# Patient Record
Sex: FEMALE | Race: WHITE | NOT HISPANIC OR LATINO | Employment: OTHER | RURAL
[De-identification: names, ages, dates, MRNs, and addresses within clinical notes are randomized per-mention and may not be internally consistent; named-entity substitution may affect disease eponyms.]

---

## 2017-05-09 RX ORDER — OMEPRAZOLE 20 MG/1
20 CAPSULE, DELAYED RELEASE ORAL DAILY
Qty: 30 CAPSULE | Refills: 0 | Status: SHIPPED | OUTPATIENT
Start: 2017-05-09 | End: 2017-07-17 | Stop reason: SDUPTHER

## 2017-05-19 ENCOUNTER — OFFICE VISIT (OUTPATIENT)
Dept: FAMILY MEDICINE CLINIC | Facility: CLINIC | Age: 77
End: 2017-05-19

## 2017-05-19 VITALS
HEART RATE: 69 BPM | WEIGHT: 176.2 LBS | DIASTOLIC BLOOD PRESSURE: 68 MMHG | HEIGHT: 66 IN | TEMPERATURE: 98.1 F | BODY MASS INDEX: 28.32 KG/M2 | SYSTOLIC BLOOD PRESSURE: 140 MMHG | OXYGEN SATURATION: 98 %

## 2017-05-19 DIAGNOSIS — Z00.00 ANNUAL PHYSICAL EXAM: ICD-10-CM

## 2017-05-19 DIAGNOSIS — Z12.31 ENCOUNTER FOR SCREENING MAMMOGRAM FOR MALIGNANT NEOPLASM OF BREAST: ICD-10-CM

## 2017-05-19 DIAGNOSIS — E55.9 UNSPECIFIED VITAMIN D DEFICIENCY: ICD-10-CM

## 2017-05-19 DIAGNOSIS — Z12.12 SCREENING FOR MALIGNANT NEOPLASM OF THE RECTUM: ICD-10-CM

## 2017-05-19 DIAGNOSIS — R53.83 FATIGUE, UNSPECIFIED TYPE: ICD-10-CM

## 2017-05-19 DIAGNOSIS — E78.2 MIXED HYPERLIPIDEMIA: Primary | ICD-10-CM

## 2017-05-19 LAB
BILIRUB BLD-MCNC: NEGATIVE MG/DL
CLARITY, POC: CLEAR
COLOR UR: YELLOW
GLUCOSE UR STRIP-MCNC: NEGATIVE MG/DL
KETONES UR QL: NEGATIVE
LEUKOCYTE EST, POC: ABNORMAL
NITRITE UR-MCNC: NEGATIVE MG/ML
PH UR: 7 [PH] (ref 5–8)
PROT UR STRIP-MCNC: NEGATIVE MG/DL
RBC # UR STRIP: NEGATIVE /UL
SP GR UR: 1.01 (ref 1–1.03)
UROBILINOGEN UR QL: NORMAL

## 2017-05-19 PROCEDURE — 81003 URINALYSIS AUTO W/O SCOPE: CPT | Performed by: FAMILY MEDICINE

## 2017-05-19 PROCEDURE — G0439 PPPS, SUBSEQ VISIT: HCPCS | Performed by: FAMILY MEDICINE

## 2017-05-20 LAB
25(OH)D3+25(OH)D2 SERPL-MCNC: 28 NG/ML (ref 30–100)
ALBUMIN SERPL-MCNC: 4.4 G/DL (ref 3.5–4.8)
ALBUMIN/GLOB SERPL: 1.9 {RATIO} (ref 1.2–2.2)
ALP SERPL-CCNC: 82 IU/L (ref 39–117)
ALT SERPL-CCNC: 12 IU/L (ref 0–32)
AST SERPL-CCNC: 29 IU/L (ref 0–40)
BASOPHILS # BLD AUTO: 0 X10E3/UL (ref 0–0.2)
BASOPHILS NFR BLD AUTO: 1 %
BILIRUB SERPL-MCNC: 0.7 MG/DL (ref 0–1.2)
BUN SERPL-MCNC: 12 MG/DL (ref 8–27)
BUN/CREAT SERPL: 15 (ref 12–28)
CALCIUM SERPL-MCNC: 9.4 MG/DL (ref 8.7–10.3)
CHLORIDE SERPL-SCNC: 104 MMOL/L (ref 96–106)
CHOLEST SERPL-MCNC: 158 MG/DL (ref 100–199)
CO2 SERPL-SCNC: 21 MMOL/L (ref 18–29)
CREAT SERPL-MCNC: 0.79 MG/DL (ref 0.57–1)
EOSINOPHIL # BLD AUTO: 0.2 X10E3/UL (ref 0–0.4)
EOSINOPHIL NFR BLD AUTO: 5 %
ERYTHROCYTE [DISTWIDTH] IN BLOOD BY AUTOMATED COUNT: 13.1 % (ref 12.3–15.4)
GLOBULIN SER CALC-MCNC: 2.3 G/DL (ref 1.5–4.5)
GLUCOSE SERPL-MCNC: 110 MG/DL (ref 65–99)
HCT VFR BLD AUTO: 40 % (ref 34–46.6)
HDLC SERPL-MCNC: 45 MG/DL
HGB BLD-MCNC: 13.6 G/DL (ref 11.1–15.9)
IMM GRANULOCYTES # BLD: 0 X10E3/UL (ref 0–0.1)
IMM GRANULOCYTES NFR BLD: 0 %
LDLC SERPL CALC-MCNC: 91 MG/DL (ref 0–99)
LYMPHOCYTES # BLD AUTO: 1.3 X10E3/UL (ref 0.7–3.1)
LYMPHOCYTES NFR BLD AUTO: 28 %
MCH RBC QN AUTO: 30.6 PG (ref 26.6–33)
MCHC RBC AUTO-ENTMCNC: 34 G/DL (ref 31.5–35.7)
MCV RBC AUTO: 90 FL (ref 79–97)
MONOCYTES # BLD AUTO: 0.3 X10E3/UL (ref 0.1–0.9)
MONOCYTES NFR BLD AUTO: 6 %
NEUTROPHILS # BLD AUTO: 2.8 X10E3/UL (ref 1.4–7)
NEUTROPHILS NFR BLD AUTO: 60 %
PLATELET # BLD AUTO: 226 X10E3/UL (ref 150–379)
POTASSIUM SERPL-SCNC: 4.3 MMOL/L (ref 3.5–5.2)
PROT SERPL-MCNC: 6.7 G/DL (ref 6–8.5)
RBC # BLD AUTO: 4.45 X10E6/UL (ref 3.77–5.28)
SODIUM SERPL-SCNC: 141 MMOL/L (ref 134–144)
T4 FREE SERPL-MCNC: 1.07 NG/DL (ref 0.82–1.77)
TRIGL SERPL-MCNC: 112 MG/DL (ref 0–149)
TSH SERPL DL<=0.005 MIU/L-ACNC: 1.45 UIU/ML (ref 0.45–4.5)
VLDLC SERPL CALC-MCNC: 22 MG/DL (ref 5–40)
WBC # BLD AUTO: 4.6 X10E3/UL (ref 3.4–10.8)

## 2017-05-22 RX ORDER — CHOLECALCIFEROL (VITAMIN D3) 125 MCG
2000 CAPSULE ORAL DAILY
COMMUNITY
End: 2019-01-28

## 2017-05-24 DIAGNOSIS — Z12.31 ENCOUNTER FOR SCREENING MAMMOGRAM FOR MALIGNANT NEOPLASM OF BREAST: ICD-10-CM

## 2017-05-24 LAB — HM MAMMOGRAM: NORMAL

## 2017-06-08 LAB
EXPIRATION DATE 2: NORMAL
EXPIRATION DATE 3: NORMAL
EXPIRATION DATE: NORMAL
GASTROCULT GAST QL: NEGATIVE
HEMOCCULT SP2 STL QL: NEGATIVE
HEMOCCULT SP3 STL QL: NEGATIVE
Lab: NORMAL

## 2017-06-08 PROCEDURE — 82270 OCCULT BLOOD FECES: CPT | Performed by: FAMILY MEDICINE

## 2017-07-17 RX ORDER — OMEPRAZOLE 20 MG/1
20 CAPSULE, DELAYED RELEASE ORAL DAILY
Qty: 30 CAPSULE | Refills: 2 | Status: SHIPPED | OUTPATIENT
Start: 2017-07-17 | End: 2018-11-30

## 2017-09-25 RX ORDER — METOCLOPRAMIDE 10 MG/1
10 TABLET ORAL DAILY
Qty: 90 TABLET | Refills: 2 | Status: SHIPPED | OUTPATIENT
Start: 2017-09-25 | End: 2018-11-30

## 2018-11-15 RX ORDER — TEMAZEPAM 30 MG/1
30 CAPSULE ORAL NIGHTLY PRN
Qty: 30 CAPSULE | Refills: 2 | Status: SHIPPED | OUTPATIENT
Start: 2018-11-15 | End: 2019-02-18 | Stop reason: SDUPTHER

## 2018-11-15 RX ORDER — TEMAZEPAM 30 MG/1
CAPSULE ORAL
Qty: 30 CAPSULE | Refills: 2 | Status: SHIPPED | OUTPATIENT
Start: 2018-11-15 | End: 2018-11-15 | Stop reason: SDUPTHER

## 2018-11-30 ENCOUNTER — OFFICE VISIT (OUTPATIENT)
Dept: FAMILY MEDICINE CLINIC | Facility: CLINIC | Age: 78
End: 2018-11-30

## 2018-11-30 VITALS
HEIGHT: 66 IN | WEIGHT: 164.2 LBS | OXYGEN SATURATION: 94 % | DIASTOLIC BLOOD PRESSURE: 76 MMHG | TEMPERATURE: 97.1 F | SYSTOLIC BLOOD PRESSURE: 145 MMHG | HEART RATE: 76 BPM | BODY MASS INDEX: 26.39 KG/M2 | RESPIRATION RATE: 18 BRPM

## 2018-11-30 DIAGNOSIS — F41.9 ANXIETY: ICD-10-CM

## 2018-11-30 DIAGNOSIS — I10 ESSENTIAL HYPERTENSION: ICD-10-CM

## 2018-11-30 DIAGNOSIS — M54.16 BILATERAL LUMBAR RADICULOPATHY: ICD-10-CM

## 2018-11-30 DIAGNOSIS — M51.36 DDD (DEGENERATIVE DISC DISEASE), LUMBAR: Primary | ICD-10-CM

## 2018-11-30 PROCEDURE — 96372 THER/PROPH/DIAG INJ SC/IM: CPT | Performed by: NURSE PRACTITIONER

## 2018-11-30 PROCEDURE — 99213 OFFICE O/P EST LOW 20 MIN: CPT | Performed by: NURSE PRACTITIONER

## 2018-11-30 RX ORDER — TRIAMCINOLONE ACETONIDE 40 MG/ML
40 INJECTION, SUSPENSION INTRA-ARTICULAR; INTRAMUSCULAR ONCE
Status: COMPLETED | OUTPATIENT
Start: 2018-11-30 | End: 2018-11-30

## 2018-11-30 RX ORDER — KETOROLAC TROMETHAMINE 30 MG/ML
30 INJECTION, SOLUTION INTRAMUSCULAR; INTRAVENOUS ONCE
Status: DISCONTINUED | OUTPATIENT
Start: 2018-11-30 | End: 2018-11-30

## 2018-11-30 RX ORDER — ALPRAZOLAM 0.25 MG/1
0.25 TABLET ORAL 2 TIMES DAILY PRN
Qty: 30 TABLET | Refills: 0 | Status: SHIPPED | OUTPATIENT
Start: 2018-11-30 | End: 2020-05-08 | Stop reason: SDUPTHER

## 2018-11-30 RX ADMIN — TRIAMCINOLONE ACETONIDE 40 MG: 40 INJECTION, SUSPENSION INTRA-ARTICULAR; INTRAMUSCULAR at 15:28

## 2018-11-30 NOTE — PROGRESS NOTES
Chief Complaint   Patient presents with   • Hip Pain     bilateral hip pain, radiates down to legs/feet x1 month        Subjective   Shira King is a 78 y.o.  female who presents today for bilateral hip pain that radiates to both feet.  There is burning pain in the feet and lower legs.  She has known degenerative disc disease.  Her  has been recently hospitalized with an MI and he is currently in a swing bed.  She has had an increase in pain since she has been sitting and traveling as well as pulling on him, approximately 2 weeks.  She had a few Norco 5mg left over at home and has been taking 1/2 at a time and this has been somewhat helpful as well as some OTC Ibuprofen.  She is tearful and anxious when discussing her  and his medical problems.    Shira King  has a past medical history of Dyspnea, Dyspnea, Hypertension, and Valvular disease.    No Known Allergies    Current Outpatient Medications:   •  amLODIPine (NORVASC) 2.5 MG tablet, Take 2.5 mg by mouth daily., Disp: , Rfl:   •  aspirin 81 MG chewable tablet, Chew 81 mg daily., Disp: , Rfl:   •  Cholecalciferol (VITAMIN D3) 2000 UNITS tablet, Take 2,000 Units by mouth Daily., Disp: , Rfl:   •  fluorometholone (FML) 0.1 % ophthalmic suspension, , Disp: , Rfl:   •  losartan (COZAAR) 100 MG tablet, Take 100 mg by mouth daily., Disp: , Rfl:   •  meclizine (ANTIVERT) 25 MG tablet, Take 25 mg by mouth 2 (two) times a day., Disp: , Rfl:   •  metoclopramide (REGLAN) 10 MG tablet, Take 10 mg by mouth 4 (four) times a day before meals and nightly., Disp: , Rfl:   •  pravastatin (PRAVACHOL) 20 MG tablet, Take 20 mg by mouth daily., Disp: , Rfl:   •  temazepam (RESTORIL) 30 MG capsule, Take 1 capsule by mouth At Night As Needed for Anxiety., Disp: 30 capsule, Rfl: 2  Past Medical History:   Diagnosis Date   • Dyspnea    • Dyspnea    • Hypertension    • Valvular disease      Past Surgical History:   Procedure Laterality Date   • BLADDER  "REPAIR  2005   • BREAST SURGERY     • BUNIONECTOMY  2007   • CATARACT EXTRACTION Bilateral 2005   • CHOLECYSTECTOMY     • ENDOSCOPY  2005   • HERNIA REPAIR  1963   • HYSTERECTOMY  1979   • NECK SURGERY  1974    VERTEBRA   • REPLACEMENT TOTAL KNEE BILATERAL     • TONSILLECTOMY       Social History     Socioeconomic History   • Marital status:      Spouse name: Not on file   • Number of children: Not on file   • Years of education: Not on file   • Highest education level: Not on file   Tobacco Use   • Smoking status: Never Smoker   • Smokeless tobacco: Never Used   Substance and Sexual Activity   • Alcohol use: No   • Drug use: No   • Sexual activity: Defer     Family History   Problem Relation Age of Onset   • Stroke Mother    • Heart disease Mother    • Stroke Sister    • Heart attack Maternal Grandmother        Family history, surgical history, past medical history, Allergies and med's reviewed with patient today and updated in [employer] EMR.     ROS:  Review of Systems   Constitutional: Positive for fatigue.   HENT: Negative.    Eyes: Negative.    Respiratory: Negative.    Cardiovascular: Negative.    Gastrointestinal: Negative.    Endocrine: Negative.    Genitourinary: Negative.    Musculoskeletal: Positive for arthralgias, back pain and gait problem.   Skin: Negative.    Allergic/Immunologic: Negative.    Hematological: Negative.    Psychiatric/Behavioral: The patient is nervous/anxious.        OBJECTIVE:  Vitals:    11/30/18 1449   BP: 145/76   BP Location: Left arm   Patient Position: Sitting   Cuff Size: Adult   Pulse: 76   Resp: 18   Temp: 97.1 °F (36.2 °C)   TempSrc: Temporal   SpO2: 94%   Weight: 74.5 kg (164 lb 3.2 oz)   Height: 167.6 cm (66\")     Physical Exam   Constitutional: She is oriented to person, place, and time. She appears well-developed and well-nourished.   HENT:   Head: Normocephalic and atraumatic.   Eyes: Conjunctivae and EOM are normal. Pupils are equal, round, and reactive to light. "   Neck: Normal range of motion. Neck supple.   Cardiovascular: Normal rate, regular rhythm and normal heart sounds.   Pulmonary/Chest: Effort normal and breath sounds normal.   Musculoskeletal: She exhibits tenderness.   Lumbar vertebral tenderness.  Positive straight leg lift 30 degrees on the right; positive at 15 degrees on the left.     Neurological: She is alert and oriented to person, place, and time.   Skin: Skin is warm and dry.   Psychiatric: She has a normal mood and affect.   Tearful when talking about 's health   Nursing note and vitals reviewed.      ASSESSMENT/ PLAN:    Shira was seen today for hip pain.    Diagnoses and all orders for this visit:    DDD (degenerative disc disease), lumbar  -     triamcinolone acetonide (KENALOG-40) injection 40 mg; Inject 1 mL into the appropriate muscle as directed by prescriber 1 (One) Time.  -     ketorolac (TORADOL) injection 30 mg; Inject 1 mL into the appropriate muscle as directed by prescriber 1 (One) Time.    Bilateral lumbar radiculopathy  -     triamcinolone acetonide (KENALOG-40) injection 40 mg; Inject 1 mL into the appropriate muscle as directed by prescriber 1 (One) Time.  -     ketorolac (TORADOL) injection 30 mg; Inject 1 mL into the appropriate muscle as directed by prescriber 1 (One) Time.    Essential hypertension    Anxiety  -     ALPRAZolam (XANAX) 0.25 MG tablet; Take 1 tablet by mouth 2 (Two) Times a Day As Needed for Anxiety.        Orders Placed Today:     New Medications Ordered This Visit   Medications   • triamcinolone acetonide (KENALOG-40) injection 40 mg   • ketorolac (TORADOL) injection 30 mg   • ALPRAZolam (XANAX) 0.25 MG tablet     Sig: Take 1 tablet by mouth 2 (Two) Times a Day As Needed for Anxiety.     Dispense:  30 tablet     Refill:  0        Management Plan:     An After Visit Summary was printed and given to the patient at discharge.    Follow-up: Return in about 3 months (around 2/28/2019) for Next scheduled follow  up.    Bonita Romero, APRN 11/30/2018 2:57 PM  This note was electronically signed.

## 2018-12-07 RX ORDER — LOSARTAN POTASSIUM 100 MG/1
TABLET ORAL
Qty: 90 TABLET | Refills: 1 | Status: SHIPPED | OUTPATIENT
Start: 2018-12-07 | End: 2019-06-05 | Stop reason: SDUPTHER

## 2018-12-10 ENCOUNTER — TELEPHONE (OUTPATIENT)
Dept: FAMILY MEDICINE CLINIC | Facility: CLINIC | Age: 78
End: 2018-12-10

## 2018-12-10 DIAGNOSIS — M54.50 LUMBAR PAIN: Primary | ICD-10-CM

## 2018-12-10 NOTE — TELEPHONE ENCOUNTER
Pt's daughter stated that Pt is hurting in her legs really bad and at last visit she received a steroid shot and Pt stated that Bonita said if Pt is still in pain then a script for lower tabs can be sent to pharmacy.    [facility] is the pharmacy.    Thanks.

## 2018-12-11 RX ORDER — HYDROCODONE BITARTRATE AND ACETAMINOPHEN 5; 325 MG/1; MG/1
1 TABLET ORAL EVERY 6 HOURS PRN
Qty: 15 TABLET | Refills: 0 | Status: SHIPPED | OUTPATIENT
Start: 2018-12-11 | End: 2019-01-14 | Stop reason: SDUPTHER

## 2018-12-11 NOTE — TELEPHONE ENCOUNTER
Pt daughter calling - to inquire about medications being sent to pharmacy.   Ms. Silva says she is having leg & back pain., requesting the script of Lortab to be sent to [facility].

## 2018-12-31 RX ORDER — MELOXICAM 15 MG/1
TABLET ORAL
Qty: 30 TABLET | Refills: 5 | Status: SHIPPED | OUTPATIENT
Start: 2018-12-31 | End: 2019-08-09 | Stop reason: ALTCHOICE

## 2019-01-14 ENCOUNTER — TELEPHONE (OUTPATIENT)
Dept: FAMILY MEDICINE CLINIC | Facility: CLINIC | Age: 79
End: 2019-01-14

## 2019-01-14 DIAGNOSIS — M54.50 LUMBAR PAIN: ICD-10-CM

## 2019-01-14 RX ORDER — HYDROCODONE BITARTRATE AND ACETAMINOPHEN 5; 325 MG/1; MG/1
TABLET ORAL
Qty: 15 TABLET | Refills: 0 | Status: SHIPPED | OUTPATIENT
Start: 2019-01-14 | End: 2019-01-15 | Stop reason: SDUPTHER

## 2019-01-15 DIAGNOSIS — M54.50 LUMBAR PAIN: ICD-10-CM

## 2019-01-16 RX ORDER — HYDROCODONE BITARTRATE AND ACETAMINOPHEN 5; 325 MG/1; MG/1
1 TABLET ORAL EVERY 6 HOURS PRN
Qty: 15 TABLET | Refills: 0 | Status: SHIPPED | OUTPATIENT
Start: 2019-01-16 | End: 2019-01-28 | Stop reason: SDUPTHER

## 2019-01-28 ENCOUNTER — OFFICE VISIT (OUTPATIENT)
Dept: FAMILY MEDICINE CLINIC | Facility: CLINIC | Age: 79
End: 2019-01-28

## 2019-01-28 VITALS
OXYGEN SATURATION: 95 % | HEIGHT: 66 IN | RESPIRATION RATE: 19 BRPM | SYSTOLIC BLOOD PRESSURE: 147 MMHG | BODY MASS INDEX: 26.23 KG/M2 | DIASTOLIC BLOOD PRESSURE: 77 MMHG | WEIGHT: 163.2 LBS | HEART RATE: 71 BPM

## 2019-01-28 DIAGNOSIS — R53.83 OTHER FATIGUE: ICD-10-CM

## 2019-01-28 DIAGNOSIS — M25.561 ACUTE PAIN OF RIGHT KNEE: ICD-10-CM

## 2019-01-28 DIAGNOSIS — M54.50 LUMBAR PAIN: ICD-10-CM

## 2019-01-28 DIAGNOSIS — M25.651 DECREASED MOBILITY OF JOINT OF RIGHT SIDE OF PELVIS: ICD-10-CM

## 2019-01-28 DIAGNOSIS — M25.551 RIGHT HIP PAIN: Primary | ICD-10-CM

## 2019-01-28 PROCEDURE — 99213 OFFICE O/P EST LOW 20 MIN: CPT | Performed by: NURSE PRACTITIONER

## 2019-01-28 RX ORDER — HYDROCODONE BITARTRATE AND ACETAMINOPHEN 5; 325 MG/1; MG/1
1 TABLET ORAL EVERY 6 HOURS PRN
Qty: 30 TABLET | Refills: 0 | Status: SHIPPED | OUTPATIENT
Start: 2019-01-28 | End: 2019-04-12 | Stop reason: SDUPTHER

## 2019-01-28 NOTE — PROGRESS NOTES
Chief Complaint   Patient presents with   • Hip Pain     radiates to knee and ankle on right side        Subjective   Shira King is a 79 y.o.  female who presents today for right hip pain and right knee pain.    HPI:  Ms. King presents today with significant right hip pain, worsening in nature.  It often radiates into the right knee.  She feels, on a daily basis, that the hip will give way with her and cause her to fall.  It hurts both with weight bearing and at rest.  She has known lumbar DDD but the hip has not been interrogated. She has a right TKR that was done by Dr. Barroso in [place].  If she requires an orthopedic surgeon, she prefers to stay in [place] at the [facility].    Shira King  has a past medical history of Dyspnea, Dyspnea, Hypertension, and Valvular disease.    No Known Allergies    Current Outpatient Medications:   •  ALPRAZolam (XANAX) 0.25 MG tablet, Take 1 tablet by mouth 2 (Two) Times a Day As Needed for Anxiety., Disp: 30 tablet, Rfl: 0  •  amLODIPine (NORVASC) 2.5 MG tablet, Take 2.5 mg by mouth daily., Disp: , Rfl:   •  aspirin 81 MG chewable tablet, Chew 81 mg daily., Disp: , Rfl:   •  fluorometholone (FML) 0.1 % ophthalmic suspension, , Disp: , Rfl:   •  HYDROcodone-acetaminophen (NORCO) 5-325 MG per tablet, Take 1 tablet by mouth Every 6 (Six) Hours As Needed for Moderate Pain  or Severe Pain ., Disp: 15 tablet, Rfl: 0  •  losartan (COZAAR) 100 MG tablet, TAKE 1 TABLET BY MOUTH ONCE DAILY, Disp: 90 tablet, Rfl: 1  •  meclizine (ANTIVERT) 25 MG tablet, Take 25 mg by mouth 2 (two) times a day., Disp: , Rfl:   •  meloxicam (MOBIC) 15 MG tablet, TAKE 1 TABLET BY MOUTH ONCE DAILY, Disp: 30 tablet, Rfl: 5  •  metoclopramide (REGLAN) 10 MG tablet, Take 10 mg by mouth 4 (four) times a day before meals and nightly., Disp: , Rfl:   •  pravastatin (PRAVACHOL) 20 MG tablet, Take 20 mg by mouth daily., Disp: , Rfl:   •  temazepam (RESTORIL) 30 MG capsule, Take 1 capsule by mouth  "At Night As Needed for Anxiety., Disp: 30 capsule, Rfl: 2  Past Medical History:   Diagnosis Date   • Dyspnea    • Dyspnea    • Hypertension    • Valvular disease      Past Surgical History:   Procedure Laterality Date   • BLADDER REPAIR  2005   • BREAST SURGERY     • BUNIONECTOMY  2007   • CATARACT EXTRACTION Bilateral 2005   • CHOLECYSTECTOMY     • ENDOSCOPY  2005   • HERNIA REPAIR  1963   • HYSTERECTOMY  1979   • NECK SURGERY  1974    VERTEBRA   • REPLACEMENT TOTAL KNEE BILATERAL     • TONSILLECTOMY       Social History     Socioeconomic History   • Marital status:      Spouse name: Not on file   • Number of children: Not on file   • Years of education: Not on file   • Highest education level: Not on file   Tobacco Use   • Smoking status: Never Smoker   • Smokeless tobacco: Never Used   Substance and Sexual Activity   • Alcohol use: No   • Drug use: No   • Sexual activity: Defer     Family History   Problem Relation Age of Onset   • Stroke Mother    • Heart disease Mother    • Stroke Sister    • Heart attack Maternal Grandmother        Family history, surgical history, past medical history, Allergies and med's reviewed with patient today and updated in [employer] EMR.     ROS:  Review of Systems   Constitutional: Negative.    HENT: Negative.    Eyes: Negative.    Respiratory: Negative.    Cardiovascular: Negative.    Gastrointestinal: Negative.    Endocrine: Negative.    Genitourinary: Negative.    Musculoskeletal: Positive for arthralgias, back pain and gait problem.   Skin: Negative.    Allergic/Immunologic: Negative.    Hematological: Negative.    Psychiatric/Behavioral: Negative.        OBJECTIVE:  Vitals:    01/28/19 1437   BP: 147/77   BP Location: Right arm   Patient Position: Sitting   Cuff Size: Adult   Pulse: 71   Resp: 19   SpO2: 95%   Weight: 74 kg (163 lb 3.2 oz)   Height: 167.6 cm (66\")     Physical Exam   Constitutional: She is oriented to person, place, and time. She appears well-developed and " well-nourished.   HENT:   Head: Normocephalic and atraumatic.   Eyes: Conjunctivae and EOM are normal. Pupils are equal, round, and reactive to light.   Neck: Normal range of motion. Neck supple.   Cardiovascular: Normal rate and regular rhythm.   Murmur heard.  Pulmonary/Chest: Effort normal and breath sounds normal.   Abdominal: Soft. Bowel sounds are normal.   Musculoskeletal:   Right hip decreased ROM secondary to pain.  She is unable to cross the right leg over the left in a seated position.  She has pain to palpation.   Neurological: She is alert and oriented to person, place, and time.   Skin: Skin is warm and dry. Capillary refill takes less than 2 seconds.   Psychiatric: She has a normal mood and affect. Her behavior is normal. Judgment and thought content normal.   Nursing note and vitals reviewed.      ASSESSMENT/ PLAN:    Shira was seen today for hip pain.    Diagnoses and all orders for this visit:    Right hip pain  -     XR Hip With or Without Pelvis 2 - 3 View Right; Future    Decreased mobility of joint of right side of pelvis  -     XR Hip With or Without Pelvis 2 - 3 View Right; Future    Acute pain of right knee  -     XR Knee 3 View Right; Future    Other fatigue        Orders Placed Today:     No orders of the defined types were placed in this encounter.       Management Plan:     An After Visit Summary was printed and given to the patient at discharge.    Follow-up: Return in about 2 weeks (around 2/11/2019) for Recheck.    Bonita Romero, ROBIN 1/28/2019 3:09 PM  This note was electronically signed.

## 2019-01-29 ENCOUNTER — TELEPHONE (OUTPATIENT)
Dept: FAMILY MEDICINE CLINIC | Facility: CLINIC | Age: 79
End: 2019-01-29

## 2019-01-29 NOTE — TELEPHONE ENCOUNTER
PT REQUEST XRAY RESULTS FROM [facility] THAT WERE DONE YESTERDAY -   HIP & KNEE XRAYS

## 2019-01-30 ENCOUNTER — TELEPHONE (OUTPATIENT)
Dept: FAMILY MEDICINE CLINIC | Facility: CLINIC | Age: 79
End: 2019-01-30

## 2019-01-30 NOTE — TELEPHONE ENCOUNTER
----- Message from ROBIN Montenegro sent at 1/29/2019  5:54 PM CST -----  Normal result.  Most likely the pain is originating from the low back and radiating to the hip.

## 2019-01-31 ENCOUNTER — TELEPHONE (OUTPATIENT)
Dept: FAMILY MEDICINE CLINIC | Facility: CLINIC | Age: 79
End: 2019-01-31

## 2019-01-31 NOTE — TELEPHONE ENCOUNTER
----- Message from ROBIN Montenegro sent at 1/31/2019  7:35 AM CST -----  Normal hip xray.  Likely pain in hip is referred pain from her lumbar DDD.

## 2019-02-19 RX ORDER — TEMAZEPAM 30 MG/1
CAPSULE ORAL
Qty: 30 CAPSULE | Refills: 5 | Status: SHIPPED | OUTPATIENT
Start: 2019-02-19 | End: 2019-07-16 | Stop reason: SDUPTHER

## 2019-02-21 ENCOUNTER — TELEPHONE (OUTPATIENT)
Dept: FAMILY MEDICINE CLINIC | Facility: CLINIC | Age: 79
End: 2019-02-21

## 2019-02-21 NOTE — TELEPHONE ENCOUNTER
PT daughter Montse called, her and PT are wondering what next steps are with care. PT is still experiencing pain in hip causing difficulty with sleep. Wondering if PT would be needed for her? PT daughter would like someone to call PT at [phone] about options for this.

## 2019-02-22 NOTE — TELEPHONE ENCOUNTER
I have spoken with patient regarding options since her xray was normal.  We discussed PT vs. Ortho referral vs hip injection here.  She wishes to start with an injection here.  We will schedule her.

## 2019-02-26 ENCOUNTER — OFFICE VISIT (OUTPATIENT)
Dept: FAMILY MEDICINE CLINIC | Facility: CLINIC | Age: 79
End: 2019-02-26

## 2019-02-26 VITALS
HEART RATE: 75 BPM | TEMPERATURE: 97.4 F | BODY MASS INDEX: 26.03 KG/M2 | SYSTOLIC BLOOD PRESSURE: 132 MMHG | DIASTOLIC BLOOD PRESSURE: 65 MMHG | WEIGHT: 162 LBS | OXYGEN SATURATION: 97 % | HEIGHT: 66 IN

## 2019-02-26 DIAGNOSIS — M70.71 BURSITIS OF RIGHT HIP, UNSPECIFIED BURSA: Primary | ICD-10-CM

## 2019-02-26 DIAGNOSIS — H66.001 ACUTE SUPPURATIVE OTITIS MEDIA OF RIGHT EAR WITHOUT SPONTANEOUS RUPTURE OF TYMPANIC MEMBRANE, RECURRENCE NOT SPECIFIED: ICD-10-CM

## 2019-02-26 DIAGNOSIS — J02.9 ACUTE PHARYNGITIS, UNSPECIFIED ETIOLOGY: ICD-10-CM

## 2019-02-26 DIAGNOSIS — M25.551 RIGHT HIP PAIN: ICD-10-CM

## 2019-02-26 PROCEDURE — 20610 DRAIN/INJ JOINT/BURSA W/O US: CPT | Performed by: NURSE PRACTITIONER

## 2019-02-26 PROCEDURE — 99213 OFFICE O/P EST LOW 20 MIN: CPT | Performed by: NURSE PRACTITIONER

## 2019-02-26 RX ORDER — AMOXICILLIN 500 MG/1
500 CAPSULE ORAL 3 TIMES DAILY
Qty: 21 CAPSULE | Refills: 0 | Status: SHIPPED | OUTPATIENT
Start: 2019-02-26 | End: 2019-03-05

## 2019-02-26 RX ORDER — METHYLPREDNISOLONE ACETATE 40 MG/ML
40 INJECTION, SUSPENSION INTRA-ARTICULAR; INTRALESIONAL; INTRAMUSCULAR; SOFT TISSUE ONCE
Status: COMPLETED | OUTPATIENT
Start: 2019-02-26 | End: 2019-02-26

## 2019-02-26 RX ADMIN — METHYLPREDNISOLONE ACETATE 40 MG: 40 INJECTION, SUSPENSION INTRA-ARTICULAR; INTRALESIONAL; INTRAMUSCULAR; SOFT TISSUE at 13:29

## 2019-02-26 NOTE — PROGRESS NOTES
Chief Complaint   Patient presents with   • Sore Throat   • Hip Pain     right hip   • Facial Injury     spot on forehead        Subjective   Shira King is a 79 y.o.  female who presents today for continued hip pain and sore throat/facial pain.    HPI:      SORE THROAT:  Patient has had a sore throat with right ear pain that started on Saturday.      SKIN LESION:  She has an area on her right temple that she is concerned about and wants us to look at it.  It has been present for about a year or so.  It does not bother her.    RIGHT HIP PAIN:  She continues to have right hip pain that has been unresponsive to treatment.  The hip has been xrayed and was normal.  This is felt to be a bursitis and we will inject her hip today.  This was the purpose of the visit.    Shira King  has a past medical history of Dyspnea, Dyspnea, Hypertension, and Valvular disease.    No Known Allergies    Current Outpatient Medications:   •  ALPRAZolam (XANAX) 0.25 MG tablet, Take 1 tablet by mouth 2 (Two) Times a Day As Needed for Anxiety., Disp: 30 tablet, Rfl: 0  •  amLODIPine (NORVASC) 2.5 MG tablet, Take 2.5 mg by mouth daily., Disp: , Rfl:   •  aspirin 81 MG chewable tablet, Chew 81 mg daily., Disp: , Rfl:   •  fluorometholone (FML) 0.1 % ophthalmic suspension, , Disp: , Rfl:   •  HYDROcodone-acetaminophen (NORCO) 5-325 MG per tablet, Take 1 tablet by mouth Every 6 (Six) Hours As Needed for Moderate Pain  or Severe Pain ., Disp: 30 tablet, Rfl: 0  •  losartan (COZAAR) 100 MG tablet, TAKE 1 TABLET BY MOUTH ONCE DAILY, Disp: 90 tablet, Rfl: 1  •  meclizine (ANTIVERT) 25 MG tablet, Take 25 mg by mouth 2 (two) times a day., Disp: , Rfl:   •  meloxicam (MOBIC) 15 MG tablet, TAKE 1 TABLET BY MOUTH ONCE DAILY, Disp: 30 tablet, Rfl: 5  •  metoclopramide (REGLAN) 10 MG tablet, Take 10 mg by mouth 4 (four) times a day before meals and nightly., Disp: , Rfl:   •  pravastatin (PRAVACHOL) 20 MG tablet, Take 20 mg by mouth  daily., Disp: , Rfl:   •  temazepam (RESTORIL) 30 MG capsule, TAKE ONE CAPSULE BY MOUTH AT BEDTIME AS NEEDED FOR ANXIETY, Disp: 30 capsule, Rfl: 5  Past Medical History:   Diagnosis Date   • Dyspnea    • Dyspnea    • Hypertension    • Valvular disease      Past Surgical History:   Procedure Laterality Date   • BLADDER REPAIR  2005   • BREAST SURGERY     • BUNIONECTOMY  2007   • CATARACT EXTRACTION Bilateral 2005   • CHOLECYSTECTOMY     • ENDOSCOPY  2005   • HERNIA REPAIR  1963   • HYSTERECTOMY  1979   • NECK SURGERY  1974    VERTEBRA   • REPLACEMENT TOTAL KNEE BILATERAL     • TONSILLECTOMY       Social History     Socioeconomic History   • Marital status:      Spouse name: Not on file   • Number of children: Not on file   • Years of education: Not on file   • Highest education level: Not on file   Tobacco Use   • Smoking status: Never Smoker   • Smokeless tobacco: Never Used   Substance and Sexual Activity   • Alcohol use: No   • Drug use: No   • Sexual activity: Defer     Family History   Problem Relation Age of Onset   • Stroke Mother    • Heart disease Mother    • Stroke Sister    • Heart attack Maternal Grandmother        Family history, surgical history, past medical history, Allergies and med's reviewed with patient today and updated in [employer] EMR.     ROS:  Review of Systems   Constitutional: Negative.    HENT: Positive for ear pain and sore throat.    Eyes: Negative.    Respiratory: Negative.    Cardiovascular: Negative.    Gastrointestinal: Negative.    Endocrine: Negative.    Genitourinary: Negative.    Musculoskeletal:        Right hip pain   Skin:        Lesion on right temple.   Allergic/Immunologic: Negative.    Neurological: Negative.    Hematological: Negative.    Psychiatric/Behavioral: Negative.        OBJECTIVE:  Vitals:    02/26/19 1047   BP: 132/65   BP Location: Left arm   Patient Position: Sitting   Cuff Size: Adult   Pulse: 75   Temp: 97.4 °F (36.3 °C)   TempSrc: Tympanic   SpO2: 97%  "  Weight: 73.5 kg (162 lb)   Height: 167.6 cm (66\")     Physical Exam   Constitutional: She is oriented to person, place, and time. She appears well-developed and well-nourished.   HENT:   Head: Normocephalic and atraumatic.   Left Ear: External ear normal.   Nose: Nose normal.   Mouth/Throat: Oropharynx is clear and moist.   Right TM is erythematous.  Right pharynx is edematous and erythematous.   Eyes: Conjunctivae and EOM are normal. Pupils are equal, round, and reactive to light.   Neck: Normal range of motion. Neck supple.   Cardiovascular: Normal rate, regular rhythm and normal heart sounds.   Pulmonary/Chest: Effort normal and breath sounds normal.   Abdominal: Soft.   Musculoskeletal: Normal range of motion. She exhibits tenderness.   Point tenderness to the right hip   Neurological: She is alert and oriented to person, place, and time.   Skin: Skin is warm and dry. Capillary refill takes less than 2 seconds.   Psychiatric: She has a normal mood and affect. Her behavior is normal. Judgment and thought content normal.   Nursing note and vitals reviewed.      ASSESSMENT/ PLAN:    Shira was seen today for sore throat, hip pain and facial injury.    Diagnoses and all orders for this visit:    Bursitis of right hip, unspecified bursa  -     methylPREDNISolone acetate (DEPO-medrol) injection 40 mg; Inject 1 mL into the appropriate joint as directed by provider 1 (One) Time.    Right hip pain  -     methylPREDNISolone acetate (DEPO-medrol) injection 40 mg; Inject 1 mL into the appropriate joint as directed by provider 1 (One) Time.    Acute pharyngitis, unspecified etiology  -     amoxicillin (AMOXIL) 500 MG capsule; Take 1 capsule by mouth 3 (Three) Times a Day for 7 days.    Acute suppurative otitis media of right ear without spontaneous rupture of tympanic membrane, recurrence not specified  -     amoxicillin (AMOXIL) 500 MG capsule; Take 1 capsule by mouth 3 (Three) Times a Day for 7 days.        Orders " Placed Today:     New Medications Ordered This Visit   Medications   • amoxicillin (AMOXIL) 500 MG capsule     Sig: Take 1 capsule by mouth 3 (Three) Times a Day for 7 days.     Dispense:  21 capsule     Refill:  0   • methylPREDNISolone acetate (DEPO-medrol) injection 40 mg        Management Plan:     An After Visit Summary was printed and given to the patient at discharge.    Follow-up: Return if symptoms worsen or fail to improve.    Bonita Romero, APRN 2/26/2019 11:31 AM  This note was electronically signed.

## 2019-02-26 NOTE — PROGRESS NOTES
"Procedure   Ms. King presents today for injection of the right hip.  Informed consent is obtained outlying both risks and benefits of joint injection.    The patient was positioned on the left side and the right hip was prepped with betadine solution and draped in sterile fashion.  The joint space was entered with a 27 gauge 1 1/2\" needle and 40 mg of DepoMedrol with 1 ml of plain Lidocaine 1% was delivered.  Patient tolerated the procedure well.  Active ROM of the right hip was performed and a sterile bandage applied.  The patient left the room ambulating in satisfactory condition.  "

## 2019-03-14 RX ORDER — METOCLOPRAMIDE 10 MG/1
TABLET ORAL
Qty: 90 TABLET | Refills: 2 | Status: SHIPPED | OUTPATIENT
Start: 2019-03-14 | End: 2020-06-22

## 2019-04-05 ENCOUNTER — PROCEDURE VISIT (OUTPATIENT)
Dept: FAMILY MEDICINE CLINIC | Facility: CLINIC | Age: 79
End: 2019-04-05

## 2019-04-05 VITALS
OXYGEN SATURATION: 95 % | HEIGHT: 66 IN | SYSTOLIC BLOOD PRESSURE: 154 MMHG | TEMPERATURE: 98.6 F | BODY MASS INDEX: 26.23 KG/M2 | RESPIRATION RATE: 19 BRPM | DIASTOLIC BLOOD PRESSURE: 74 MMHG | HEART RATE: 73 BPM | WEIGHT: 163.2 LBS

## 2019-04-05 DIAGNOSIS — R30.0 DIFFICULT OR PAINFUL URINATION: ICD-10-CM

## 2019-04-05 DIAGNOSIS — B35.1 ONYCHOMYCOSIS OF TOENAIL: ICD-10-CM

## 2019-04-05 DIAGNOSIS — N39.0 URINARY TRACT INFECTION WITHOUT HEMATURIA, SITE UNSPECIFIED: Primary | ICD-10-CM

## 2019-04-05 LAB
BILIRUB BLD-MCNC: NEGATIVE MG/DL
CLARITY, POC: CLEAR
COLOR UR: YELLOW
GLUCOSE UR STRIP-MCNC: NEGATIVE MG/DL
KETONES UR QL: NEGATIVE
LEUKOCYTE EST, POC: ABNORMAL
NITRITE UR-MCNC: NEGATIVE MG/ML
PH UR: 7.5 [PH] (ref 5–8)
PROT UR STRIP-MCNC: NEGATIVE MG/DL
RBC # UR STRIP: ABNORMAL /UL
SP GR UR: 1.01 (ref 1–1.03)
UROBILINOGEN UR QL: NORMAL

## 2019-04-05 PROCEDURE — 11719 TRIM NAIL(S) ANY NUMBER: CPT | Performed by: NURSE PRACTITIONER

## 2019-04-05 PROCEDURE — 81003 URINALYSIS AUTO W/O SCOPE: CPT | Performed by: NURSE PRACTITIONER

## 2019-04-05 PROCEDURE — 99213 OFFICE O/P EST LOW 20 MIN: CPT | Performed by: NURSE PRACTITIONER

## 2019-04-05 RX ORDER — CEPHALEXIN 500 MG/1
500 CAPSULE ORAL 3 TIMES DAILY
Qty: 15 CAPSULE | Refills: 0 | Status: SHIPPED | OUTPATIENT
Start: 2019-04-05 | End: 2019-04-10

## 2019-04-05 NOTE — PROGRESS NOTES
Chief Complaint   Patient presents with   • Difficulty Urinating   • Nail Problem     bilateral feet        Subjective   Shira King is a 79 y.o.  female who presents today for UTI/nail trim.    HPI:  UTI:  Patient was treated at [facility] last week for UTI with Macrobid.  She states her symptoms are not any better, actually worse.  Her main symptom is urinary frequency and urgency.    NAILS:  She has fungal toenails.  She is unable to cut the toenails that are so thick and so she presents today for a nail trim of those toenails.        Shira King  has a past medical history of Dyspnea, Dyspnea, Hypertension, and Valvular disease.    No Known Allergies    Current Outpatient Medications:   •  ALPRAZolam (XANAX) 0.25 MG tablet, Take 1 tablet by mouth 2 (Two) Times a Day As Needed for Anxiety., Disp: 30 tablet, Rfl: 0  •  amLODIPine (NORVASC) 2.5 MG tablet, Take 2.5 mg by mouth daily., Disp: , Rfl:   •  aspirin 81 MG chewable tablet, Chew 81 mg daily., Disp: , Rfl:   •  fluorometholone (FML) 0.1 % ophthalmic suspension, , Disp: , Rfl:   •  HYDROcodone-acetaminophen (NORCO) 5-325 MG per tablet, Take 1 tablet by mouth Every 6 (Six) Hours As Needed for Moderate Pain  or Severe Pain ., Disp: 30 tablet, Rfl: 0  •  losartan (COZAAR) 100 MG tablet, TAKE 1 TABLET BY MOUTH ONCE DAILY, Disp: 90 tablet, Rfl: 1  •  meclizine (ANTIVERT) 25 MG tablet, Take 25 mg by mouth 2 (two) times a day., Disp: , Rfl:   •  meloxicam (MOBIC) 15 MG tablet, TAKE 1 TABLET BY MOUTH ONCE DAILY, Disp: 30 tablet, Rfl: 5  •  metoclopramide (REGLAN) 10 MG tablet, TAKE ONE TABLET BY MOUTH ONCE DAILY, Disp: 90 tablet, Rfl: 2  •  pravastatin (PRAVACHOL) 20 MG tablet, Take 20 mg by mouth daily., Disp: , Rfl:   •  temazepam (RESTORIL) 30 MG capsule, TAKE ONE CAPSULE BY MOUTH AT BEDTIME AS NEEDED FOR ANXIETY, Disp: 30 capsule, Rfl: 5  Past Medical History:   Diagnosis Date   • Dyspnea    • Dyspnea    • Hypertension    • Valvular disease   "    Past Surgical History:   Procedure Laterality Date   • BLADDER REPAIR  2005   • BREAST SURGERY     • BUNIONECTOMY  2007   • CATARACT EXTRACTION Bilateral 2005   • CHOLECYSTECTOMY     • ENDOSCOPY  2005   • HERNIA REPAIR  1963   • HYSTERECTOMY  1979   • NECK SURGERY  1974    VERTEBRA   • REPLACEMENT TOTAL KNEE BILATERAL     • TONSILLECTOMY       Social History     Socioeconomic History   • Marital status:      Spouse name: Not on file   • Number of children: Not on file   • Years of education: Not on file   • Highest education level: Not on file   Tobacco Use   • Smoking status: Never Smoker   • Smokeless tobacco: Never Used   Substance and Sexual Activity   • Alcohol use: No   • Drug use: No   • Sexual activity: Defer     Family History   Problem Relation Age of Onset   • Stroke Mother    • Heart disease Mother    • Stroke Sister    • Heart attack Maternal Grandmother        Family history, surgical history, past medical history, Allergies and med's reviewed with patient today and updated in [email or web address] EMR.     ROS:  Review of Systems   Constitutional: Negative.    HENT: Negative.    Eyes: Negative.    Respiratory: Negative.    Cardiovascular: Negative.    Gastrointestinal: Negative.    Endocrine: Negative.    Genitourinary: Positive for frequency and urgency.   Musculoskeletal: Positive for arthralgias.   Skin: Negative.    Allergic/Immunologic: Negative.    Neurological: Negative.    Hematological: Negative.    Psychiatric/Behavioral: Negative.        OBJECTIVE:  Vitals:    04/05/19 1455   BP: 154/74   BP Location: Right arm   Patient Position: Sitting   Cuff Size: Adult   Pulse: 73   Resp: 19   Temp: 98.6 °F (37 °C)   TempSrc: Temporal   SpO2: 95%   Weight: 74 kg (163 lb 3.2 oz)   Height: 167.6 cm (66\")     Physical Exam   Constitutional: She is oriented to person, place, and time. She appears well-developed and well-nourished.   HENT:   Head: Normocephalic and atraumatic.   Eyes: Conjunctivae and EOM are " normal. Pupils are equal, round, and reactive to light.   Neck: Normal range of motion. Neck supple.   Cardiovascular: Normal rate, regular rhythm, normal heart sounds and intact distal pulses.   Pulmonary/Chest: Effort normal and breath sounds normal.   Abdominal: Soft.   Musculoskeletal: Normal range of motion.   Neurological: She is alert and oriented to person, place, and time.   Skin: Skin is warm and dry. Capillary refill takes less than 2 seconds.   Left great toenail and 2nd toenail are thickened, discolored and fungal in appearance.  The right 2nd and 3rd toenails are the same.   Psychiatric: She has a normal mood and affect. Her behavior is normal. Judgment and thought content normal.   Nursing note and vitals reviewed.      ASSESSMENT/ PLAN:    Shira was seen today for difficulty urinating and nail problem.    Diagnoses and all orders for this visit:    Urinary tract infection without hematuria, site unspecified  -     cephalexin (KEFLEX) 500 MG capsule; Take 1 capsule by mouth 3 (Three) Times a Day for 5 days.    Difficult or painful urination  -     POCT urinalysis dipstick, automated    Onychomycosis of toenail    See procedure note for nail trim.    Orders Placed Today:     New Medications Ordered This Visit   Medications   • cephalexin (KEFLEX) 500 MG capsule     Sig: Take 1 capsule by mouth 3 (Three) Times a Day for 5 days.     Dispense:  15 capsule     Refill:  0        Management Plan:     An After Visit Summary was printed and given to the patient at discharge.    Follow-up: Return if symptoms worsen or fail to improve.    ROBIN Montenegro 4/5/2019 3:34 PM  This note was electronically signed.

## 2019-04-05 NOTE — PROGRESS NOTES
Procedure    Nail trim of fungal nails.  Bilateral feet were soaked in warm soapy water for 20 minutes.  The left 1st and 2nd nails were trimmed and cleaned.  The right 2nd and 3rd nails were trimmed and cleaned.  It was recommended to the patient to have an avulsion of the left great toenail and the right 2nd toenail due to the depth of the nail causing pain. Patient tolerated well.

## 2019-04-12 ENCOUNTER — OFFICE VISIT (OUTPATIENT)
Dept: FAMILY MEDICINE CLINIC | Facility: CLINIC | Age: 79
End: 2019-04-12

## 2019-04-12 ENCOUNTER — RESULTS ENCOUNTER (OUTPATIENT)
Dept: FAMILY MEDICINE CLINIC | Facility: CLINIC | Age: 79
End: 2019-04-12

## 2019-04-12 VITALS
HEIGHT: 66 IN | WEIGHT: 162.4 LBS | OXYGEN SATURATION: 97 % | SYSTOLIC BLOOD PRESSURE: 138 MMHG | RESPIRATION RATE: 18 BRPM | TEMPERATURE: 98.1 F | HEART RATE: 71 BPM | BODY MASS INDEX: 26.1 KG/M2 | DIASTOLIC BLOOD PRESSURE: 71 MMHG

## 2019-04-12 DIAGNOSIS — R82.90 ABNORMAL URINALYSIS: ICD-10-CM

## 2019-04-12 DIAGNOSIS — R30.0 DIFFICULT OR PAINFUL URINATION: ICD-10-CM

## 2019-04-12 DIAGNOSIS — Z79.899 LONG-TERM USE OF HIGH-RISK MEDICATION: ICD-10-CM

## 2019-04-12 DIAGNOSIS — R30.0 DIFFICULT OR PAINFUL URINATION: Primary | ICD-10-CM

## 2019-04-12 DIAGNOSIS — G47.00 INSOMNIA, UNSPECIFIED TYPE: ICD-10-CM

## 2019-04-12 DIAGNOSIS — M54.50 LUMBAR PAIN: ICD-10-CM

## 2019-04-12 DIAGNOSIS — M51.36 DDD (DEGENERATIVE DISC DISEASE), LUMBAR: ICD-10-CM

## 2019-04-12 LAB
BILIRUB BLD-MCNC: NEGATIVE MG/DL
CLARITY, POC: CLEAR
COLOR UR: YELLOW
GLUCOSE UR STRIP-MCNC: NEGATIVE MG/DL
KETONES UR QL: NEGATIVE
LEUKOCYTE EST, POC: ABNORMAL
NITRITE UR-MCNC: NEGATIVE MG/ML
PH UR: 7 [PH] (ref 5–8)
PROT UR STRIP-MCNC: NEGATIVE MG/DL
RBC # UR STRIP: ABNORMAL /UL
SP GR UR: 1.01 (ref 1–1.03)
UROBILINOGEN UR QL: NORMAL

## 2019-04-12 PROCEDURE — 99213 OFFICE O/P EST LOW 20 MIN: CPT | Performed by: NURSE PRACTITIONER

## 2019-04-12 PROCEDURE — 81003 URINALYSIS AUTO W/O SCOPE: CPT | Performed by: NURSE PRACTITIONER

## 2019-04-12 RX ORDER — SULFAMETHOXAZOLE AND TRIMETHOPRIM 800; 160 MG/1; MG/1
1 TABLET ORAL 2 TIMES DAILY
Qty: 14 TABLET | Refills: 0 | Status: SHIPPED | OUTPATIENT
Start: 2019-04-12 | End: 2019-04-19

## 2019-04-12 RX ORDER — HYDROCODONE BITARTRATE AND ACETAMINOPHEN 5; 325 MG/1; MG/1
1 TABLET ORAL EVERY 6 HOURS PRN
Qty: 30 TABLET | Refills: 0 | Status: SHIPPED | OUTPATIENT
Start: 2019-04-12 | End: 2019-05-16 | Stop reason: SDUPTHER

## 2019-04-12 NOTE — TELEPHONE ENCOUNTER
Pt was seen in office by Bonita 4/12/19. UDS, MALU, and CONTRACT WERE obtained. Pt asking for refill on [facility] 5-325 #30 no refills.

## 2019-04-12 NOTE — PROGRESS NOTES
Chief Complaint   Patient presents with   • Urinary Frequency   • Difficulty Urinating        Subjective   Shira King is a 79 y.o.  female who presents today for UTI symptoms.    HPI:  Symptoms of UTI did not improve on the Keflex.  She was unable to void enough at that visit for a C&S to be sent.  She remains with 3+ leukocytes and was able to void enough for a culture to be submitted.  Her symptoms continue to be urgency, frequency and difficulty urinating (at times).  Shira King  has a past medical history of Dyspnea, Dyspnea, Hypertension, and Valvular disease.    No Known Allergies    Current Outpatient Medications:   •  ALPRAZolam (XANAX) 0.25 MG tablet, Take 1 tablet by mouth 2 (Two) Times a Day As Needed for Anxiety., Disp: 30 tablet, Rfl: 0  •  amLODIPine (NORVASC) 2.5 MG tablet, Take 2.5 mg by mouth daily., Disp: , Rfl:   •  aspirin 81 MG chewable tablet, Chew 81 mg daily., Disp: , Rfl:   •  fluorometholone (FML) 0.1 % ophthalmic suspension, , Disp: , Rfl:   •  HYDROcodone-acetaminophen (NORCO) 5-325 MG per tablet, Take 1 tablet by mouth Every 6 (Six) Hours As Needed for Moderate Pain  or Severe Pain ., Disp: 30 tablet, Rfl: 0  •  losartan (COZAAR) 100 MG tablet, TAKE 1 TABLET BY MOUTH ONCE DAILY, Disp: 90 tablet, Rfl: 1  •  meclizine (ANTIVERT) 25 MG tablet, Take 25 mg by mouth 2 (two) times a day., Disp: , Rfl:   •  meloxicam (MOBIC) 15 MG tablet, TAKE 1 TABLET BY MOUTH ONCE DAILY, Disp: 30 tablet, Rfl: 5  •  metoclopramide (REGLAN) 10 MG tablet, TAKE ONE TABLET BY MOUTH ONCE DAILY, Disp: 90 tablet, Rfl: 2  •  pravastatin (PRAVACHOL) 20 MG tablet, Take 20 mg by mouth daily., Disp: , Rfl:   •  temazepam (RESTORIL) 30 MG capsule, TAKE ONE CAPSULE BY MOUTH AT BEDTIME AS NEEDED FOR ANXIETY, Disp: 30 capsule, Rfl: 5  Past Medical History:   Diagnosis Date   • Dyspnea    • Dyspnea    • Hypertension    • Valvular disease      Past Surgical History:   Procedure Laterality Date   •  "BLADDER REPAIR  2005   • BREAST SURGERY     • BUNIONECTOMY  2007   • CATARACT EXTRACTION Bilateral 2005   • CHOLECYSTECTOMY     • ENDOSCOPY  2005   • HERNIA REPAIR  1963   • HYSTERECTOMY  1979   • NECK SURGERY  1974    VERTEBRA   • REPLACEMENT TOTAL KNEE BILATERAL     • TONSILLECTOMY       Social History     Socioeconomic History   • Marital status:      Spouse name: Not on file   • Number of children: Not on file   • Years of education: Not on file   • Highest education level: Not on file   Tobacco Use   • Smoking status: Never Smoker   • Smokeless tobacco: Never Used   Substance and Sexual Activity   • Alcohol use: No   • Drug use: No   • Sexual activity: Defer     Family History   Problem Relation Age of Onset   • Stroke Mother    • Heart disease Mother    • Stroke Sister    • Heart attack Maternal Grandmother        Family history, surgical history, past medical history, Allergies and med's reviewed with patient today and updated in [employer] EMR.     ROS:  Review of Systems   Constitutional: Negative.    HENT: Negative.    Eyes: Negative.    Respiratory: Negative.    Cardiovascular: Negative.    Gastrointestinal: Negative.    Endocrine: Negative.    Genitourinary: Positive for difficulty urinating, frequency and urgency.   Musculoskeletal: Positive for arthralgias and back pain.   Skin: Negative.    Allergic/Immunologic: Negative.    Neurological: Negative.    Hematological: Negative.    Psychiatric/Behavioral: Positive for sleep disturbance.       OBJECTIVE:  Vitals:    04/12/19 0941   BP: 138/71   BP Location: Right arm   Patient Position: Sitting   Cuff Size: Adult   Pulse: 71   Resp: 18   Temp: 98.1 °F (36.7 °C)   TempSrc: Tympanic   SpO2: 97%   Weight: 73.7 kg (162 lb 6.4 oz)   Height: 167.6 cm (66\")     Physical Exam   Constitutional: She is oriented to person, place, and time. She appears well-developed and well-nourished.   HENT:   Head: Normocephalic and atraumatic.   Eyes: Conjunctivae and EOM are " normal. Pupils are equal, round, and reactive to light.   Neck: Normal range of motion. Neck supple.   Cardiovascular: Normal rate, regular rhythm, normal heart sounds and intact distal pulses.   Pulmonary/Chest: Effort normal and breath sounds normal.   Abdominal: Soft. Bowel sounds are normal.   Musculoskeletal: Normal range of motion. She exhibits tenderness.   Right hip tenderness to deep palpation.   Neurological: She is alert and oriented to person, place, and time.   Skin: Skin is warm and dry. Capillary refill takes less than 2 seconds.   Psychiatric: She has a normal mood and affect. Her behavior is normal. Judgment and thought content normal.   Nursing note and vitals reviewed.      ASSESSMENT/ PLAN:    Shira was seen today for urinary frequency and difficulty urinating.    Diagnoses and all orders for this visit:    Difficult or painful urination  -     POCT urinalysis dipstick, automated  -     Urinalysis With Culture If Indicated - Urine, Clean Catch; Future  -     sulfamethoxazole-trimethoprim (BACTRIM DS,SEPTRA DS) 800-160 MG per tablet; Take 1 tablet by mouth 2 (Two) Times a Day for 7 days.    Long-term use of high-risk medication  -     Urine Drug Screen - Urine, Clean Catch; Future    Abnormal urinalysis  -     Cancel: Urine Culture - Urine, Urine, Clean Catch; Future  -     sulfamethoxazole-trimethoprim (BACTRIM DS,SEPTRA DS) 800-160 MG per tablet; Take 1 tablet by mouth 2 (Two) Times a Day for 7 days.    DDD (degenerative disc disease), lumbar    Insomnia, unspecified type        Orders Placed Today:     New Medications Ordered This Visit   Medications   • sulfamethoxazole-trimethoprim (BACTRIM DS,SEPTRA DS) 800-160 MG per tablet     Sig: Take 1 tablet by mouth 2 (Two) Times a Day for 7 days.     Dispense:  14 tablet     Refill:  0        Management Plan:     An After Visit Summary was printed and given to the patient at discharge.    Follow-up: Return in about 3 months (around 7/12/2019) for  Next scheduled follow up.    Bonita Romero, APRN 4/12/2019 10:09 AM  This note was electronically signed.

## 2019-05-03 ENCOUNTER — OFFICE VISIT (OUTPATIENT)
Dept: FAMILY MEDICINE CLINIC | Facility: CLINIC | Age: 79
End: 2019-05-03

## 2019-05-03 ENCOUNTER — RESULTS ENCOUNTER (OUTPATIENT)
Dept: FAMILY MEDICINE CLINIC | Facility: CLINIC | Age: 79
End: 2019-05-03

## 2019-05-03 VITALS
SYSTOLIC BLOOD PRESSURE: 132 MMHG | WEIGHT: 162.8 LBS | OXYGEN SATURATION: 95 % | HEIGHT: 66 IN | TEMPERATURE: 98.1 F | BODY MASS INDEX: 26.16 KG/M2 | HEART RATE: 69 BPM | DIASTOLIC BLOOD PRESSURE: 77 MMHG | RESPIRATION RATE: 14 BRPM

## 2019-05-03 DIAGNOSIS — G89.29 CHRONIC LEFT-SIDED LOW BACK PAIN WITH LEFT-SIDED SCIATICA: ICD-10-CM

## 2019-05-03 DIAGNOSIS — R30.0 DIFFICULT OR PAINFUL URINATION: ICD-10-CM

## 2019-05-03 DIAGNOSIS — B37.31 VAGINAL CANDIDIASIS: ICD-10-CM

## 2019-05-03 DIAGNOSIS — M54.42 CHRONIC LEFT-SIDED LOW BACK PAIN WITH LEFT-SIDED SCIATICA: ICD-10-CM

## 2019-05-03 DIAGNOSIS — N39.0 URINARY TRACT INFECTION WITHOUT HEMATURIA, SITE UNSPECIFIED: Primary | ICD-10-CM

## 2019-05-03 LAB
BILIRUB BLD-MCNC: NEGATIVE MG/DL
CLARITY, POC: ABNORMAL
COLOR UR: ABNORMAL
GLUCOSE UR STRIP-MCNC: NEGATIVE MG/DL
KETONES UR QL: NEGATIVE
LEUKOCYTE EST, POC: ABNORMAL
NITRITE UR-MCNC: NEGATIVE MG/ML
PH UR: 7.5 [PH] (ref 5–8)
PROT UR STRIP-MCNC: NEGATIVE MG/DL
RBC # UR STRIP: ABNORMAL /UL
SP GR UR: 1.01 (ref 1–1.03)
UROBILINOGEN UR QL: NORMAL

## 2019-05-03 PROCEDURE — 99213 OFFICE O/P EST LOW 20 MIN: CPT | Performed by: NURSE PRACTITIONER

## 2019-05-03 PROCEDURE — 81003 URINALYSIS AUTO W/O SCOPE: CPT | Performed by: NURSE PRACTITIONER

## 2019-05-03 RX ORDER — FLUCONAZOLE 150 MG/1
TABLET ORAL
Qty: 2 TABLET | Refills: 0 | Status: SHIPPED | OUTPATIENT
Start: 2019-05-03 | End: 2019-07-12

## 2019-05-03 RX ORDER — CEPHALEXIN 500 MG/1
500 CAPSULE ORAL 3 TIMES DAILY
Qty: 15 CAPSULE | Refills: 0 | Status: SHIPPED | OUTPATIENT
Start: 2019-05-03 | End: 2019-05-08

## 2019-05-03 NOTE — PROGRESS NOTES
Chief Complaint   Patient presents with   • Difficulty Urinating   • Vaginal Itching   • Rash     in groin area        Subjective   Shira King is a 79 y.o.  female who presents today for UTI?    HPI:  DYSURIA:  Patient states that she is again experiencing pain and burning as well as urgency and frequency of urination.  This started 4 days ago.   RASH:  Patient has a rash in the left groin.  Unsure if something has bit her.  It is better, been present about a week.    Shira King  has a past medical history of Dyspnea, Dyspnea, Hypertension, and Valvular disease.    No Known Allergies    Current Outpatient Medications:   •  ALPRAZolam (XANAX) 0.25 MG tablet, Take 1 tablet by mouth 2 (Two) Times a Day As Needed for Anxiety., Disp: 30 tablet, Rfl: 0  •  amLODIPine (NORVASC) 2.5 MG tablet, Take 2.5 mg by mouth daily., Disp: , Rfl:   •  aspirin 81 MG chewable tablet, Chew 81 mg daily., Disp: , Rfl:   •  fluorometholone (FML) 0.1 % ophthalmic suspension, , Disp: , Rfl:   •  HYDROcodone-acetaminophen (NORCO) 5-325 MG per tablet, Take 1 tablet by mouth Every 6 (Six) Hours As Needed for Moderate Pain  or Severe Pain ., Disp: 30 tablet, Rfl: 0  •  losartan (COZAAR) 100 MG tablet, TAKE 1 TABLET BY MOUTH ONCE DAILY, Disp: 90 tablet, Rfl: 1  •  meclizine (ANTIVERT) 25 MG tablet, Take 25 mg by mouth 2 (two) times a day., Disp: , Rfl:   •  meloxicam (MOBIC) 15 MG tablet, TAKE 1 TABLET BY MOUTH ONCE DAILY, Disp: 30 tablet, Rfl: 5  •  metoclopramide (REGLAN) 10 MG tablet, TAKE ONE TABLET BY MOUTH ONCE DAILY, Disp: 90 tablet, Rfl: 2  •  pravastatin (PRAVACHOL) 20 MG tablet, Take 20 mg by mouth daily., Disp: , Rfl:   •  temazepam (RESTORIL) 30 MG capsule, TAKE ONE CAPSULE BY MOUTH AT BEDTIME AS NEEDED FOR ANXIETY, Disp: 30 capsule, Rfl: 5  •  cephalexin (KEFLEX) 500 MG capsule, Take 1 capsule by mouth 3 (Three) Times a Day for 5 days., Disp: 15 capsule, Rfl: 0  •  fluconazole (DIFLUCAN) 150 MG tablet, Take one  today and one at completion of antibiotics., Disp: 2 tablet, Rfl: 0  Past Medical History:   Diagnosis Date   • Dyspnea    • Dyspnea    • Hypertension    • Valvular disease      Past Surgical History:   Procedure Laterality Date   • BLADDER REPAIR  2005   • BREAST SURGERY     • BUNIONECTOMY  2007   • CATARACT EXTRACTION Bilateral 2005   • CHOLECYSTECTOMY     • ENDOSCOPY  2005   • HERNIA REPAIR  1963   • HYSTERECTOMY  1979   • NECK SURGERY  1974    VERTEBRA   • REPLACEMENT TOTAL KNEE BILATERAL     • TONSILLECTOMY       Social History     Socioeconomic History   • Marital status:      Spouse name: Not on file   • Number of children: Not on file   • Years of education: Not on file   • Highest education level: Not on file   Tobacco Use   • Smoking status: Never Smoker   • Smokeless tobacco: Never Used   Substance and Sexual Activity   • Alcohol use: No   • Drug use: No   • Sexual activity: Defer     Family History   Problem Relation Age of Onset   • Stroke Mother    • Heart disease Mother    • Stroke Sister    • Heart attack Maternal Grandmother        Family history, surgical history, past medical history, Allergies and med's reviewed with patient today and updated in [employer] EMR.     ROS:  Review of Systems   Constitutional: Negative.  Negative for fatigue, fever and unexpected weight change.   HENT: Negative.  Negative for facial swelling, sore throat and trouble swallowing.    Eyes: Negative.  Negative for photophobia, discharge and visual disturbance.   Respiratory: Negative.  Negative for cough, chest tightness and shortness of breath.    Cardiovascular: Negative.  Negative for chest pain and palpitations.   Gastrointestinal: Negative.  Negative for abdominal pain, diarrhea, nausea and vomiting.   Endocrine: Negative.  Negative for polydipsia, polyphagia and polyuria.   Genitourinary: Positive for dysuria, frequency and urgency. Negative for flank pain.   Musculoskeletal: Positive for back pain. Negative for  "gait problem and neck pain.   Skin: Negative.  Negative for rash.   Allergic/Immunologic: Negative.    Neurological: Negative.  Negative for dizziness, light-headedness and headaches.   Hematological: Negative.    Psychiatric/Behavioral: Negative.  Negative for self-injury and suicidal ideas.       OBJECTIVE:  Vitals:    05/03/19 1048   BP: 132/77   BP Location: Right arm   Patient Position: Sitting   Cuff Size: Adult   Pulse: 69   Resp: 14   Temp: 98.1 °F (36.7 °C)   TempSrc: Tympanic   SpO2: 95%   Weight: 73.8 kg (162 lb 12.8 oz)   Height: 167.6 cm (66\")     Physical Exam   Constitutional: She is oriented to person, place, and time. She appears well-developed and well-nourished. No distress.   HENT:   Head: Normocephalic and atraumatic.   Eyes: Conjunctivae and EOM are normal. Pupils are equal, round, and reactive to light.   Neck: Normal range of motion. Neck supple.   Cardiovascular: Normal rate, regular rhythm, normal heart sounds and intact distal pulses.   No murmur heard.  Pulmonary/Chest: Effort normal and breath sounds normal. No respiratory distress.   Abdominal: Soft. Bowel sounds are normal. She exhibits no distension. There is no tenderness.   Musculoskeletal: Normal range of motion. She exhibits tenderness. She exhibits no edema.   Lumbar tenderness to deep palpation.   Neurological: She is alert and oriented to person, place, and time.   Skin: Skin is warm and dry. Capillary refill takes less than 2 seconds. She is not diaphoretic. No erythema.   Psychiatric: She has a normal mood and affect. Her behavior is normal. Judgment and thought content normal.   Nursing note and vitals reviewed.      ASSESSMENT/ PLAN:    Shira was seen today for difficulty urinating, vaginal itching and rash.    Diagnoses and all orders for this visit:    Urinary tract infection without hematuria, site unspecified  -     cephalexin (KEFLEX) 500 MG capsule; Take 1 capsule by mouth 3 (Three) Times a Day for 5 " days.    Difficult or painful urination  -     POCT urinalysis dipstick, automated  -     Urinalysis With Culture If Indicated - Urine, Clean Catch; Future    Chronic left-sided low back pain with left-sided sciatica    Vaginal candidiasis  -     fluconazole (DIFLUCAN) 150 MG tablet; Take one today and one at completion of antibiotics.        Orders Placed Today:     New Medications Ordered This Visit   Medications   • cephalexin (KEFLEX) 500 MG capsule     Sig: Take 1 capsule by mouth 3 (Three) Times a Day for 5 days.     Dispense:  15 capsule     Refill:  0   • fluconazole (DIFLUCAN) 150 MG tablet     Sig: Take one today and one at completion of antibiotics.     Dispense:  2 tablet     Refill:  0        Management Plan:     An After Visit Summary was printed and given to the patient at discharge.    Follow-up: Return for Keep scheduled appt.    ROBIN Montenegro 5/3/2019 11:19 AM  This note was electronically signed.

## 2019-05-08 ENCOUNTER — TELEPHONE (OUTPATIENT)
Dept: FAMILY MEDICINE CLINIC | Facility: CLINIC | Age: 79
End: 2019-05-08

## 2019-05-08 DIAGNOSIS — N39.0 URINARY TRACT INFECTION WITHOUT HEMATURIA, SITE UNSPECIFIED: Primary | ICD-10-CM

## 2019-05-08 DIAGNOSIS — R30.0 DIFFICULT OR PAINFUL URINATION: ICD-10-CM

## 2019-05-08 NOTE — TELEPHONE ENCOUNTER
Pt called regarding Urine sample. Checking on results, none in chart at this time. Pt also stated medication is gone, however she is having same symptoms. She is requesting phone call.

## 2019-05-09 NOTE — TELEPHONE ENCOUNTER
Recommend referral to urology.  Her urine sample (last 2) were really unremarkable; however, she remains symptomatic despite treatment.

## 2019-05-10 DIAGNOSIS — N39.0 URINARY TRACT INFECTION WITHOUT HEMATURIA, SITE UNSPECIFIED: Primary | ICD-10-CM

## 2019-05-10 RX ORDER — AMPICILLIN 500 MG/1
500 CAPSULE ORAL 3 TIMES DAILY
Qty: 21 CAPSULE | Refills: 0 | Status: SHIPPED | OUTPATIENT
Start: 2019-05-10 | End: 2019-07-12

## 2019-05-10 NOTE — TELEPHONE ENCOUNTER
I spoke with Pt, she is wanting to go to [facility], she prefers her appt not be on a Thursday, or scheduled for 5/17/19.

## 2019-05-13 ENCOUNTER — TELEPHONE (OUTPATIENT)
Dept: FAMILY MEDICINE CLINIC | Facility: CLINIC | Age: 79
End: 2019-05-13

## 2019-05-13 NOTE — TELEPHONE ENCOUNTER
Pt request referral to [facility]., as per last chart note.   Her daughter in law uses them, and she would like to see them.

## 2019-05-15 DIAGNOSIS — M54.50 LUMBAR PAIN: ICD-10-CM

## 2019-05-15 NOTE — TELEPHONE ENCOUNTER
Pt daughter in law, Joy called. She is needing refill of pain medication. She is requesting this sent to [facility].Pt has UDS/Contract on file.

## 2019-05-16 ENCOUNTER — OFFICE VISIT (OUTPATIENT)
Dept: UROLOGY | Age: 79
End: 2019-05-16
Payer: MEDICARE

## 2019-05-16 VITALS
DIASTOLIC BLOOD PRESSURE: 67 MMHG | SYSTOLIC BLOOD PRESSURE: 120 MMHG | TEMPERATURE: 98.1 F | HEIGHT: 66 IN | WEIGHT: 158 LBS | BODY MASS INDEX: 25.39 KG/M2 | HEART RATE: 76 BPM

## 2019-05-16 DIAGNOSIS — R30.0 DYSURIA: ICD-10-CM

## 2019-05-16 DIAGNOSIS — R35.0 FREQUENCY OF URINATION: ICD-10-CM

## 2019-05-16 DIAGNOSIS — N39.0 RECURRENT UTI: Primary | ICD-10-CM

## 2019-05-16 LAB
BILIRUBIN, POC: NORMAL
BLOOD URINE, POC: NORMAL
CLARITY, POC: CLEAR
COLOR, POC: YELLOW
GLUCOSE URINE, POC: NORMAL
KETONES, POC: NORMAL
LEUKOCYTE EST, POC: NORMAL
NITRITE, POC: NORMAL
PH, POC: 7
PROTEIN, POC: NORMAL
SPECIFIC GRAVITY, POC: 1.01
UROBILINOGEN, POC: 0.2

## 2019-05-16 PROCEDURE — 81003 URINALYSIS AUTO W/O SCOPE: CPT | Performed by: NURSE PRACTITIONER

## 2019-05-16 PROCEDURE — 1036F TOBACCO NON-USER: CPT | Performed by: NURSE PRACTITIONER

## 2019-05-16 PROCEDURE — G8419 CALC BMI OUT NRM PARAM NOF/U: HCPCS | Performed by: NURSE PRACTITIONER

## 2019-05-16 PROCEDURE — 1090F PRES/ABSN URINE INCON ASSESS: CPT | Performed by: NURSE PRACTITIONER

## 2019-05-16 PROCEDURE — G8427 DOCREV CUR MEDS BY ELIG CLIN: HCPCS | Performed by: NURSE PRACTITIONER

## 2019-05-16 PROCEDURE — 51798 US URINE CAPACITY MEASURE: CPT | Performed by: NURSE PRACTITIONER

## 2019-05-16 PROCEDURE — G8400 PT W/DXA NO RESULTS DOC: HCPCS | Performed by: NURSE PRACTITIONER

## 2019-05-16 PROCEDURE — 99203 OFFICE O/P NEW LOW 30 MIN: CPT | Performed by: NURSE PRACTITIONER

## 2019-05-16 PROCEDURE — 4040F PNEUMOC VAC/ADMIN/RCVD: CPT | Performed by: NURSE PRACTITIONER

## 2019-05-16 PROCEDURE — 1123F ACP DISCUSS/DSCN MKR DOCD: CPT | Performed by: NURSE PRACTITIONER

## 2019-05-16 RX ORDER — ALPRAZOLAM 0.25 MG/1
0.25 TABLET ORAL
COMMUNITY
Start: 2018-11-30

## 2019-05-16 RX ORDER — ESTRADIOL 0.1 MG/G
CREAM VAGINAL
Qty: 1 TUBE | Refills: 3 | Status: SHIPPED | OUTPATIENT
Start: 2019-05-16

## 2019-05-16 RX ORDER — HYDROCODONE BITARTRATE AND ACETAMINOPHEN 5; 325 MG/1; MG/1
TABLET ORAL EVERY 6 HOURS PRN
Refills: 0 | COMMUNITY
Start: 2019-04-12

## 2019-05-16 NOTE — TELEPHONE ENCOUNTER
UDS & Contract on file, OV is up to date . Was seen by Bonita with request to send pain med refill to Dr. Cavazos

## 2019-05-16 NOTE — PROGRESS NOTES
Samantha Ramirez is a 78 y.o. female who presents today   Chief Complaint   Patient presents with    New Patient     pt here today for recurrent uti            HPI:       Samantha Ramirez presents for evaluation of urinary tract infection. On 5/3/19 patient was placed on Bactrim for acute cystitis after having been treated with Keflex and Diflucan. Urine culture from 5/3/19 that showed enterococcus which was pansensitive. Patient explains that she did have bladder prolapse and repair in 1998 with Dr. Barry Sagastume. She states that she has been having burning for two months. She does have family history of kidney stones. She does wear a small pad but is not incontinent. Past Medical History:   Diagnosis Date    Hyperlipidemia     Hypertension     Insomnia     Insomnia     Obstructive sleep apnea     AHI:  5.8    Osteoarthritis     Periodic limb movement     Restless leg syndrome     Snoring       Past Surgical History:   Procedure Laterality Date    APPENDECTOMY      GALLBLADDER SURGERY      HERNIA REPAIR      TONSILLECTOMY AND ADENOIDECTOMY      TOTAL KNEE ARTHROPLASTY      UPPER GASTROINTESTINAL ENDOSCOPY         History reviewed. No pertinent family history. Social History     Tobacco Use    Smoking status: Never Smoker    Smokeless tobacco: Never Used   Substance Use Topics    Alcohol use: No      Current Outpatient Medications   Medication Sig Dispense Refill    HYDROcodone-acetaminophen (NORCO) 5-325 MG per tablet   0    ALPRAZolam (XANAX) 0.25 MG tablet Take 0.25 mg by mouth.  estradiol (ESTRACE VAGINAL) 0.1 MG/GM vaginal cream Apply fingertip amount to urethra nightly for one week, then apply every Mon, Wed, Fri night.  1 Tube 3    amLODIPine (NORVASC) 2.5 MG tablet Take 2.5 mg by mouth daily      fluorometholone (FML) 0.1 % ophthalmic suspension       losartan (COZAAR) 100 MG tablet       metoclopramide (REGLAN) 10 MG tablet       pravastatin (PRAVACHOL) 20 MG tablet  meclizine (ANTIVERT) 50 MG tablet Take 50 mg by mouth 3 times daily as needed      omeprazole (PRILOSEC) 20 MG delayed release capsule Take 20 mg by mouth daily      pramipexole (MIRAPEX) 0.25 MG tablet 1-2 q hs 60 tablet 5    eszopiclone (LUNESTA) 1 MG TABS Take 1 tablet by mouth nightly 30 tablet 0    doxycycline (VIBRA-TABS) 100 MG tablet       indapamide (LOZOL) 2.5 MG tablet       traZODone (DESYREL) 50 MG tablet        No current facility-administered medications for this visit. No Known Allergies      Subjective:      Review of Systems   Constitutional: Negative for chills and fever. Genitourinary: Positive for dysuria. Negative for flank pain, hematuria and pelvic pain. All other systems reviewed and are negative. Objective:     Physical Exam   Constitutional: She is oriented to person, place, and time. She appears well-developed and well-nourished. No distress. HENT:   Head: Normocephalic and atraumatic. Eyes: Pupils are equal, round, and reactive to light. Conjunctivae are normal.   Neck: Normal range of motion. Neck supple. Cardiovascular: Normal rate. Pulmonary/Chest: Effort normal. No respiratory distress. Abdominal: Soft. Musculoskeletal: Normal range of motion. Neurological: She is alert and oriented to person, place, and time. Skin: Skin is warm and dry. Psychiatric: She has a normal mood and affect. Her behavior is normal. Judgment and thought content normal.   Vitals reviewed.     /67   Pulse 76   Temp 98.1 °F (36.7 °C) (Temporal)   Ht 5' 6\" (1.676 m)   Wt 158 lb (71.7 kg)   BMI 25.50 kg/m²       DATA:    Results for orders placed or performed in visit on 05/16/19   POCT Urinalysis No Micro (Auto)   Result Value Ref Range    Color, UA yellow     Clarity, UA clear     Glucose, UA POC neg     Bilirubin, UA neg     Ketones, UA neg     Spec Grav, UA 1.015     Blood, UA POC neg     pH, UA 7.0     Protein, UA POC trace     Urobilinogen, UA 0.2 Leukocytes, UA small     Nitrite, UA neg          Assessment/ Plan       Diagnosis Orders   1. Recurrent UTI  POCT Urinalysis No Micro (Auto)    MI MEASUREMENT,POST-VOID RESIDUAL VOLUME BY US,NON-IMAGING    estradiol (ESTRACE VAGINAL) 0.1 MG/GM vaginal cream    XR ABDOMEN (KUB) (SINGLE AP VIEW)    US RENAL COMPLETE   2. Frequency of urination  MI MEASUREMENT,POST-VOID RESIDUAL VOLUME BY US,NON-IMAGING   3. Dysuria  estradiol (ESTRACE VAGINAL) 0.1 MG/GM vaginal cream   Patient will follow-up in one month with KUB and renal ultrasound. She has been prescribed Estrace cream; if insurance will not cover cream we will send a compounded cream from [facility]. Discussed use, benefit, and side effects of prescribed medications. All patient questions answered. Pt voiced understanding. Patient agreedwith treatment plan.        Electronically signed by ISHMAEL Cisse on 5/16/2019 at 9:37 AM

## 2019-05-17 RX ORDER — HYDROCODONE BITARTRATE AND ACETAMINOPHEN 5; 325 MG/1; MG/1
1 TABLET ORAL EVERY 6 HOURS PRN
Qty: 30 TABLET | Refills: 0 | Status: SHIPPED | OUTPATIENT
Start: 2019-05-17 | End: 2019-06-14 | Stop reason: SDUPTHER

## 2019-05-20 ENCOUNTER — TELEPHONE (OUTPATIENT)
Dept: UROLOGY | Age: 79
End: 2019-05-20

## 2019-06-06 RX ORDER — LOSARTAN POTASSIUM 100 MG/1
TABLET ORAL
Qty: 90 TABLET | Refills: 1 | Status: SHIPPED | OUTPATIENT
Start: 2019-06-06 | End: 2019-11-29 | Stop reason: SDUPTHER

## 2019-06-14 DIAGNOSIS — M54.50 LUMBAR PAIN: ICD-10-CM

## 2019-06-14 RX ORDER — HYDROCODONE BITARTRATE AND ACETAMINOPHEN 5; 325 MG/1; MG/1
1 TABLET ORAL EVERY 6 HOURS PRN
Qty: 30 TABLET | Refills: 0 | Status: SHIPPED | OUTPATIENT
Start: 2019-06-14 | End: 2019-07-12 | Stop reason: SDUPTHER

## 2019-06-14 NOTE — TELEPHONE ENCOUNTER
Joy called regarding medication refill for Pt. Please call for med and dosage. She requests this be sent to [facility].

## 2019-06-14 NOTE — TELEPHONE ENCOUNTER
Patient was seen in office on 4/12 and 5/3/19 by Bonita. Urine Drug screen and contract completed on 4/12/19

## 2019-07-05 ENCOUNTER — RESULTS ENCOUNTER (OUTPATIENT)
Dept: FAMILY MEDICINE CLINIC | Facility: CLINIC | Age: 79
End: 2019-07-05

## 2019-07-05 ENCOUNTER — CLINICAL SUPPORT (OUTPATIENT)
Dept: FAMILY MEDICINE CLINIC | Facility: CLINIC | Age: 79
End: 2019-07-05

## 2019-07-05 DIAGNOSIS — Z00.00 ANNUAL PHYSICAL EXAM: Primary | ICD-10-CM

## 2019-07-05 DIAGNOSIS — Z00.00 ANNUAL PHYSICAL EXAM: ICD-10-CM

## 2019-07-10 ENCOUNTER — RESULTS ENCOUNTER (OUTPATIENT)
Dept: FAMILY MEDICINE CLINIC | Facility: CLINIC | Age: 79
End: 2019-07-10

## 2019-07-10 ENCOUNTER — OFFICE VISIT (OUTPATIENT)
Dept: UROLOGY | Age: 79
End: 2019-07-10
Payer: MEDICARE

## 2019-07-10 ENCOUNTER — HOSPITAL ENCOUNTER (OUTPATIENT)
Dept: GENERAL RADIOLOGY | Age: 79
Discharge: HOME | End: 2019-07-10
Payer: MEDICARE

## 2019-07-10 ENCOUNTER — HOSPITAL ENCOUNTER (OUTPATIENT)
Dept: ULTRASOUND IMAGING | Age: 79
Discharge: HOME | End: 2019-07-10
Payer: MEDICARE

## 2019-07-10 VITALS — WEIGHT: 161 LBS | BODY MASS INDEX: 25.27 KG/M2 | TEMPERATURE: 98.3 F | HEIGHT: 67 IN

## 2019-07-10 DIAGNOSIS — N39.0 RECURRENT UTI: Primary | ICD-10-CM

## 2019-07-10 DIAGNOSIS — Z00.00 ANNUAL PHYSICAL EXAM: ICD-10-CM

## 2019-07-10 DIAGNOSIS — N39.0 RECURRENT UTI: ICD-10-CM

## 2019-07-10 PROCEDURE — G8400 PT W/DXA NO RESULTS DOC: HCPCS | Performed by: NURSE PRACTITIONER

## 2019-07-10 PROCEDURE — G8419 CALC BMI OUT NRM PARAM NOF/U: HCPCS | Performed by: NURSE PRACTITIONER

## 2019-07-10 PROCEDURE — 1123F ACP DISCUSS/DSCN MKR DOCD: CPT | Performed by: NURSE PRACTITIONER

## 2019-07-10 PROCEDURE — 1036F TOBACCO NON-USER: CPT | Performed by: NURSE PRACTITIONER

## 2019-07-10 PROCEDURE — 99213 OFFICE O/P EST LOW 20 MIN: CPT | Performed by: NURSE PRACTITIONER

## 2019-07-10 PROCEDURE — 1090F PRES/ABSN URINE INCON ASSESS: CPT | Performed by: NURSE PRACTITIONER

## 2019-07-10 PROCEDURE — 81003 URINALYSIS AUTO W/O SCOPE: CPT | Performed by: NURSE PRACTITIONER

## 2019-07-10 PROCEDURE — 76770 US EXAM ABDO BACK WALL COMP: CPT

## 2019-07-10 PROCEDURE — G8427 DOCREV CUR MEDS BY ELIG CLIN: HCPCS | Performed by: NURSE PRACTITIONER

## 2019-07-10 PROCEDURE — 4040F PNEUMOC VAC/ADMIN/RCVD: CPT | Performed by: NURSE PRACTITIONER

## 2019-07-10 PROCEDURE — 74018 RADEX ABDOMEN 1 VIEW: CPT

## 2019-07-12 ENCOUNTER — TELEPHONE (OUTPATIENT)
Dept: FAMILY MEDICINE CLINIC | Facility: CLINIC | Age: 79
End: 2019-07-12

## 2019-07-12 ENCOUNTER — OFFICE VISIT (OUTPATIENT)
Dept: FAMILY MEDICINE CLINIC | Facility: CLINIC | Age: 79
End: 2019-07-12

## 2019-07-12 VITALS
OXYGEN SATURATION: 97 % | HEART RATE: 66 BPM | BODY MASS INDEX: 26.13 KG/M2 | HEIGHT: 66 IN | TEMPERATURE: 98.2 F | SYSTOLIC BLOOD PRESSURE: 124 MMHG | WEIGHT: 162.6 LBS | DIASTOLIC BLOOD PRESSURE: 72 MMHG

## 2019-07-12 DIAGNOSIS — E78.2 MIXED HYPERLIPIDEMIA: ICD-10-CM

## 2019-07-12 DIAGNOSIS — I10 ESSENTIAL HYPERTENSION: ICD-10-CM

## 2019-07-12 DIAGNOSIS — Z00.00 MEDICARE ANNUAL WELLNESS VISIT, SUBSEQUENT: Primary | ICD-10-CM

## 2019-07-12 DIAGNOSIS — M54.50 LUMBAR PAIN: ICD-10-CM

## 2019-07-12 DIAGNOSIS — Z23 NEED FOR VACCINATION FOR STREP PNEUMONIAE: ICD-10-CM

## 2019-07-12 PROCEDURE — G0009 ADMIN PNEUMOCOCCAL VACCINE: HCPCS | Performed by: FAMILY MEDICINE

## 2019-07-12 PROCEDURE — 90670 PCV13 VACCINE IM: CPT | Performed by: FAMILY MEDICINE

## 2019-07-12 PROCEDURE — G0439 PPPS, SUBSEQ VISIT: HCPCS | Performed by: FAMILY MEDICINE

## 2019-07-12 RX ORDER — ESTRADIOL 0.1 MG/G
CREAM VAGINAL
COMMUNITY
Start: 2019-05-16 | End: 2020-07-21

## 2019-07-12 RX ORDER — HYDROCODONE BITARTRATE AND ACETAMINOPHEN 5; 325 MG/1; MG/1
1 TABLET ORAL EVERY 6 HOURS PRN
Qty: 30 TABLET | Refills: 0 | Status: SHIPPED | OUTPATIENT
Start: 2019-07-12 | End: 2019-07-16 | Stop reason: SDUPTHER

## 2019-07-12 NOTE — PROGRESS NOTES
QUICK REFERENCE INFORMATION:  The ABCs of the Annual Wellness Visit    Subsequent Medicare Wellness Visit     HEALTH RISK ASSESSMENT    : 1940    Recent Hospitalizations:  No hospitalization(s) within the last year.    Has a right great toenail and left 2nd toenail (with her underlying foot deformities) that have complete fungal involvement and are painful due to ingrowing and occasional infections.       Current Medical Providers:  Patient Care Team:  Reynaldo Cavazos MD as PCP - General (Family Medicine)  Bonita Romero APRN as PCP - Claims Attributed  Keyon Upton MD as Cardiologist (Cardiology)        Smoking Status:  Social History     Tobacco Use   Smoking Status Never Smoker   Smokeless Tobacco Never Used       Alcohol Consumption:  Social History     Substance and Sexual Activity   Alcohol Use No       Depression Screen:   PHQ-2/PHQ-9 Depression Screening 2019   Little interest or pleasure in doing things 0   Feeling down, depressed, or hopeless 0   Trouble falling or staying asleep, or sleeping too much 0   Feeling tired or having little energy 0   Poor appetite or overeating 0   Feeling bad about yourself - or that you are a failure or have let yourself or your family down 0   Trouble concentrating on things, such as reading the newspaper or watching television 0   Moving or speaking so slowly that other people could have noticed. Or the opposite - being so fidgety or restless that you have been moving around a lot more than usual 0   Thoughts that you would be better off dead, or of hurting yourself in some way 0   Total Score 0   If you checked off any problems, how difficult have these problems made it for you to do your work, take care of things at home, or get along with other people? Not difficult at all       Health Habits and Functional and Cognitive Screening:  Functional & Cognitive Status 2019   Do you have difficulty preparing food and eating? No   Do you  have difficulty bathing yourself, getting dressed or grooming yourself? No   Do you have difficulty using the toilet? No   Do you have difficulty moving around from place to place? No   Do you have trouble with steps or getting out of a bed or a chair? No   In the past year have you fallen or experienced a near fall? No   Current Diet Well Balanced Diet   Dental Exam Up to date   Eye Exam Not up to date   Exercise (times per week) 0 times per week   Current Exercise Activities Include None   Do you need help using the phone?  No   Are you deaf or do you have serious difficulty hearing?  No   Do you need help with transportation? No   Do you need help shopping? No   Do you need help preparing meals?  No   Do you need help with housework?  No   Do you need help with laundry? No   Do you need help taking your medications? No   Do you need help managing money? No   Do you ever drive or ride in a car without wearing a seat belt? No   Have you felt unusual stress, anger or loneliness in the last month? No   Who do you live with? Spouse   If you need help, do you have trouble finding someone available to you? No   Have you been bothered in the last four weeks by sexual problems? No   Do you have difficulty concentrating, remembering or making decisions? Yes           Does the patient have evidence of cognitive impairment? No    Asiprin use counseling: Taking ASA appropriately as indicated      Recent Lab Results:    Lab Results   Component Value Date     (H) 05/19/2017        Lab Results   Component Value Date    TRIG 112 05/19/2017    HDL 45 05/19/2017    VLDL 22 05/19/2017           Age-appropriate Screening Schedule:  Refer to the list below for future screening recommendations based on patient's age, sex and/or medical conditions. Orders for these recommended tests are listed in the plan section. The patient has been provided with a written plan.    Health Maintenance   Topic Date Due   • MAMMOGRAM  05/24/2018    • ZOSTER VACCINE (2 of 3) 07/12/2020 (Originally 12/21/2007)   • INFLUENZA VACCINE  08/01/2019   • LIPID PANEL  07/05/2020   • TDAP/TD VACCINES (2 - Td) 06/19/2023   • PNEUMOCOCCAL VACCINES (65+ LOW/MEDIUM RISK)  Completed        Subjective   History of Present Illness    Shira King is a 79 y.o. female who presents for an Annual Wellness Visit.    The following portions of the patient's history were reviewed and updated as appropriate: allergies, current medications, past family history, past medical history, past social history, past surgical history and problem list.    Outpatient Medications Prior to Visit   Medication Sig Dispense Refill   • ALPRAZolam (XANAX) 0.25 MG tablet Take 1 tablet by mouth 2 (Two) Times a Day As Needed for Anxiety. 30 tablet 0   • amLODIPine (NORVASC) 2.5 MG tablet Take 2.5 mg by mouth daily.     • aspirin 81 MG chewable tablet Chew 81 mg daily.     • estradiol (ESTRACE VAGINAL) 0.1 MG/GM vaginal cream Apply fingertip amount to urethra nightly for one week, then apply every Mon, Wed, Fri night.     • fluorometholone (FML) 0.1 % ophthalmic suspension      • losartan (COZAAR) 100 MG tablet TAKE 1 TABLET BY MOUTH ONCE DAILY 90 tablet 1   • meclizine (ANTIVERT) 25 MG tablet Take 25 mg by mouth 2 (two) times a day.     • meloxicam (MOBIC) 15 MG tablet TAKE 1 TABLET BY MOUTH ONCE DAILY 30 tablet 5   • metoclopramide (REGLAN) 10 MG tablet TAKE ONE TABLET BY MOUTH ONCE DAILY 90 tablet 2   • pravastatin (PRAVACHOL) 20 MG tablet Take 20 mg by mouth daily.     • temazepam (RESTORIL) 30 MG capsule TAKE ONE CAPSULE BY MOUTH AT BEDTIME AS NEEDED FOR ANXIETY 30 capsule 5   • HYDROcodone-acetaminophen (NORCO) 5-325 MG per tablet Take 1 tablet by mouth Every 6 (Six) Hours As Needed for Moderate Pain  or Severe Pain . 30 tablet 0   • ampicillin (PRINCIPEN) 500 MG capsule Take 1 capsule by mouth 3 (Three) Times a Day. 21 capsule 0   • fluconazole (DIFLUCAN) 150 MG tablet Take one today and one at  completion of antibiotics. 2 tablet 0     No facility-administered medications prior to visit.        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 Have asked the patient to send this to us to add to record    Identification of Risk Factors:  Risk factors include: Cardiovascular risk.    Review of Systems   Constitutional: Negative for activity change, appetite change, fatigue, fever and unexpected weight change.   Respiratory: Negative for cough and shortness of breath.    Cardiovascular: Negative for chest pain.   Gastrointestinal: Negative for abdominal pain.   Genitourinary: Negative for difficulty urinating.   Skin: Negative for rash.   Neurological: Negative for syncope and headaches.       Compared to one year ago, the patient feels her physical health is the same.  Compared to one year ago, the patient feels her mental health is worse. Regarding memory she says - gets confused easily.     Objective     Physical Exam   Constitutional: She is oriented to person, place, and time. She appears well-developed and well-nourished. No distress.   HENT:   Head: Normocephalic and atraumatic.   Mouth/Throat: Oropharynx is clear and moist.   Neck: Normal range of motion. Neck supple. No JVD present.   Cardiovascular: Normal rate, regular rhythm and intact distal pulses.   Pulmonary/Chest: Effort normal and breath sounds normal. No respiratory distress.   Abdominal: Soft. She exhibits no distension. There is no tenderness.   Musculoskeletal: Normal range of motion. She exhibits no edema.   Onychomycosis of some toenails.   Neurological: She is alert and oriented to person, place, and time. No cranial nerve deficit.   Skin: Skin is warm and dry. Capillary refill takes less than 2 seconds. No rash noted.   Psychiatric: She has a  "normal mood and affect. Her behavior is normal.   Nursing note and vitals reviewed.      Vitals:    07/12/19 1029   BP: 124/72   BP Location: Left arm   Patient Position: Sitting   Cuff Size: Adult   Pulse: 66   Temp: 98.2 °F (36.8 °C)   TempSrc: Tympanic   SpO2: 97%   Weight: 73.8 kg (162 lb 9.6 oz)   Height: 167.6 cm (66\")   PainSc:   4   PainLoc: Back       Patient's Body mass index is 26.24 kg/m². BMI is within normal parameters. No follow-up required..      Assessment/Plan   Patient Self-Management and Personalized Health Advice  The patient has been provided with information about: prevention of cardiac or vascular disease and preventive services including:   · Exercise counseling provided, Nutrition counseling provided.    Visit Diagnoses:    ICD-10-CM ICD-9-CM   1. Medicare annual wellness visit, subsequent Z00.00 V70.0   2. Essential hypertension I10 401.9   3. Mixed hyperlipidemia E78.2 272.2   4. Lumbar pain M54.5 724.2   5. Need for vaccination for Strep pneumoniae Z23 V03.82       No orders of the defined types were placed in this encounter.      Outpatient Encounter Medications as of 7/12/2019   Medication Sig Dispense Refill   • ALPRAZolam (XANAX) 0.25 MG tablet Take 1 tablet by mouth 2 (Two) Times a Day As Needed for Anxiety. 30 tablet 0   • amLODIPine (NORVASC) 2.5 MG tablet Take 2.5 mg by mouth daily.     • aspirin 81 MG chewable tablet Chew 81 mg daily.     • estradiol (ESTRACE VAGINAL) 0.1 MG/GM vaginal cream Apply fingertip amount to urethra nightly for one week, then apply every Mon, Wed, Fri night.     • fluorometholone (FML) 0.1 % ophthalmic suspension      • HYDROcodone-acetaminophen (NORCO) 5-325 MG per tablet Take 1 tablet by mouth Every 6 (Six) Hours As Needed for Moderate Pain  or Severe Pain . 30 tablet 0   • losartan (COZAAR) 100 MG tablet TAKE 1 TABLET BY MOUTH ONCE DAILY 90 tablet 1   • meclizine (ANTIVERT) 25 MG tablet Take 25 mg by mouth 2 (two) times a day.     • meloxicam (MOBIC) " 15 MG tablet TAKE 1 TABLET BY MOUTH ONCE DAILY 30 tablet 5   • metoclopramide (REGLAN) 10 MG tablet TAKE ONE TABLET BY MOUTH ONCE DAILY 90 tablet 2   • pravastatin (PRAVACHOL) 20 MG tablet Take 20 mg by mouth daily.     • temazepam (RESTORIL) 30 MG capsule TAKE ONE CAPSULE BY MOUTH AT BEDTIME AS NEEDED FOR ANXIETY 30 capsule 5   • [DISCONTINUED] HYDROcodone-acetaminophen (NORCO) 5-325 MG per tablet Take 1 tablet by mouth Every 6 (Six) Hours As Needed for Moderate Pain  or Severe Pain . 30 tablet 0   • [DISCONTINUED] ampicillin (PRINCIPEN) 500 MG capsule Take 1 capsule by mouth 3 (Three) Times a Day. 21 capsule 0   • [DISCONTINUED] fluconazole (DIFLUCAN) 150 MG tablet Take one today and one at completion of antibiotics. 2 tablet 0   • [] pneumococcal conj. 13-valent (PREVNAR-13) vaccine 0.5 mL        No facility-administered encounter medications on file as of 2019.        Reviewed use of high risk medication in the elderly: not applicable  Reviewed for potential of harmful drug interactions in the elderly: not applicable    Follow Up:  Return in about 6 months (around 2020) for Recheck.     An After Visit Summary and PPPS with all of these plans were given to the patient.

## 2019-07-12 NOTE — PATIENT INSTRUCTIONS
Suspect Essential HTN.Good BP control is encouraged with Goal BP based on JNC 8 guidelines 2014 <140/90 for patients with known cardiac disease and diabetes. (LITO. 2014:322 (5):507-520. doi:10.1001/lito.2013.27471): general population <60 yr old goal BP <140/90 and for those >60 <150/90.  For patients of all ages with Diabetes, CKD, Known CAD <140/90. Recommended to the patient to obtain electronic home BP machine with upper arm blood pressure cuff and to check regularly as instructed.  Keep BP log and bring to subsequent visits. Stable, at goal.  a. LABS: routine for hypertension recommended and ordered if necessary.  b. Recommend if you do not have a home BP machine to obtain an electronic machine with arm blood pressure cuff.      c. Monitor BP over the next week and keep log to bring back to office. Discussed medication therapy however pt wants to try to control with diet exercise. .  Your provider  has recommended self-monitoring of your blood pressure.  If you do not have a blood pressure cuff you may purchase one from the local pharmacy.  You may ask the pharmacist which brand and model they recommend.  Obtain your blood pressure measurement at least 2x per week.  You should also check your blood pressure if you experience any symptoms of blurred visit, dizziness or headache.  Please record all blood pressure measurements and bring them to next office visit.  If you have any questions about the accuracy of your blood pressure machine please bring it in to the office and our staff will be happy to check accuracy.   d. Encouraged to eat a low sodium heart healthy diet  e. Offered handout on HTN educational topics.  These were provided if patient requested these today.  f. MEDS: as listed in today's visit.  g. Risks/benefits of current and new medications discussed with the patient and or family today.  The patient/family are aware and accept that if there any side effects they should call or return to clinic  as soon as possible.  Appropriate F/U discussed for topics addressed today. All questions were answered to the  satisfactory state of patient/family.  Should symptoms fail to improve or worsen they agree to call or return to clinic or to go to the ER. Education handouts were offered on any new Rx if requested.  Discussed the importance of following up with any needed screening tests/labs/specialist appointments and any requested follow-up recommended by me today.  Importance of maintaining follow-up discussed and patient accepts that missed appointments can delay diagnosis and potentially lead to worsening of conditions.      Cholesterol  Cholesterol is a white, waxy, fat-like substance that is needed by the human body in small amounts. The liver makes all the cholesterol we need. Cholesterol is carried from the liver by the blood through the blood vessels. Deposits of cholesterol (plaques) may build up on blood vessel (artery) walls. Plaques make the arteries narrower and stiffer. Cholesterol plaques increase the risk for heart attack and stroke.  You cannot feel your cholesterol level even if it is very high. The only way to know that it is high is to have a blood test. Once you know your cholesterol levels, you should keep a record of the test results. Work with your health care provider to keep your levels in the desired range.  What do the results mean?  · Total cholesterol is a rough measure of all the cholesterol in your blood.  · LDL (low-density lipoprotein) is the “bad” cholesterol. This is the type that causes plaque to build up on the artery walls. You want this level to be low.  · HDL (high-density lipoprotein) is the “good” cholesterol because it cleans the arteries and carries the LDL away. You want this level to be high.  · Triglycerides are fat that the body can either burn for energy or store. High levels are closely linked to heart disease.  What are the desired levels of cholesterol?  · Total  cholesterol below 200.  · LDL below 100 for people who are at risk, below 70 for people at very high risk.  · HDL above 40 is good. A level of 60 or higher is considered to be protective against heart disease.  · Triglycerides below 150.  How can I lower my cholesterol?  Diet  Follow your diet program as told by your health care provider.  · Choose fish or white meat chicken and turkey, roasted or baked. Limit fatty cuts of red meat, fried foods, and processed meats, such as sausage and lunch meats.  · Eat lots of fresh fruits and vegetables.  · Choose whole grains, beans, pasta, potatoes, and cereals.  · Choose olive oil, corn oil, or canola oil, and use only small amounts.  · Avoid butter, mayonnaise, shortening, or palm kernel oils.  · Avoid foods with trans fats.  · Drink skim or nonfat milk and eat low-fat or nonfat yogurt and cheeses. Avoid whole milk, cream, ice cream, egg yolks, and full-fat cheeses.  · Healthier desserts include johnnie food cake, phil snaps, animal crackers, hard candy, popsicles, and low-fat or nonfat frozen yogurt. Avoid pastries, cakes, pies, and cookies.    Exercise  · Follow your exercise program as told by your health care provider. A regular program:  ? Helps to decrease LDL and raise HDL.  ? Helps with weight control.  · Do things that increase your activity level, such as gardening, walking, and taking the stairs.  · Ask your health care provider about ways that you can be more active in your daily life.    Medicine  · Take over-the-counter and prescription medicines only as told by your health care provider.  ? Medicine may be prescribed by your health care provider to help lower cholesterol and decrease the risk for heart disease. This is usually done if diet and exercise have failed to bring down cholesterol levels.  ? If you have several risk factors, you may need medicine even if your levels are normal.    This information is not intended to replace advice given to you by  your health care provider. Make sure you discuss any questions you have with your health care provider.  Document Released: 09/12/2002 Document Revised: 07/15/2017 Document Reviewed: 06/17/2017  Elsevier Interactive Patient Education © 2019 [employer]

## 2019-07-12 NOTE — TELEPHONE ENCOUNTER
Pharmacist called stating that they  need you to call and clarify medications - Norco & Temazepam combo

## 2019-07-16 DIAGNOSIS — M54.50 LUMBAR PAIN: ICD-10-CM

## 2019-07-17 RX ORDER — HYDROCODONE BITARTRATE AND ACETAMINOPHEN 5; 325 MG/1; MG/1
1 TABLET ORAL EVERY 8 HOURS PRN
Qty: 90 TABLET | Refills: 0 | Status: SHIPPED | OUTPATIENT
Start: 2019-07-17 | End: 2019-08-14 | Stop reason: SDUPTHER

## 2019-07-17 RX ORDER — TEMAZEPAM 30 MG/1
30 CAPSULE ORAL NIGHTLY PRN
Qty: 30 CAPSULE | Refills: 5 | Status: SHIPPED | OUTPATIENT
Start: 2019-07-17 | End: 2020-02-19

## 2019-07-18 RX ORDER — AMLODIPINE BESYLATE 2.5 MG/1
TABLET ORAL
Qty: 90 TABLET | Refills: 1 | Status: SHIPPED | OUTPATIENT
Start: 2019-07-18 | End: 2019-08-09 | Stop reason: ALTCHOICE

## 2019-08-02 ENCOUNTER — HOSPITAL ENCOUNTER (OUTPATIENT)
Age: 79
Setting detail: OBSERVATION
Discharge: HOME | End: 2019-08-03
Attending: EMERGENCY MEDICINE | Admitting: HOSPITALIST
Payer: MEDICARE

## 2019-08-02 ENCOUNTER — APPOINTMENT (OUTPATIENT)
Dept: GENERAL RADIOLOGY | Age: 79
End: 2019-08-02
Payer: MEDICARE

## 2019-08-02 DIAGNOSIS — R07.9 CHEST PAIN, UNSPECIFIED TYPE: Primary | ICD-10-CM

## 2019-08-02 LAB
ANION GAP SERPL CALCULATED.3IONS-SCNC: 13 MMOL/L (ref 7–19)
BUN BLDV-MCNC: 16 MG/DL (ref 8–23)
CALCIUM SERPL-MCNC: 9.9 MG/DL (ref 8.8–10.2)
CHLORIDE BLD-SCNC: 98 MMOL/L (ref 98–111)
CO2: 24 MMOL/L (ref 22–29)
CREAT SERPL-MCNC: 0.7 MG/DL (ref 0.5–0.9)
GFR NON-AFRICAN AMERICAN: >60
GLUCOSE BLD-MCNC: 119 MG/DL (ref 74–109)
HCT VFR BLD CALC: 43.1 % (ref 37–47)
HEMOGLOBIN: 14.7 G/DL (ref 12–16)
MCH RBC QN AUTO: 30.8 PG (ref 27–31)
MCHC RBC AUTO-ENTMCNC: 34.1 G/DL (ref 33–37)
MCV RBC AUTO: 90.2 FL (ref 81–99)
PDW BLD-RTO: 12 % (ref 11.5–14.5)
PLATELET # BLD: 228 K/UL (ref 130–400)
PMV BLD AUTO: 8.9 FL (ref 9.4–12.3)
POTASSIUM REFLEX MAGNESIUM: 4.3 MMOL/L (ref 3.5–5)
RBC # BLD: 4.78 M/UL (ref 4.2–5.4)
SODIUM BLD-SCNC: 135 MMOL/L (ref 136–145)
TROPONIN: <0.01 NG/ML (ref 0–0.03)
TROPONIN: <0.01 NG/ML (ref 0–0.03)
WBC # BLD: 6.3 K/UL (ref 4.8–10.8)

## 2019-08-02 PROCEDURE — 84484 ASSAY OF TROPONIN QUANT: CPT

## 2019-08-02 PROCEDURE — G0378 HOSPITAL OBSERVATION PER HR: HCPCS

## 2019-08-02 PROCEDURE — 99219 PR INITIAL OBSERVATION CARE/DAY 50 MINUTES: CPT | Performed by: INTERNAL MEDICINE

## 2019-08-02 PROCEDURE — 99285 EMERGENCY DEPT VISIT HI MDM: CPT

## 2019-08-02 PROCEDURE — 85027 COMPLETE CBC AUTOMATED: CPT

## 2019-08-02 PROCEDURE — 93005 ELECTROCARDIOGRAM TRACING: CPT

## 2019-08-02 PROCEDURE — 99285 EMERGENCY DEPT VISIT HI MDM: CPT | Performed by: EMERGENCY MEDICINE

## 2019-08-02 PROCEDURE — 6370000000 HC RX 637 (ALT 250 FOR IP): Performed by: EMERGENCY MEDICINE

## 2019-08-02 PROCEDURE — 71046 X-RAY EXAM CHEST 2 VIEWS: CPT

## 2019-08-02 PROCEDURE — 80048 BASIC METABOLIC PNL TOTAL CA: CPT

## 2019-08-02 PROCEDURE — 36415 COLL VENOUS BLD VENIPUNCTURE: CPT

## 2019-08-02 RX ORDER — LOSARTAN POTASSIUM 50 MG/1
50 TABLET ORAL DAILY
Status: DISCONTINUED | OUTPATIENT
Start: 2019-08-03 | End: 2019-08-02

## 2019-08-02 RX ORDER — PRAVASTATIN SODIUM 20 MG
20 TABLET ORAL NIGHTLY
Status: DISCONTINUED | OUTPATIENT
Start: 2019-08-02 | End: 2019-08-03 | Stop reason: HOSPADM

## 2019-08-02 RX ORDER — TRAZODONE HYDROCHLORIDE 50 MG/1
50 TABLET ORAL NIGHTLY PRN
Status: DISCONTINUED | OUTPATIENT
Start: 2019-08-02 | End: 2019-08-03 | Stop reason: HOSPADM

## 2019-08-02 RX ORDER — CARVEDILOL 12.5 MG/1
12.5 TABLET ORAL 2 TIMES DAILY WITH MEALS
Status: DISCONTINUED | OUTPATIENT
Start: 2019-08-02 | End: 2019-08-03 | Stop reason: HOSPADM

## 2019-08-02 RX ORDER — HYDROCODONE BITARTRATE AND ACETAMINOPHEN 5; 325 MG/1; MG/1
1 TABLET ORAL EVERY 6 HOURS PRN
Status: DISCONTINUED | OUTPATIENT
Start: 2019-08-02 | End: 2019-08-03 | Stop reason: HOSPADM

## 2019-08-02 RX ORDER — SODIUM CHLORIDE 0.9 % (FLUSH) 0.9 %
10 SYRINGE (ML) INJECTION EVERY 12 HOURS SCHEDULED
Status: DISCONTINUED | OUTPATIENT
Start: 2019-08-02 | End: 2019-08-03 | Stop reason: HOSPADM

## 2019-08-02 RX ORDER — ASPIRIN 81 MG/1
81 TABLET, CHEWABLE ORAL DAILY
Status: DISCONTINUED | OUTPATIENT
Start: 2019-08-03 | End: 2019-08-03 | Stop reason: HOSPADM

## 2019-08-02 RX ORDER — TEMAZEPAM 15 MG/1
30 CAPSULE ORAL NIGHTLY PRN
COMMUNITY

## 2019-08-02 RX ORDER — LOSARTAN POTASSIUM 100 MG/1
100 TABLET ORAL DAILY
Status: DISCONTINUED | OUTPATIENT
Start: 2019-08-02 | End: 2019-08-03 | Stop reason: HOSPADM

## 2019-08-02 RX ORDER — SODIUM CHLORIDE 0.9 % (FLUSH) 0.9 %
10 SYRINGE (ML) INJECTION PRN
Status: DISCONTINUED | OUTPATIENT
Start: 2019-08-02 | End: 2019-08-03 | Stop reason: HOSPADM

## 2019-08-02 RX ORDER — NICOTINE POLACRILEX 4 MG
15 LOZENGE BUCCAL PRN
Status: DISCONTINUED | OUTPATIENT
Start: 2019-08-02 | End: 2019-08-03 | Stop reason: HOSPADM

## 2019-08-02 RX ORDER — PANTOPRAZOLE SODIUM 40 MG/1
40 TABLET, DELAYED RELEASE ORAL
Status: DISCONTINUED | OUTPATIENT
Start: 2019-08-02 | End: 2019-08-03 | Stop reason: HOSPADM

## 2019-08-02 RX ORDER — FLUOROMETHOLONE 0.1 %
1 SUSPENSION, DROPS(FINAL DOSAGE FORM)(ML) OPHTHALMIC (EYE) 2 TIMES DAILY
Status: DISCONTINUED | OUTPATIENT
Start: 2019-08-02 | End: 2019-08-03 | Stop reason: HOSPADM

## 2019-08-02 RX ORDER — ASPIRIN 81 MG/1
81 TABLET, CHEWABLE ORAL DAILY
COMMUNITY

## 2019-08-02 RX ORDER — SODIUM CHLORIDE 9 MG/ML
INJECTION, SOLUTION INTRAVENOUS CONTINUOUS
Status: DISCONTINUED | OUTPATIENT
Start: 2019-08-02 | End: 2019-08-03 | Stop reason: HOSPADM

## 2019-08-02 RX ORDER — ALPRAZOLAM 0.25 MG/1
0.25 TABLET ORAL 2 TIMES DAILY PRN
Status: DISCONTINUED | OUTPATIENT
Start: 2019-08-02 | End: 2019-08-03 | Stop reason: HOSPADM

## 2019-08-02 RX ORDER — DEXTROSE MONOHYDRATE 25 G/50ML
12.5 INJECTION, SOLUTION INTRAVENOUS PRN
Status: DISCONTINUED | OUTPATIENT
Start: 2019-08-02 | End: 2019-08-03 | Stop reason: HOSPADM

## 2019-08-02 RX ORDER — DEXTROSE MONOHYDRATE 50 MG/ML
100 INJECTION, SOLUTION INTRAVENOUS PRN
Status: DISCONTINUED | OUTPATIENT
Start: 2019-08-02 | End: 2019-08-03 | Stop reason: HOSPADM

## 2019-08-02 RX ORDER — AMLODIPINE BESYLATE 2.5 MG/1
2.5 TABLET ORAL DAILY
Status: DISCONTINUED | OUTPATIENT
Start: 2019-08-03 | End: 2019-08-02

## 2019-08-02 RX ORDER — AMLODIPINE BESYLATE 5 MG/1
5 TABLET ORAL DAILY
Status: DISCONTINUED | OUTPATIENT
Start: 2019-08-02 | End: 2019-08-03 | Stop reason: HOSPADM

## 2019-08-02 RX ORDER — MELOXICAM 15 MG/1
15 TABLET ORAL DAILY
Status: ON HOLD | COMMUNITY
End: 2019-08-03 | Stop reason: HOSPADM

## 2019-08-02 RX ORDER — IBUPROFEN 200 MG
600 TABLET ORAL EVERY 6 HOURS PRN
Status: ON HOLD | COMMUNITY
End: 2019-08-03 | Stop reason: HOSPADM

## 2019-08-02 RX ADMIN — LIDOCAINE HYDROCHLORIDE: 20 SOLUTION ORAL; TOPICAL at 20:00

## 2019-08-02 ASSESSMENT — ENCOUNTER SYMPTOMS
RHINORRHEA: 0
VOICE CHANGE: 0
SHORTNESS OF BREATH: 0
NAUSEA: 1
VOMITING: 0
EYE PAIN: 0
EYE REDNESS: 0
ABDOMINAL PAIN: 0
DIARRHEA: 0
COUGH: 0

## 2019-08-03 ENCOUNTER — APPOINTMENT (OUTPATIENT)
Dept: NUCLEAR MEDICINE | Age: 79
End: 2019-08-03
Payer: MEDICARE

## 2019-08-03 VITALS
SYSTOLIC BLOOD PRESSURE: 150 MMHG | OXYGEN SATURATION: 93 % | HEART RATE: 72 BPM | BODY MASS INDEX: 25.17 KG/M2 | TEMPERATURE: 97 F | HEIGHT: 66 IN | WEIGHT: 156.6 LBS | DIASTOLIC BLOOD PRESSURE: 80 MMHG | RESPIRATION RATE: 14 BRPM

## 2019-08-03 PROBLEM — R07.89 CHEST DISCOMFORT: Status: ACTIVE | Noted: 2019-08-03

## 2019-08-03 LAB
ANION GAP SERPL CALCULATED.3IONS-SCNC: 11 MMOL/L (ref 7–19)
BUN BLDV-MCNC: 14 MG/DL (ref 8–23)
CALCIUM SERPL-MCNC: 9.2 MG/DL (ref 8.8–10.2)
CHLORIDE BLD-SCNC: 101 MMOL/L (ref 98–111)
CHOLESTEROL, TOTAL: 150 MG/DL (ref 160–199)
CO2: 26 MMOL/L (ref 22–29)
CREAT SERPL-MCNC: 0.8 MG/DL (ref 0.5–0.9)
GFR NON-AFRICAN AMERICAN: >60
GLUCOSE BLD-MCNC: 112 MG/DL (ref 70–99)
GLUCOSE BLD-MCNC: 117 MG/DL (ref 74–109)
GLUCOSE BLD-MCNC: 175 MG/DL (ref 70–99)
HBA1C MFR BLD: 5.6 % (ref 4–6)
HCT VFR BLD CALC: 41.2 % (ref 37–47)
HDLC SERPL-MCNC: 57 MG/DL (ref 65–121)
HEMOGLOBIN: 13.7 G/DL (ref 12–16)
IRON SATURATION: 30 % (ref 14–50)
IRON: 66 UG/DL (ref 37–145)
LDL CHOLESTEROL CALCULATED: 78 MG/DL
MCH RBC QN AUTO: 30.4 PG (ref 27–31)
MCHC RBC AUTO-ENTMCNC: 33.3 G/DL (ref 33–37)
MCV RBC AUTO: 91.6 FL (ref 81–99)
PDW BLD-RTO: 12.2 % (ref 11.5–14.5)
PERFORMED ON: ABNORMAL
PERFORMED ON: ABNORMAL
PLATELET # BLD: 203 K/UL (ref 130–400)
PMV BLD AUTO: 8.8 FL (ref 9.4–12.3)
POTASSIUM REFLEX MAGNESIUM: 4.5 MMOL/L (ref 3.5–5)
RBC # BLD: 4.5 M/UL (ref 4.2–5.4)
SODIUM BLD-SCNC: 138 MMOL/L (ref 136–145)
TOTAL IRON BINDING CAPACITY: 222 UG/DL (ref 250–400)
TRIGL SERPL-MCNC: 73 MG/DL (ref 0–149)
TROPONIN: <0.01 NG/ML (ref 0–0.03)
WBC # BLD: 5.3 K/UL (ref 4.8–10.8)

## 2019-08-03 PROCEDURE — 85027 COMPLETE CBC AUTOMATED: CPT

## 2019-08-03 PROCEDURE — 82948 REAGENT STRIP/BLOOD GLUCOSE: CPT

## 2019-08-03 PROCEDURE — 3430000000 HC RX DIAGNOSTIC RADIOPHARMACEUTICAL: Performed by: INTERNAL MEDICINE

## 2019-08-03 PROCEDURE — 84484 ASSAY OF TROPONIN QUANT: CPT

## 2019-08-03 PROCEDURE — 6360000002 HC RX W HCPCS: Performed by: INTERNAL MEDICINE

## 2019-08-03 PROCEDURE — 80061 LIPID PANEL: CPT

## 2019-08-03 PROCEDURE — 83036 HEMOGLOBIN GLYCOSYLATED A1C: CPT

## 2019-08-03 PROCEDURE — G0378 HOSPITAL OBSERVATION PER HR: HCPCS

## 2019-08-03 PROCEDURE — A9500 TC99M SESTAMIBI: HCPCS | Performed by: INTERNAL MEDICINE

## 2019-08-03 PROCEDURE — 80048 BASIC METABOLIC PNL TOTAL CA: CPT

## 2019-08-03 PROCEDURE — 83540 ASSAY OF IRON: CPT

## 2019-08-03 PROCEDURE — 93923 UPR/LXTR ART STDY 3+ LVLS: CPT

## 2019-08-03 PROCEDURE — 93017 CV STRESS TEST TRACING ONLY: CPT

## 2019-08-03 PROCEDURE — 93005 ELECTROCARDIOGRAM TRACING: CPT

## 2019-08-03 PROCEDURE — 99217 PR OBSERVATION CARE DISCHARGE MANAGEMENT: CPT | Performed by: HOSPITALIST

## 2019-08-03 PROCEDURE — 2580000003 HC RX 258: Performed by: INTERNAL MEDICINE

## 2019-08-03 PROCEDURE — 6370000000 HC RX 637 (ALT 250 FOR IP): Performed by: INTERNAL MEDICINE

## 2019-08-03 PROCEDURE — 78452 HT MUSCLE IMAGE SPECT MULT: CPT

## 2019-08-03 PROCEDURE — 83550 IRON BINDING TEST: CPT

## 2019-08-03 PROCEDURE — 36415 COLL VENOUS BLD VENIPUNCTURE: CPT

## 2019-08-03 PROCEDURE — 99205 OFFICE O/P NEW HI 60 MIN: CPT | Performed by: INTERNAL MEDICINE

## 2019-08-03 PROCEDURE — 96372 THER/PROPH/DIAG INJ SC/IM: CPT

## 2019-08-03 RX ORDER — CARVEDILOL 3.12 MG/1
3.12 TABLET ORAL 2 TIMES DAILY WITH MEALS
Qty: 60 TABLET | Refills: 0 | Status: SHIPPED | OUTPATIENT
Start: 2019-08-03 | End: 2019-09-02

## 2019-08-03 RX ADMIN — AMLODIPINE BESYLATE 5 MG: 5 TABLET ORAL at 10:55

## 2019-08-03 RX ADMIN — ENOXAPARIN SODIUM 40 MG: 40 INJECTION SUBCUTANEOUS at 10:55

## 2019-08-03 RX ADMIN — INSULIN LISPRO 1 UNITS: 100 INJECTION, SOLUTION INTRAVENOUS; SUBCUTANEOUS at 11:34

## 2019-08-03 RX ADMIN — LOSARTAN POTASSIUM 100 MG: 100 TABLET ORAL at 10:55

## 2019-08-03 RX ADMIN — CARVEDILOL 12.5 MG: 12.5 TABLET, FILM COATED ORAL at 10:55

## 2019-08-03 RX ADMIN — Medication 10 ML: at 11:00

## 2019-08-03 RX ADMIN — SODIUM CHLORIDE: 9 INJECTION, SOLUTION INTRAVENOUS at 00:09

## 2019-08-03 RX ADMIN — ASPIRIN 81 MG 81 MG: 81 TABLET ORAL at 10:55

## 2019-08-03 RX ADMIN — TETRAKIS(2-METHOXYISOBUTYLISOCYANIDE)COPPER(I) TETRAFLUOROBORATE 10 MILLICURIE: 1 INJECTION, POWDER, LYOPHILIZED, FOR SOLUTION INTRAVENOUS at 10:43

## 2019-08-03 RX ADMIN — PANTOPRAZOLE SODIUM 40 MG: 40 TABLET, DELAYED RELEASE ORAL at 06:17

## 2019-08-03 RX ADMIN — TETRAKIS(2-METHOXYISOBUTYLISOCYANIDE)COPPER(I) TETRAFLUOROBORATE 30 MILLICURIE: 1 INJECTION, POWDER, LYOPHILIZED, FOR SOLUTION INTRAVENOUS at 10:44

## 2019-08-03 RX ADMIN — REGADENOSON 0.4 MG: 0.08 INJECTION, SOLUTION INTRAVENOUS at 09:47

## 2019-08-03 RX ADMIN — SODIUM CHLORIDE: 9 INJECTION, SOLUTION INTRAVENOUS at 14:03

## 2019-08-03 RX ADMIN — FLUOROMETHOLONE 1 DROP: 1 SOLUTION/ DROPS OPHTHALMIC at 10:55

## 2019-08-03 RX ADMIN — PRAVASTATIN SODIUM 20 MG: 20 TABLET ORAL at 00:07

## 2019-08-03 ASSESSMENT — PAIN SCALES - GENERAL: PAINLEVEL_OUTOF10: 0

## 2019-08-03 NOTE — ED PROVIDER NOTES
Pulse Resp SpO2 Height Weight   (!) 160/103 98.4 °F (36.9 °C) Oral 82 18 92 % -- --       Physical Exam   Constitutional: She is oriented to person, place, and time. She appears well-developed and well-nourished. Non-toxic appearance. No distress. HENT:   Head: Normocephalic and atraumatic. Mouth/Throat: Oropharynx is clear and moist.   Eyes: Pupils are equal, round, and reactive to light. EOM are normal. Right eye exhibits no discharge. Left eye exhibits no discharge. No scleral icterus. Neck: Normal range of motion. No JVD present. Cardiovascular: Normal rate, regular rhythm, normal heart sounds and intact distal pulses. Exam reveals no gallop and no friction rub. No murmur heard. Pulses:       Radial pulses are 2+ on the right side, and 2+ on the left side. Dorsalis pedis pulses are 2+ on the right side, and 2+ on the left side. Pulmonary/Chest: Effort normal and breath sounds normal. No accessory muscle usage or stridor. No respiratory distress. She has no decreased breath sounds. She has no wheezes. She has no rhonchi. She has no rales. She exhibits no tenderness. Abdominal: Soft. She exhibits no distension. There is no tenderness. There is no rebound and no guarding. Musculoskeletal: Normal range of motion. She exhibits no deformity. Neurological: She is alert and oriented to person, place, and time. No cranial nerve deficit. She exhibits normal muscle tone. Coordination normal. GCS eye subscore is 4. GCS verbal subscore is 5. GCS motor subscore is 6. Skin: Skin is warm and dry. She is not diaphoretic. No erythema. No pallor. Psychiatric: She has a normal mood and affect. Her behavior is normal. Judgment and thought content normal.   Nursing note and vitals reviewed.       DIAGNOSTIC RESULTS     EKG: All EKG's are interpreted by the Emergency Department Physician who either signs or Co-signs this chart in the absence of a cardiologist.    RADIOLOGY:   Non-plain film images such as CT, Ultrasound and MRI are read by the radiologist. Johnna Sol images are visualized and preliminarily interpreted by the emergency physician with the below findings:      Interpretation per the Radiologist below, if available at the time of this note:    XR CHEST STANDARD (2 VW)   Final Result   1. No acute disease. Signed by Dr Saima Her on 8/2/2019 7:46 PM      Stress/Rest NM Myocardial SPECT Exercise or RX    (Results Pending)   VL Ankle Art Brachial Indices Extremity Bilateral    (Results Pending)         ED BEDSIDE ULTRASOUND:   Performed by ED Physician - none    LABS:  Labs Reviewed   CBC - Abnormal; Notable for the following components:       Result Value    MPV 8.9 (*)     All other components within normal limits   BASIC METABOLIC PANEL W/ REFLEX TO MG FOR LOW K - Abnormal; Notable for the following components:    Sodium 135 (*)     Glucose 119 (*)     All other components within normal limits   TROPONIN   TROPONIN   TROPONIN   CBC   BASIC METABOLIC PANEL W/ REFLEX TO MG FOR LOW K   LIPID PANEL   HEMOGLOBIN A1C   IRON AND TIBC       All other labs were within normal range or not returned as of this dictation.     Medications   ALPRAZolam (XANAX) tablet 0.25 mg (has no administration in time range)   fluorometholone (FML) 0.1 % ophthalmic suspension 1 drop (1 drop Both Eyes Not Given 8/2/19 2328)   HYDROcodone-acetaminophen (NORCO) 5-325 MG per tablet 1 tablet (has no administration in time range)   pantoprazole (PROTONIX) tablet 40 mg (40 mg Oral Not Given 8/2/19 2329)   pravastatin (PRAVACHOL) tablet 20 mg (has no administration in time range)   traZODone (DESYREL) tablet 50 mg (has no administration in time range)   sodium chloride flush 0.9 % injection 10 mL (has no administration in time range)   sodium chloride flush 0.9 % injection 10 mL (has no administration in time range)   magnesium hydroxide (MILK OF MAGNESIA) 400 MG/5ML suspension 30 mL (has no administration in time range)

## 2019-08-03 NOTE — H&P
*      PLAN:  1) Chest pain, concern for possible ACS. Continue trending troponins. Repeat EKG after 3rd trop. Telemetry monitoring. Check Lipid panel and BMP in am. Starting ASA, statin, and BB. Consider stress test. Cardiology consulted, appreciate further recommendations. Possibly related to GI, starting PPI. 2) Hypertension, uncontrolled . Restarting antihypertensives, adding BB. Cont to monitor and adjust as needed. 3) B/L LE cramping, occurring at nighttime. Will evaluate for possible restless legs or PVD. Check Iron studies and ABIs.     Deisy De Jesus MD  8/2/2019

## 2019-08-03 NOTE — ED NOTES
Bed: 07  Expected date:   Expected time:   Means of arrival: [facility]  Comments:  EMS: ABDI Harris RN  08/02/19 3567

## 2019-08-03 NOTE — CONSULTS
REVIEW OF SYSTEMS:     Except as noted in the HPI, all other systems are negative        PHYSICAL EXAMINATION:     /67   Pulse 84   Temp 98.4 °F (36.9 °C) (Temporal)   Resp 14   Ht 5' 6\" (1.676 m)   Wt 156 lb 9.6 oz (71 kg)   SpO2 95%   BMI 25.28 kg/m²     GENERAL - well developed and well nourished, in no amount of generalized distress; is an active participant in this examination  HEENT -  PERRLA, Hearing appears normal, conjunctiva and lids are normal, ears and nose appear normal  NECK - no thyromegaly, no JVD, trachea is in the midline  CARDIOVASCULAR - PMI is in the left mid line clavicular position, Normal S1 and S2 with a grade 1/6 systolic murmur. No S3 or S4    PULMONARY - No respiratory distress. scattered wheezes and rales.   Breath sounds in both  lung fields are Decreased  ABDOMEN  - soft, non tender, no rebound, no hepatomegaly or splenomegaly  MUSCULOSKELETAL  - Prone/Supine, digitals and nails are without clubbing or cyanosis  EXTREMITIES - trace edema  NEUROLOGIC - cranial nerves, II-XII, are normal  SKIN - turgor is normal, no rash  PSYCHIATRIC - was normal mood and affect, alert and orientated x 3, judgement and insight appear appropriate      LABORATORY EVALUATION & TESTING:    I have personally reviewed and interpreted the results of the following diagnostic testing      EKG and or Telemetry:  which was personally reviewed me:  Sinus rhythm,   Pulse Readings from Last 1 Encounters:   08/03/19 84    bpm,  without Acute changes    Troponin:  negative myocardial necrosis (  <0.01); the creatinine is normal    CBC:   Recent Labs     08/02/19 1912 08/03/19 0215   WBC 6.3 5.3   HGB 14.7 13.7   HCT 43.1 41.2   MCV 90.2 91.6    203     BMP:   Recent Labs     08/02/19 1912 08/03/19 0215   * 138   K 4.3 4.5   CL 98 101   CO2 24 26   BUN 16 14   CREATININE 0.7 0.8     Cardiac Enzymes:   Recent Labs     08/02/19 1912 08/02/19  2315 08/03/19  0215   TROPONINI <0.01

## 2019-08-03 NOTE — PROGRESS NOTES
PRELIMINARY REPORT    PHILIP'S    RT PTA= 1.2                   LT PTA= 1.22        DPA=1.23                       DPA=1.23    DIGIT= 1.01                     DIGIT= 1.09    PENDING FINAL REPORT

## 2019-08-05 LAB
EKG P AXIS: 36 DEGREES
EKG P-R INTERVAL: 186 MS
EKG Q-T INTERVAL: 380 MS
EKG QRS DURATION: 94 MS
EKG QTC CALCULATION (BAZETT): 413 MS
EKG T AXIS: 39 DEGREES

## 2019-08-06 LAB
LV EF: 76 %
LVEF MODALITY: NORMAL

## 2019-08-08 ENCOUNTER — TELEPHONE (OUTPATIENT)
Dept: FAMILY MEDICINE CLINIC | Facility: CLINIC | Age: 79
End: 2019-08-08

## 2019-08-08 ENCOUNTER — TELEPHONE (OUTPATIENT)
Dept: UROLOGY | Age: 79
End: 2019-08-08

## 2019-08-08 DIAGNOSIS — N39.0 RECURRENT UTI: ICD-10-CM

## 2019-08-08 DIAGNOSIS — I10 ESSENTIAL HYPERTENSION: Primary | ICD-10-CM

## 2019-08-08 DIAGNOSIS — R30.0 DYSURIA: ICD-10-CM

## 2019-08-08 NOTE — TELEPHONE ENCOUNTER
Mariza with [facility] is currently in the patient's home to work with spouse and the wife has also asked for services.  Patient was recently seen in the ER for hypertension and now Mariza reports that now the patient has hypotension.  Please advise?

## 2019-08-08 NOTE — TELEPHONE ENCOUNTER
[facility] sent fax stating estradiol cream is unavailable. Wants to know if there is anything else to prescribe?

## 2019-08-09 ENCOUNTER — PROCEDURE VISIT (OUTPATIENT)
Dept: FAMILY MEDICINE CLINIC | Facility: CLINIC | Age: 79
End: 2019-08-09

## 2019-08-09 VITALS
HEART RATE: 60 BPM | DIASTOLIC BLOOD PRESSURE: 70 MMHG | BODY MASS INDEX: 25.62 KG/M2 | SYSTOLIC BLOOD PRESSURE: 130 MMHG | HEIGHT: 66 IN | OXYGEN SATURATION: 96 % | WEIGHT: 159.4 LBS

## 2019-08-09 DIAGNOSIS — B35.1 ONYCHOMYCOSIS: ICD-10-CM

## 2019-08-09 DIAGNOSIS — L60.0 INGROWN RIGHT GREATER TOENAIL: Primary | ICD-10-CM

## 2019-08-09 PROCEDURE — 11730 AVULSION NAIL PLATE SIMPLE 1: CPT | Performed by: FAMILY MEDICINE

## 2019-08-09 RX ORDER — CARVEDILOL 3.12 MG/1
3.12 TABLET ORAL 2 TIMES DAILY WITH MEALS
COMMUNITY
End: 2019-09-03 | Stop reason: SDUPTHER

## 2019-08-09 RX ORDER — OMEPRAZOLE 20 MG/1
20 CAPSULE, DELAYED RELEASE ORAL DAILY
COMMUNITY
End: 2020-07-24 | Stop reason: SDUPTHER

## 2019-08-09 NOTE — ADDENDUM NOTE
Addended by: ADALBERTO ALTMAN on: 8/9/2019 10:12 AM     Modules accepted: Orders     Date Of Previous Biopsy (Optional): 7/31/2017

## 2019-08-12 ENCOUNTER — TELEPHONE (OUTPATIENT)
Dept: FAMILY MEDICINE CLINIC | Facility: CLINIC | Age: 79
End: 2019-08-12

## 2019-08-12 NOTE — TELEPHONE ENCOUNTER
Pt called, left message. She is checking to see if bandage on toe where nail was removed need to be changed? Pt also stated that she does not want to use [facility] if Home Health is needed. She stated she prefers [facility]. She requested phone call.

## 2019-08-12 NOTE — PROGRESS NOTES
OFFICE VISIT NOTE:    Shira King is a 79 y.o. female who presents today for toenail removal.     Has right great toenail with deformities and onychomycosis and pain/tenderness surrounding the odalis bed. No purulent drainage. She requests it to be removed.         Past medical/surgical history, Family history, Social history, Allergies and Medications have been reviewed with the patient today and are updated in [facility] EMR. See below.    Past Medical History:   Diagnosis Date   • Lumbar pain 7/12/2019   • Mixed hyperlipidemia    • Valvular disease      Past Surgical History:   Procedure Laterality Date   • BLADDER REPAIR  2005   • BREAST SURGERY     • BUNIONECTOMY  2007   • CATARACT EXTRACTION Bilateral 2005   • CHOLECYSTECTOMY     • ENDOSCOPY  2005   • HERNIA REPAIR  1963   • HYSTERECTOMY  1979   • NECK SURGERY  1974    VERTEBRA   • REPLACEMENT TOTAL KNEE BILATERAL     • TONSILLECTOMY       Family History   Problem Relation Age of Onset   • Stroke Mother    • Heart disease Mother    • Stroke Sister    • Heart attack Maternal Grandmother      Social History     Tobacco Use   • Smoking status: Never Smoker   • Smokeless tobacco: Never Used   Substance Use Topics   • Alcohol use: No   • Drug use: No       Allergies:  Patient has no known allergies.    Current Meds:    Current Outpatient Medications:   •  ALPRAZolam (XANAX) 0.25 MG tablet, Take 1 tablet by mouth 2 (Two) Times a Day As Needed for Anxiety., Disp: 30 tablet, Rfl: 0  •  aspirin 81 MG chewable tablet, Chew 81 mg daily., Disp: , Rfl:   •  carvedilol (COREG) 3.125 MG tablet, Take 3.125 mg by mouth 2 (Two) Times a Day With Meals., Disp: , Rfl:   •  estradiol (ESTRACE VAGINAL) 0.1 MG/GM vaginal cream, Apply fingertip amount to urethra nightly for one week, then apply every Mon, Wed, Fri night., Disp: , Rfl:   •  fluorometholone (FML) 0.1 % ophthalmic suspension, , Disp: , Rfl:   •  HYDROcodone-acetaminophen (NORCO) 5-325 MG per tablet, Take 1 tablet by mouth  "Every 8 (Eight) Hours As Needed for Moderate Pain  or Severe Pain ., Disp: 90 tablet, Rfl: 0  •  losartan (COZAAR) 100 MG tablet, TAKE 1 TABLET BY MOUTH ONCE DAILY, Disp: 90 tablet, Rfl: 1  •  meclizine (ANTIVERT) 25 MG tablet, Take 25 mg by mouth 2 (two) times a day., Disp: , Rfl:   •  metoclopramide (REGLAN) 10 MG tablet, TAKE ONE TABLET BY MOUTH ONCE DAILY, Disp: 90 tablet, Rfl: 2  •  omeprazole (priLOSEC) 20 MG capsule, Take 20 mg by mouth Daily., Disp: , Rfl:   •  pravastatin (PRAVACHOL) 20 MG tablet, Take 20 mg by mouth daily., Disp: , Rfl:   •  temazepam (RESTORIL) 30 MG capsule, Take 1 capsule by mouth At Night As Needed for Sleep., Disp: 30 capsule, Rfl: 5    Review of Systems:  Review of Systems   Constitutional: Negative for activity change, fatigue, fever, unexpected weight gain and unexpected weight loss.   Respiratory: Negative for shortness of breath.    Cardiovascular: Negative for chest pain.   Gastrointestinal: Negative for abdominal pain.   Genitourinary: Negative for difficulty urinating.   Skin: Negative for rash.   Neurological: Negative for syncope and headache.       Physical Examination:  Vital Signs:  /70 (BP Location: Left arm, Patient Position: Sitting, Cuff Size: Adult)   Pulse 60   Ht 167.6 cm (66\")   Wt 72.3 kg (159 lb 6.4 oz)   LMP  (LMP Unknown)   SpO2 96%   Breastfeeding? No   BMI 25.73 kg/m²   Physical Exam   Constitutional: She is oriented to person, place, and time. She appears well-developed and well-nourished. No distress.   HENT:   Head: Normocephalic and atraumatic.   Mouth/Throat: Oropharynx is clear and moist.   Neck: Normal range of motion. Neck supple.   Cardiovascular: Normal rate, regular rhythm and intact distal pulses.   Pulmonary/Chest: Effort normal. No respiratory distress.   Musculoskeletal: Normal range of motion. She exhibits deformity (right great toenail as before). She exhibits no edema.   Neurological: She is alert and oriented to person, place, " and time. No cranial nerve deficit.   Skin: Skin is warm and dry. Capillary refill takes less than 2 seconds. No rash noted.   Psychiatric: She has a normal mood and affect. Her behavior is normal.   Nursing note and vitals reviewed.      Nail Removal  Date/Time: 8/9/2019 3:00 PM  Performed by: Reynaldo Cavazos MD  Authorized by: Ryenaldo Cavazos MD   Location: right foot  Location details: right big toe  Anesthesia: local infiltration    Anesthesia:  Local Anesthetic: lidocaine 1% without epinephrine  Anesthetic total: 10 mL    Sedation:  Patient sedated: no    Preparation: skin prepped with Betadine and sterile field established  Amount removed: complete  Wedge excision of skin of nail fold: no  Nail bed sutured: no  Nail matrix removed: none  Removed nail replaced and anchored: no  Dressing: antibiotic ointment, 4x4 and gauze roll  Patient tolerance: Patient tolerated the procedure well with no immediate complications          ASSESSMENT/ PLAN:        Problem List Items Addressed This Visit     None      Visit Diagnoses     Ingrown right greater toenail    -  Primary    Relevant Orders    Nail Removal    Onychomycosis        Relevant Orders    Nail Removal                   Specific Patient Instructions:  MEDICATION Instructions: Encouraged patient to continue routine medicines as prescribed and maintain compliance. Patient instructed to report any adverse side effects or reactions to medicines promptly to the office. Patient instructed to make us aware of any OTC or herbal meds or supplement use.  DIET Recommendations: No new recommendations regarding diet/restrictions.  EXERCISE Instructions: No new recommendations.    Patient's Body mass index is 25.73 kg/m². BMI is above normal parameters. Recommendations include: exercise counseling and nutrition counseling.      SMOKING Recommendations: N/A  HEALTH MAINTENANCE:  N/A  MISCELLANEOUS Instructions: N/A      Medications ordered or changed this visit:  No  orders of the defined types were placed in this encounter.       FOLLOW-UP:  No Follow-up on file.    I discussed the patients findings and my recommendations with patient.  An After Visit Summary (AVS) was printed and given to the patient at discharge.      Reynaldo Cavazos MD, FAAFP  8/12/2019

## 2019-08-13 ENCOUNTER — TELEPHONE (OUTPATIENT)
Dept: FAMILY MEDICINE CLINIC | Facility: CLINIC | Age: 79
End: 2019-08-13

## 2019-08-14 DIAGNOSIS — M54.50 LUMBAR PAIN: ICD-10-CM

## 2019-08-14 RX ORDER — HYDROCODONE BITARTRATE AND ACETAMINOPHEN 5; 325 MG/1; MG/1
1 TABLET ORAL EVERY 8 HOURS PRN
Qty: 90 TABLET | Refills: 0 | Status: SHIPPED | OUTPATIENT
Start: 2019-08-14 | End: 2019-11-06 | Stop reason: SDUPTHER

## 2019-08-14 NOTE — TELEPHONE ENCOUNTER
Patients daughter called regarding refill on patients Lortab prescription. She also requested to speak with someone regarding an OTC medication that can be taken along with her pain medication. She said the patient can no longer take ibuprofen.   
Please advise?  
Spoke with Bonita and she advised that maybe patient could try Tylenol Arthritis.  Joy verbalized understanding and stated she would have patient try this.  I have also pended refill of NORCO to MD.  
Will need to RF the [place]. But will need more info on what med she wants to take OTC instead of the ibuprofen.   
Luis Antonio Katz

## 2019-08-16 RX ORDER — TEMAZEPAM 30 MG/1
CAPSULE ORAL
Qty: 90 CAPSULE | Refills: 1 | Status: SHIPPED | OUTPATIENT
Start: 2019-08-16 | End: 2019-08-22 | Stop reason: SDUPTHER

## 2019-08-22 ENCOUNTER — OFFICE VISIT (OUTPATIENT)
Dept: FAMILY MEDICINE CLINIC | Facility: CLINIC | Age: 79
End: 2019-08-22

## 2019-08-22 VITALS
SYSTOLIC BLOOD PRESSURE: 163 MMHG | OXYGEN SATURATION: 95 % | HEIGHT: 66 IN | WEIGHT: 161.8 LBS | HEART RATE: 65 BPM | RESPIRATION RATE: 18 BRPM | DIASTOLIC BLOOD PRESSURE: 82 MMHG | BODY MASS INDEX: 26 KG/M2

## 2019-08-22 DIAGNOSIS — M79.676 PAIN DUE TO ONYCHOMYCOSIS OF TOENAIL: Primary | ICD-10-CM

## 2019-08-22 DIAGNOSIS — B35.1 PAIN DUE TO ONYCHOMYCOSIS OF TOENAIL: Primary | ICD-10-CM

## 2019-08-22 DIAGNOSIS — R03.0 SITUATIONAL HYPERTENSION: ICD-10-CM

## 2019-08-22 DIAGNOSIS — K13.70 ORAL LESION: ICD-10-CM

## 2019-08-22 DIAGNOSIS — I10 UNCONTROLLED HYPERTENSION: ICD-10-CM

## 2019-08-22 PROCEDURE — 99213 OFFICE O/P EST LOW 20 MIN: CPT | Performed by: NURSE PRACTITIONER

## 2019-08-22 NOTE — PROGRESS NOTES
Chief Complaint   Patient presents with   • Nail Problem     right foot   • Oral Swelling        Subjective   Shira King is a 79 y.o.  female who presents today for follow up nail removal.    HPI:  LEFT GREAT TOE:  Left great toenail base where her toenail was removed.  She states the pain is gone and she only has some mild tenderness.  RIGHT FOOT:  Right 2nd toe is painful due to a thickened toenail.  She is interested in having this removed as well.  TONGUE:  Patient has a dark elevated area that appeared on her tongue.  She first noticed it a few days ago in the morning.  It has improved but is still present.    Shira King  has a past medical history of Lumbar pain (7/12/2019), Mixed hyperlipidemia, and Valvular disease.    No Known Allergies    Current Outpatient Medications:   •  ALPRAZolam (XANAX) 0.25 MG tablet, Take 1 tablet by mouth 2 (Two) Times a Day As Needed for Anxiety., Disp: 30 tablet, Rfl: 0  •  aspirin 81 MG chewable tablet, Chew 81 mg daily., Disp: , Rfl:   •  carvedilol (COREG) 3.125 MG tablet, Take 3.125 mg by mouth 2 (Two) Times a Day With Meals., Disp: , Rfl:   •  estradiol (ESTRACE VAGINAL) 0.1 MG/GM vaginal cream, Apply fingertip amount to urethra nightly for one week, then apply every Mon, Wed, Fri night., Disp: , Rfl:   •  fluorometholone (FML) 0.1 % ophthalmic suspension, , Disp: , Rfl:   •  HYDROcodone-acetaminophen (NORCO) 5-325 MG per tablet, Take 1 tablet by mouth Every 8 (Eight) Hours As Needed for Moderate Pain  or Severe Pain ., Disp: 90 tablet, Rfl: 0  •  losartan (COZAAR) 100 MG tablet, TAKE 1 TABLET BY MOUTH ONCE DAILY, Disp: 90 tablet, Rfl: 1  •  meclizine (ANTIVERT) 25 MG tablet, Take 25 mg by mouth 2 (two) times a day., Disp: , Rfl:   •  metoclopramide (REGLAN) 10 MG tablet, TAKE ONE TABLET BY MOUTH ONCE DAILY, Disp: 90 tablet, Rfl: 2  •  omeprazole (priLOSEC) 20 MG capsule, Take 20 mg by mouth Daily., Disp: , Rfl:   •  pravastatin (PRAVACHOL) 20 MG  tablet, Take 20 mg by mouth daily., Disp: , Rfl:   •  temazepam (RESTORIL) 30 MG capsule, Take 1 capsule by mouth At Night As Needed for Sleep., Disp: 30 capsule, Rfl: 5  Past Medical History:   Diagnosis Date   • Lumbar pain 7/12/2019   • Mixed hyperlipidemia    • Valvular disease      Past Surgical History:   Procedure Laterality Date   • BLADDER REPAIR  2005   • BREAST SURGERY     • BUNIONECTOMY  2007   • CATARACT EXTRACTION Bilateral 2005   • CHOLECYSTECTOMY     • ENDOSCOPY  2005   • HERNIA REPAIR  1963   • HYSTERECTOMY  1979   • NECK SURGERY  1974    VERTEBRA   • REPLACEMENT TOTAL KNEE BILATERAL     • TONSILLECTOMY       Social History     Socioeconomic History   • Marital status:      Spouse name: Not on file   • Number of children: Not on file   • Years of education: Not on file   • Highest education level: Not on file   Tobacco Use   • Smoking status: Never Smoker   • Smokeless tobacco: Never Used   Substance and Sexual Activity   • Alcohol use: No   • Drug use: No   • Sexual activity: Not Currently     Partners: Male     Family History   Problem Relation Age of Onset   • Stroke Mother    • Heart disease Mother    • Stroke Sister    • Heart attack Maternal Grandmother        Family history, surgical history, past medical history, Allergies and med's reviewed with patient today and updated in [facility] EMR.     ROS:  Review of Systems   Constitutional: Negative.  Negative for fatigue, fever and unexpected weight change.   HENT: Positive for mouth sores. Negative for facial swelling, sore throat and trouble swallowing.    Eyes: Negative.  Negative for photophobia, discharge and visual disturbance.   Respiratory: Negative.  Negative for cough, chest tightness and shortness of breath.    Cardiovascular: Negative.  Negative for chest pain and palpitations.   Gastrointestinal: Negative.  Negative for abdominal pain, diarrhea, nausea and vomiting.   Endocrine: Negative.  Negative for polydipsia, polyphagia and  "polyuria.   Genitourinary: Negative.  Negative for dysuria, flank pain and frequency.   Musculoskeletal: Negative.  Negative for back pain, gait problem and neck pain.   Skin: Negative.  Negative for rash.        Toe pain from toenails.   Allergic/Immunologic: Negative.    Neurological: Negative.  Negative for dizziness, light-headedness and headaches.   Hematological: Negative.    Psychiatric/Behavioral: Negative.  Negative for self-injury and suicidal ideas.       OBJECTIVE:  Vitals:    08/22/19 1507   BP: 163/82   BP Location: Right arm   Patient Position: Sitting   Cuff Size: Adult   Pulse: 65   Resp: 18   SpO2: 95%   Weight: 73.4 kg (161 lb 12.8 oz)   Height: 167.6 cm (66\")     Physical Exam   Constitutional: She is oriented to person, place, and time. She appears well-developed and well-nourished. No distress.   HENT:   Head: Normocephalic and atraumatic.   Right lateral edge of tongue has a 3mm raised area that is tender and purplish in appearance.   Eyes: Conjunctivae and EOM are normal. Pupils are equal, round, and reactive to light.   Neck: Normal range of motion. Neck supple.   Cardiovascular: Normal rate, regular rhythm, normal heart sounds and intact distal pulses.   No murmur heard.  Pulmonary/Chest: Effort normal and breath sounds normal. No respiratory distress.   Abdominal: Soft. Bowel sounds are normal. She exhibits no distension. There is no tenderness.   Musculoskeletal: Normal range of motion. She exhibits no edema.   Neurological: She is alert and oriented to person, place, and time.   Skin: Skin is warm and dry. Capillary refill takes less than 2 seconds. She is not diaphoretic. No erythema.   Left great toenail bed is healed with no residual erythema or drainage.  The right 2nd toenail is thickened, discolored and long.   Psychiatric: She has a normal mood and affect. Her behavior is normal. Judgment and thought content normal.   Nursing note and vitals reviewed.      ASSESSMENT/ " PLAN:    Shira was seen today for nail problem and oral swelling.    Diagnoses and all orders for this visit:    Pain due to onychomycosis of toenail    Oral lesion    Uncontrolled hypertension    Situational hypertension    Patient is instructed to call if the oral lesion does not resolve over the next week to 10 days entirely.  It is likely that she bit her tongue in her sleep as it appears to patient to be resolving.  Patient thinks her b/p was elevated today because she drove in a heavy rain to get to the appointment.  She states measurements at home are historically like they have been here in the 120-130/70's range.  She agrees to keep a check of them.    Orders Placed Today:     No orders of the defined types were placed in this encounter.       Management Plan:     An After Visit Summary was printed and given to the patient at discharge.    Follow-up: Return in about 4 weeks (around 9/19/2019) for appt with [facility] for nail avulsion R)2nd toenail.    Bonita Romero, ROBIN 8/22/2019 3:50 PM  This note was electronically signed.

## 2019-09-03 RX ORDER — CARVEDILOL 3.12 MG/1
3.12 TABLET ORAL 2 TIMES DAILY WITH MEALS
Qty: 60 TABLET | Refills: 5 | Status: SHIPPED | OUTPATIENT
Start: 2019-09-03 | End: 2019-11-01 | Stop reason: DRUGHIGH

## 2019-09-11 ENCOUNTER — OUTSIDE FACILITY SERVICE (OUTPATIENT)
Dept: FAMILY MEDICINE CLINIC | Facility: CLINIC | Age: 79
End: 2019-09-11

## 2019-09-11 PROCEDURE — G0180 MD CERTIFICATION HHA PATIENT: HCPCS | Performed by: FAMILY MEDICINE

## 2019-09-14 DIAGNOSIS — E78.2 MIXED HYPERLIPIDEMIA: Primary | ICD-10-CM

## 2019-09-16 RX ORDER — PRAVASTATIN SODIUM 20 MG
TABLET ORAL
Qty: 90 TABLET | Refills: 2 | Status: SHIPPED | OUTPATIENT
Start: 2019-09-16 | End: 2020-06-22

## 2019-09-17 ENCOUNTER — OFFICE VISIT (OUTPATIENT)
Dept: FAMILY MEDICINE CLINIC | Facility: CLINIC | Age: 79
End: 2019-09-17

## 2019-09-17 VITALS
WEIGHT: 163 LBS | HEART RATE: 70 BPM | OXYGEN SATURATION: 97 % | HEIGHT: 66 IN | BODY MASS INDEX: 26.2 KG/M2 | RESPIRATION RATE: 18 BRPM | DIASTOLIC BLOOD PRESSURE: 80 MMHG | SYSTOLIC BLOOD PRESSURE: 137 MMHG

## 2019-09-17 DIAGNOSIS — N95.2 ATROPHIC VAGINITIS: ICD-10-CM

## 2019-09-17 DIAGNOSIS — R41.0 INTERMITTENT CONFUSION: ICD-10-CM

## 2019-09-17 DIAGNOSIS — R30.0 DIFFICULT OR PAINFUL URINATION: Primary | ICD-10-CM

## 2019-09-17 LAB
BILIRUB BLD-MCNC: NEGATIVE MG/DL
CLARITY, POC: CLEAR
COLOR UR: YELLOW
GLUCOSE UR STRIP-MCNC: NEGATIVE MG/DL
KETONES UR QL: NEGATIVE
LEUKOCYTE EST, POC: NEGATIVE
NITRITE UR-MCNC: NEGATIVE MG/ML
PH UR: 7 [PH] (ref 5–8)
PROT UR STRIP-MCNC: NEGATIVE MG/DL
RBC # UR STRIP: NEGATIVE /UL
SP GR UR: 1.01 (ref 1–1.03)
UROBILINOGEN UR QL: NORMAL

## 2019-09-17 PROCEDURE — 81003 URINALYSIS AUTO W/O SCOPE: CPT | Performed by: NURSE PRACTITIONER

## 2019-09-17 PROCEDURE — 99213 OFFICE O/P EST LOW 20 MIN: CPT | Performed by: NURSE PRACTITIONER

## 2019-09-17 NOTE — PROGRESS NOTES
Chief Complaint   Patient presents with   • Difficulty Urinating        Subjective   Shira King is a 79 y.o.  female who presents today for difficulty urinating.    HPI:  UTI:  The symptoms have been present x 3 weeks or so.  Yesterday she had some confusion.  She feels sure that she has another UTI.  She states she got her medicine all messed up.  She also has atrophic vaginitis.    INSOMNIA:  Despite the sleeping pill, she continues to sleep about 4 hours a night.  Patient states that is pretty good for her.  She does not want anything different.    Shira King  has a past medical history of Lumbar pain (7/12/2019), Mixed hyperlipidemia, and Valvular disease.    No Known Allergies    Current Outpatient Medications:   •  ALPRAZolam (XANAX) 0.25 MG tablet, Take 1 tablet by mouth 2 (Two) Times a Day As Needed for Anxiety., Disp: 30 tablet, Rfl: 0  •  aspirin 81 MG chewable tablet, Chew 81 mg daily., Disp: , Rfl:   •  carvedilol (COREG) 3.125 MG tablet, Take 1 tablet by mouth 2 (Two) Times a Day With Meals., Disp: 60 tablet, Rfl: 5  •  estradiol (ESTRACE VAGINAL) 0.1 MG/GM vaginal cream, Apply fingertip amount to urethra nightly for one week, then apply every Mon, Wed, Fri night., Disp: , Rfl:   •  HYDROcodone-acetaminophen (NORCO) 5-325 MG per tablet, Take 1 tablet by mouth Every 8 (Eight) Hours As Needed for Moderate Pain  or Severe Pain ., Disp: 90 tablet, Rfl: 0  •  losartan (COZAAR) 100 MG tablet, TAKE 1 TABLET BY MOUTH ONCE DAILY, Disp: 90 tablet, Rfl: 1  •  meclizine (ANTIVERT) 25 MG tablet, Take 25 mg by mouth 2 (two) times a day., Disp: , Rfl:   •  metoclopramide (REGLAN) 10 MG tablet, TAKE ONE TABLET BY MOUTH ONCE DAILY, Disp: 90 tablet, Rfl: 2  •  omeprazole (priLOSEC) 20 MG capsule, Take 20 mg by mouth Daily., Disp: , Rfl:   •  pravastatin (PRAVACHOL) 20 MG tablet, TAKE 1 TABLET BY MOUTH ONCE DAILY, Disp: 90 tablet, Rfl: 2  •  temazepam (RESTORIL) 30 MG capsule, Take 1 capsule by  mouth At Night As Needed for Sleep., Disp: 30 capsule, Rfl: 5  Past Medical History:   Diagnosis Date   • Lumbar pain 7/12/2019   • Mixed hyperlipidemia    • Valvular disease      Past Surgical History:   Procedure Laterality Date   • BLADDER REPAIR  2005   • BREAST SURGERY     • BUNIONECTOMY  2007   • CATARACT EXTRACTION Bilateral 2005   • CHOLECYSTECTOMY     • ENDOSCOPY  2005   • HERNIA REPAIR  1963   • HYSTERECTOMY  1979   • NECK SURGERY  1974    VERTEBRA   • REPLACEMENT TOTAL KNEE BILATERAL     • TONSILLECTOMY       Social History     Socioeconomic History   • Marital status:      Spouse name: Not on file   • Number of children: Not on file   • Years of education: Not on file   • Highest education level: Not on file   Tobacco Use   • Smoking status: Never Smoker   • Smokeless tobacco: Never Used   Substance and Sexual Activity   • Alcohol use: No   • Drug use: No   • Sexual activity: Not Currently     Partners: Male     Family History   Problem Relation Age of Onset   • Stroke Mother    • Heart disease Mother    • Stroke Sister    • Heart attack Maternal Grandmother        Family history, surgical history, past medical history, Allergies and med's reviewed with patient today and updated in [facility] EMR.     ROS:  Review of Systems   Constitutional: Negative.  Negative for fatigue, fever and unexpected weight change.   HENT: Negative.  Negative for facial swelling, sore throat and trouble swallowing.    Eyes: Negative.  Negative for photophobia, discharge and visual disturbance.   Respiratory: Negative.  Negative for cough, chest tightness and shortness of breath.    Cardiovascular: Negative.  Negative for chest pain and palpitations.   Gastrointestinal: Negative.  Negative for abdominal pain, diarrhea, nausea and vomiting.   Endocrine: Negative.  Negative for polydipsia, polyphagia and polyuria.   Genitourinary: Positive for frequency and urgency. Negative for dysuria and flank pain.   Musculoskeletal:  "Negative.  Negative for back pain, gait problem and neck pain.   Skin: Negative.  Negative for rash.   Allergic/Immunologic: Negative.    Neurological: Negative.  Negative for dizziness, light-headedness and headaches.   Hematological: Negative.    Psychiatric/Behavioral: Positive for confusion. Negative for self-injury and suicidal ideas.       OBJECTIVE:  Vitals:    09/17/19 1118   BP: 137/80   BP Location: Left arm   Patient Position: Sitting   Cuff Size: Adult   Pulse: 70   Resp: 18   SpO2: 97%   Weight: 73.9 kg (163 lb)   Height: 167.6 cm (66\")     Physical Exam   Constitutional: She is oriented to person, place, and time. She appears well-developed and well-nourished. No distress.   HENT:   Head: Normocephalic and atraumatic.   Eyes: Conjunctivae and EOM are normal. Pupils are equal, round, and reactive to light.   Neck: Normal range of motion. Neck supple.   Cardiovascular: Normal rate, regular rhythm, normal heart sounds and intact distal pulses.   No murmur heard.  Pulmonary/Chest: Effort normal and breath sounds normal. No respiratory distress.   Abdominal: Soft. Bowel sounds are normal. She exhibits no distension. There is no tenderness.   Musculoskeletal: Normal range of motion. She exhibits no edema.   Neurological: She is alert and oriented to person, place, and time.   Skin: Skin is warm and dry. Capillary refill takes less than 2 seconds. She is not diaphoretic. No erythema.   Psychiatric: She has a normal mood and affect. Her behavior is normal. Judgment and thought content normal.   Nursing note and vitals reviewed.      ASSESSMENT/ PLAN:    Shira was seen today for difficulty urinating.    Diagnoses and all orders for this visit:    Difficult or painful urination  -     POCT urinalysis dipstick, automated    Atrophic vaginitis    Intermittent confusion    Patient has burning likely associated with the atrophic vaginitis.  She has no complaints of confusion at present.  We will continue to " monitor.    Orders Placed Today:     No orders of the defined types were placed in this encounter.       Management Plan:     An After Visit Summary was printed and given to the patient at discharge.    Follow-up: Return if symptoms worsen or fail to improve.    Bonita Romero, APRN 9/17/2019 12:00 PM  This note was electronically signed.

## 2019-09-20 ENCOUNTER — PROCEDURE VISIT (OUTPATIENT)
Dept: FAMILY MEDICINE CLINIC | Facility: CLINIC | Age: 79
End: 2019-09-20

## 2019-09-20 VITALS
HEART RATE: 75 BPM | SYSTOLIC BLOOD PRESSURE: 126 MMHG | BODY MASS INDEX: 26.2 KG/M2 | DIASTOLIC BLOOD PRESSURE: 62 MMHG | OXYGEN SATURATION: 98 % | HEIGHT: 66 IN | TEMPERATURE: 98.1 F | WEIGHT: 163 LBS

## 2019-09-20 DIAGNOSIS — B35.1 ONYCHOMYCOSIS: ICD-10-CM

## 2019-09-20 DIAGNOSIS — I10 ESSENTIAL HYPERTENSION: ICD-10-CM

## 2019-09-20 DIAGNOSIS — L60.0 INGROWN NAIL OF SECOND TOE OF RIGHT FOOT: Primary | ICD-10-CM

## 2019-09-20 PROCEDURE — 11750 EXCISION NAIL&NAIL MATRIX: CPT | Performed by: FAMILY MEDICINE

## 2019-09-20 NOTE — PATIENT INSTRUCTIONS
Fingernail or Toenail Removal, Adult, Care After  This sheet gives you information about how to care for yourself after your procedure. Your health care provider may also give you more specific instructions. If you have problems or questions, contact your health care provider.  What can I expect after the procedure?  After the procedure, it is common to have:  · Pain.  · Redness.  · Swelling.  · Soreness.  Follow these instructions at home:  · If you have a splint:  ? Do not put pressure on any part of the splint until it is fully hardened. This may take several hours.  ? Wear the splint as told by your health care provider. Remove it only as told by your health care provider.  ? Loosen the splint if your fingers or toes tingle, become numb, or turn cold and blue.  ? Keep the splint clean.  ? If the splint is not waterproof:  § Do not let it get wet.  § Cover it with a watertight covering when you take a bath or a shower.  Wound care    · Follow instructions from your health care provider about how to take care of your wound. Make sure you:  ? Wash your hands with soap and water before you change your bandage (dressing). If soap and water are not available, use hand .  ? Change your dressing as told by your health care provider.  ? Keep your dressing dry until your health care provider says it can be removed.  ? Leave stitches (sutures), skin glue, or adhesive strips in place. These skin closures may need to stay in place for 2 weeks or longer. If adhesive strip edges start to loosen and curl up, you may trim the loose edges. Do not remove adhesive strips completely unless your health care provider tells you to do that.  · Check your wound every day for signs of infection. Check for:  ? More redness, swelling, or pain.  ? More fluid or blood.  ? Warmth.  ? Pus or a bad smell.  Managing pain, stiffness, and swelling  · Move your fingers or toes often to avoid stiffness and to lessen swelling.  · Raise  (elevate) the injured area above the level of your heart while you are sitting or lying down. You may need to keep your finger or toe raised or supported on a pillow for 24 hours or as told by your health care provider.  · Soak your hand or foot in warm, soapy water for 10-20 minutes, 3 times a day or as told by your health care provider.  Medicine  · Take over-the-counter and prescription medicines only as told by your health care provider.  · If you were prescribed an antibiotic medicine, use it as told by your health care provider. Do not stop using the antibiotic even if your condition improves.  General instructions  · If you were given a shoe to wear, wear it as told by your health care provider.  · Keep all follow-up visits as told by your health care provider. This is important.  Contact a health care provider if:  · You have more redness, swelling, or pain around your wound.  · You have more fluid or blood coming from your wound.  · Your wound feels warm to the touch.  · You have pus or a bad smell coming from your wound.  · You have a fever.  · Your finger or toe looks blue or black.  This information is not intended to replace advice given to you by your health care provider. Make sure you discuss any questions you have with your health care provider.  Document Released: 01/08/2016 Document Revised: 08/16/2017 Document Reviewed: 06/26/2017  [employer] Interactive Patient Education © 2019 [employer]    Ingrown Toenail  An ingrown toenail occurs when the corner or sides of a toenail grow into the surrounding skin. This causes discomfort and pain. The big toe is most commonly affected, but any of the toes can be affected. If an ingrown toenail is not treated, it can become infected.  What are the causes?  This condition may be caused by:  · Wearing shoes that are too small or tight.  · An injury, such as stubbing your toe or having your toe stepped on.  · Improper cutting or care of your toenails.  · Having  nail or foot abnormalities that were present from birth (congenital abnormalities), such as having a nail that is too big for your toe.  What increases the risk?  The following factors may make you more likely to develop ingrown toenails:  · Age. Nails tend to get thicker with age, so ingrown nails are more common among older people.  · Cutting your toenails incorrectly, such as cutting them very short or cutting them unevenly.  An ingrown toenail is more likely to get infected if you have:  · Diabetes.  · Blood flow (circulation) problems.  What are the signs or symptoms?  Symptoms of an ingrown toenail may include:  · Pain, soreness, or tenderness.  · Redness.  · Swelling.  · Hardening of the skin that surrounds the toenail.  Signs that an ingrown toenail may be infected include:  · Fluid or pus.  · Symptoms that get worse instead of better.  How is this diagnosed?  An ingrown toenail may be diagnosed based on your medical history, your symptoms, and a physical exam. If you have fluid or blood coming from your toenail, a sample may be collected to test for the specific type of bacteria that is causing the infection.  How is this treated?  Treatment depends on how severe your ingrown toenail is. You may be able to care for your toenail at home.  · If you have an infection, you may be prescribed antibiotic medicines.  · If you have fluid or pus draining from your toenail, your health care provider may drain it.  · If you have trouble walking, you may be given crutches to use.  · If you have a severe or infected ingrown toenail, you may need a procedure to remove part or all of the nail.  Follow these instructions at home:  Foot care    · Do not pick at your toenail or try to remove it yourself.  · Soak your foot in warm, soapy water. Do this for 20 minutes, 3 times a day, or as often as told by your health care provider. This helps to keep your toe clean and keep your skin soft.  · Wear shoes that fit well and are  not too tight. Your health care provider may recommend that you wear open-toed shoes while you heal.  · Trim your toenails regularly and carefully. Cut your toenails straight across to prevent injury to the skin at the corners of the toenail. Do not cut your nails in a curved shape.  · Keep your feet clean and dry to help prevent infection.  Medicines  · Take over-the-counter and prescription medicines only as told by your health care provider.  · If you were prescribed an antibiotic, take it as told by your health care provider. Do not stop taking the antibiotic even if you start to feel better.  Activity  · Return to your normal activities as told by your health care provider. Ask your health care provider what activities are safe for you.  · Avoid activities that cause pain.  General instructions  · If your health care provider told you to use crutches to help you move around, use them as instructed.  · Keep all follow-up visits as told by your health care provider. This is important.  Contact a health care provider if:  · You have more redness, swelling, pain, or other symptoms that do not improve with treatment.  · You have fluid, blood, or pus coming from your toenail.  Get help right away if:  · You have a red streak on your skin that starts at your foot and spreads up your leg.  · You have a fever.  Summary  · An ingrown toenail occurs when the corner or sides of a toenail grow into the surrounding skin. This causes discomfort and pain. The big toe is most commonly affected, but any of the toes can be affected.  · If an ingrown toenail is not treated, it can become infected.  · Fluid or pus draining from your toenail is a sign of infection. Your health care provider may need to drain it. You may be given antibiotics to treat the infection.  · Trimming your toenails regularly and properly can help you prevent an ingrown toenail.  This information is not intended to replace advice given to you by your health  care provider. Make sure you discuss any questions you have with your health care provider.  Document Released: 12/15/2001 Document Revised: 09/05/2018 Document Reviewed: 09/05/2018  [employer] Interactive Patient Education © 2019 [employer]

## 2019-09-20 NOTE — PROGRESS NOTES
OFFICE VISIT NOTE:    Shira King is a 79 y.o. female who presents today for toenail removal.     Has ingrown toenail on the right 2nd toe. The left great toe is healing well.          Past medical/surgical history, Family history, Social history, Allergies and Medications have been reviewed with the patient today and are updated in [facility] EMR. See below.    Past Medical History:   Diagnosis Date   • Lumbar pain 7/12/2019   • Mixed hyperlipidemia    • Valvular disease      Past Surgical History:   Procedure Laterality Date   • BLADDER REPAIR  2005   • BREAST SURGERY     • BUNIONECTOMY  2007   • CATARACT EXTRACTION Bilateral 2005   • CHOLECYSTECTOMY     • ENDOSCOPY  2005   • HERNIA REPAIR  1963   • HYSTERECTOMY  1979   • NECK SURGERY  1974    VERTEBRA   • REPLACEMENT TOTAL KNEE BILATERAL     • TONSILLECTOMY       Family History   Problem Relation Age of Onset   • Stroke Mother    • Heart disease Mother    • Stroke Sister    • Heart attack Maternal Grandmother      Social History     Tobacco Use   • Smoking status: Never Smoker   • Smokeless tobacco: Never Used   Substance Use Topics   • Alcohol use: No   • Drug use: No       Allergies:  Patient has no known allergies.    Current Meds:    Current Outpatient Medications:   •  ALPRAZolam (XANAX) 0.25 MG tablet, Take 1 tablet by mouth 2 (Two) Times a Day As Needed for Anxiety., Disp: 30 tablet, Rfl: 0  •  aspirin 81 MG chewable tablet, Chew 81 mg daily., Disp: , Rfl:   •  carvedilol (COREG) 3.125 MG tablet, Take 1 tablet by mouth 2 (Two) Times a Day With Meals., Disp: 60 tablet, Rfl: 5  •  estradiol (ESTRACE VAGINAL) 0.1 MG/GM vaginal cream, Apply fingertip amount to urethra nightly for one week, then apply every Mon, Wed, Fri night., Disp: , Rfl:   •  HYDROcodone-acetaminophen (NORCO) 5-325 MG per tablet, Take 1 tablet by mouth Every 8 (Eight) Hours As Needed for Moderate Pain  or Severe Pain ., Disp: 90 tablet, Rfl: 0  •  losartan (COZAAR) 100 MG tablet, TAKE 1  "TABLET BY MOUTH ONCE DAILY, Disp: 90 tablet, Rfl: 1  •  meclizine (ANTIVERT) 25 MG tablet, Take 25 mg by mouth 2 (two) times a day., Disp: , Rfl:   •  metoclopramide (REGLAN) 10 MG tablet, TAKE ONE TABLET BY MOUTH ONCE DAILY, Disp: 90 tablet, Rfl: 2  •  omeprazole (priLOSEC) 20 MG capsule, Take 20 mg by mouth Daily., Disp: , Rfl:   •  pravastatin (PRAVACHOL) 20 MG tablet, TAKE 1 TABLET BY MOUTH ONCE DAILY, Disp: 90 tablet, Rfl: 2  •  temazepam (RESTORIL) 30 MG capsule, Take 1 capsule by mouth At Night As Needed for Sleep., Disp: 30 capsule, Rfl: 5    Review of Systems:  Review of Systems    Physical Examination:  Vital Signs:  /62 (BP Location: Left arm, Patient Position: Sitting, Cuff Size: Adult)   Pulse 75   Temp 98.1 °F (36.7 °C) (Temporal)   Ht 167.6 cm (66\")   Wt 73.9 kg (163 lb)   LMP  (LMP Unknown)   SpO2 98%   Breastfeeding? No   BMI 26.31 kg/m²   Physical Exam   Constitutional: She is oriented to person, place, and time. She appears well-developed and well-nourished. No distress.   HENT:   Head: Normocephalic and atraumatic.   Mouth/Throat: Oropharynx is clear and moist.   Neck: Normal range of motion. Neck supple. No JVD present.   Musculoskeletal: Normal range of motion. She exhibits no edema or tenderness.   Right 2nd toenail with onychomycosis and ingrown character   Neurological: She is alert and oriented to person, place, and time. No cranial nerve deficit.   Skin: Skin is warm and dry. Capillary refill takes less than 2 seconds.   Psychiatric: She has a normal mood and affect. Her behavior is normal.   Nursing note and vitals reviewed.      Nail Removal  Date/Time: 9/20/2019 10:34 AM  Performed by: Reynaldo Cavazos MD  Authorized by: Reynaldo Cavazos MD   Location: right foot  Location details: right second toe  Anesthesia: digital block    Anesthesia:  Local Anesthetic: lidocaine 1% without epinephrine    Sedation:  Patient sedated: no    Preparation: skin prepped with " alcohol  Amount removed: complete  Wedge excision of skin of nail fold: no  Nail bed sutured: no  Nail matrix removed: complete  Removed nail replaced and anchored: no  Dressing: antibiotic ointment, 4x4 and gauze roll  Patient tolerance: Patient tolerated the procedure well with no immediate complications  Comments: After R/B/A and informed consent and appropriate time out performed.          ASSESSMENT/ PLAN:        Problem List Items Addressed This Visit        Cardiovascular and Mediastinum    Essential hypertension    Overview     HAS COMPONENT OF HYPERTENSIVE HEART DISEASE           Other Visit Diagnoses     Ingrown nail of second toe of right foot    -  Primary    Relevant Orders    Nail Removal    Onychomycosis        Relevant Orders    Nail Removal                   Specific Patient Instructions:  MEDICATION Instructions: Encouraged patient to continue routine medicines as prescribed and maintain compliance. Patient instructed to report any adverse side effects or reactions to medicines promptly to the office. Patient instructed to make us aware of any OTC or herbal meds or supplement use.  DIET Recommendations: No new recommendations regarding diet/restrictions.  EXERCISE Instructions: No new recommendations.    Patient's Body mass index is 26.31 kg/m². BMI is above normal parameters. Recommendations include: exercise counseling and nutrition counseling.      SMOKING Recommendations: N/A  HEALTH MAINTENANCE:  N/A  MISCELLANEOUS Instructions: N/A      Medications ordered or changed this visit:  No orders of the defined types were placed in this encounter.       FOLLOW-UP:  Return in about 1 week (around 9/27/2019) for Recheck.    I discussed the patients findings and my recommendations with patient.  An After Visit Summary (AVS) was printed and given to the patient at discharge.      Reynaldo Cavazos MD, FAAFP  9/20/2019

## 2019-09-23 ENCOUNTER — TELEPHONE (OUTPATIENT)
Dept: FAMILY MEDICINE CLINIC | Facility: CLINIC | Age: 79
End: 2019-09-23

## 2019-09-23 NOTE — TELEPHONE ENCOUNTER
Mamta with home health wanted to let Dr. Cavazos know that Mrs. King's dressing for her toe nail removal was removed today and Mamta redressed it with oil emulsion and guaze. She said it did look good but if there is anything further she needs to do to please let her know.

## 2019-09-27 ENCOUNTER — OFFICE VISIT (OUTPATIENT)
Dept: FAMILY MEDICINE CLINIC | Facility: CLINIC | Age: 79
End: 2019-09-27

## 2019-09-27 VITALS
DIASTOLIC BLOOD PRESSURE: 76 MMHG | HEART RATE: 64 BPM | WEIGHT: 164.4 LBS | OXYGEN SATURATION: 97 % | BODY MASS INDEX: 26.42 KG/M2 | SYSTOLIC BLOOD PRESSURE: 132 MMHG | HEIGHT: 66 IN

## 2019-09-27 DIAGNOSIS — L60.0 INGROWN TOENAIL: ICD-10-CM

## 2019-09-27 DIAGNOSIS — Z23 FLU VACCINE NEED: ICD-10-CM

## 2019-09-27 DIAGNOSIS — Z23 NEED FOR IMMUNIZATION AGAINST INFLUENZA: Primary | ICD-10-CM

## 2019-09-27 PROCEDURE — 99024 POSTOP FOLLOW-UP VISIT: CPT | Performed by: FAMILY MEDICINE

## 2019-09-27 PROCEDURE — 90653 IIV ADJUVANT VACCINE IM: CPT | Performed by: FAMILY MEDICINE

## 2019-09-27 PROCEDURE — G0008 ADMIN INFLUENZA VIRUS VAC: HCPCS | Performed by: FAMILY MEDICINE

## 2019-09-27 NOTE — PATIENT INSTRUCTIONS
Fingernail or Toenail Removal, Adult    An injury, accident, or medical condition may require your health care provider to remove the nail on a finger or toe. Removal of a nail may be necessary when the nail is ingrown, infected, or damaged, or when it has not grown properly.  Tell a health care provider about:  · Any allergies you have.  · All medicines you are taking, including vitamins, herbs, eye drops, creams, and over-the-counter medicines.  · Any problems you or family members have had with anesthetic medicines.  · Any blood disorders you have.  · Any surgeries you have had.  · Any medical conditions you have.  · Whether you are pregnant or may be pregnant.  What are the risks?  Generally, this is a safe procedure. However, problems may occur, including:  · Pain.  · Bleeding.  · Infection.  · Allergic reactions to medicines.  · The nail growing back improperly.  What happens before the procedure?  · Ask your health care provider about:  ? Changing or stopping your regular medicines. This is especially important if you are taking diabetes medicines or blood thinners.  ? Taking medicines such as aspirin and ibuprofen. These medicines can thin your blood. Do not take these medicines before your procedure if your health care provider instructs you not to.  · Follow instructions from your health care provider about eating or drinking restrictions.  · Plan to have someone take you home from the hospital or clinic.  What happens during the procedure?  · An IV tube may be inserted into one of your veins.  · You will be given one or more of the following:  ? A medicine that helps you relax (sedative).  ? A medicine that numbs the area (local anesthetic).  · An instrument will be inserted underneath the nail to lift it up.  · A cut (incision) may be made in your nail.  · The nail will be removed.  · A bandage (dressing) will be put over the area where the nail was removed.  The procedure may vary among health care  providers and hospitals.  What happens after the procedure?  · Your blood pressure, heart rate, breathing rate, and blood oxygen level may be monitored until the medicines you were given have worn off.  · If you had a fingernail removed, you may be given a finger splint to wear while you recover.  · If you had a toenail removed, you will be given a surgical shoe to wear while you recover.  · You may need to keep your finger or toe raised (elevated) or supported on a pillow for 24 hours or as long as told by your health care provider.  Summary  · Removal of a nail may be necessary when the nail is ingrown, infected, or damaged, or when it has not grown properly.  · Before the procedure, tell your health care provider about all medicines you take and any medical conditions you have.  · You will be given medicine to numb the area, and the nail will be removed.  · After a fingernail is removed, you will be given a finger splint to wear while you recover.  · After a toenail is removed, you will be given a surgical shoe to wear while you recover.  This information is not intended to replace advice given to you by your health care provider. Make sure you discuss any questions you have with your health care provider.  Document Released: 09/15/2004 Document Revised: 09/15/2017 Document Reviewed: 09/15/2017  [employer] Interactive Patient Education © 2019 [employer]

## 2019-09-27 NOTE — PROGRESS NOTES
OFFICE VISIT NOTE:    Shira King is a 79 y.o. female who presents today for toenail removal (f/u) and Flu Vaccine.     Recent removal of right 2nd toenail - healing well. HH is following with dressing changes.          Past medical/surgical history, Family history, Social history, Allergies and Medications have been reviewed with the patient today and are updated in [facility] EMR. See below.    Past Medical History:   Diagnosis Date   • Lumbar pain 7/12/2019   • Mixed hyperlipidemia    • Valvular disease      Past Surgical History:   Procedure Laterality Date   • BLADDER REPAIR  2005   • BREAST SURGERY     • BUNIONECTOMY  2007   • CATARACT EXTRACTION Bilateral 2005   • CHOLECYSTECTOMY     • ENDOSCOPY  2005   • HERNIA REPAIR  1963   • HYSTERECTOMY  1979   • NECK SURGERY  1974    VERTEBRA   • REPLACEMENT TOTAL KNEE BILATERAL     • TONSILLECTOMY       Family History   Problem Relation Age of Onset   • Stroke Mother    • Heart disease Mother    • Stroke Sister    • Heart attack Maternal Grandmother      Social History     Tobacco Use   • Smoking status: Never Smoker   • Smokeless tobacco: Never Used   Substance Use Topics   • Alcohol use: No   • Drug use: No       Allergies:  Patient has no known allergies.    Current Meds:    Current Outpatient Medications:   •  ALPRAZolam (XANAX) 0.25 MG tablet, Take 1 tablet by mouth 2 (Two) Times a Day As Needed for Anxiety., Disp: 30 tablet, Rfl: 0  •  aspirin 81 MG chewable tablet, Chew 81 mg daily., Disp: , Rfl:   •  carvedilol (COREG) 3.125 MG tablet, Take 1 tablet by mouth 2 (Two) Times a Day With Meals., Disp: 60 tablet, Rfl: 5  •  estradiol (ESTRACE VAGINAL) 0.1 MG/GM vaginal cream, Apply fingertip amount to urethra nightly for one week, then apply every Mon, Wed, Fri night., Disp: , Rfl:   •  HYDROcodone-acetaminophen (NORCO) 5-325 MG per tablet, Take 1 tablet by mouth Every 8 (Eight) Hours As Needed for Moderate Pain  or Severe Pain ., Disp: 90 tablet, Rfl: 0  •   "losartan (COZAAR) 100 MG tablet, TAKE 1 TABLET BY MOUTH ONCE DAILY, Disp: 90 tablet, Rfl: 1  •  meclizine (ANTIVERT) 25 MG tablet, Take 25 mg by mouth 2 (two) times a day., Disp: , Rfl:   •  metoclopramide (REGLAN) 10 MG tablet, TAKE ONE TABLET BY MOUTH ONCE DAILY, Disp: 90 tablet, Rfl: 2  •  omeprazole (priLOSEC) 20 MG capsule, Take 20 mg by mouth Daily., Disp: , Rfl:   •  pravastatin (PRAVACHOL) 20 MG tablet, TAKE 1 TABLET BY MOUTH ONCE DAILY, Disp: 90 tablet, Rfl: 2  •  temazepam (RESTORIL) 30 MG capsule, Take 1 capsule by mouth At Night As Needed for Sleep., Disp: 30 capsule, Rfl: 5    Review of Systems:  Review of Systems   Constitutional: Negative for activity change, fatigue, fever, unexpected weight gain and unexpected weight loss.   Respiratory: Negative for shortness of breath.    Cardiovascular: Negative for chest pain.   Gastrointestinal: Negative for abdominal pain.   Genitourinary: Negative for difficulty urinating.   Skin: Negative for rash.   Neurological: Negative for syncope and headache.       Physical Examination:  Vital Signs:  /76 (BP Location: Left arm, Patient Position: Sitting, Cuff Size: Adult)   Pulse 64   Ht 167.6 cm (66\")   Wt 74.6 kg (164 lb 6.4 oz)   LMP  (LMP Unknown)   SpO2 97%   Breastfeeding? No   BMI 26.53 kg/m²   Physical Exam   Constitutional: She is oriented to person, place, and time. She appears well-developed and well-nourished. No distress.   Musculoskeletal: Normal range of motion. She exhibits no edema.   Right 2nd toenail bed is healing well. No infection.   Neurological: She is alert and oriented to person, place, and time. No cranial nerve deficit.   Skin: Skin is warm and dry. Capillary refill takes less than 2 seconds. No rash noted.   Psychiatric: She has a normal mood and affect. Her behavior is normal.   Nursing note and vitals reviewed.      Procedures    ASSESSMENT/ PLAN:        Problem List Items Addressed This Visit     None      Visit Diagnoses  "    Need for immunization against influenza    -  Primary    Relevant Orders    Fluad Tri 65yr ([id number]) (Completed)    Flu vaccine need        Ingrown toenail                       Specific Patient Instructions:  MEDICATION Instructions: Encouraged patient to continue routine medicines as prescribed and maintain compliance. Patient instructed to report any adverse side effects or reactions to medicines promptly to the office. Patient instructed to make us aware of any OTC or herbal meds or supplement use.  DIET Recommendations: No new recommendations regarding diet/restrictions.  EXERCISE Instructions: No new recommendations.    Patient's Body mass index is 26.53 kg/m². BMI is above normal parameters. Recommendations include: exercise counseling and nutrition counseling.      SMOKING Recommendations: N/A  HEALTH MAINTENANCE:  N/A  MISCELLANEOUS Instructions: N/A      Medications ordered or changed this visit:  No orders of the defined types were placed in this encounter.       FOLLOW-UP:  Return if symptoms worsen or fail to improve, for Recheck.    I discussed the patients findings and my recommendations with patient.  An After Visit Summary (AVS) was printed and given to the patient at discharge.      Reynaldo Cavazos MD, FAAFP  9/27/2019

## 2019-10-10 ENCOUNTER — OFFICE VISIT (OUTPATIENT)
Dept: UROLOGY | Age: 79
End: 2019-10-10
Payer: MEDICARE

## 2019-10-10 VITALS — TEMPERATURE: 98.2 F | HEIGHT: 66 IN | BODY MASS INDEX: 26.68 KG/M2 | WEIGHT: 166 LBS

## 2019-10-10 DIAGNOSIS — N39.0 RECURRENT UTI: Primary | ICD-10-CM

## 2019-10-10 LAB
APPEARANCE FLUID: NORMAL
BILIRUBIN, POC: NORMAL
BLOOD URINE, POC: NORMAL
CLARITY, POC: CLEAR
COLOR, POC: YELLOW
GLUCOSE URINE, POC: NORMAL
KETONES, POC: NORMAL
LEUKOCYTE EST, POC: NORMAL
NITRITE, POC: NORMAL
PH, POC: 7.5
PROTEIN, POC: NORMAL
SPECIFIC GRAVITY, POC: 1.01
UROBILINOGEN, POC: 0.2

## 2019-10-10 PROCEDURE — 4040F PNEUMOC VAC/ADMIN/RCVD: CPT | Performed by: NURSE PRACTITIONER

## 2019-10-10 PROCEDURE — G8427 DOCREV CUR MEDS BY ELIG CLIN: HCPCS | Performed by: NURSE PRACTITIONER

## 2019-10-10 PROCEDURE — G8419 CALC BMI OUT NRM PARAM NOF/U: HCPCS | Performed by: NURSE PRACTITIONER

## 2019-10-10 PROCEDURE — G8400 PT W/DXA NO RESULTS DOC: HCPCS | Performed by: NURSE PRACTITIONER

## 2019-10-10 PROCEDURE — 99213 OFFICE O/P EST LOW 20 MIN: CPT | Performed by: NURSE PRACTITIONER

## 2019-10-10 PROCEDURE — 1123F ACP DISCUSS/DSCN MKR DOCD: CPT | Performed by: NURSE PRACTITIONER

## 2019-10-10 PROCEDURE — 81002 URINALYSIS NONAUTO W/O SCOPE: CPT | Performed by: NURSE PRACTITIONER

## 2019-10-10 PROCEDURE — G8484 FLU IMMUNIZE NO ADMIN: HCPCS | Performed by: NURSE PRACTITIONER

## 2019-10-10 PROCEDURE — 1036F TOBACCO NON-USER: CPT | Performed by: NURSE PRACTITIONER

## 2019-10-10 PROCEDURE — 1090F PRES/ABSN URINE INCON ASSESS: CPT | Performed by: NURSE PRACTITIONER

## 2019-11-01 ENCOUNTER — CLINICAL SUPPORT (OUTPATIENT)
Dept: FAMILY MEDICINE CLINIC | Facility: CLINIC | Age: 79
End: 2019-11-01

## 2019-11-01 VITALS — SYSTOLIC BLOOD PRESSURE: 143 MMHG | DIASTOLIC BLOOD PRESSURE: 80 MMHG | HEART RATE: 67 BPM

## 2019-11-01 DIAGNOSIS — I10 ESSENTIAL HYPERTENSION: Primary | ICD-10-CM

## 2019-11-01 RX ORDER — CARVEDILOL 6.25 MG/1
6.25 TABLET ORAL 2 TIMES DAILY WITH MEALS
Qty: 60 TABLET | Refills: 2 | Status: SHIPPED | OUTPATIENT
Start: 2019-11-01 | End: 2020-02-10

## 2019-11-01 NOTE — PROGRESS NOTES
Patient presented to office for Blood Pressure Check. BP was 143/80. Reading was given to MD to address. MD has advised for Pt to increase Coreg 3.125 BID to Coreg 6.25mg BID. Pt is to take Coreg 6.25 BID along with her Losartan 100mg QD she is already taking. Pt voiced understanding. New script was sent to  [facility] at Pt's request.

## 2019-11-03 PROCEDURE — 99234 HOSP IP/OBS SM DT SF/LOW 45: CPT | Performed by: FAMILY MEDICINE

## 2019-11-04 ENCOUNTER — OUTSIDE FACILITY SERVICE (OUTPATIENT)
Dept: FAMILY MEDICINE CLINIC | Facility: CLINIC | Age: 79
End: 2019-11-04

## 2019-11-05 ENCOUNTER — TRANSITIONAL CARE MANAGEMENT TELEPHONE ENCOUNTER (OUTPATIENT)
Dept: FAMILY MEDICINE CLINIC | Facility: CLINIC | Age: 79
End: 2019-11-05

## 2019-11-05 ENCOUNTER — TELEPHONE (OUTPATIENT)
Dept: FAMILY MEDICINE CLINIC | Facility: CLINIC | Age: 79
End: 2019-11-05

## 2019-11-05 NOTE — TELEPHONE ENCOUNTER
DIL called stating that bp readings  Today were as follows:   Earlier in the day: 151/85  2 pm: 160/90  Last reading @ 4 pm: 167/95     [place] [facility] [place], please advise

## 2019-11-06 ENCOUNTER — TELEPHONE (OUTPATIENT)
Dept: FAMILY MEDICINE CLINIC | Facility: CLINIC | Age: 79
End: 2019-11-06

## 2019-11-06 DIAGNOSIS — M54.50 LUMBAR PAIN: ICD-10-CM

## 2019-11-07 RX ORDER — HYDROCODONE BITARTRATE AND ACETAMINOPHEN 5; 325 MG/1; MG/1
1 TABLET ORAL EVERY 8 HOURS PRN
Qty: 90 TABLET | Refills: 0 | Status: SHIPPED | OUTPATIENT
Start: 2019-11-07 | End: 2020-02-10 | Stop reason: SDUPTHER

## 2019-11-11 ENCOUNTER — OUTSIDE FACILITY SERVICE (OUTPATIENT)
Dept: FAMILY MEDICINE CLINIC | Facility: CLINIC | Age: 79
End: 2019-11-11

## 2019-11-12 ENCOUNTER — TELEPHONE (OUTPATIENT)
Dept: FAMILY MEDICINE CLINIC | Facility: CLINIC | Age: 79
End: 2019-11-12

## 2019-11-12 NOTE — TELEPHONE ENCOUNTER
Susanne called from [facility]. She advised that pt blood pressure is 176/78. She is checking to see if any changes are needed to BP meds?

## 2019-11-13 NOTE — TELEPHONE ENCOUNTER
Talked with patient and told her to take total of 12.5mg of Coreg tonight , then we will have HH to watch it closely tomorrow and see if more adjustments are needed. Please check on her tomorrow (11/13) Thanks.

## 2019-11-13 NOTE — TELEPHONE ENCOUNTER
Tried to call the HH nurse back without answer - will attempt to call the home. May need med adjustment.

## 2019-11-15 ENCOUNTER — OFFICE VISIT (OUTPATIENT)
Dept: FAMILY MEDICINE CLINIC | Facility: CLINIC | Age: 79
End: 2019-11-15

## 2019-11-15 VITALS
BODY MASS INDEX: 26.2 KG/M2 | HEART RATE: 62 BPM | SYSTOLIC BLOOD PRESSURE: 128 MMHG | OXYGEN SATURATION: 98 % | DIASTOLIC BLOOD PRESSURE: 62 MMHG | HEIGHT: 66 IN | WEIGHT: 163 LBS

## 2019-11-15 DIAGNOSIS — I10 ESSENTIAL HYPERTENSION: Primary | ICD-10-CM

## 2019-11-15 DIAGNOSIS — R42 VERTIGO: ICD-10-CM

## 2019-11-15 DIAGNOSIS — L82.1 SEBORRHEIC KERATOSES: ICD-10-CM

## 2019-11-15 PROCEDURE — 99495 TRANSJ CARE MGMT MOD F2F 14D: CPT | Performed by: FAMILY MEDICINE

## 2019-11-15 NOTE — PROGRESS NOTES
[facility]  TRANSITIONAL CARE MANAGEMENT VISIT         DISCHARGING PHYSICIAN: French  DISCHARGE DATE: 11/3/19 - 11/4/19  DISCHARGE from FACILITY: [facility]    DISCHARGE DIAGNOSES:  There are no active hospital problems to display for this patient.  Also has an irritated Jose K on the left lateral eyelid that is growing and she requests it to be taken off. Will make appt.   Is better from vertigo and BP standpoint - reviewed list of BP's she brought. Need to watch BP closely and parameters are to call us if <90/60 or > 160/100.      Hypertension   This is a chronic problem. The current episode started more than 1 year ago. The problem has been gradually worsening since onset. The problem is controlled. Associated symptoms include malaise/fatigue. Pertinent negatives include no chest pain, headaches, orthopnea, palpitations, peripheral edema, PND or shortness of breath. (Vertigo) There are no associated agents to hypertension. The current treatment provides significant improvement. There are no compliance problems.         INITIAL TCM Phone Encounter was made on 11/5/19.    FACE-to-FACE evaluation performed on 11/15/2019.  This was within within 14 (Medium Complexity) days of discharge.    MEDICATION RECONCILIATION: Medication list was reviewed and reconciled, and new list provided to patient/family/caregiver via AVS.    ALLERGIES:  Patient has no known allergies.    CURRENT MEDICATION LIST:    Current Outpatient Medications:   •  ALPRAZolam (XANAX) 0.25 MG tablet, Take 1 tablet by mouth 2 (Two) Times a Day As Needed for Anxiety., Disp: 30 tablet, Rfl: 0  •  aspirin 81 MG chewable tablet, Chew 81 mg daily., Disp: , Rfl:   •  carvedilol (COREG) 6.25 MG tablet, Take 1 tablet by mouth 2 (Two) Times a Day With Meals., Disp: 60 tablet, Rfl: 2  •  estradiol (ESTRACE VAGINAL) 0.1 MG/GM vaginal cream, Apply fingertip amount to urethra nightly for one week, then apply  "every Mon, Wed, Fri night., Disp: , Rfl:   •  HYDROcodone-acetaminophen (NORCO) 5-325 MG per tablet, Take 1 tablet by mouth Every 8 (Eight) Hours As Needed for Moderate Pain  or Severe Pain ., Disp: 90 tablet, Rfl: 0  •  losartan (COZAAR) 100 MG tablet, TAKE 1 TABLET BY MOUTH ONCE DAILY, Disp: 90 tablet, Rfl: 1  •  meclizine (ANTIVERT) 25 MG tablet, Take 25 mg by mouth 2 (two) times a day., Disp: , Rfl:   •  metoclopramide (REGLAN) 10 MG tablet, TAKE ONE TABLET BY MOUTH ONCE DAILY, Disp: 90 tablet, Rfl: 2  •  omeprazole (priLOSEC) 20 MG capsule, Take 20 mg by mouth Daily., Disp: , Rfl:   •  pravastatin (PRAVACHOL) 20 MG tablet, TAKE 1 TABLET BY MOUTH ONCE DAILY, Disp: 90 tablet, Rfl: 2  •  temazepam (RESTORIL) 30 MG capsule, Take 1 capsule by mouth At Night As Needed for Sleep., Disp: 30 capsule, Rfl: 5    ROS:  Review of Systems   Constitutional: Positive for malaise/fatigue. Negative for activity change, fatigue, fever, unexpected weight gain and unexpected weight loss.   Respiratory: Negative for shortness of breath.    Cardiovascular: Negative for chest pain, palpitations, orthopnea and PND.   Gastrointestinal: Negative for abdominal pain.   Genitourinary: Negative for difficulty urinating.   Skin: Negative for rash.   Neurological: Negative for syncope and headache.       PATIENT EXAM:  Vital Signs:  /62 (BP Location: Left arm, Patient Position: Sitting, Cuff Size: Adult)   Pulse 62   Ht 167.6 cm (66\")   Wt 73.9 kg (163 lb)   LMP  (LMP Unknown)   SpO2 98%   BMI 26.31 kg/m²   Physical Exam   Constitutional: She is oriented to person, place, and time. She appears well-developed and well-nourished. No distress.   HENT:   Head: Normocephalic and atraumatic.   Mouth/Throat: Oropharynx is clear and moist.   Neck: Normal range of motion. Neck supple. No JVD present.   Cardiovascular: Normal rate, regular rhythm, normal heart sounds and intact distal pulses.   Pulmonary/Chest: Effort normal and breath " sounds normal. No respiratory distress.   Musculoskeletal: Normal range of motion. She exhibits no edema.   Neurological: She is alert and oriented to person, place, and time. No cranial nerve deficit.   Skin: Skin is warm and dry. Capillary refill takes less than 2 seconds. No rash noted.   Psychiatric: She has a normal mood and affect. Her behavior is normal.   Nursing note and vitals reviewed.       ASSESSMENT:  Diagnoses and all orders for this visit:    1. Essential hypertension (Primary)    2. Vertigo    3. Seborrheic keratoses          REFERRALS:       LABS and/or ADDITIONAL TESTS NEEDED:  No orders of the defined types were placed in this encounter.      DURABLE MEDICAL EQUIPMENT (DME):  None Needed    COMMUNITY RESOURCES IDENTIFIED FOR PATIENT/FAMILY:  None Needed.    ADDITIONAL COMMUNICATION DELIVERED OR PLANNED:  Please refer to AVS for specifics of Patient Education, Updated Medication List, Handouts, Referrals. As necessary, information was made available or sent to Specialists or members of Care Team.    COMPLEXITY OF MEDICAL DECISION MAKING:  Moderate Complexity (77677)  (Considerations: Number of diagnoses, number of management options considered, amount and/or complexity of medical records/diagnostic tests, and/or other information obtained/reviewed/analyzed. Risk of significant complications, morbidity, and/or mortality as well as co-morbidities associated with presenting problems, diagnostic procedures, and/or the possible management options).

## 2019-11-22 PROCEDURE — G0180 MD CERTIFICATION HHA PATIENT: HCPCS | Performed by: FAMILY MEDICINE

## 2019-12-02 RX ORDER — LOSARTAN POTASSIUM 100 MG/1
TABLET ORAL
Qty: 90 TABLET | Refills: 0 | Status: SHIPPED | OUTPATIENT
Start: 2019-12-02 | End: 2019-12-17 | Stop reason: SDUPTHER

## 2019-12-18 RX ORDER — LOSARTAN POTASSIUM 100 MG/1
100 TABLET ORAL DAILY
Qty: 90 TABLET | Refills: 1 | Status: SHIPPED | OUTPATIENT
Start: 2019-12-18 | End: 2020-07-22

## 2019-12-23 ENCOUNTER — PROCEDURE VISIT (OUTPATIENT)
Dept: FAMILY MEDICINE CLINIC | Facility: CLINIC | Age: 79
End: 2019-12-23

## 2019-12-23 VITALS
HEIGHT: 66 IN | SYSTOLIC BLOOD PRESSURE: 139 MMHG | DIASTOLIC BLOOD PRESSURE: 80 MMHG | OXYGEN SATURATION: 96 % | BODY MASS INDEX: 27.13 KG/M2 | RESPIRATION RATE: 18 BRPM | HEART RATE: 68 BPM | WEIGHT: 168.8 LBS

## 2019-12-23 DIAGNOSIS — I10 ESSENTIAL HYPERTENSION: Primary | ICD-10-CM

## 2019-12-23 DIAGNOSIS — E78.2 MIXED HYPERLIPIDEMIA: ICD-10-CM

## 2019-12-23 DIAGNOSIS — D22.9 NEVUS: ICD-10-CM

## 2019-12-23 PROCEDURE — 99213 OFFICE O/P EST LOW 20 MIN: CPT | Performed by: FAMILY MEDICINE

## 2019-12-23 PROCEDURE — 11441 EXC FACE-MM B9+MARG 0.6-1 CM: CPT | Performed by: FAMILY MEDICINE

## 2019-12-23 NOTE — PROGRESS NOTES
OFFICE VISIT NOTE:    Shira King is a 79 y.o. female who presents today for Procedure.     Has a pedunculated nevus just above the left eye on the upper lid - increasing in size and itching more lately. No pigment change, but very warty in character. Requests removal.          Past medical/surgical history, Family history, Social history, Allergies and Medications have been reviewed with the patient today and are updated in [facility] EMR. See below.    Past Medical History:   Diagnosis Date   • Lumbar pain 7/12/2019   • Mixed hyperlipidemia    • Valvular disease      Past Surgical History:   Procedure Laterality Date   • BLADDER REPAIR  2005   • BREAST SURGERY     • BUNIONECTOMY  2007   • CATARACT EXTRACTION Bilateral 2005   • CHOLECYSTECTOMY     • ENDOSCOPY  2005   • HERNIA REPAIR  1963   • HYSTERECTOMY  1979   • NECK SURGERY  1974    VERTEBRA   • REPLACEMENT TOTAL KNEE BILATERAL     • TONSILLECTOMY       Family History   Problem Relation Age of Onset   • Stroke Mother    • Heart disease Mother    • Stroke Sister    • Heart attack Maternal Grandmother      Social History     Tobacco Use   • Smoking status: Never Smoker   • Smokeless tobacco: Never Used   Substance Use Topics   • Alcohol use: No   • Drug use: No       ALLERGIES:  Patient has no known allergies.    CURRENT MEDS:    Current Outpatient Medications:   •  ALPRAZolam (XANAX) 0.25 MG tablet, Take 1 tablet by mouth 2 (Two) Times a Day As Needed for Anxiety., Disp: 30 tablet, Rfl: 0  •  aspirin 81 MG chewable tablet, Chew 81 mg daily., Disp: , Rfl:   •  carvedilol (COREG) 6.25 MG tablet, Take 1 tablet by mouth 2 (Two) Times a Day With Meals., Disp: 60 tablet, Rfl: 2  •  estradiol (ESTRACE VAGINAL) 0.1 MG/GM vaginal cream, Apply fingertip amount to urethra nightly for one week, then apply every Mon, Wed, Fri night., Disp: , Rfl:   •  HYDROcodone-acetaminophen (NORCO) 5-325 MG per tablet, Take 1 tablet by mouth Every 8 (Eight) Hours As Needed for Moderate  "Pain  or Severe Pain ., Disp: 90 tablet, Rfl: 0  •  losartan (COZAAR) 100 MG tablet, Take 1 tablet by mouth Daily., Disp: 90 tablet, Rfl: 1  •  meclizine (ANTIVERT) 25 MG tablet, Take 25 mg by mouth 2 (two) times a day., Disp: , Rfl:   •  metoclopramide (REGLAN) 10 MG tablet, TAKE ONE TABLET BY MOUTH ONCE DAILY, Disp: 90 tablet, Rfl: 2  •  omeprazole (priLOSEC) 20 MG capsule, Take 20 mg by mouth Daily., Disp: , Rfl:   •  pravastatin (PRAVACHOL) 20 MG tablet, TAKE 1 TABLET BY MOUTH ONCE DAILY, Disp: 90 tablet, Rfl: 2  •  temazepam (RESTORIL) 30 MG capsule, Take 1 capsule by mouth At Night As Needed for Sleep., Disp: 30 capsule, Rfl: 5    REVIEW OF SYSTEMS:  Review of Systems   Constitutional: Negative for activity change, fatigue, fever, unexpected weight gain and unexpected weight loss.   Respiratory: Negative for shortness of breath.    Cardiovascular: Negative for chest pain.   Gastrointestinal: Negative for abdominal pain.   Genitourinary: Negative for difficulty urinating.   Skin: Negative for rash.   Neurological: Negative for syncope and headache.       PHYSICAL EXAMINATION:  Vital Signs:  /80 (BP Location: Left arm, Patient Position: Sitting, Cuff Size: Adult)   Pulse 68   Resp 18   Ht 167.6 cm (66\")   Wt 76.6 kg (168 lb 12.8 oz)   LMP  (LMP Unknown)   SpO2 96%   BMI 27.25 kg/m²   Physical Exam   Constitutional: She is oriented to person, place, and time. She appears well-developed and well-nourished. No distress.   HENT:   Head: Normocephalic and atraumatic.   Mouth/Throat: Oropharynx is clear and moist.   Neck: Normal range of motion. Neck supple. No JVD present.   Musculoskeletal: Normal range of motion. She exhibits no edema.   Neurological: She is alert and oriented to person, place, and time. No cranial nerve deficit.   Skin: Skin is warm and dry. Capillary refill takes less than 2 seconds. No rash noted.   Pedunculated nevus about 6mm in diameter, left upper outer  eyelid     Psychiatric: " She has a normal mood and affect. Her behavior is normal.   Nursing note and vitals reviewed.      Destruction of Lesion  Performed by: Reynaldo Cavazos MD  Authorized by: Reynaldo Cavazos MD   Preparation: Patient was prepped and draped in the usual sterile fashion.  Local anesthesia used: yes  Anesthesia: local infiltration    Anesthesia:  Local anesthesia used: yes  Local Anesthetic: lidocaine 1% with epinephrine    Sedation:  Patient sedated: no    Patient tolerance: Patient tolerated the procedure well with no immediate complications  Comments: After informed consent given and signed, and R/B/A discussed, and questions answered fully, and appropriate timeout performed, the lesion was held with forceps, and excised with hyfrecation and base cauterized after removal with good hemostasis and control. Dressing applied and tolerated well.               ASSESSMENT/ PLAN:  Problem List Items Addressed This Visit        Cardiovascular and Mediastinum    Essential hypertension - Primary    Overview     HAS COMPONENT OF HYPERTENSIVE HEART DISEASE         Mixed hyperlipidemia      Other Visit Diagnoses     Nevus        Relevant Orders    Destruction of Lesion            Specific Patient Instructions:  MEDICATION Instructions: Encouraged patient to continue routine medicines as prescribed and maintain compliance. Patient instructed to report any adverse side effects or reactions to medicines promptly to the office. Patient instructed to make us aware of any OTC or herbal meds or supplement use.  DIET Recommendations: Patient instructed and provided information on the following nutrition and DIET(s): Calorie restriction for weight reduction and maintenance. Necessity for adequate daily intake of fluids/water.  EXERCISE Instructions: Discussed with patient the need for routine aerobic activity for cardiovascular fitness, 3 times a week for about 30 minutes. Daily exercise for increased fitness and weight reduction  goals.    Patient's Body mass index is 27.25 kg/m². BMI is above normal parameters. Recommendations include: exercise counseling and nutrition counseling.      SMOKING Recommendations: Counseled patient and encouraged them on smoking cessation. Discussed the benefits to all body systems with smoking cessation, including decreased cardiac/lung/stroke/cancer risk.  HEALTH MAINTENANCE:  Counseling provided to patient/family about routine health maintenance and ANNUAL physicals/labs. Counseling on recommended Vaccinations appropriate for age needed.  MISCELLANEOUS Instructions: N/A      Medications or Orders placed this visit:  Orders Placed This Encounter   Procedures   • Destruction of Lesion     This order was created via procedure documentation       Medications DISCONTINUED this visit:  There are no discontinued medications.    FOLLOW-UP:  Return if symptoms worsen or fail to improve, for Recheck.    I discussed the patients findings and my recommendations with patient.  An After Visit Summary (AVS) was printed and given to the patient at discharge.      Reynaldo Cavazos MD, FAAFP  12/26/2019

## 2019-12-27 ENCOUNTER — TELEPHONE (OUTPATIENT)
Dept: FAMILY MEDICINE CLINIC | Facility: CLINIC | Age: 79
End: 2019-12-27

## 2019-12-27 RX ORDER — GUAIFENESIN 600 MG/1
600 TABLET, EXTENDED RELEASE ORAL 2 TIMES DAILY
Qty: 14 TABLET | Refills: 0 | Status: SHIPPED | OUTPATIENT
Start: 2019-12-27 | End: 2020-01-03

## 2019-12-27 RX ORDER — AZITHROMYCIN 250 MG/1
TABLET, FILM COATED ORAL
Qty: 6 TABLET | Refills: 0 | Status: SHIPPED | OUTPATIENT
Start: 2019-12-27 | End: 2020-01-27

## 2019-12-27 NOTE — TELEPHONE ENCOUNTER
Pt called, stated that she woke up with a sore throat and sinus drainage. She is asking that medication be sent to [facility] for her.

## 2020-01-20 ENCOUNTER — TELEPHONE (OUTPATIENT)
Dept: FAMILY MEDICINE CLINIC | Facility: CLINIC | Age: 80
End: 2020-01-20

## 2020-01-20 DIAGNOSIS — B00.9 HERPES SIMPLEX: Primary | ICD-10-CM

## 2020-01-20 RX ORDER — VALACYCLOVIR HYDROCHLORIDE 500 MG/1
500 TABLET, FILM COATED ORAL 3 TIMES DAILY
Qty: 15 TABLET | Refills: 0 | Status: SHIPPED | OUTPATIENT
Start: 2020-01-20 | End: 2020-01-25

## 2020-01-20 NOTE — TELEPHONE ENCOUNTER
Pt daughter in law called. Stated that Pt has fever blisters all around her mouth. She is asking if medication can be sent to [facility] for this?

## 2020-01-23 ENCOUNTER — CLINICAL SUPPORT (OUTPATIENT)
Dept: FAMILY MEDICINE CLINIC | Facility: CLINIC | Age: 80
End: 2020-01-23

## 2020-01-23 DIAGNOSIS — R30.0 DYSURIA: Primary | ICD-10-CM

## 2020-01-23 LAB
BILIRUB BLD-MCNC: NEGATIVE MG/DL
CLARITY, POC: ABNORMAL
COLOR UR: YELLOW
GLUCOSE UR STRIP-MCNC: NEGATIVE MG/DL
KETONES UR QL: ABNORMAL
LEUKOCYTE EST, POC: ABNORMAL
NITRITE UR-MCNC: NEGATIVE MG/ML
PH UR: 7 [PH] (ref 5–8)
PROT UR STRIP-MCNC: NEGATIVE MG/DL
RBC # UR STRIP: ABNORMAL /UL
SP GR UR: 1.02 (ref 1–1.03)
UROBILINOGEN UR QL: NORMAL

## 2020-01-23 PROCEDURE — 81003 URINALYSIS AUTO W/O SCOPE: CPT | Performed by: NURSE PRACTITIONER

## 2020-01-23 RX ORDER — NITROFURANTOIN 25; 75 MG/1; MG/1
100 CAPSULE ORAL 2 TIMES DAILY
Qty: 10 CAPSULE | Refills: 0 | Status: SHIPPED | OUTPATIENT
Start: 2020-01-23 | End: 2020-01-27

## 2020-01-23 NOTE — PROGRESS NOTES
Pt came in for dysuria. She states that she started having symptoms this week with burning & pain during urination. She states that there has been no visible blood , itching or discharge. Bonita was made aware of results as well as patient. An abx was sent in to [facility] at pt request. Advised pt as per Bonita's orders to RTC if no better after abx completion and recheck UA with culture. She is aware & voiced understanding.

## 2020-01-27 ENCOUNTER — OFFICE VISIT (OUTPATIENT)
Dept: FAMILY MEDICINE CLINIC | Facility: CLINIC | Age: 80
End: 2020-01-27

## 2020-01-27 VITALS
BODY MASS INDEX: 27 KG/M2 | TEMPERATURE: 99 F | HEIGHT: 66 IN | OXYGEN SATURATION: 98 % | WEIGHT: 168 LBS | SYSTOLIC BLOOD PRESSURE: 148 MMHG | DIASTOLIC BLOOD PRESSURE: 78 MMHG | HEART RATE: 69 BPM

## 2020-01-27 DIAGNOSIS — R30.0 DYSURIA: ICD-10-CM

## 2020-01-27 DIAGNOSIS — B00.1 FEVER BLISTER: ICD-10-CM

## 2020-01-27 DIAGNOSIS — L03.032 PARONYCHIA OF GREAT TOE, LEFT: Primary | ICD-10-CM

## 2020-01-27 DIAGNOSIS — N30.00 ACUTE CYSTITIS WITHOUT HEMATURIA: ICD-10-CM

## 2020-01-27 LAB
BILIRUB BLD-MCNC: NEGATIVE MG/DL
CLARITY, POC: CLEAR
COLOR UR: YELLOW
GLUCOSE UR STRIP-MCNC: NEGATIVE MG/DL
KETONES UR QL: NEGATIVE
LEUKOCYTE EST, POC: ABNORMAL
NITRITE UR-MCNC: NEGATIVE MG/ML
PH UR: 7.5 [PH] (ref 5–8)
PROT UR STRIP-MCNC: NEGATIVE MG/DL
RBC # UR STRIP: NEGATIVE /UL
SP GR UR: 1.01 (ref 1–1.03)
UROBILINOGEN UR QL: NORMAL

## 2020-01-27 PROCEDURE — 99214 OFFICE O/P EST MOD 30 MIN: CPT | Performed by: FAMILY MEDICINE

## 2020-01-27 PROCEDURE — 81003 URINALYSIS AUTO W/O SCOPE: CPT | Performed by: FAMILY MEDICINE

## 2020-01-27 RX ORDER — AMOXICILLIN AND CLAVULANATE POTASSIUM 875; 125 MG/1; MG/1
1 TABLET, FILM COATED ORAL 2 TIMES DAILY
Qty: 14 TABLET | Refills: 0 | Status: SHIPPED | OUTPATIENT
Start: 2020-01-27 | End: 2020-02-03

## 2020-01-27 RX ORDER — VALACYCLOVIR HYDROCHLORIDE 500 MG/1
500 TABLET, FILM COATED ORAL 3 TIMES DAILY
Qty: 15 TABLET | Refills: 1 | Status: SHIPPED | OUTPATIENT
Start: 2020-01-27 | End: 2020-02-01

## 2020-01-27 NOTE — PROGRESS NOTES
OFFICE VISIT NOTE:    Shira King is a 80 y.o. female who presents today for Nail Problem; Urinary Tract Infection (UA in office); and fever blisters.     Had a UTI and Bonita Rx'd Macrodantin - has a recurrent UTI now with recurring symptoms and urine with leukocytes now. Also, has a paronychia developing on the 1st toe left, and will have antibiotics now and removal with phenol destruction in about a week.   Has recurrent fever blisters noted - Valtrex helped in past.   Also has some continued pain in the toenail area with paronychial involvement on the left 1st toenail.    Urinary Tract Infection    This is a new problem. The current episode started in the past 7 days. The problem occurs intermittently. The problem has been waxing and waning. The quality of the pain is described as aching and stabbing. The pain is moderate. There has been no fever. There is no history of pyelonephritis. She has tried acetaminophen and increased fluids for the symptoms. The treatment provided moderate relief.        Past medical/surgical history, Family history, Social history, Allergies and Medications have been reviewed with the patient today and are updated in [facility] EMR. See below.    Past Medical History:   Diagnosis Date   • Lumbar pain 7/12/2019   • Mixed hyperlipidemia    • Valvular disease      Past Surgical History:   Procedure Laterality Date   • BLADDER REPAIR  2005   • BREAST SURGERY     • BUNIONECTOMY  2007   • CATARACT EXTRACTION Bilateral 2005   • CHOLECYSTECTOMY     • ENDOSCOPY  2005   • HERNIA REPAIR  1963   • HYSTERECTOMY  1979   • NECK SURGERY  1974    VERTEBRA   • REPLACEMENT TOTAL KNEE BILATERAL     • TONSILLECTOMY       Family History   Problem Relation Age of Onset   • Stroke Mother    • Heart disease Mother    • Stroke Sister    • Heart attack Maternal Grandmother      Social History     Tobacco Use   • Smoking status: Never Smoker   • Smokeless tobacco: Never Used   Substance Use Topics   • Alcohol  use: No   • Drug use: No       ALLERGIES:  Patient has no known allergies.    CURRENT MEDS:    Current Outpatient Medications:   •  ALPRAZolam (XANAX) 0.25 MG tablet, Take 1 tablet by mouth 2 (Two) Times a Day As Needed for Anxiety., Disp: 30 tablet, Rfl: 0  •  aspirin 81 MG chewable tablet, Chew 81 mg daily., Disp: , Rfl:   •  carvedilol (COREG) 6.25 MG tablet, Take 1 tablet by mouth 2 (Two) Times a Day With Meals., Disp: 60 tablet, Rfl: 2  •  estradiol (ESTRACE VAGINAL) 0.1 MG/GM vaginal cream, Apply fingertip amount to urethra nightly for one week, then apply every Mon, Wed, Fri night., Disp: , Rfl:   •  HYDROcodone-acetaminophen (NORCO) 5-325 MG per tablet, Take 1 tablet by mouth Every 8 (Eight) Hours As Needed for Moderate Pain  or Severe Pain ., Disp: 90 tablet, Rfl: 0  •  losartan (COZAAR) 100 MG tablet, Take 1 tablet by mouth Daily., Disp: 90 tablet, Rfl: 1  •  meclizine (ANTIVERT) 25 MG tablet, Take 25 mg by mouth 2 (two) times a day., Disp: , Rfl:   •  metoclopramide (REGLAN) 10 MG tablet, TAKE ONE TABLET BY MOUTH ONCE DAILY, Disp: 90 tablet, Rfl: 2  •  omeprazole (priLOSEC) 20 MG capsule, Take 20 mg by mouth Daily., Disp: , Rfl:   •  pravastatin (PRAVACHOL) 20 MG tablet, TAKE 1 TABLET BY MOUTH ONCE DAILY, Disp: 90 tablet, Rfl: 2  •  temazepam (RESTORIL) 30 MG capsule, Take 1 capsule by mouth At Night As Needed for Sleep., Disp: 30 capsule, Rfl: 5  •  amoxicillin-clavulanate (AUGMENTIN) 875-125 MG per tablet, Take 1 tablet by mouth 2 (Two) Times a Day for 7 days., Disp: 14 tablet, Rfl: 0  •  valACYclovir (VALTREX) 500 MG tablet, Take 1 tablet by mouth 3 (Three) Times a Day for 5 days., Disp: 15 tablet, Rfl: 1    REVIEW OF SYSTEMS:  Review of Systems   Constitutional: Negative for activity change, fatigue, fever, unexpected weight gain and unexpected weight loss.   Respiratory: Negative for shortness of breath.    Cardiovascular: Negative for chest pain.   Gastrointestinal: Negative for abdominal pain.  "  Genitourinary: Negative for difficulty urinating.   Skin: Negative for rash.   Neurological: Negative for syncope and headache.       PHYSICAL EXAMINATION:  Vital Signs:  /78 (BP Location: Left arm, Patient Position: Sitting, Cuff Size: Adult)   Pulse 69   Temp 99 °F (37.2 °C) (Temporal)   Ht 167.6 cm (66\")   Wt 76.2 kg (168 lb)   LMP  (LMP Unknown)   SpO2 98%   BMI 27.12 kg/m²   Vitals:    01/27/20 1436   Patient Position: Sitting       Physical Exam   Constitutional: She is oriented to person, place, and time. She appears well-developed and well-nourished. No distress.   HENT:   Head: Normocephalic and atraumatic.   Mouth/Throat: Oropharynx is clear and moist.   Neck: Normal range of motion. Neck supple. No JVD present.   Cardiovascular: Normal rate, regular rhythm, normal heart sounds and intact distal pulses.   Pulmonary/Chest: Effort normal and breath sounds normal. No respiratory distress.   Musculoskeletal: Normal range of motion. She exhibits no edema.   Neurological: She is alert and oriented to person, place, and time. No cranial nerve deficit.   Skin: Skin is warm and dry. Capillary refill takes less than 2 seconds. No rash noted.   Psychiatric: She has a normal mood and affect. Her behavior is normal.   Nursing note and vitals reviewed.      Procedures    ASSESSMENT/ PLAN:  Problem List Items Addressed This Visit     None      Visit Diagnoses     Paronychia of great toe, left    -  Primary    Relevant Medications    amoxicillin-clavulanate (AUGMENTIN) 875-125 MG per tablet    Dysuria        Relevant Orders    POCT urinalysis dipstick, automated (Completed)    Acute cystitis without hematuria        Relevant Medications    amoxicillin-clavulanate (AUGMENTIN) 875-125 MG per tablet    Fever blister        Relevant Medications    valACYclovir (VALTREX) 500 MG tablet            Specific Patient Instructions:  MEDICATION Instructions: Encouraged patient to continue routine medicines as " prescribed and maintain compliance. Patient instructed to report any adverse side effects or reactions to medicines promptly to the office. Patient instructed to make us aware of any OTC or herbal meds or supplement use.    DIET Recommendations: Patient instructed and provided information on the following nutrition and diet recommendations: Calorie restriction for weight reduction and maintenance. Necessity for adequate daily intake of fluids/water. Also, diet information provided in AVS for specifics.    EXERCISE Instructions: Discussed with patient the need for routine aerobic activity for cardiovascular fitness, 3 times a week for about 30 minutes. Daily exercise for increased fitness and weight reduction goals.    SMOKING Recommendations: Counseled patient and encouraged them on smoking and tobacco cessation if applicable. Discussed the benefits to all body systems with smoking/tobacco cessation, including decreased cardiac/lung/stroke/cancer risk. Encouraged no vaping use.    HEALTH MAINTENANCE:  Counseling provided to patient/family about routine health maintenance and ANNUAL physicals/labs. Counseling on recommended Vaccinations appropriate for age needed.        Patient's Body mass index is 27.12 kg/m². BMI is above normal parameters. Recommendations include: exercise counseling and nutrition counseling.      Medications or Orders placed this visit:  Orders Placed This Encounter   Procedures   • POCT urinalysis dipstick, automated       Medications DISCONTINUED this visit:  Medications Discontinued During This Encounter   Medication Reason   • azithromycin (ZITHROMAX Z-ANGEL) 250 MG tablet *Therapy completed   • nitrofurantoin, macrocrystal-monohydrate, (MACROBID) 100 MG capsule *Therapy completed       FOLLOW-UP:  Return in about 2 weeks (around 2/10/2020) for Recheck.    I discussed the patients findings and my recommendations with patient.  An After Visit Summary (AVS) was printed and given to the patient  at discharge.      Reynaldo Cavazos MD, FAAFP  1/31/2020

## 2020-01-27 NOTE — PATIENT INSTRUCTIONS
Urinary Tract Infection, Adult    A urinary tract infection (UTI) is an infection of any part of the urinary tract. The urinary tract includes the kidneys, ureters, bladder, and urethra. These organs make, store, and get rid of urine in the body.  Your health care provider may use other names to describe the infection. An upper UTI affects the ureters and kidneys (pyelonephritis). A lower UTI affects the bladder (cystitis) and urethra (urethritis).  What are the causes?  Most urinary tract infections are caused by bacteria in your genital area, around the entrance to your urinary tract (urethra). These bacteria grow and cause inflammation of your urinary tract.  What increases the risk?  You are more likely to develop this condition if:  · You have a urinary catheter that stays in place (indwelling).  · You are not able to control when you urinate or have a bowel movement (you have incontinence).  · You are female and you:  ? Use a spermicide or diaphragm for birth control.  ? Have low estrogen levels.  ? Are pregnant.  · You have certain genes that increase your risk (genetics).  · You are sexually active.  · You take antibiotic medicines.  · You have a condition that causes your flow of urine to slow down, such as:  ? An enlarged prostate, if you are male.  ? Blockage in your urethra (stricture).  ? A kidney stone.  ? A nerve condition that affects your bladder control (neurogenic bladder).  ? Not getting enough to drink, or not urinating often.  · You have certain medical conditions, such as:  ? Diabetes.  ? A weak disease-fighting system (immunesystem).  ? Sickle cell disease.  ? Gout.  ? Spinal cord injury.  What are the signs or symptoms?  Symptoms of this condition include:  · Needing to urinate right away (urgently).  · Frequent urination or passing small amounts of urine frequently.  · Pain or burning with urination.  · Blood in the urine.  · Urine that smells bad or unusual.  · Trouble urinating.  · Cloudy  urine.  · Vaginal discharge, if you are female.  · Pain in the abdomen or the lower back.  You may also have:  · Vomiting or a decreased appetite.  · Confusion.  · Irritability or tiredness.  · A fever.  · Diarrhea.  The first symptom in older adults may be confusion. In some cases, they may not have any symptoms until the infection has worsened.  How is this diagnosed?  This condition is diagnosed based on your medical history and a physical exam. You may also have other tests, including:  · Urine tests.  · Blood tests.  · Tests for sexually transmitted infections (STIs).  If you have had more than one UTI, a cystoscopy or imaging studies may be done to determine the cause of the infections.  How is this treated?  Treatment for this condition includes:  · Antibiotic medicine.  · Over-the-counter medicines to treat discomfort.  · Drinking enough water to stay hydrated.  If you have frequent infections or have other conditions such as a kidney stone, you may need to see a health care provider who specializes in the urinary tract (urologist).  In rare cases, urinary tract infections can cause sepsis. Sepsis is a life-threatening condition that occurs when the body responds to an infection. Sepsis is treated in the hospital with IV antibiotics, fluids, and other medicines.  Follow these instructions at home:    Medicines  · Take over-the-counter and prescription medicines only as told by your health care provider.  · If you were prescribed an antibiotic medicine, take it as told by your health care provider. Do not stop using the antibiotic even if you start to feel better.  General instructions  · Make sure you:  ? Empty your bladder often and completely. Do not hold urine for long periods of time.  ? Empty your bladder after sex.  ? Wipe from front to back after a bowel movement if you are female. Use each tissue one time when you wipe.  · Drink enough fluid to keep your urine pale yellow.  · Keep all follow-up  visits as told by your health care provider. This is important.  Contact a health care provider if:  · Your symptoms do not get better after 1-2 days.  · Your symptoms go away and then return.  Get help right away if you have:  · Severe pain in your back or your lower abdomen.  · A fever.  · Nausea or vomiting.  Summary  · A urinary tract infection (UTI) is an infection of any part of the urinary tract, which includes the kidneys, ureters, bladder, and urethra.  · Most urinary tract infections are caused by bacteria in your genital area, around the entrance to your urinary tract (urethra).  · Treatment for this condition often includes antibiotic medicines.  · If you were prescribed an antibiotic medicine, take it as told by your health care provider. Do not stop using the antibiotic even if you start to feel better.  · Keep all follow-up visits as told by your health care provider. This is important.  This information is not intended to replace advice given to you by your health care provider. Make sure you discuss any questions you have with your health care provider.  Document Released: 09/27/2006 Document Revised: 06/27/2019 Document Reviewed: 06/27/2019  [employer] Interactive Patient Education © 2019 [employer]      Paronychia  Paronychia is an infection of the skin that surrounds a nail. It usually affects the skin around a fingernail, but it may also occur near a toenail. It often causes pain and swelling around the nail. In some cases, a collection of pus (abscess) can form near or under the nail.   This condition may develop suddenly, or it may develop gradually over a longer period. In most cases, paronychia is not serious, and it will clear up with treatment.  What are the causes?  This condition may be caused by bacteria or a fungus. These germs can enter the body through an opening in the skin, such as a cut or a hangnail.  What increases the risk?  This condition is more likely to develop in people  who:  · Get their hands wet often, such as those who work as dishwashers, , or nurses.  · Bite their fingernails or suck their thumbs.  · Trim their nails very short.  · Have hangnails or injured fingertips.  · Get manicures.  · Have diabetes.  What are the signs or symptoms?  Symptoms of this condition include:  · Redness and swelling of the skin near the nail.  · Tenderness around the nail when you touch the area.  · Pus-filled bumps under the skin at the base and sides of the nail (cuticle).  · Fluid or pus under the nail.  · Throbbing pain in the area.  How is this diagnosed?  This condition is diagnosed with a physical exam. In some cases, a sample of pus may be tested to determine what type of bacteria or fungus is causing the condition.  How is this treated?  Treatment depends on the cause and severity of your condition. If your condition is mild, it may clear up on its own in a few days or after soaking in warm water. If needed, treatment may include:  · Antibiotic medicine, if your infection is caused by bacteria.  · Antifungal medicine, if your infection is caused by a fungus.  · A procedure to drain pus from an abscess.  · Anti-inflammatory medicine (corticosteroids).  Follow these instructions at home:  Wound care  · Keep the affected area clean.  · Soak the affected area in warm water, if told to do so by your health care provider. You may be told to do this for 20 minutes, 2-3 times a day.  · Keep the area dry when you are not soaking it.  · Do not try to drain an abscess yourself.  · Follow instructions from your health care provider about how to take care of the affected area. Make sure you:  ? Wash your hands with soap and water before you change your bandage (dressing). If soap and water are not available, use hand .  ? Change your dressing as told by your health care provider.  · If you had an abscess drained, check the area every day for signs of infection. Check  for:  ? Redness, swelling, or pain.  ? Fluid or blood.  ? Warmth.  ? Pus or a bad smell.  Medicines    · Take over-the-counter and prescription medicines only as told by your health care provider.  · If you were prescribed an antibiotic medicine, take it as told by your health care provider. Do not stop taking the antibiotic even if you start to feel better.  General instructions  · Avoid contact with harsh chemicals.  · Do not pick at the affected area.  Prevention  · To prevent this condition from happening again:  ? Wear rubber gloves when washing dishes or doing other tasks that require your hands to get wet.  ? Wear gloves if your hands might come in contact with  or other chemicals.  ? Avoid injuring your nails or fingertips.  ? Do not bite your nails or tear hangnails.  ? Do not cut your nails very short.   ? Do not cut your cuticles.  ? Use clean nail clippers or scissors when trimming nails.  Contact a health care provider if:  · Your symptoms get worse or do not improve with treatment.  · You have continued or increased fluid, blood, or pus coming from the affected area.  · Your finger or knuckle becomes swollen or difficult to move.  Get help right away if you have:  · A fever or chills.  · Redness spreading away from the affected area.  · Joint or muscle pain.  Summary  · Paronychia is an infection of the skin that surrounds a nail. It often causes pain and swelling around the nail. In some cases, a collection of pus (abscess) can form near or under the nail.  · This condition may be caused by bacteria or a fungus. These germs can enter the body through an opening in the skin, such as a cut or a hangnail.  · If your condition is mild, it may clear up on its own in a few days. If needed, treatment may include medicine or a procedure to drain pus from an abscess.  · To prevent this condition from happening again, wear gloves if doing tasks that require your hands to get wet or to come in contact  with chemicals. Also avoid injuring your nails or fingertips.  This information is not intended to replace advice given to you by your health care provider. Make sure you discuss any questions you have with your health care provider.  Document Released: 06/13/2002 Document Revised: 12/31/2018 Document Reviewed: 12/31/2018  [employer] Interactive Patient Education © 2019 [employer]

## 2020-02-03 ENCOUNTER — PROCEDURE VISIT (OUTPATIENT)
Dept: FAMILY MEDICINE CLINIC | Facility: CLINIC | Age: 80
End: 2020-02-03

## 2020-02-03 VITALS
TEMPERATURE: 98.7 F | DIASTOLIC BLOOD PRESSURE: 68 MMHG | BODY MASS INDEX: 27.12 KG/M2 | OXYGEN SATURATION: 98 % | HEART RATE: 74 BPM | SYSTOLIC BLOOD PRESSURE: 134 MMHG | HEIGHT: 66 IN

## 2020-02-03 DIAGNOSIS — L60.0 INGROWN TOENAIL: Primary | ICD-10-CM

## 2020-02-03 DIAGNOSIS — L03.032 PARONYCHIA OF GREAT TOE, LEFT: ICD-10-CM

## 2020-02-03 PROBLEM — G47.61 PERIODIC LIMB MOVEMENT: Status: ACTIVE | Noted: 2020-02-03

## 2020-02-03 PROBLEM — K21.9 GASTROESOPHAGEAL REFLUX DISEASE: Status: ACTIVE | Noted: 2020-02-03

## 2020-02-03 PROBLEM — R06.83 SNORING: Status: ACTIVE | Noted: 2020-02-03

## 2020-02-03 PROCEDURE — 11750 EXCISION NAIL&NAIL MATRIX: CPT | Performed by: FAMILY MEDICINE

## 2020-02-03 PROCEDURE — 99212 OFFICE O/P EST SF 10 MIN: CPT | Performed by: FAMILY MEDICINE

## 2020-02-03 PROCEDURE — 11730 AVULSION NAIL PLATE SIMPLE 1: CPT | Performed by: FAMILY MEDICINE

## 2020-02-03 PROCEDURE — 64450 NJX AA&/STRD OTHER PN/BRANCH: CPT | Performed by: FAMILY MEDICINE

## 2020-02-04 ENCOUNTER — TELEPHONE (OUTPATIENT)
Dept: FAMILY MEDICINE CLINIC | Facility: CLINIC | Age: 80
End: 2020-02-04

## 2020-02-04 NOTE — TELEPHONE ENCOUNTER
Pt daughter called regarding referral for Home Health. She stated that this was discussed at Pt last appt. She is asking that this referral be made with [facility].

## 2020-02-07 DIAGNOSIS — M54.50 LUMBAR PAIN: ICD-10-CM

## 2020-02-07 DIAGNOSIS — I10 ESSENTIAL HYPERTENSION: ICD-10-CM

## 2020-02-07 NOTE — PATIENT INSTRUCTIONS
Proper wound care is important to prevent infection and help promote healing.  Protecting the wound is necessary because the tissues will not be solidly healed until about 3 weeks after the procedure.  The following instructions are provided as guidelines for you during this crucial period of healing.    1.  Leave the dressing on that was placed at our office for 24 hours.  If it should get wet, replace it with a new, clean, sterile bandage or dressing.  2.  Clean the wound 2 to 3 times a day with dilute Hydrogen Peroxide (mix 1/2 cup tap water with 1/2 cup peroxide).  Use a cotton-tipped swab to remove any dried blood or crusted material.  3.  Dry gently with a piece of gauze -- blot or pat dry, DON’T RUB.  4.  Apply a very thin layer of antibiotic ointment (Neosporin, Bacitracin, Polysporin, etc.) using a clean cotton swab.  5.  Apply a non-stick bandage, such as Telfa or Bandaid; if the wound is small, you may use tape to secure it.  6.  If bleeding occurs, use direct pressure to the wound with a sterile gauze for about twenty (20) minutes.  If possible, elevate the wound (example: if on the arm or leg).  Remember to keep constant pressure for 20 minutes and not to dab the gauze on/off the bleeding area.  7.  A small amount of bruising is normal, but it should decrease each day until healed.  If not, please contact our office.  8.  You may take acetaminophen (Tylenol, generic, etc.) for pain.  Ibuprofen, Aleve or aspirin may cause bleeding and slow the healing process.  If stronger pills were prescribed for you, please take as directed.  9.  For any swelling that may occur, use an ice pack to the area and elevate that part for 20-30 minutes.  10.  If you were given an antibiotic (see below), make sure that you take it correctly as prescribed and for the complete course to prevent complications, even if you are feeling better!     Antibiotic: , taken  time(s) a day for  day(s), with food.  11.  If the wound area  appears dry, you may apply a small amount of non-perfumed lotion to moisten.  12.  Please call the office if any of the following occurs:  fever (T>101), wound becomes warm/red/swollen, persistent bleeding, continued severe pain, drainage of pus from the wound.      SPECIAL INSTRUCTIONS:    Sutures:   These will need to be removed in about ____ days.  Please make an appointment to have them removed, and so I can evaluate for any problems.  Keep the wound clean and dry for the first 24-48 hours.  The bandage should be kept on until tomorrow, then you may leave open to the air, if you wish.    Cryosurgery:  Today, I froze a small area to destroy abnormal tissue, and now you have a small area of “controlled frostbite” that needs to heal.  Just keep it clean and dry.  You may cover it with an adhesive bandage if you want.  Usually, it will develop a blister in the first few days, then turn dark, then scab over the next 1-2 weeks.  Let the scab peel off by itself.  Complete healing usually takes 3-4 weeks and results in a small flat scar.  Sometimes the healed scar is lighter in color than the surrounding skin and may not tan equally.    Wet-to-Dry dressings (Saline, Betadine, 1/8 strength Dakin’s, 2% Neomycin):  These dressings are to help clean the wound.  Once it begins to dry, or if stuck to the wound, you may use extra sterile saline to remove it.  There is usually a small amount of bleeding and some sticking when pulling the dressing off - this is normal!  If the dressing doesn’t stick, then it usually is not being changed frequently enough.    Silvadene:  This medicine loses its effectiveness after being on up to 8 hours, therefore it needs to be used thickly twice a day.  Just before its application, wash the wound thoroughly, making sure to remove all gray or yellow-brown “old” lotion.  If left in place, it will prevent new lotion from working properly.  You may apply a gauze bandage or Adaptic dressing loosely  over the medicine to prevent getting it on your clothes or other surfaces.

## 2020-02-07 NOTE — PROGRESS NOTES
OFFICE VISIT NOTE:    Shira King is a 80 y.o. female who presents today for toenail removal.     Here for toenail removal, left 1st, requests removal of entire nail with phenol destruction. R/B/A discussed, informed consent and timeout performed. Underwent procedure as listed. Tolerated well. Return in 1 week to recheck.        Past medical/surgical history, Family history, Social history, Allergies and Medications have been reviewed with the patient today and are updated in [facility] EMR. See below.    Past Medical History:   Diagnosis Date   • Gastroesophageal reflux disease 2/3/2020   • Lumbar pain 7/12/2019   • Mixed hyperlipidemia    • Valvular disease      Past Surgical History:   Procedure Laterality Date   • BLADDER REPAIR  2005   • BREAST SURGERY     • BUNIONECTOMY  2007   • CATARACT EXTRACTION Bilateral 2005   • CHOLECYSTECTOMY     • ENDOSCOPY  2005   • HERNIA REPAIR  1963   • HYSTERECTOMY  1979   • NECK SURGERY  1974    VERTEBRA   • REPLACEMENT TOTAL KNEE BILATERAL     • TONSILLECTOMY       Family History   Problem Relation Age of Onset   • Stroke Mother    • Heart disease Mother    • Stroke Sister    • Heart attack Maternal Grandmother      Social History     Tobacco Use   • Smoking status: Never Smoker   • Smokeless tobacco: Never Used   Substance Use Topics   • Alcohol use: No   • Drug use: No       ALLERGIES:  Patient has no known allergies.    CURRENT MEDS:    Current Outpatient Medications:   •  ALPRAZolam (XANAX) 0.25 MG tablet, Take 1 tablet by mouth 2 (Two) Times a Day As Needed for Anxiety., Disp: 30 tablet, Rfl: 0  •  aspirin 81 MG chewable tablet, Chew 81 mg daily., Disp: , Rfl:   •  carvedilol (COREG) 6.25 MG tablet, Take 1 tablet by mouth 2 (Two) Times a Day With Meals., Disp: 60 tablet, Rfl: 2  •  estradiol (ESTRACE VAGINAL) 0.1 MG/GM vaginal cream, Apply fingertip amount to urethra nightly for one week, then apply every Mon, Wed, Fri night., Disp: , Rfl:   •  HYDROcodone-acetaminophen  "(NORCO) 5-325 MG per tablet, Take 1 tablet by mouth Every 8 (Eight) Hours As Needed for Moderate Pain  or Severe Pain ., Disp: 90 tablet, Rfl: 0  •  losartan (COZAAR) 100 MG tablet, Take 1 tablet by mouth Daily., Disp: 90 tablet, Rfl: 1  •  meclizine (ANTIVERT) 25 MG tablet, Take 25 mg by mouth 2 (two) times a day., Disp: , Rfl:   •  metoclopramide (REGLAN) 10 MG tablet, TAKE ONE TABLET BY MOUTH ONCE DAILY, Disp: 90 tablet, Rfl: 2  •  omeprazole (priLOSEC) 20 MG capsule, Take 20 mg by mouth Daily., Disp: , Rfl:   •  pravastatin (PRAVACHOL) 20 MG tablet, TAKE 1 TABLET BY MOUTH ONCE DAILY, Disp: 90 tablet, Rfl: 2  •  temazepam (RESTORIL) 30 MG capsule, Take 1 capsule by mouth At Night As Needed for Sleep., Disp: 30 capsule, Rfl: 5    REVIEW OF SYSTEMS:  Review of Systems   Constitutional: Negative for activity change, fatigue, fever, unexpected weight gain and unexpected weight loss.   Respiratory: Negative for shortness of breath.    Cardiovascular: Negative for chest pain.   Gastrointestinal: Negative for abdominal pain.   Genitourinary: Negative for difficulty urinating.   Skin: Negative for rash.   Neurological: Negative for syncope and headache.       PHYSICAL EXAMINATION:  Vital Signs:  /68 (BP Location: Left arm, Patient Position: Sitting, Cuff Size: Adult)   Pulse 74   Temp 98.7 °F (37.1 °C) (Temporal)   Ht 167.6 cm (66\")   LMP  (LMP Unknown)   SpO2 98%   Breastfeeding No   BMI 27.12 kg/m²   Vitals:    02/03/20 1036   Patient Position: Sitting       Physical Exam   Constitutional: She is oriented to person, place, and time. She appears well-developed and well-nourished. No distress.   Cardiovascular: Normal rate and regular rhythm.   Pulmonary/Chest: Effort normal and breath sounds normal. No respiratory distress.   Musculoskeletal: Normal range of motion. She exhibits no edema.   Paronychia and ingrown nail on 1st toe left, without lymphangitis.    Neurological: She is alert and oriented to " person, place, and time. No cranial nerve deficit.   Skin: Skin is warm and dry. Capillary refill takes less than 2 seconds. No rash noted.   Psychiatric: She has a normal mood and affect. Her behavior is normal.   Nursing note and vitals reviewed.      Nail Removal  Performed by: Reynaldo Cavazos MD  Authorized by: Reynaldo Cavazos MD   Location: left foot  Location details: left big toe  Anesthesia: digital block    Anesthesia:  Local Anesthetic: lidocaine 1% without epinephrine  Anesthetic total: 10 mL    Sedation:  Patient sedated: no    Preparation: skin prepped with Betadine  Amount removed: complete  Wedge excision of skin of nail fold: no  Nail bed sutured: no  Nail matrix removed: complete  Removed nail replaced and anchored: no  Dressing: gauze roll and antibiotic ointment  Patient tolerance: Patient tolerated the procedure well with no immediate complications  Comments: Usual sterile fashion - phenol used for root destruction.           ASSESSMENT/ PLAN:  Problem List Items Addressed This Visit     None      Visit Diagnoses     Ingrown toenail    -  Primary    Relevant Orders    Nail Removal    Paronychia of great toe, left        Relevant Orders    Nail Removal            Specific Patient Instructions:  MEDICATION Instructions: Encouraged patient to continue routine medicines as prescribed and maintain compliance. Patient instructed to report any adverse side effects or reactions to medicines promptly to the office. Patient instructed to make us aware of any OTC or herbal meds or supplement use.    DIET Recommendations: Patient instructed and provided information on the following nutrition and diet recommendations: Calorie restriction for weight reduction and maintenance. Necessity for adequate daily intake of fluids/water. Also, diet information provided in AVS for specifics.    EXERCISE Instructions: Discussed with patient the need for routine aerobic activity for cardiovascular fitness, 3  times a week for about 30 minutes. Daily exercise for increased fitness and weight reduction goals.    SMOKING Recommendations: Counseled patient and encouraged them on smoking and tobacco cessation if applicable. Discussed the benefits to all body systems with smoking/tobacco cessation, including decreased cardiac/lung/stroke/cancer risk. Encouraged no vaping use.    HEALTH MAINTENANCE:  Counseling provided to patient/family about routine health maintenance and ANNUAL physicals/labs. Counseling on recommended Vaccinations appropriate for age needed.        Patient's Body mass index is 27.12 kg/m². BMI is above normal parameters. Recommendations include: exercise counseling and nutrition counseling.      Medications or Orders placed this visit:  Orders Placed This Encounter   Procedures   • Nail Removal     This order was created via procedure documentation       Medications DISCONTINUED this visit:  There are no discontinued medications.    FOLLOW-UP:  Return in about 1 week (around 2/10/2020) for Recheck.    I discussed the patients findings and my recommendations with patient.  An After Visit Summary (AVS) was printed and given to the patient at discharge.      Reynaldo Cavazos MD, FAAFP  2/6/2020

## 2020-02-10 ENCOUNTER — OFFICE VISIT (OUTPATIENT)
Dept: FAMILY MEDICINE CLINIC | Facility: CLINIC | Age: 80
End: 2020-02-10

## 2020-02-10 VITALS
SYSTOLIC BLOOD PRESSURE: 128 MMHG | OXYGEN SATURATION: 98 % | HEART RATE: 63 BPM | DIASTOLIC BLOOD PRESSURE: 62 MMHG | WEIGHT: 168.8 LBS | TEMPERATURE: 98.5 F | BODY MASS INDEX: 27.13 KG/M2 | HEIGHT: 66 IN

## 2020-02-10 DIAGNOSIS — M54.50 LUMBAR PAIN: ICD-10-CM

## 2020-02-10 DIAGNOSIS — R30.0 DYSURIA: Primary | ICD-10-CM

## 2020-02-10 DIAGNOSIS — I10 ESSENTIAL HYPERTENSION: ICD-10-CM

## 2020-02-10 LAB
BILIRUB BLD-MCNC: NEGATIVE MG/DL
CLARITY, POC: CLEAR
COLOR UR: YELLOW
GLUCOSE UR STRIP-MCNC: NEGATIVE MG/DL
KETONES UR QL: NEGATIVE
LEUKOCYTE EST, POC: NEGATIVE
NITRITE UR-MCNC: NEGATIVE MG/ML
PH UR: 8.5 [PH] (ref 5–8)
PROT UR STRIP-MCNC: NEGATIVE MG/DL
RBC # UR STRIP: ABNORMAL /UL
SP GR UR: 1.01 (ref 1–1.03)
UROBILINOGEN UR QL: NORMAL

## 2020-02-10 PROCEDURE — 99213 OFFICE O/P EST LOW 20 MIN: CPT | Performed by: FAMILY MEDICINE

## 2020-02-10 PROCEDURE — 81003 URINALYSIS AUTO W/O SCOPE: CPT | Performed by: FAMILY MEDICINE

## 2020-02-10 RX ORDER — HYDROCODONE BITARTRATE AND ACETAMINOPHEN 5; 325 MG/1; MG/1
1 TABLET ORAL EVERY 8 HOURS PRN
Qty: 90 TABLET | Refills: 0 | Status: SHIPPED | OUTPATIENT
Start: 2020-02-10 | End: 2020-05-08 | Stop reason: SDUPTHER

## 2020-02-10 RX ORDER — HYDROCODONE BITARTRATE AND ACETAMINOPHEN 5; 325 MG/1; MG/1
1 TABLET ORAL EVERY 8 HOURS PRN
Qty: 90 TABLET | Refills: 0 | OUTPATIENT
Start: 2020-02-10

## 2020-02-10 RX ORDER — CARVEDILOL 6.25 MG/1
TABLET ORAL
Qty: 90 TABLET | Refills: 1 | Status: SHIPPED | OUTPATIENT
Start: 2020-02-10 | End: 2020-05-01

## 2020-02-10 NOTE — PROGRESS NOTES
OFFICE VISIT NOTE:    Shira King is a 80 y.o. female who presents today for Difficulty Urinating and toenail recheck.     Patient presents for follow-up of long term use of high risk medication (narcotics, sedatives, stimulants or other controlled medications). The patient has read and signed the [facility] Controlled Substance Contract - copy in chart and updated annually. A recent Rikki was reviewed and is present in the chart, updated annually and as needed. UDS has been reviewed and is up to date, and performed at least annually and PRN discretion of provider. No aberrant behavioral issues are noted, and no significant side effects/complications are present. The patient is aware of the potential for addiction and dependence of these medications and accepts responsibility for proper med use. Patient is aware of the PRN use of these medications (unless otherwise prescribed), and not to be used routinely.     Urine today was clear - needs to have less coffee and more water.     Also, here to recheck her toenail from recent procedure - healing well - encouraged to paint betadine on it now and not soak, recheck PRN.     Difficulty Urinating   This is a new problem. The current episode started in the past 7 days. The problem occurs daily. The problem has been waxing and waning. Associated symptoms include urinary symptoms. Pertinent negatives include no abdominal pain, chest pain, fatigue, fever or rash. The symptoms are aggravated by bending, stress and exertion. She has tried acetaminophen, drinking and position changes for the symptoms. The treatment provided moderate relief.        Past medical/surgical history, Family history, Social history, Allergies and Medications have been reviewed with the patient today and are updated in [facility] EMR. See below.    Past Medical History:   Diagnosis Date   • Gastroesophageal reflux disease 2/3/2020   • Lumbar pain 7/12/2019   • Mixed hyperlipidemia    • Valvular  disease      Past Surgical History:   Procedure Laterality Date   • BLADDER REPAIR  2005   • BREAST SURGERY     • BUNIONECTOMY  2007   • CATARACT EXTRACTION Bilateral 2005   • CHOLECYSTECTOMY     • ENDOSCOPY  2005   • HERNIA REPAIR  1963   • HYSTERECTOMY  1979   • NECK SURGERY  1974    VERTEBRA   • REPLACEMENT TOTAL KNEE BILATERAL     • TONSILLECTOMY       Family History   Problem Relation Age of Onset   • Stroke Mother    • Heart disease Mother    • Stroke Sister    • Heart attack Maternal Grandmother      Social History     Tobacco Use   • Smoking status: Never Smoker   • Smokeless tobacco: Never Used   Substance Use Topics   • Alcohol use: No   • Drug use: No       ALLERGIES:  Patient has no known allergies.    CURRENT MEDS:    Current Outpatient Medications:   •  ALPRAZolam (XANAX) 0.25 MG tablet, Take 1 tablet by mouth 2 (Two) Times a Day As Needed for Anxiety., Disp: 30 tablet, Rfl: 0  •  aspirin 81 MG chewable tablet, Chew 81 mg daily., Disp: , Rfl:   •  estradiol (ESTRACE VAGINAL) 0.1 MG/GM vaginal cream, Apply fingertip amount to urethra nightly for one week, then apply every Mon, Wed, Fri night., Disp: , Rfl:   •  HYDROcodone-acetaminophen (NORCO) 5-325 MG per tablet, Take 1 tablet by mouth Every 8 (Eight) Hours As Needed for Moderate Pain  or Severe Pain ., Disp: 90 tablet, Rfl: 0  •  losartan (COZAAR) 100 MG tablet, Take 1 tablet by mouth Daily., Disp: 90 tablet, Rfl: 1  •  meclizine (ANTIVERT) 25 MG tablet, Take 25 mg by mouth 2 (two) times a day., Disp: , Rfl:   •  metoclopramide (REGLAN) 10 MG tablet, TAKE ONE TABLET BY MOUTH ONCE DAILY, Disp: 90 tablet, Rfl: 2  •  omeprazole (priLOSEC) 20 MG capsule, Take 20 mg by mouth Daily., Disp: , Rfl:   •  pravastatin (PRAVACHOL) 20 MG tablet, TAKE 1 TABLET BY MOUTH ONCE DAILY, Disp: 90 tablet, Rfl: 2  •  temazepam (RESTORIL) 30 MG capsule, Take 1 capsule by mouth At Night As Needed for Sleep., Disp: 30 capsule, Rfl: 5  •  carvedilol (COREG) 6.25 MG tablet,  "TAKE 1 TABLET BY MOUTH TWICE DAILY WITH MEALS, Disp: 90 tablet, Rfl: 1    REVIEW OF SYSTEMS:  Review of Systems   Constitutional: Negative for activity change, fatigue, fever, unexpected weight gain and unexpected weight loss.   Respiratory: Negative for shortness of breath.    Cardiovascular: Negative for chest pain.   Gastrointestinal: Negative for abdominal pain.   Genitourinary: Positive for difficulty urinating.   Skin: Negative for rash.   Neurological: Negative for syncope and headache.       PHYSICAL EXAMINATION:  Vital Signs:  /62 (BP Location: Left arm, Patient Position: Sitting, Cuff Size: Adult)   Pulse 63   Temp 98.5 °F (36.9 °C) (Temporal)   Ht 167.6 cm (66\")   Wt 76.6 kg (168 lb 12.8 oz)   LMP  (LMP Unknown)   SpO2 98%   BMI 27.25 kg/m²   Vitals:    02/10/20 1111   Patient Position: Sitting       Physical Exam   Constitutional: She is oriented to person, place, and time. She appears well-developed and well-nourished. No distress.   HENT:   Head: Normocephalic and atraumatic.   Mouth/Throat: Oropharynx is clear and moist.   Neck: Normal range of motion. Neck supple. No JVD present.   Cardiovascular: Normal rate, regular rhythm, normal heart sounds and intact distal pulses.   Pulmonary/Chest: Effort normal and breath sounds normal. No respiratory distress.   Musculoskeletal: Normal range of motion. She exhibits no edema.   Neurological: She is alert and oriented to person, place, and time. No cranial nerve deficit.   Skin: Skin is warm and dry. Capillary refill takes less than 2 seconds. No rash noted.   Psychiatric: She has a normal mood and affect. Her behavior is normal.   Nursing note and vitals reviewed.      Procedures    ASSESSMENT/ PLAN:  Problem List Items Addressed This Visit        Cardiovascular and Mediastinum    Essential hypertension    Overview     HAS COMPONENT OF HYPERTENSIVE HEART DISEASE            Nervous and Auditory    Lumbar pain    Relevant Medications    " HYDROcodone-acetaminophen (NORCO) 5-325 MG per tablet      Other Visit Diagnoses     Dysuria    -  Primary    Relevant Orders    POCT urinalysis dipstick, automated (Completed)            Specific Patient Instructions:  MEDICATION Instructions: Encouraged patient to continue routine medicines as prescribed and maintain compliance. Patient instructed to report any adverse side effects or reactions to medicines promptly to the office. Patient instructed to make us aware of any OTC or herbal meds or supplement use.    DIET Recommendations: Patient instructed and provided information on the following nutrition and diet recommendations: Calorie restriction for weight reduction and maintenance. Necessity for adequate daily intake of fluids/water. Also, diet information provided in AVS for specifics.    EXERCISE Instructions: Discussed with patient the need for routine aerobic activity for cardiovascular fitness, 3 times a week for about 30 minutes. Daily exercise for increased fitness and weight reduction goals.    SMOKING Recommendations: Counseled patient and encouraged them on smoking and tobacco cessation if applicable. Discussed the benefits to all body systems with smoking/tobacco cessation, including decreased cardiac/lung/stroke/cancer risk. Encouraged no vaping use.    HEALTH MAINTENANCE:  Counseling provided to patient/family about routine health maintenance and ANNUAL physicals/labs. Counseling on recommended Vaccinations appropriate for age needed.        Patient's Body mass index is 27.25 kg/m². BMI is above normal parameters. Recommendations include: exercise counseling and nutrition counseling.      Medications or Orders placed this visit:  Orders Placed This Encounter   Procedures   • POCT urinalysis dipstick, automated       Medications DISCONTINUED this visit:  Medications Discontinued During This Encounter   Medication Reason   • HYDROcodone-acetaminophen (NORCO) 5-325 MG per tablet Reorder        FOLLOW-UP:  Return in about 3 months (around 5/10/2020) for Recheck.    I discussed the patients findings and my recommendations with patient.  An After Visit Summary (AVS) was printed and given to the patient at discharge.      Reynaldo Cavazos MD, FAAFP  2/10/2020

## 2020-02-10 NOTE — PATIENT INSTRUCTIONS
Chronic Back Pain  When back pain lasts longer than 3 months, it is called chronic back pain. The cause of your back pain may not be known. Some common causes include:  · Wear and tear (degenerative disease) of the bones, ligaments, or disks in your back.  · Inflammation and stiffness in your back (arthritis).  People who have chronic back pain often go through certain periods in which the pain is more intense (flare-ups). Many people can learn to manage the pain with home care.  Follow these instructions at home:  Pay attention to any changes in your symptoms. Take these actions to help with your pain:  Activity    · Avoid bending and other activities that make the problem worse.  · Maintain a proper position when standing or sitting:  ? When standing, keep your upper back and neck straight, with your shoulders pulled back. Avoid slouching.  ? When sitting, keep your back straight and relax your shoulders. Do not round your shoulders or pull them backward.  · Do not sit or  one place for long periods of time.  · Take brief periods of rest throughout the day. This will reduce your pain. Resting in a lying or standing position is usually better than sitting to rest.  · When you are resting for longer periods, mix in some mild activity or stretching between periods of rest. This will help to prevent stiffness and pain.  · Get regular exercise. Ask your health care provider what activities are safe for you.  · Do not lift anything that is heavier than 10 lb (4.5 kg). Always use proper lifting technique, which includes:  ? Bending your knees.  ? Keeping the load close to your body.  ? Avoiding twisting.  · Sleep on a firm mattress in a comfortable position. Try lying on your side with your knees slightly bent. If you lie on your back, put a pillow under your knees.  Managing pain  · If directed, apply ice to the painful area. Your health care provider may recommend applying ice during the first 24-48 hours after  a flare-up begins.  ? Put ice in a plastic bag.  ? Place a towel between your skin and the bag.  ? Leave the ice on for 20 minutes, 2-3 times per day.  · If directed, apply heat to the affected area as often as told by your health care provider. Use the heat source that your health care provider recommends, such as a moist heat pack or a heating pad.  ? Place a towel between your skin and the heat source.  ? Leave the heat on for 20-30 minutes.  ? Remove the heat if your skin turns bright red. This is especially important if you are unable to feel pain, heat, or cold. You may have a greater risk of getting burned.  · Try soaking in a warm tub.  · Take over-the-counter and prescription medicines only as told by your health care provider.  · Keep all follow-up visits as told by your health care provider. This is important.  Contact a health care provider if:  · You have pain that is not relieved with rest or medicine.  Get help right away if:  · You have weakness or numbness in one or both of your legs or feet.  · You have trouble controlling your bladder or your bowels.  · You have nausea or vomiting.  · You have pain in your abdomen.  · You have shortness of breath or you faint.  This information is not intended to replace advice given to you by your health care provider. Make sure you discuss any questions you have with your health care provider.  Document Released: 01/25/2006 Document Revised: 07/25/2019 Document Reviewed: 06/27/2018  [email or web address] Interactive Patient Education © 2019 [employer]

## 2020-02-13 DIAGNOSIS — Z48.00 DRESSING CHANGE: Primary | ICD-10-CM

## 2020-02-19 DIAGNOSIS — F41.9 ANXIETY: Primary | ICD-10-CM

## 2020-02-19 RX ORDER — TEMAZEPAM 30 MG/1
CAPSULE ORAL
Qty: 90 CAPSULE | Refills: 0 | Status: SHIPPED | OUTPATIENT
Start: 2020-02-19 | End: 2020-05-08 | Stop reason: SDUPTHER

## 2020-02-26 NOTE — TELEPHONE ENCOUNTER
This has been addressed on another encounter. Can this order be removed so this encounter can be signed and completed?

## 2020-05-01 DIAGNOSIS — I10 ESSENTIAL HYPERTENSION: ICD-10-CM

## 2020-05-01 RX ORDER — CARVEDILOL 6.25 MG/1
TABLET ORAL
Qty: 90 TABLET | Refills: 0 | Status: SHIPPED | OUTPATIENT
Start: 2020-05-01 | End: 2020-05-11

## 2020-05-01 NOTE — TELEPHONE ENCOUNTER
Patient was seen in February. I pended medication with note that patient needs appt for further refills.

## 2020-05-07 DIAGNOSIS — M54.50 LUMBAR PAIN: ICD-10-CM

## 2020-05-07 RX ORDER — HYDROCODONE BITARTRATE AND ACETAMINOPHEN 5; 325 MG/1; MG/1
1 TABLET ORAL EVERY 8 HOURS PRN
Qty: 90 TABLET | Refills: 0 | Status: CANCELLED | OUTPATIENT
Start: 2020-05-07

## 2020-05-07 NOTE — TELEPHONE ENCOUNTER
PT's daughter in law, Joy, called to request a refill on the following medication: HYDROcodone-acetaminophen (NORCO) 5-325 MG per tablet.    Confirmed Pharmacy:  [facility] [address] [phone] [facility] [phone] .    LOV: 1/27/20  NOV: 5/8/20 (DOXY)  Last Refill: 2/10/20, refills: 0, quantity: 90.

## 2020-05-08 ENCOUNTER — TELEMEDICINE (OUTPATIENT)
Dept: FAMILY MEDICINE CLINIC | Facility: CLINIC | Age: 80
End: 2020-05-08

## 2020-05-08 DIAGNOSIS — E78.2 MIXED HYPERLIPIDEMIA: ICD-10-CM

## 2020-05-08 DIAGNOSIS — M54.50 LUMBAR PAIN: ICD-10-CM

## 2020-05-08 DIAGNOSIS — I10 ESSENTIAL HYPERTENSION: ICD-10-CM

## 2020-05-08 DIAGNOSIS — F41.9 ANXIETY: Primary | ICD-10-CM

## 2020-05-08 PROCEDURE — 99214 OFFICE O/P EST MOD 30 MIN: CPT | Performed by: FAMILY MEDICINE

## 2020-05-08 RX ORDER — HYDROCODONE BITARTRATE AND ACETAMINOPHEN 5; 325 MG/1; MG/1
1 TABLET ORAL EVERY 8 HOURS PRN
Qty: 90 TABLET | Refills: 0 | Status: SHIPPED | OUTPATIENT
Start: 2020-05-08 | End: 2020-07-21 | Stop reason: SDUPTHER

## 2020-05-08 RX ORDER — TEMAZEPAM 30 MG/1
30 CAPSULE ORAL NIGHTLY PRN
Qty: 90 CAPSULE | Refills: 1 | Status: SHIPPED | OUTPATIENT
Start: 2020-05-08 | End: 2020-11-18

## 2020-05-08 RX ORDER — ALPRAZOLAM 0.25 MG/1
0.25 TABLET ORAL 2 TIMES DAILY PRN
Qty: 30 TABLET | Refills: 0 | Status: SHIPPED | OUTPATIENT
Start: 2020-05-08 | End: 2021-01-08 | Stop reason: SDUPTHER

## 2020-05-08 NOTE — PROGRESS NOTES
TELEPHONE VISIT NOTE:    Shira King is a 80 y.o. female who participates in VIDEO visit today for Med Refill.     Back Pain   This is a chronic problem. The current episode started more than 1 year ago. The problem occurs intermittently. The problem has been waxing and waning since onset. The pain is present in the lumbar spine and sacro-iliac. The quality of the pain is described as cramping and aching. The pain does not radiate. The pain is moderate. The pain is worse during the day. The symptoms are aggravated by bending and twisting. Stiffness is present all day. Pertinent negatives include no abdominal pain, bladder incontinence, bowel incontinence, chest pain, fever or weight loss. She has tried analgesics, home exercises and bed rest for the symptoms. The treatment provided moderate relief.   Anxiety   Presents for follow-up visit. Symptoms include nervous/anxious behavior. Patient reports no chest pain, excessive worry, obsessions, palpitations, panic, restlessness, shortness of breath or suicidal ideas. Symptoms occur constantly. The severity of symptoms is severe. The quality of sleep is good. Nighttime awakenings: occasional.     Compliance with medications is %.        Past medical/surgical history, Family history, Social history, Allergies and Medications have been reviewed with the patient today and are updated in [facility] EMR. See below.  Past Medical History:   Diagnosis Date   • Gastroesophageal reflux disease 2/3/2020   • Lumbar pain 7/12/2019   • Mixed hyperlipidemia    • Valvular disease      Past Surgical History:   Procedure Laterality Date   • BLADDER REPAIR  2005   • BREAST SURGERY     • BUNIONECTOMY  2007   • CATARACT EXTRACTION Bilateral 2005   • CHOLECYSTECTOMY     • ENDOSCOPY  2005   • HERNIA REPAIR  1963   • HYSTERECTOMY  1979   • NECK SURGERY  1974    VERTEBRA   • REPLACEMENT TOTAL KNEE BILATERAL     • TONSILLECTOMY       Family History   Problem Relation Age of Onset   •  Stroke Mother    • Heart disease Mother    • Stroke Sister    • Heart attack Maternal Grandmother      Social History     Tobacco Use   • Smoking status: Never Smoker   • Smokeless tobacco: Never Used   Substance Use Topics   • Alcohol use: No   • Drug use: No       ALLERGIES:  Patient has no known allergies.    CURRENT MEDS:    Current Outpatient Medications:   •  ALPRAZolam (XANAX) 0.25 MG tablet, Take 1 tablet by mouth 2 (Two) Times a Day As Needed for Anxiety., Disp: 30 tablet, Rfl: 0  •  amoxicillin (AMOXIL) 500 MG capsule, Take 1 capsule by mouth 3 (Three) Times a Day., Disp: 21 capsule, Rfl: 0  •  aspirin 81 MG chewable tablet, Chew 81 mg daily., Disp: , Rfl:   •  carvedilol (COREG) 6.25 MG tablet, TAKE 1 TABLET BY MOUTH TWICE DAILY WITH MEALS, Disp: 90 tablet, Rfl: 0  •  estradiol (ESTRACE VAGINAL) 0.1 MG/GM vaginal cream, Apply fingertip amount to urethra nightly for one week, then apply every Mon, Wed, Fri night., Disp: , Rfl:   •  HYDROcodone-acetaminophen (NORCO) 5-325 MG per tablet, Take 1 tablet by mouth Every 8 (Eight) Hours As Needed for Moderate Pain  or Severe Pain ., Disp: 90 tablet, Rfl: 0  •  losartan (COZAAR) 100 MG tablet, Take 1 tablet by mouth Daily., Disp: 90 tablet, Rfl: 1  •  meclizine (ANTIVERT) 25 MG tablet, Take 25 mg by mouth 2 (two) times a day., Disp: , Rfl:   •  metoclopramide (REGLAN) 10 MG tablet, TAKE ONE TABLET BY MOUTH ONCE DAILY, Disp: 90 tablet, Rfl: 2  •  omeprazole (priLOSEC) 20 MG capsule, Take 20 mg by mouth Daily., Disp: , Rfl:   •  pravastatin (PRAVACHOL) 20 MG tablet, TAKE 1 TABLET BY MOUTH ONCE DAILY, Disp: 90 tablet, Rfl: 2  •  temazepam (RESTORIL) 30 MG capsule, Take 1 capsule by mouth At Night As Needed for Sleep., Disp: 90 capsule, Rfl: 1    REVIEW OF SYSTEMS:  Review of Systems   Constitutional: Negative for activity change, fatigue, fever, unexpected weight gain and unexpected weight loss.   Respiratory: Negative for shortness of breath.    Cardiovascular:  Negative for chest pain and palpitations.   Gastrointestinal: Negative for abdominal pain and bowel incontinence.   Genitourinary: Negative for difficulty urinating and urinary incontinence.   Musculoskeletal: Positive for back pain.   Skin: Negative for rash.   Neurological: Negative for syncope and headache.   Psychiatric/Behavioral: Negative for suicidal ideas. The patient is nervous/anxious.        PHYSICAL EXAMINATION:  Vital Signs:  Not able to obtain in office, but any listed is from the patient's measurement at home.  LMP  (LMP Unknown)   Physical Exam   Constitutional: Patient is oriented to person, place, and time. They appear well-developed and well-nourished. No distress.   HEENT: No cranial nerve deficit, no trauma noted. Normocephalic and atraumatic.   Pulmonary/Chest: Effort normal. No respiratory distress.   Neurological: Alert and oriented to person, place, and time. No cranial nerve deficit.   Psychiatric: Speech is normal. Judgment and thought content normal. Cognition and memory are normal.    ASSESSMENT/ PLAN:  Problem List Items Addressed This Visit        Cardiovascular and Mediastinum    Essential hypertension    Overview     HAS COMPONENT OF HYPERTENSIVE HEART DISEASE         Mixed hyperlipidemia       Nervous and Auditory    Lumbar pain    Relevant Medications    HYDROcodone-acetaminophen (NORCO) 5-325 MG per tablet      Other Visit Diagnoses     Anxiety    -  Primary    Relevant Medications    temazepam (RESTORIL) 30 MG capsule    ALPRAZolam (XANAX) 0.25 MG tablet          Specific Patient Instructions:  MEDICATION Instructions:  Encouraged patient to continue routine medicines as prescribed and maintain compliance. Patient instructed to report any adverse side effects or reactions to medicines promptly to the office. Patient instructed to make us aware of any OTC or herbal meds or supplement use.  DIET Recommendations:  Patient instructed and provided information on the following  nutrition and diet recommendations: Calorie restriction for weight reduction and maintenance. Necessity for adequate daily intake of fluids/water. Also, diet information available from AVS as necessary.   EXERCISE Instructions:  Discussed with patient the need for routine aerobic activity for cardiovascular fitness, 3 times a week for about 30 minutes. Daily exercise for increased fitness and weight reduction goals.  SMOKING Recommendations:  Counseled patient and encouraged them on smoking and tobacco cessation, if applicable. Discussed the benefits to all body systems with smoking/tobacco cessation, including decreased cardiac/lung/stroke/cancer risk. Encouraged no vaping use.  HEALTH MAINTENANCE:  Counseling provided to patient/family about routine health maintenance and ANNUAL physicals/labs. Counseling on recommended Vaccinations appropriate for age needed.        Medications or Orders placed this visit:  No orders of the defined types were placed in this encounter.      Medications DISCONTINUED this visit:  Medications Discontinued During This Encounter   Medication Reason   • HYDROcodone-acetaminophen (NORCO) 5-325 MG per tablet Reorder   • temazepam (RESTORIL) 30 MG capsule Reorder   • ALPRAZolam (XANAX) 0.25 MG tablet Reorder       FOLLOW-UP:  Return in about 3 months (around 8/8/2020) for Recheck, Next scheduled follow up.    I discussed the patients findings and my recommendations with patient.  An After Visit Summary (AVS) was made available to the patient at discharge through [facility].        Reynaldo Cavazos MD, FAAFP  5/12/2020

## 2020-05-09 DIAGNOSIS — I10 ESSENTIAL HYPERTENSION: ICD-10-CM

## 2020-05-11 ENCOUNTER — NURSE TRIAGE (OUTPATIENT)
Dept: CALL CENTER | Facility: HOSPITAL | Age: 80
End: 2020-05-11

## 2020-05-11 ENCOUNTER — TELEPHONE (OUTPATIENT)
Dept: FAMILY MEDICINE CLINIC | Facility: CLINIC | Age: 80
End: 2020-05-11

## 2020-05-11 DIAGNOSIS — J02.9 ACUTE PHARYNGITIS, UNSPECIFIED ETIOLOGY: Primary | ICD-10-CM

## 2020-05-11 RX ORDER — AMOXICILLIN 500 MG/1
500 CAPSULE ORAL 3 TIMES DAILY
Qty: 21 CAPSULE | Refills: 0 | Status: SHIPPED | OUTPATIENT
Start: 2020-05-11 | End: 2020-07-21

## 2020-05-11 RX ORDER — CARVEDILOL 6.25 MG/1
TABLET ORAL
Qty: 90 TABLET | Refills: 0 | Status: SHIPPED | OUTPATIENT
Start: 2020-05-11 | End: 2020-06-19

## 2020-05-11 NOTE — TELEPHONE ENCOUNTER
"    Reason for Disposition  • Health Information question, no triage required and triager able to answer question    Additional Information  • Negative: [1] Caller is not with the adult (patient) AND [2] reporting urgent symptoms  • Negative: Lab result questions  • Negative: Medication questions  • Negative: Caller can't be reached by phone  • Negative: Caller has already spoken to PCP or another triager  • Negative: RN needs further essential information from caller in order to complete triage  • Negative: Requesting regular office appointment  • Negative: [1] Caller requesting NON-URGENT health information AND [2] PCP's office is the best resource    Answer Assessment - Initial Assessment Questions  1. REASON FOR CALL or QUESTION: \"What is your reason for calling today?\" or \"How can I best help you?\" or \"What question do you have that I can help answer?\"      Calling about RX that was to be sent to pharmacy. They thought it was to go to [facility]. After reviewing chart, it was sent to [facility].    Protocols used: INFORMATION ONLY CALL-ADULT-      "

## 2020-06-19 DIAGNOSIS — I10 ESSENTIAL HYPERTENSION: ICD-10-CM

## 2020-06-19 RX ORDER — CARVEDILOL 6.25 MG/1
TABLET ORAL
Qty: 90 TABLET | Refills: 0 | Status: SHIPPED | OUTPATIENT
Start: 2020-06-19 | End: 2020-07-22

## 2020-06-22 DIAGNOSIS — E78.2 MIXED HYPERLIPIDEMIA: ICD-10-CM

## 2020-06-22 RX ORDER — METOCLOPRAMIDE 10 MG/1
TABLET ORAL
Qty: 90 TABLET | Refills: 0 | Status: SHIPPED | OUTPATIENT
Start: 2020-06-22 | End: 2020-07-22

## 2020-06-22 RX ORDER — PRAVASTATIN SODIUM 20 MG
TABLET ORAL
Qty: 90 TABLET | Refills: 0 | Status: SHIPPED | OUTPATIENT
Start: 2020-06-22 | End: 2020-07-22

## 2020-07-21 ENCOUNTER — OFFICE VISIT (OUTPATIENT)
Dept: FAMILY MEDICINE CLINIC | Facility: CLINIC | Age: 80
End: 2020-07-21

## 2020-07-21 VITALS
TEMPERATURE: 97.2 F | HEART RATE: 62 BPM | OXYGEN SATURATION: 96 % | WEIGHT: 169.2 LBS | HEIGHT: 66 IN | BODY MASS INDEX: 27.19 KG/M2 | SYSTOLIC BLOOD PRESSURE: 129 MMHG | DIASTOLIC BLOOD PRESSURE: 77 MMHG

## 2020-07-21 DIAGNOSIS — M79.671 PAIN IN BOTH FEET: ICD-10-CM

## 2020-07-21 DIAGNOSIS — Z79.899 LONG-TERM USE OF HIGH-RISK MEDICATION: Primary | ICD-10-CM

## 2020-07-21 DIAGNOSIS — B35.1 ONYCHOMYCOSIS OF TOENAIL: ICD-10-CM

## 2020-07-21 DIAGNOSIS — M79.672 PAIN IN BOTH FEET: ICD-10-CM

## 2020-07-21 DIAGNOSIS — M54.50 LUMBAR PAIN: ICD-10-CM

## 2020-07-21 PROCEDURE — 99214 OFFICE O/P EST MOD 30 MIN: CPT | Performed by: NURSE PRACTITIONER

## 2020-07-21 RX ORDER — HYDROCODONE BITARTRATE AND ACETAMINOPHEN 5; 325 MG/1; MG/1
1 TABLET ORAL EVERY 8 HOURS PRN
Qty: 90 TABLET | Refills: 0 | Status: SHIPPED | OUTPATIENT
Start: 2020-07-21 | End: 2020-09-02 | Stop reason: SDUPTHER

## 2020-07-21 NOTE — PROGRESS NOTES
Chief Complaint   Patient presents with   • Nail Problem     Pt wants referral   • Pain     Pt completed UDS/Contract in office today        Subjective   Shira King is a 80 y.o.  female who presents today for podiatry referral and pain contract.    HPI:  TOENAILS: Patient has had to have her left great toenail removed several times by Dr. Cavazos. She states that it is getting sore and she is afraid it is becoming ingrown again. She is also having trouble with the second toenail on both feet. It is growing at an angle and is very thick. She wants to see Nahun KELLY in [place], [place].     CONTROLLED SUBSTANCE CONTRACT: Patient is taking alprazolam for anxiety, Norco for back pain, and temazepam for sleep. She states that she is taking these medications as prescribed and they are working well for her.     NEUROPATHY: Patient states that she feels like she has developed some neuropathy in her bilateral feet. She can't stand to have the sheets lay on her feet at night. She states that this has been going on for 4-5 years and it has gotten so bad she can't really wear shoes without pain. She is asking about gabapentin.     EAR PAIN: Patient states that her ears have been aching for the last 4-5 days. She states that pain is worse in the right ear than the left. She states that she thinks she just has earwax built up.    Shira King  has a past medical history of Gastroesophageal reflux disease (2/3/2020), Lumbar pain (7/12/2019), Mixed hyperlipidemia, and Valvular disease.    No Known Allergies    Current Outpatient Medications:   •  ALPRAZolam (XANAX) 0.25 MG tablet, Take 1 tablet by mouth 2 (Two) Times a Day As Needed for Anxiety., Disp: 30 tablet, Rfl: 0  •  aspirin 81 MG chewable tablet, Chew 81 mg daily., Disp: , Rfl:   •  carvedilol (COREG) 6.25 MG tablet, TAKE 1 TABLET BY MOUTH TWICE DAILY WITH MEALS, Disp: 90 tablet, Rfl: 0  •  CBD (cannabidiol) oral oil, Take 4 drops by mouth 2  (Two) Times a Day., Disp: , Rfl:   •  HYDROcodone-acetaminophen (NORCO) 5-325 MG per tablet, Take 1 tablet by mouth Every 8 (Eight) Hours As Needed for Moderate Pain  or Severe Pain ., Disp: 90 tablet, Rfl: 0  •  losartan (COZAAR) 100 MG tablet, Take 1 tablet by mouth Daily., Disp: 90 tablet, Rfl: 1  •  meclizine (ANTIVERT) 25 MG tablet, Take 25 mg by mouth 2 (two) times a day., Disp: , Rfl:   •  metoclopramide (REGLAN) 10 MG tablet, Take 1 tablet by mouth once daily, Disp: 90 tablet, Rfl: 0  •  omeprazole (priLOSEC) 20 MG capsule, Take 20 mg by mouth Daily., Disp: , Rfl:   •  pravastatin (PRAVACHOL) 20 MG tablet, Take 1 tablet by mouth once daily, Disp: 90 tablet, Rfl: 0  •  temazepam (RESTORIL) 30 MG capsule, Take 1 capsule by mouth At Night As Needed for Sleep., Disp: 90 capsule, Rfl: 1  Past Medical History:   Diagnosis Date   • Gastroesophageal reflux disease 2/3/2020   • Lumbar pain 7/12/2019   • Mixed hyperlipidemia    • Valvular disease      Past Surgical History:   Procedure Laterality Date   • BLADDER REPAIR  2005   • BREAST SURGERY     • BUNIONECTOMY  2007   • CATARACT EXTRACTION Bilateral 2005   • CHOLECYSTECTOMY     • ENDOSCOPY  2005   • HERNIA REPAIR  1963   • HYSTERECTOMY  1979   • NECK SURGERY  1974    VERTEBRA   • REPLACEMENT TOTAL KNEE BILATERAL     • TONSILLECTOMY       Social History     Socioeconomic History   • Marital status:      Spouse name: Not on file   • Number of children: Not on file   • Years of education: Not on file   • Highest education level: Not on file   Tobacco Use   • Smoking status: Never Smoker   • Smokeless tobacco: Never Used   Substance and Sexual Activity   • Alcohol use: No   • Drug use: No   • Sexual activity: Not Currently     Partners: Male     Family History   Problem Relation Age of Onset   • Stroke Mother    • Heart disease Mother    • Stroke Sister    • Heart attack Maternal Grandmother        Family history, surgical history, past medical history,  "Allergies and med's reviewed with patient today and updated in [facility] EMR.     ROS:  Review of Systems   Constitutional: Negative.  Negative for fatigue, fever and unexpected weight change.   HENT: Negative.  Negative for facial swelling, sore throat and trouble swallowing.    Eyes: Negative.  Negative for photophobia, discharge and visual disturbance.   Respiratory: Negative.  Negative for cough, chest tightness and shortness of breath.    Cardiovascular: Negative.  Negative for chest pain and palpitations.   Gastrointestinal: Negative.  Negative for abdominal pain, diarrhea, nausea and vomiting.   Endocrine: Negative.  Negative for polydipsia, polyphagia and polyuria.   Genitourinary: Negative.  Negative for dysuria, flank pain and frequency.   Musculoskeletal: Positive for back pain. Negative for gait problem and neck pain.        Foot pain, bilaterally.   Skin: Negative.  Negative for rash.   Allergic/Immunologic: Negative.    Neurological: Negative.  Negative for dizziness, light-headedness and headaches.   Hematological: Negative.    Psychiatric/Behavioral: Positive for sleep disturbance. Negative for self-injury and suicidal ideas.        Occasional insomnia, relieved with medication.       OBJECTIVE:  Vitals:    07/21/20 0825   BP: 129/77   BP Location: Left arm   Patient Position: Sitting   Cuff Size: Adult   Pulse: 62   Temp: 97.2 °F (36.2 °C)   SpO2: 96%   Weight: 76.7 kg (169 lb 3.2 oz)   Height: 167.6 cm (66\")     Physical Exam   Constitutional: She is oriented to person, place, and time. She appears well-developed and well-nourished. No distress.   HENT:   Head: Normocephalic and atraumatic.   Eyes: Pupils are equal, round, and reactive to light. Conjunctivae and EOM are normal.   Neck: Normal range of motion. Neck supple.   Cardiovascular: Normal rate, regular rhythm, normal heart sounds and intact distal pulses.   No murmur heard.  Pulmonary/Chest: Effort normal and breath sounds normal. No respiratory " distress.   Abdominal: Soft. Bowel sounds are normal. She exhibits no distension. There is no tenderness.   Musculoskeletal: Normal range of motion. She exhibits no edema.   Neurological: She is alert and oriented to person, place, and time.   Skin: Skin is warm and dry. Capillary refill takes less than 2 seconds. She is not diaphoretic. No erythema.   Thickened left great toenail and bilateral 2nd toenails.   Psychiatric: She has a normal mood and affect. Her behavior is normal. Judgment and thought content normal.   Nursing note and vitals reviewed.      ASSESSMENT/ PLAN:    Shira was seen today for nail problem and pain.    Diagnoses and all orders for this visit:    Long-term use of high-risk medication  -     Compliance Drug Analysis, Ur - Urine, Clean Catch    Onychomycosis of toenail  -     Ambulatory Referral to Podiatry    Pain in both feet  -     Ambulatory Referral to Podiatry    Lumbar pain    New Controlled Substance Agreement and UDS entered.  MALU reviewed.    Orders Placed Today:     No orders of the defined types were placed in this encounter.       Management Plan:     An After Visit Summary was printed and given to the patient at discharge.    Follow-up: Return in about 3 months (around 10/21/2020) for Medicare Wellness with labs prior.    Bonita Romero, ROBIN 7/21/2020 09:20  This note was electronically signed.          Answers for HPI/ROS submitted by the patient on 7/20/2020   What is the primary reason for your visit?: Other  Please describe your symptoms.: Requesting a a referral for a pediostrist.  Have you had these symptoms before?: Yes  How long have you been having these symptoms?: Greater than 2 weeks

## 2020-07-21 NOTE — TELEPHONE ENCOUNTER
Patient seen this morning.  UDS and controlled substance agreement renewed this morning.  MALU reviewed.  Sent to Dr. Gonzalez for approval.

## 2020-07-21 NOTE — TELEPHONE ENCOUNTER
Patient was in office today and seen Bonita for pain shell. She is needing refill on Norco 5-325-every 8 hours PRN to [facility]. Patient completed UDS/Contract in office today. Rikki was reviewed by ROBIN at visit as well.

## 2020-07-22 ENCOUNTER — TELEPHONE (OUTPATIENT)
Dept: PODIATRY | Facility: CLINIC | Age: 80
End: 2020-07-22

## 2020-07-22 DIAGNOSIS — E78.2 MIXED HYPERLIPIDEMIA: ICD-10-CM

## 2020-07-22 DIAGNOSIS — I10 ESSENTIAL HYPERTENSION: ICD-10-CM

## 2020-07-22 RX ORDER — METOCLOPRAMIDE 10 MG/1
TABLET ORAL
Qty: 90 TABLET | Refills: 0 | Status: SHIPPED | OUTPATIENT
Start: 2020-07-22 | End: 2020-07-24 | Stop reason: SDUPTHER

## 2020-07-22 RX ORDER — LOSARTAN POTASSIUM 100 MG/1
TABLET ORAL
Qty: 90 TABLET | Refills: 0 | Status: SHIPPED | OUTPATIENT
Start: 2020-07-22 | End: 2020-07-24 | Stop reason: SDUPTHER

## 2020-07-22 RX ORDER — CARVEDILOL 6.25 MG/1
TABLET ORAL
Qty: 90 TABLET | Refills: 0 | Status: SHIPPED | OUTPATIENT
Start: 2020-07-22 | End: 2020-07-24 | Stop reason: SDUPTHER

## 2020-07-22 RX ORDER — PRAVASTATIN SODIUM 20 MG
TABLET ORAL
Qty: 90 TABLET | Refills: 0 | Status: SHIPPED | OUTPATIENT
Start: 2020-07-22 | End: 2020-07-24 | Stop reason: SDUPTHER

## 2020-07-22 NOTE — TELEPHONE ENCOUNTER
Spoke with care giver who advised that she was not able to take patient to the appointment with Nahun tomorrow.  Patient was RS to 8/6/2020 as requested.

## 2020-07-24 DIAGNOSIS — I10 ESSENTIAL HYPERTENSION: ICD-10-CM

## 2020-07-24 DIAGNOSIS — E78.2 MIXED HYPERLIPIDEMIA: ICD-10-CM

## 2020-07-24 LAB — DRUGS UR: NORMAL

## 2020-07-24 RX ORDER — PRAVASTATIN SODIUM 20 MG
20 TABLET ORAL DAILY
Qty: 90 TABLET | Refills: 1 | Status: SHIPPED | OUTPATIENT
Start: 2020-07-24 | End: 2021-06-03

## 2020-07-24 RX ORDER — OMEPRAZOLE 20 MG/1
20 CAPSULE, DELAYED RELEASE ORAL DAILY
Qty: 90 CAPSULE | Refills: 0 | Status: SHIPPED | OUTPATIENT
Start: 2020-07-24 | End: 2021-01-08 | Stop reason: SDUPTHER

## 2020-07-24 RX ORDER — CARVEDILOL 6.25 MG/1
6.25 TABLET ORAL 2 TIMES DAILY WITH MEALS
Qty: 180 TABLET | Refills: 1 | Status: SHIPPED | OUTPATIENT
Start: 2020-07-24 | End: 2021-02-12

## 2020-07-24 RX ORDER — METOCLOPRAMIDE 10 MG/1
10 TABLET ORAL DAILY
Qty: 90 TABLET | Refills: 1 | Status: SHIPPED | OUTPATIENT
Start: 2020-07-24 | End: 2020-11-19

## 2020-07-24 RX ORDER — LOSARTAN POTASSIUM 100 MG/1
100 TABLET ORAL DAILY
Qty: 90 TABLET | Refills: 1 | Status: SHIPPED | OUTPATIENT
Start: 2020-07-24 | End: 2021-05-17 | Stop reason: SDUPTHER

## 2020-08-06 ENCOUNTER — OFFICE VISIT (OUTPATIENT)
Dept: PODIATRY | Facility: CLINIC | Age: 80
End: 2020-08-06

## 2020-08-06 VITALS
OXYGEN SATURATION: 99 % | HEIGHT: 66 IN | WEIGHT: 170 LBS | BODY MASS INDEX: 27.32 KG/M2 | DIASTOLIC BLOOD PRESSURE: 80 MMHG | HEART RATE: 75 BPM | SYSTOLIC BLOOD PRESSURE: 163 MMHG

## 2020-08-06 DIAGNOSIS — L60.8 PINCER NAIL DEFORMITY: ICD-10-CM

## 2020-08-06 DIAGNOSIS — B35.1 ONYCHOMYCOSIS: ICD-10-CM

## 2020-08-06 DIAGNOSIS — M79.672 FOOT PAIN, BILATERAL: Primary | ICD-10-CM

## 2020-08-06 DIAGNOSIS — G62.9 NEUROPATHY: ICD-10-CM

## 2020-08-06 DIAGNOSIS — M79.671 FOOT PAIN, BILATERAL: Primary | ICD-10-CM

## 2020-08-06 PROCEDURE — 11750 EXCISION NAIL&NAIL MATRIX: CPT | Performed by: NURSE PRACTITIONER

## 2020-08-06 PROCEDURE — 99214 OFFICE O/P EST MOD 30 MIN: CPT | Performed by: NURSE PRACTITIONER

## 2020-08-06 NOTE — PROGRESS NOTES
[facility] - PODIATRY    Today's Date: 08/07/20    Patient Name: Shira King  MRN: [id number]  CSN: [id number]  PCP: Reynaldo Cavazos MD  Referring Provider: Bonita Romero AP*    SUBJECTIVE     Chief Complaint   Patient presents with   • Follow-up     pt is here today for Onychomycosis of the toenails - pain in bilateral feet - pcp 02/10/2020 - pt states that she has two toenails on the left foot that are needing taken off and one toenail on the left foot that is needed taken off - pt pain level today is 8/10     HPI: Shira King, a 80 y.o.female, comes to clinic as a(n) new patient complaining of foot pain and complaining of painful toenails. Patient has h/o GERD, Lumbar pain, HLD, Valvular disease, periodic limb movement disorder. anxiety. Patient presents today complaining of painful ingrowing toenails to bilateral feet. States that she has previously had the left great toenail and matrix removed by Dr. Cavazos; this returned after the procedure. Patient also notes abnormal nail growth of the left second toe as well as the right second toe as she notes that they curve downward and sideways into the skin and are painful. Patient requests to have these permenantly removed as they have been problematic for quite some time. Admits pain at 8/10 level and described as aching, throbbing and sharp. Pain is worse in certain shoes or when pressure is applied to nail. Relates previous treatment(s) including nail avulsion. Denies any constitutional symptoms. No other pedal complaints at this time.    Past Medical History:   Diagnosis Date   • Gastroesophageal reflux disease 2/3/2020   • Lumbar pain 7/12/2019   • Mixed hyperlipidemia    • Valvular disease      Past Surgical History:   Procedure Laterality Date   • BLADDER REPAIR  2005   • BREAST SURGERY     • BUNIONECTOMY  2007   • CATARACT EXTRACTION Bilateral 2005   • CHOLECYSTECTOMY     • ENDOSCOPY  2005   • HERNIA REPAIR  1963   •  HYSTERECTOMY  1979   • NECK SURGERY  1974    VERTEBRA   • REPLACEMENT TOTAL KNEE BILATERAL     • TONSILLECTOMY       Family History   Problem Relation Age of Onset   • Stroke Mother    • Heart disease Mother    • Stroke Sister    • Heart attack Maternal Grandmother      Social History     Socioeconomic History   • Marital status:      Spouse name: Not on file   • Number of children: Not on file   • Years of education: Not on file   • Highest education level: Not on file   Tobacco Use   • Smoking status: Never Smoker   • Smokeless tobacco: Never Used   Substance and Sexual Activity   • Alcohol use: No   • Drug use: No   • Sexual activity: Not Currently     Partners: Male     No Known Allergies  Current Outpatient Medications   Medication Sig Dispense Refill   • ALPRAZolam (XANAX) 0.25 MG tablet Take 1 tablet by mouth 2 (Two) Times a Day As Needed for Anxiety. 30 tablet 0   • aspirin 81 MG chewable tablet Chew 81 mg daily.     • carvedilol (COREG) 6.25 MG tablet Take 1 tablet by mouth 2 (Two) Times a Day With Meals. 180 tablet 1   • CBD (cannabidiol) oral oil Take 4 drops by mouth 2 (Two) Times a Day.     • HYDROcodone-acetaminophen (NORCO) 5-325 MG per tablet Take 1 tablet by mouth Every 8 (Eight) Hours As Needed for Moderate Pain  or Severe Pain . 90 tablet 0   • losartan (COZAAR) 100 MG tablet Take 1 tablet by mouth Daily. 90 tablet 1   • meclizine (ANTIVERT) 25 MG tablet Take 25 mg by mouth 2 (two) times a day.     • metoclopramide (REGLAN) 10 MG tablet Take 1 tablet by mouth Daily. 90 tablet 1   • omeprazole (priLOSEC) 20 MG capsule Take 1 capsule by mouth Daily. 90 capsule 0   • pravastatin (PRAVACHOL) 20 MG tablet Take 1 tablet by mouth Daily. 90 tablet 1   • temazepam (RESTORIL) 30 MG capsule Take 1 capsule by mouth At Night As Needed for Sleep. 90 capsule 1     No current facility-administered medications for this visit.      Review of Systems   Constitutional: Negative for chills and fever.   HENT:  Negative for congestion.    Respiratory: Negative for shortness of breath.    Cardiovascular: Negative for chest pain and leg swelling.   Gastrointestinal: Negative for constipation, diarrhea, nausea and vomiting.   Musculoskeletal: Positive for arthralgias and back pain. Negative for gait problem and myalgias.   Skin: Negative for wound.   Neurological: Positive for numbness.       OBJECTIVE     Vitals:    08/06/20 1306   BP: 163/80   Pulse: 75   SpO2: 99%       PHYSICAL EXAM  GEN:   Accompanied by caregiver.     Foot/Ankle Exam:       General:   Appearance: appears stated age and healthy and elderly    Orientation: AAOx3    Affect: appropriate    Gait: unimpaired    Assistance: independent    Shoe Gear:  Sandals    VASCULAR      Right Foot Vascularity   Dorsalis pedis:  2+  Posterior tibial:  2+  Skin Temperature: warm    Edema Grading:  None  CFT:  3  Pedal Hair Growth:  Present  Varicosities: mild varicosities       Left Foot Vascularity   Dorsalis pedis:  2+  Posterior tibial:  2+  Skin Temperature: warm    Edema Grading:  None  CFT:  3  Pedal Hair Growth:  Present  Varicosities: mild varicosities        NEUROLOGIC     Right Foot Neurologic   Normal sensation    Light touch sensation:  Normal  Vibratory sensation:  Normal  Hot/Cold sensation: normal       Left Foot Neurologic   Normal sensation    Light touch sensation:  Normal  Vibratory sensation:  Normal  Hot/cold sensation: normal       MUSCULOSKELETAL      Right Foot Musculoskeletal    Amputation   Right toes amputated: No    Ecchymosis:  None  Tenderness: toenails    Arch:  Normal  Hallux valgus: No    Hallux limitus: No       Left Foot Musculoskeletal    Amputation   Left toes amputated: No    Ecchymosis:  None  Tenderness: toenails    Arch:  Normal  Hallux valgus: No    Hallux limitus: No       DERMATOLOGIC     Right Foot Dermatologic   Skin: skin intact    Nails: onychomycosis, abnormally thick, dystrophic nails and ingrown toenail (second toe)        Left Foot Dermatologic   Skin: skin intact    Nails: abnormally thick, dystrophic nails and ingrown toenail (hallux, second toe)        RADIOLOGY/NUCLEAR:  No results found.    LABORATORY/CULTURE RESULTS:      PATHOLOGY RESULTS:       ASSESSMENT/PLAN     Shira was seen today for follow-up.    Diagnoses and all orders for this visit:    Foot pain, bilateral  -     Nail Removal  -     Nail Removal  -     Nail Removal    Pincer nail deformity  -     Nail Removal  -     Nail Removal  -     Nail Removal    Onychomycosis    Neuropathy      Comprehensive lower extremity examination and evaluation was performed.  Discussed findings and treatment plan including risks, benefits, and treatment options with patient in detail. Patient agreed with treatment plan.  Patient may maintain nails and calluses at home utilizing emery board or pumice stone between visits as needed  After written consent obtained, total/partial nail avulsion(s) performed as documented in procedure note. Post-procedure instructions given.   Advised that neuropathic changes in lower extremities are likely related to history of chronic lumbar pain.   Recommend foot soaks daily in warm water and Epsom salt x15 minutes for comfort.  Postprocedural instructions given including leaving bandage on overnight tonight, removing in the morning, performing routine hygienic care, and then daily application of triple antibiotic ointment and Band-Aid until follow-up appointment in 2 weeks.  An After Visit Summary was printed and given to the patient at discharge, including (if requested) any available informative/educational handouts regarding diagnosis, treatment, or medications. All questions were answered to patient/family satisfaction. Should symptoms fail to improve or worsen they agree to call or return to clinic or to go to the Emergency Department. Discussed the importance of following up with any needed screening tests/labs/specialist appointments and any  requested follow-up recommended by me today. Importance of maintaining follow-up discussed and patient accepts that missed appointments can delay diagnosis and potentially lead to worsening of conditions.  Return in about 2 weeks (around 8/20/2020)., or sooner if acute issues arise.      Nail Removal  Date/Time: 8/6/2020 1:53 PM  Performed by: Harshad Dao APRN  Authorized by: Harshad Dao APRN   Location: right foot  Location details: right second toe  Anesthesia: digital block    Anesthesia:  Local Anesthetic: bupivacaine 0.5% without epinephrine  Anesthetic total: 2 mL    Sedation:  Patient sedated: no    Preparation: skin prepped with Betadine  Amount removed: complete  Wedge excision of skin of nail fold: no  Nail bed sutured: no  Nail matrix removed: complete  Removed nail replaced and anchored: no  Dressing: 4x4, antibiotic ointment and gauze roll  Patient tolerance: Patient tolerated the procedure well with no immediate complications  Comments: Phenol flushed with alcohol. Silvadene applied to wound bed.     Nail Removal  Date/Time: 8/6/2020 1:53 PM  Performed by: Harshad Dao APRN  Authorized by: Harshad Dao APRN   Location: left foot  Location details: left second toe  Anesthesia: digital block    Anesthesia:  Local Anesthetic: bupivacaine 0.5% without epinephrine  Anesthetic total: 2 mL    Sedation:  Patient sedated: no    Preparation: skin prepped with Betadine  Amount removed: complete  Wedge excision of skin of nail fold: no  Nail bed sutured: no  Nail matrix removed: complete  Removed nail replaced and anchored: no  Dressing: 4x4, antibiotic ointment and gauze roll  Patient tolerance: Patient tolerated the procedure well with no immediate complications  Comments: Phenol flushed with alcohol. Silvadene applied to wound bed.     Nail Removal  Date/Time: 8/6/2020 1:53 PM  Performed by: Harshad Dao APRN  Authorized by: Harshad Dao APRN   Location:  left foot  Location details: left big toe  Anesthesia: digital block    Anesthesia:  Local Anesthetic: bupivacaine 0.5% without epinephrine  Anesthetic total: 5 mL    Sedation:  Patient sedated: no    Amount removed: complete  Wedge excision of skin of nail fold: no  Nail bed sutured: no  Nail matrix removed: complete  Removed nail replaced and anchored: no  Dressing: antibiotic ointment, gauze roll and 4x4  Patient tolerance: Patient tolerated the procedure well with no immediate complications  Comments: Phenol flushed with alcohol. Silvadene applied to wound bed.           This document has been electronically signed by ROBIN Winston on August 7, 2020 07:48

## 2020-08-07 RX ORDER — BUPIVACAINE HYDROCHLORIDE 5 MG/ML
9 INJECTION, SOLUTION PERINEURAL ONCE
Status: COMPLETED | OUTPATIENT
Start: 2020-08-07 | End: 2020-08-07

## 2020-08-07 RX ADMIN — BUPIVACAINE HYDROCHLORIDE 9 ML: 5 INJECTION, SOLUTION PERINEURAL at 08:58

## 2020-08-07 NOTE — PATIENT INSTRUCTIONS
Ingrown Toenail  An ingrown toenail occurs when the corner or sides of a toenail grow into the surrounding skin. This causes discomfort and pain. The big toe is most commonly affected, but any of the toes can be affected. If an ingrown toenail is not treated, it can become infected.  What are the causes?  This condition may be caused by:  · Wearing shoes that are too small or tight.  · An injury, such as stubbing your toe or having your toe stepped on.  · Improper cutting or care of your toenails.  · Having nail or foot abnormalities that were present from birth (congenital abnormalities), such as having a nail that is too big for your toe.  What increases the risk?  The following factors may make you more likely to develop ingrown toenails:  · Age. Nails tend to get thicker with age, so ingrown nails are more common among older people.  · Cutting your toenails incorrectly, such as cutting them very short or cutting them unevenly.  An ingrown toenail is more likely to get infected if you have:  · Diabetes.  · Blood flow (circulation) problems.  What are the signs or symptoms?  Symptoms of an ingrown toenail may include:  · Pain, soreness, or tenderness.  · Redness.  · Swelling.  · Hardening of the skin that surrounds the toenail.  Signs that an ingrown toenail may be infected include:  · Fluid or pus.  · Symptoms that get worse instead of better.  How is this diagnosed?  An ingrown toenail may be diagnosed based on your medical history, your symptoms, and a physical exam. If you have fluid or blood coming from your toenail, a sample may be collected to test for the specific type of bacteria that is causing the infection.  How is this treated?  Treatment depends on how severe your ingrown toenail is. You may be able to care for your toenail at home.  · If you have an infection, you may be prescribed antibiotic medicines.  · If you have fluid or pus draining from your toenail, your health care provider may drain  it.  · If you have trouble walking, you may be given crutches to use.  · If you have a severe or infected ingrown toenail, you may need a procedure to remove part or all of the nail.  Follow these instructions at home:  Foot care    · Do not pick at your toenail or try to remove it yourself.  · Soak your foot in warm, soapy water. Do this for 20 minutes, 3 times a day, or as often as told by your health care provider. This helps to keep your toe clean and keep your skin soft.  · Wear shoes that fit well and are not too tight. Your health care provider may recommend that you wear open-toed shoes while you heal.  · Trim your toenails regularly and carefully. Cut your toenails straight across to prevent injury to the skin at the corners of the toenail. Do not cut your nails in a curved shape.  · Keep your feet clean and dry to help prevent infection.  Medicines  · Take over-the-counter and prescription medicines only as told by your health care provider.  · If you were prescribed an antibiotic, take it as told by your health care provider. Do not stop taking the antibiotic even if you start to feel better.  Activity  · Return to your normal activities as told by your health care provider. Ask your health care provider what activities are safe for you.  · Avoid activities that cause pain.  General instructions  · If your health care provider told you to use crutches to help you move around, use them as instructed.  · Keep all follow-up visits as told by your health care provider. This is important.  Contact a health care provider if:  · You have more redness, swelling, pain, or other symptoms that do not improve with treatment.  · You have fluid, blood, or pus coming from your toenail.  Get help right away if:  · You have a red streak on your skin that starts at your foot and spreads up your leg.  · You have a fever.  Summary  · An ingrown toenail occurs when the corner or sides of a toenail grow into the surrounding  skin. This causes discomfort and pain. The big toe is most commonly affected, but any of the toes can be affected.  · If an ingrown toenail is not treated, it can become infected.  · Fluid or pus draining from your toenail is a sign of infection. Your health care provider may need to drain it. You may be given antibiotics to treat the infection.  · Trimming your toenails regularly and properly can help you prevent an ingrown toenail.  This information is not intended to replace advice given to you by your health care provider. Make sure you discuss any questions you have with your health care provider.  Document Released: 12/15/2001 Document Revised: 04/10/2020 Document Reviewed: 09/05/2018  [employer] Patient Education © 2020 [employer]    Peripheral Neuropathy  Peripheral neuropathy is a type of nerve damage. It affects nerves that carry signals between the spinal cord and the arms, legs, and the rest of the body (peripheral nerves). It does not affect nerves in the spinal cord or brain. In peripheral neuropathy, one nerve or a group of nerves may be damaged. Peripheral neuropathy is a broad category that includes many specific nerve disorders, like diabetic neuropathy, hereditary neuropathy, and carpal tunnel syndrome.  What are the causes?  This condition may be caused by:  · Diabetes. This is the most common cause of peripheral neuropathy.  · Nerve injury.  · Pressure or stress on a nerve that lasts a long time.  · Lack (deficiency) of B vitamins. This can result from alcoholism, poor diet, or a restricted diet.  · Infections.  · Autoimmune diseases, such as rheumatoid arthritis and systemic lupus erythematosus.  · Nerve diseases that are passed from parent to child (inherited).  · Some medicines, such as cancer medicines (chemotherapy).  · Poisonous (toxic) substances, such as lead and mercury.  · Too little blood flowing to the legs.  · Kidney disease.  · Thyroid disease.  In some cases, the cause of this  condition is not known.  What are the signs or symptoms?  Symptoms of this condition depend on which of your nerves is damaged. Common symptoms include:  · Loss of feeling (numbness) in the feet, hands, or both.  · Tingling in the feet, hands, or both.  · Burning pain.  · Very sensitive skin.  · Weakness.  · Not being able to move a part of the body (paralysis).  · Muscle twitching.  · Clumsiness or poor coordination.  · Loss of balance.  · Not being able to control your bladder.  · Feeling dizzy.  · Sexual problems.  How is this diagnosed?  Diagnosing and finding the cause of peripheral neuropathy can be difficult. Your health care provider will take your medical history and do a physical exam. A neurological exam will also be done. This involves checking things that are affected by your brain, spinal cord, and nerves (nervous system). For example, your health care provider will check your reflexes, how you move, and what you can feel.  You may have other tests, such as:  · Blood tests.  · Electromyogram (EMG) and nerve conduction tests. These tests check nerve function and how well the nerves are controlling the muscles.  · Imaging tests, such as CT scans or MRI to rule out other causes of your symptoms.  · Removing a small piece of nerve to be examined in a lab (nerve biopsy). This is rare.  · Removing and examining a small amount of the fluid that surrounds the brain and spinal cord (lumbar puncture). This is rare.  How is this treated?  Treatment for this condition may involve:  · Treating the underlying cause of the neuropathy, such as diabetes, kidney disease, or vitamin deficiencies.  · Stopping medicines that can cause neuropathy, such as chemotherapy.  · Medicine to relieve pain. Medicines may include:  ? Prescription or over-the-counter pain medicine.  ? Antiseizure medicine.  ? Antidepressants.  ? Pain-relieving patches that are applied to painful areas of skin.  · Surgery to relieve pressure on a nerve  or to destroy a nerve that is causing pain.  · Physical therapy to help improve movement and balance.  · Devices to help you move around (assistive devices).  Follow these instructions at home:  Medicines  · Take over-the-counter and prescription medicines only as told by your health care provider. Do not take any other medicines without first asking your health care provider.  · Do not drive or use heavy machinery while taking prescription pain medicine.  Lifestyle    · Do not use any products that contain nicotine or tobacco, such as cigarettes and e-cigarettes. Smoking keeps blood from reaching damaged nerves. If you need help quitting, ask your health care provider.  · Avoid or limit alcohol. Too much alcohol can cause a vitamin B deficiency, and vitamin B is needed for healthy nerves.  · Eat a healthy diet. This includes:  ? Eating foods that are high in fiber, such as fresh fruits and vegetables, whole grains, and beans.  ? Limiting foods that are high in fat and processed sugars, such as fried or sweet foods.  General instructions    · If you have diabetes, work closely with your health care provider to keep your blood sugar under control.  · If you have numbness in your feet:  ? Check every day for signs of injury or infection. Watch for redness, warmth, and swelling.  ? Wear padded socks and comfortable shoes. These help protect your feet.  · Develop a good support system. Living with peripheral neuropathy can be stressful. Consider talking with a mental health specialist or joining a support group.  · Use assistive devices and attend physical therapy as told by your health care provider. This may include using a walker or a cane.  · Keep all follow-up visits as told by your health care provider. This is important.  Contact a health care provider if:  · You have new signs or symptoms of peripheral neuropathy.  · You are struggling emotionally from dealing with peripheral neuropathy.  · Your pain is not  well-controlled.  Get help right away if:  · You have an injury or infection that is not healing normally.  · You develop new weakness in an arm or leg.  · You fall frequently.  Summary  · Peripheral neuropathy is when the nerves in the arms, or legs are damaged, resulting in numbness, weakness, or pain.  · There are many causes of peripheral neuropathy, including diabetes, pinched nerves, vitamin deficiencies, autoimmune disease, and hereditary conditions.  · Diagnosing and finding the cause of peripheral neuropathy can be difficult. Your health care provider will take your medical history, do a physical exam, and do tests, including blood tests and nerve function tests.  · Treatment involves treating the underlying cause of the neuropathy and taking medicines to help control pain. Physical therapy and assistive devices may also help.  This information is not intended to replace advice given to you by your health care provider. Make sure you discuss any questions you have with your health care provider.  Document Released: 12/08/2003 Document Revised: 11/30/2018 Document Reviewed: 02/26/2018  [employer] Patient Education © 2020 [employer]

## 2020-08-17 ENCOUNTER — TELEPHONE (OUTPATIENT)
Dept: VASCULAR SURGERY | Facility: CLINIC | Age: 80
End: 2020-08-17

## 2020-08-17 NOTE — TELEPHONE ENCOUNTER
Spoke to pt about rescheduling appt on 8/20 pt expressed understanding. Rescheduled for September 17th in [place].

## 2020-09-01 DIAGNOSIS — M54.50 LUMBAR PAIN: ICD-10-CM

## 2020-09-01 NOTE — TELEPHONE ENCOUNTER
Needs refill on HYDROcodone-acetaminophen (NORCO) 5-325 MG per tablet        [facility] - [place], [place] - [facility] [facility] [address] [facility] [phone] [facility] [phone] FX     She has 4 left    Please advise

## 2020-09-02 RX ORDER — HYDROCODONE BITARTRATE AND ACETAMINOPHEN 5; 325 MG/1; MG/1
1 TABLET ORAL EVERY 8 HOURS PRN
Qty: 90 TABLET | Refills: 0 | Status: SHIPPED | OUTPATIENT
Start: 2020-09-02 | End: 2020-10-08 | Stop reason: SDUPTHER

## 2020-10-08 DIAGNOSIS — M54.50 LUMBAR PAIN: ICD-10-CM

## 2020-10-08 RX ORDER — HYDROCODONE BITARTRATE AND ACETAMINOPHEN 5; 325 MG/1; MG/1
1 TABLET ORAL EVERY 8 HOURS PRN
Qty: 90 TABLET | Refills: 0 | Status: SHIPPED | OUTPATIENT
Start: 2020-10-08 | End: 2020-11-10 | Stop reason: SDUPTHER

## 2020-10-08 NOTE — TELEPHONE ENCOUNTER
Caller: Christine Joy    Relationship: Emergency Contact    Best call back number: [phone]    Medication needed:   Requested Prescriptions     Pending Prescriptions Disp Refills   • HYDROcodone-acetaminophen (NORCO) 5-325 MG per tablet 90 tablet 0     Sig: Take 1 tablet by mouth Every 8 (Eight) Hours As Needed for Moderate Pain  or Severe Pain .       What is the patient's preferred pharmacy: [address] [address] [place] - [phone] [facility] [phone]

## 2020-10-13 ENCOUNTER — CLINICAL SUPPORT (OUTPATIENT)
Dept: FAMILY MEDICINE CLINIC | Facility: CLINIC | Age: 80
End: 2020-10-13

## 2020-10-13 DIAGNOSIS — Z00.00 MEDICARE ANNUAL WELLNESS VISIT, SUBSEQUENT: Primary | ICD-10-CM

## 2020-10-13 DIAGNOSIS — I10 ESSENTIAL HYPERTENSION: ICD-10-CM

## 2020-10-13 DIAGNOSIS — E78.2 MIXED HYPERLIPIDEMIA: ICD-10-CM

## 2020-10-13 NOTE — PROGRESS NOTES
Labs only   Answers for HPI/ROS submitted by the patient on 10/7/2020   What is the primary reason for your visit?: Other  Please describe your symptoms.: Labs for yearly Wellness and 90 day follow up for prescriptions needing it.  Have you had these symptoms before?: Yes  How long have you been having these symptoms?: Greater than 2 weeks

## 2020-10-14 LAB
ALBUMIN SERPL-MCNC: 4.2 G/DL (ref 3.6–4.6)
ALBUMIN/GLOB SERPL: 1.6 {RATIO} (ref 1.2–2.2)
ALP SERPL-CCNC: 91 IU/L (ref 39–117)
ALT SERPL-CCNC: 13 IU/L (ref 0–32)
AST SERPL-CCNC: 35 IU/L (ref 0–40)
BASOPHILS # BLD AUTO: 0 X10E3/UL (ref 0–0.2)
BASOPHILS NFR BLD AUTO: 1 %
BILIRUB SERPL-MCNC: 0.8 MG/DL (ref 0–1.2)
BUN SERPL-MCNC: 7 MG/DL (ref 8–27)
BUN/CREAT SERPL: 8 (ref 12–28)
CALCIUM SERPL-MCNC: 9.5 MG/DL (ref 8.7–10.3)
CHLORIDE SERPL-SCNC: 102 MMOL/L (ref 96–106)
CHOLEST SERPL-MCNC: 144 MG/DL (ref 100–199)
CO2 SERPL-SCNC: 26 MMOL/L (ref 20–29)
CREAT SERPL-MCNC: 0.9 MG/DL (ref 0.57–1)
EOSINOPHIL # BLD AUTO: 0.1 X10E3/UL (ref 0–0.4)
EOSINOPHIL NFR BLD AUTO: 3 %
ERYTHROCYTE [DISTWIDTH] IN BLOOD BY AUTOMATED COUNT: 12.1 % (ref 11.7–15.4)
GLOBULIN SER CALC-MCNC: 2.7 G/DL (ref 1.5–4.5)
GLUCOSE SERPL-MCNC: 121 MG/DL (ref 65–99)
HCT VFR BLD AUTO: 42.8 % (ref 34–46.6)
HDLC SERPL-MCNC: 50 MG/DL
HGB BLD-MCNC: 14.5 G/DL (ref 11.1–15.9)
IMM GRANULOCYTES # BLD AUTO: 0 X10E3/UL (ref 0–0.1)
IMM GRANULOCYTES NFR BLD AUTO: 0 %
LDLC SERPL CALC-MCNC: 76 MG/DL (ref 0–99)
LDLC/HDLC SERPL: 1.5 RATIO (ref 0–3.2)
LYMPHOCYTES # BLD AUTO: 1.5 X10E3/UL (ref 0.7–3.1)
LYMPHOCYTES NFR BLD AUTO: 28 %
MCH RBC QN AUTO: 30.7 PG (ref 26.6–33)
MCHC RBC AUTO-ENTMCNC: 33.9 G/DL (ref 31.5–35.7)
MCV RBC AUTO: 91 FL (ref 79–97)
MONOCYTES # BLD AUTO: 0.4 X10E3/UL (ref 0.1–0.9)
MONOCYTES NFR BLD AUTO: 8 %
NEUTROPHILS # BLD AUTO: 3.2 X10E3/UL (ref 1.4–7)
NEUTROPHILS NFR BLD AUTO: 60 %
PLATELET # BLD AUTO: 241 X10E3/UL (ref 150–450)
POTASSIUM SERPL-SCNC: 4.7 MMOL/L (ref 3.5–5.2)
PROT SERPL-MCNC: 6.9 G/DL (ref 6–8.5)
RBC # BLD AUTO: 4.72 X10E6/UL (ref 3.77–5.28)
SODIUM SERPL-SCNC: 138 MMOL/L (ref 134–144)
T4 SERPL-MCNC: 6.3 UG/DL (ref 4.5–12)
TRIGL SERPL-MCNC: 95 MG/DL (ref 0–149)
TSH SERPL DL<=0.005 MIU/L-ACNC: 1.41 UIU/ML (ref 0.45–4.5)
VLDLC SERPL CALC-MCNC: 18 MG/DL (ref 5–40)
WBC # BLD AUTO: 5.3 X10E3/UL (ref 3.4–10.8)

## 2020-10-22 ENCOUNTER — OFFICE VISIT (OUTPATIENT)
Dept: FAMILY MEDICINE CLINIC | Facility: CLINIC | Age: 80
End: 2020-10-22

## 2020-10-22 VITALS
BODY MASS INDEX: 27.58 KG/M2 | TEMPERATURE: 97.4 F | WEIGHT: 171.6 LBS | SYSTOLIC BLOOD PRESSURE: 146 MMHG | HEIGHT: 66 IN | DIASTOLIC BLOOD PRESSURE: 72 MMHG | HEART RATE: 57 BPM | OXYGEN SATURATION: 96 %

## 2020-10-22 VITALS
OXYGEN SATURATION: 96 % | TEMPERATURE: 97.4 F | HEART RATE: 57 BPM | BODY MASS INDEX: 27.58 KG/M2 | WEIGHT: 171.6 LBS | SYSTOLIC BLOOD PRESSURE: 146 MMHG | HEIGHT: 66 IN | DIASTOLIC BLOOD PRESSURE: 72 MMHG

## 2020-10-22 DIAGNOSIS — Z86.19 HISTORY OF ONYCHOMYCOSIS: ICD-10-CM

## 2020-10-22 DIAGNOSIS — Z23 NEED FOR SHINGLES VACCINE: ICD-10-CM

## 2020-10-22 DIAGNOSIS — Z78.0 POST-MENOPAUSAL: ICD-10-CM

## 2020-10-22 DIAGNOSIS — M54.50 LUMBAR PAIN: ICD-10-CM

## 2020-10-22 DIAGNOSIS — R73.9 HYPERGLYCEMIA: ICD-10-CM

## 2020-10-22 DIAGNOSIS — Z79.899 LONG-TERM USE OF HIGH-RISK MEDICATION: ICD-10-CM

## 2020-10-22 DIAGNOSIS — F41.9 ANXIETY: ICD-10-CM

## 2020-10-22 DIAGNOSIS — Z23 NEED FOR IMMUNIZATION AGAINST INFLUENZA: ICD-10-CM

## 2020-10-22 DIAGNOSIS — E78.2 MIXED HYPERLIPIDEMIA: ICD-10-CM

## 2020-10-22 DIAGNOSIS — F51.01 PRIMARY INSOMNIA: ICD-10-CM

## 2020-10-22 DIAGNOSIS — Z00.00 MEDICARE ANNUAL WELLNESS VISIT, SUBSEQUENT: Primary | ICD-10-CM

## 2020-10-22 DIAGNOSIS — I10 ESSENTIAL HYPERTENSION: ICD-10-CM

## 2020-10-22 DIAGNOSIS — Z12.31 ENCOUNTER FOR SCREENING MAMMOGRAM FOR MALIGNANT NEOPLASM OF BREAST: ICD-10-CM

## 2020-10-22 DIAGNOSIS — H65.06 ACUTE SEROUS OTITIS MEDIA, RECURRENT, BILATERAL: Primary | ICD-10-CM

## 2020-10-22 DIAGNOSIS — L82.0 SEBORRHEIC KERATOSES, INFLAMED: ICD-10-CM

## 2020-10-22 LAB
EXPIRATION DATE: NORMAL
HBA1C MFR BLD: 5.9 %
Lab: NORMAL

## 2020-10-22 PROCEDURE — G0008 ADMIN INFLUENZA VIRUS VAC: HCPCS | Performed by: NURSE PRACTITIONER

## 2020-10-22 PROCEDURE — G0439 PPPS, SUBSEQ VISIT: HCPCS | Performed by: NURSE PRACTITIONER

## 2020-10-22 PROCEDURE — 99214 OFFICE O/P EST MOD 30 MIN: CPT | Performed by: NURSE PRACTITIONER

## 2020-10-22 PROCEDURE — 83036 HEMOGLOBIN GLYCOSYLATED A1C: CPT | Performed by: NURSE PRACTITIONER

## 2020-10-22 PROCEDURE — 90694 VACC AIIV4 NO PRSRV 0.5ML IM: CPT | Performed by: NURSE PRACTITIONER

## 2020-10-22 RX ORDER — AZELASTINE 1 MG/ML
2 SPRAY, METERED NASAL 2 TIMES DAILY
Qty: 1 EACH | Refills: 12 | Status: SHIPPED | OUTPATIENT
Start: 2020-10-22 | End: 2021-04-06

## 2020-10-22 RX ORDER — ZOSTER VACCINE RECOMBINANT, ADJUVANTED 50 MCG/0.5
0.5 KIT INTRAMUSCULAR ONCE
Qty: 1 EACH | Refills: 1 | Status: SHIPPED | OUTPATIENT
Start: 2020-10-22 | End: 2020-10-22

## 2020-10-22 NOTE — PROGRESS NOTES
"Chief Complaint   Patient presents with   • Pain     FU   • Earache     RIGHT   • Nail Problem        Subjective   Shira King is a 80 y.o.  female who presents today for follow up.    HPI:  MED REFILL/CONTROLLED SUBSTANCE:  Patient presents for controlled substance visit although she does not need a refill at present.  She has shown appropriate use of both pain medication and sleep aid without abuse or misuse.  She rarely takes xanax for anxiety but does have some available (received #30 in March).  She is up to date on both UDS and controlled substance agreement.  EARACHE:  Patient states her right ear is painful.  Hurts \"like it has in the past when I have fluid on it.\"  She denies fever or other URI symptoms.  She continues to take a zyrtec daily and uses Flonase as well.  ONYCHOMYCOSIS:  She is wondering if her toenail is \"coming back.\"  Both great toenails have been removed.  \"I just want you to look at it.\"    Shira King  has a past medical history of Gastroesophageal reflux disease (2/3/2020), Lumbar pain (7/12/2019), Mixed hyperlipidemia, and Valvular disease.    No Known Allergies    Current Outpatient Medications:   •  ALPRAZolam (XANAX) 0.25 MG tablet, Take 1 tablet by mouth 2 (Two) Times a Day As Needed for Anxiety., Disp: 30 tablet, Rfl: 0  •  aspirin 81 MG chewable tablet, Chew 81 mg daily., Disp: , Rfl:   •  carvedilol (COREG) 6.25 MG tablet, Take 1 tablet by mouth 2 (Two) Times a Day With Meals., Disp: 180 tablet, Rfl: 1  •  CBD (cannabidiol) oral oil, Take 4 drops by mouth 2 (Two) Times a Day., Disp: , Rfl:   •  HYDROcodone-acetaminophen (NORCO) 5-325 MG per tablet, Take 1 tablet by mouth Every 8 (Eight) Hours As Needed for Moderate Pain  or Severe Pain ., Disp: 90 tablet, Rfl: 0  •  losartan (COZAAR) 100 MG tablet, Take 1 tablet by mouth Daily., Disp: 90 tablet, Rfl: 1  •  meclizine (ANTIVERT) 25 MG tablet, Take 25 mg by mouth 2 (two) times a day., Disp: , Rfl:   •  " metoclopramide (REGLAN) 10 MG tablet, Take 1 tablet by mouth Daily., Disp: 90 tablet, Rfl: 1  •  omeprazole (priLOSEC) 20 MG capsule, Take 1 capsule by mouth Daily., Disp: 90 capsule, Rfl: 0  •  pravastatin (PRAVACHOL) 20 MG tablet, Take 1 tablet by mouth Daily., Disp: 90 tablet, Rfl: 1  •  temazepam (RESTORIL) 30 MG capsule, Take 1 capsule by mouth At Night As Needed for Sleep., Disp: 90 capsule, Rfl: 1  •  azelastine (ASTELIN) 0.1 % nasal spray, 2 sprays into the nostril(s) as directed by provider 2 (Two) Times a Day. Use in each nostril as directed, Disp: 1 each, Rfl: 12  •  Zoster Vac Recomb Adjuvanted (Shingrix) 50 MCG/0.5ML reconstituted suspension, Inject 0.5 mL into the appropriate muscle as directed by prescriber 1 (One) Time for 1 dose. 2nd dose in 2-6 months, Disp: 1 each, Rfl: 1  Past Medical History:   Diagnosis Date   • Gastroesophageal reflux disease 2/3/2020   • Lumbar pain 7/12/2019   • Mixed hyperlipidemia    • Valvular disease      Past Surgical History:   Procedure Laterality Date   • BLADDER REPAIR  2005   • BREAST SURGERY     • BUNIONECTOMY  2007   • CATARACT EXTRACTION Bilateral 2005   • CHOLECYSTECTOMY     • ENDOSCOPY  2005   • HERNIA REPAIR  1963   • HYSTERECTOMY  1979   • NECK SURGERY  1974    VERTEBRA   • REPLACEMENT TOTAL KNEE BILATERAL     • TONSILLECTOMY       Social History     Socioeconomic History   • Marital status:      Spouse name: Not on file   • Number of children: Not on file   • Years of education: Not on file   • Highest education level: Not on file   Tobacco Use   • Smoking status: Never Smoker   • Smokeless tobacco: Never Used   Substance and Sexual Activity   • Alcohol use: No   • Drug use: No   • Sexual activity: Not Currently     Partners: Male     Family History   Problem Relation Age of Onset   • Stroke Mother    • Heart disease Mother    • Stroke Sister    • Heart attack Maternal Grandmother        Family history, surgical history, past medical history,  "Allergies and med's reviewed with patient today and updated in [facility] EMR.     ROS:  Review of Systems   Constitutional: Negative.  Negative for fatigue, fever and unexpected weight change.   HENT: Positive for ear pain. Negative for facial swelling, sore throat and trouble swallowing.    Eyes: Negative.  Negative for photophobia, discharge and visual disturbance.   Respiratory: Negative.  Negative for cough, chest tightness and shortness of breath.    Cardiovascular: Negative.  Negative for chest pain and palpitations.   Gastrointestinal: Negative.  Negative for abdominal pain, diarrhea, nausea and vomiting.   Endocrine: Negative.  Negative for polydipsia, polyphagia and polyuria.   Genitourinary: Negative.  Negative for dysuria, flank pain and frequency.   Musculoskeletal: Positive for back pain. Negative for gait problem and neck pain.   Skin: Negative.  Negative for rash.   Allergic/Immunologic: Negative.    Neurological: Negative.  Negative for dizziness, light-headedness and headaches.   Hematological: Negative.    Psychiatric/Behavioral: Positive for sleep disturbance. Negative for self-injury and suicidal ideas.       OBJECTIVE:  Vitals:    10/22/20 0850   BP: 146/72   BP Location: Right arm   Patient Position: Sitting   Cuff Size: Adult   Pulse: 57   Temp: 97.4 °F (36.3 °C)   SpO2: 96%   Weight: 77.8 kg (171 lb 9.6 oz)   Height: 167.6 cm (66\")     Physical Exam  Vitals signs and nursing note reviewed. Exam conducted with a chaperone present (NORRIS Centeno).   Constitutional:       General: She is not in acute distress.     Appearance: She is well-developed. She is not diaphoretic.   HENT:      Head: Normocephalic and atraumatic.      Right Ear: Tympanic membrane, ear canal and external ear normal.      Left Ear: Tympanic membrane, ear canal and external ear normal.      Ears:      Comments: Evidence of moderate bilateral serous effusions.  Eyes:      Conjunctiva/sclera: Conjunctivae normal.      Pupils: Pupils " "are equal, round, and reactive to light.   Neck:      Musculoskeletal: Normal range of motion and neck supple.   Cardiovascular:      Rate and Rhythm: Normal rate and regular rhythm.      Heart sounds: Normal heart sounds. No murmur.   Pulmonary:      Effort: Pulmonary effort is normal. No respiratory distress.      Breath sounds: Normal breath sounds.   Abdominal:      General: Bowel sounds are normal. There is no distension.      Palpations: Abdomen is soft.      Tenderness: There is no abdominal tenderness.   Musculoskeletal: Normal range of motion.   Skin:     General: Skin is warm and dry.      Capillary Refill: Capillary refill takes less than 2 seconds.      Findings: Lesion present. No erythema.      Comments: Left great toenail bed is healed without evidence of nail regrowth.  Right temple has an irregular flat, slightly raised lesion consistent with a seborrheic keratosis.  At the medial margin is a \"scabbed\" area measuring 2 mm that patient states keeps recurring.   Neurological:      Mental Status: She is alert and oriented to person, place, and time.   Psychiatric:         Behavior: Behavior normal.         Thought Content: Thought content normal.         Judgment: Judgment normal.         ASSESSMENT/ PLAN:    Diagnoses and all orders for this visit:    1. Acute serous otitis media, recurrent, bilateral (Primary)  -     azelastine (ASTELIN) 0.1 % nasal spray; 2 sprays into the nostril(s) as directed by provider 2 (Two) Times a Day. Use in each nostril as directed  Dispense: 1 each; Refill: 12    2. Need for immunization against influenza  -     Fluad Quad 65+ yrs ([id number])    3. Lumbar pain    4. Long-term use of high-risk medication    5. Primary insomnia    6. Anxiety    7. History of onychomycosis    8. Seborrheic keratoses, inflamed        Orders Placed Today:     New Medications Ordered This Visit   Medications   • azelastine (ASTELIN) 0.1 % nasal spray     Si sprays into the nostril(s) as " directed by provider 2 (Two) Times a Day. Use in each nostril as directed     Dispense:  1 each     Refill:  12        Management Plan:     An After Visit Summary was printed and given to the patient at discharge.    Follow-up: Return in about 4 weeks (around 11/19/2020) for excision facial lesion.    Bonita Romero, ROBIN 10/22/2020 11:32 CDT  This note was electronically signed.

## 2020-10-22 NOTE — PROGRESS NOTES
The ABCs of the Annual Wellness Visit  Subsequent Medicare Wellness Visit    Chief Complaint   Patient presents with   • Medicare Wellness-subsequent       Subjective   History of Present Illness:  Shira Knig is a 80 y.o. female who presents for a Subsequent Medicare Wellness Visit.    HEALTH RISK ASSESSMENT    Recent Hospitalizations:  No hospitalization(s) within the last year.    Current Medical Providers:  Patient Care Team:  Reynaldo Cavazos MD as PCP - General (Family Medicine)  Bonita Romero APRN as PCP - Claims Attributed  Keyon Upton MD as Cardiologist (Cardiology)    Smoking Status:  Social History     Tobacco Use   Smoking Status Never Smoker   Smokeless Tobacco Never Used       Alcohol Consumption:  Social History     Substance and Sexual Activity   Alcohol Use No       Depression Screen:   PHQ-2/PHQ-9 Depression Screening 10/22/2020   Little interest or pleasure in doing things 0   Feeling down, depressed, or hopeless 0   Trouble falling or staying asleep, or sleeping too much -   Feeling tired or having little energy -   Poor appetite or overeating -   Feeling bad about yourself - or that you are a failure or have let yourself or your family down -   Trouble concentrating on things, such as reading the newspaper or watching television -   Moving or speaking so slowly that other people could have noticed. Or the opposite - being so fidgety or restless that you have been moving around a lot more than usual -   Thoughts that you would be better off dead, or of hurting yourself in some way -   Total Score 0   If you checked off any problems, how difficult have these problems made it for you to do your work, take care of things at home, or get along with other people? -       Fall Risk Screen:  STEADI Fall Risk Assessment was completed, and patient is at LOW risk for falls.Assessment completed on:10/22/2020    Health Habits and Functional and Cognitive Screening:  Functional  & Cognitive Status 10/22/2020   Do you have difficulty preparing food and eating? Yes   Do you have difficulty bathing yourself, getting dressed or grooming yourself? Yes   Do you have difficulty using the toilet? Yes   Do you have difficulty moving around from place to place? Yes   Do you have trouble with steps or getting out of a bed or a chair? No   Current Diet Well Balanced Diet   Dental Exam Not up to date   Eye Exam Up to date   Exercise (times per week) 7 times per week   Current Exercises Include Walking   Current Exercise Activities Include -   Do you need help using the phone?  No   Are you deaf or do you have serious difficulty hearing?  No   Do you need help with transportation? No   Do you need help shopping? Yes   Do you need help preparing meals?  Yes   Do you need help with housework?  Yes   Do you need help with laundry? Yes   Do you need help taking your medications? Yes   Do you need help managing money? No   Do you ever drive or ride in a car without wearing a seat belt? No   Have you felt unusual stress, anger or loneliness in the last month? Yes   Who do you live with? Spouse   If you need help, do you have trouble finding someone available to you? No   Have you been bothered in the last four weeks by sexual problems? No   Do you have difficulty concentrating, remembering or making decisions? Yes         Does the patient have evidence of cognitive impairment? No    Asprin use counseling:Taking ASA appropriately as indicated    Age-appropriate Screening Schedule:  Refer to the list below for future screening recommendations based on patient's age, sex and/or medical conditions. Orders for these recommended tests are listed in the plan section. The patient has been provided with a written plan.    Health Maintenance   Topic Date Due   • URINE MICROALBUMIN  1940   • DIABETIC EYE EXAM  12/05/2016   • MAMMOGRAM  05/24/2018   • HEMOGLOBIN A1C  02/03/2020   • ZOSTER VACCINE (2 of 3) 10/22/2021  (Originally 12/21/2007)   • LIPID PANEL  10/13/2021   • TDAP/TD VACCINES (2 - Td) 06/19/2023   • INFLUENZA VACCINE  Completed          The following portions of the patient's history were reviewed and updated as appropriate: allergies, current medications, past family history, past medical history, past social history, past surgical history and problem list.    Outpatient Medications Prior to Visit   Medication Sig Dispense Refill   • ALPRAZolam (XANAX) 0.25 MG tablet Take 1 tablet by mouth 2 (Two) Times a Day As Needed for Anxiety. 30 tablet 0   • aspirin 81 MG chewable tablet Chew 81 mg daily.     • carvedilol (COREG) 6.25 MG tablet Take 1 tablet by mouth 2 (Two) Times a Day With Meals. 180 tablet 1   • CBD (cannabidiol) oral oil Take 4 drops by mouth 2 (Two) Times a Day.     • HYDROcodone-acetaminophen (NORCO) 5-325 MG per tablet Take 1 tablet by mouth Every 8 (Eight) Hours As Needed for Moderate Pain  or Severe Pain . 90 tablet 0   • losartan (COZAAR) 100 MG tablet Take 1 tablet by mouth Daily. 90 tablet 1   • meclizine (ANTIVERT) 25 MG tablet Take 25 mg by mouth 2 (two) times a day.     • metoclopramide (REGLAN) 10 MG tablet Take 1 tablet by mouth Daily. 90 tablet 1   • omeprazole (priLOSEC) 20 MG capsule Take 1 capsule by mouth Daily. 90 capsule 0   • pravastatin (PRAVACHOL) 20 MG tablet Take 1 tablet by mouth Daily. 90 tablet 1   • temazepam (RESTORIL) 30 MG capsule Take 1 capsule by mouth At Night As Needed for Sleep. 90 capsule 1     No facility-administered medications prior to visit.        Patient Active Problem List   Diagnosis   • Essential hypertension   • RMSF (Khari Mountain spotted fever)   • Mixed hyperlipidemia   • Lumbar pain   • Gastroesophageal reflux disease   • Insomnia   • Obstructive sleep apnea   • Periodic limb movement   • Snoring   • Somnolence, daytime       Advanced Care Planning:  ACP discussion was held with the patient during this visit. Patient has an advance directive (not in  "EMR), copy requested.    Review of Systems   Constitutional: Negative.  Negative for fatigue, fever and unexpected weight change.   HENT: Negative.  Negative for facial swelling, sore throat and trouble swallowing.    Eyes: Negative.  Negative for photophobia, discharge and visual disturbance.   Respiratory: Negative.  Negative for cough, chest tightness and shortness of breath.    Cardiovascular: Negative.  Negative for chest pain and palpitations.   Gastrointestinal: Negative.  Negative for abdominal pain, diarrhea, nausea and vomiting.   Endocrine: Negative.  Negative for polydipsia, polyphagia and polyuria.   Genitourinary: Negative.  Negative for dysuria, flank pain and frequency.   Musculoskeletal: Negative.  Negative for back pain, gait problem and neck pain.   Skin: Negative.  Negative for rash.   Allergic/Immunologic: Negative.    Neurological: Negative.  Negative for dizziness, light-headedness and headaches.   Hematological: Negative.    Psychiatric/Behavioral: Negative.  Negative for self-injury and suicidal ideas.       Compared to one year ago, the patient feels her physical health is the same.  Compared to one year ago, the patient feels her mental health is the same.    Reviewed chart for potential of high risk medication in the elderly: yes  Reviewed chart for potential of harmful drug interactions in the elderly:yes    Objective         Vitals:    10/22/20 0844   BP: 146/72   BP Location: Right arm   Patient Position: Sitting   Cuff Size: Adult   Pulse: 57   Temp: 97.4 °F (36.3 °C)   SpO2: 96%   Weight: 77.8 kg (171 lb 9.6 oz)   Height: 167.6 cm (66\")  Comment: pt reported   PainSc:   6       Body mass index is 27.7 kg/m².  Discussed the patient's BMI with her. The BMI is above average; no BMI management plan is appropriate..    Physical Exam  Vitals signs and nursing note reviewed. Exam conducted with a chaperone present (JANELLE Centeno).   Constitutional:       General: She is not in acute " distress.     Appearance: Normal appearance. She is well-developed and normal weight. She is not diaphoretic.   HENT:      Head: Normocephalic and atraumatic.   Eyes:      Conjunctiva/sclera: Conjunctivae normal.      Pupils: Pupils are equal, round, and reactive to light.   Neck:      Musculoskeletal: Normal range of motion and neck supple.   Cardiovascular:      Rate and Rhythm: Normal rate and regular rhythm.      Heart sounds: Normal heart sounds. No murmur.   Pulmonary:      Effort: Pulmonary effort is normal. No respiratory distress.      Breath sounds: Normal breath sounds.   Abdominal:      General: Bowel sounds are normal. There is no distension.      Palpations: Abdomen is soft.      Tenderness: There is no abdominal tenderness.   Musculoskeletal: Normal range of motion.   Skin:     General: Skin is warm and dry.      Capillary Refill: Capillary refill takes less than 2 seconds.      Findings: No erythema.   Neurological:      Mental Status: She is alert and oriented to person, place, and time.   Psychiatric:         Behavior: Behavior normal.         Thought Content: Thought content normal.         Judgment: Judgment normal.         Lab Results   Component Value Date     (H) 10/13/2020    CHLPL 144 10/13/2020    TRIG 95 10/13/2020    HDL 50 10/13/2020    LDL 76 10/13/2020    VLDL 18 10/13/2020    HGBA1C 5.9 10/22/2020        Assessment/Plan   Medicare Risks and Personalized Health Plan  CMS Preventative Services Quick Reference  Advance Directive Discussion  Breast Cancer/Mammogram Screening  Cardiovascular risk  Chronic Pain   Diabetic Lab Screening   Immunizations Discussed/Encouraged (specific immunizations; Influenza and Shingrix )  Osteoprorosis Risk  Polypharmacy    The above risks/problems have been discussed with the patient.  Pertinent information has been shared with the patient in the After Visit Summary.  Follow up plans and orders are seen below in the Assessment/Plan  Section.    Diagnoses and all orders for this visit:    1. Medicare annual wellness visit, subsequent (Primary)    2. Hyperglycemia  -     POCT glycated hemoglobin, total    3. Mixed hyperlipidemia    4. Essential hypertension    5. Lumbar pain    6. Long-term use of high-risk medication    7. Anxiety    8. Encounter for screening mammogram for malignant neoplasm of breast  -     Mammo Screening Digital Tomosynthesis Bilateral With CAD; Future    9. Post-menopausal  -     DEXA Bone Density Axial; Future    10. Need for shingles vaccine  -     Zoster Vac Recomb Adjuvanted (Shingrix) 50 MCG/0.5ML reconstituted suspension; Inject 0.5 mL into the appropriate muscle as directed by prescriber 1 (One) Time for 1 dose. 2nd dose in 2-6 months  Dispense: 1 each; Refill: 1    Patient encouraged to begin a daily exercise program of low intensity with goal of 30 minutes sustained activity.  Patient encouraged to follow a healthy low carb diet rich in fresh vegetables and lean proteins.  Patient encouraged to continue social distancing and use of mask in public.    Follow Up:  Return in about 3 months (around 1/22/2021) for Next scheduled follow up (med refill).     An After Visit Summary and PPPS were given to the patient.

## 2020-11-03 ENCOUNTER — TELEPHONE (OUTPATIENT)
Dept: FAMILY MEDICINE CLINIC | Facility: CLINIC | Age: 80
End: 2020-11-03

## 2020-11-03 NOTE — TELEPHONE ENCOUNTER
Pt rec'd Shingrix vaccine 11/2/2020 at [facility]. Scanned faxed notification under media tab. Please update HM.

## 2020-11-10 DIAGNOSIS — M54.50 LUMBAR PAIN: ICD-10-CM

## 2020-11-10 RX ORDER — HYDROCODONE BITARTRATE AND ACETAMINOPHEN 5; 325 MG/1; MG/1
1 TABLET ORAL EVERY 8 HOURS PRN
Qty: 90 TABLET | Refills: 0 | Status: SHIPPED | OUTPATIENT
Start: 2020-11-10 | End: 2020-12-07 | Stop reason: SDUPTHER

## 2020-11-10 NOTE — TELEPHONE ENCOUNTER
Caller: Joy King    Relationship: Emergency Contact    Best call back number: [phone]    Medication needed:   Requested Prescriptions     Pending Prescriptions Disp Refills   • HYDROcodone-acetaminophen (NORCO) 5-325 MG per tablet 90 tablet 0     Sig: Take 1 tablet by mouth Every 8 (Eight) Hours As Needed for Moderate Pain  or Severe Pain .       When do you need the refill by: 11/10/2020    Does the patient have less than a 3 day supply:  [] Yes  [x] No    What is the patient's preferred pharmacy: [facility] [place], [place] - [address] [place] [address] [phone] [facility] [phone]

## 2020-11-17 DIAGNOSIS — Z12.31 ENCOUNTER FOR SCREENING MAMMOGRAM FOR MALIGNANT NEOPLASM OF BREAST: ICD-10-CM

## 2020-11-17 DIAGNOSIS — Z78.0 POST-MENOPAUSAL: ICD-10-CM

## 2020-11-17 DIAGNOSIS — F41.9 ANXIETY: ICD-10-CM

## 2020-11-18 RX ORDER — TEMAZEPAM 30 MG/1
CAPSULE ORAL
Qty: 90 CAPSULE | Refills: 1 | Status: SHIPPED | OUTPATIENT
Start: 2020-11-18 | End: 2021-04-09 | Stop reason: SDUPTHER

## 2020-11-19 ENCOUNTER — PROCEDURE VISIT (OUTPATIENT)
Dept: FAMILY MEDICINE CLINIC | Facility: CLINIC | Age: 80
End: 2020-11-19

## 2020-11-19 VITALS
SYSTOLIC BLOOD PRESSURE: 172 MMHG | BODY MASS INDEX: 27.48 KG/M2 | TEMPERATURE: 97.1 F | WEIGHT: 171 LBS | HEART RATE: 66 BPM | DIASTOLIC BLOOD PRESSURE: 84 MMHG | HEIGHT: 66 IN | OXYGEN SATURATION: 100 %

## 2020-11-19 DIAGNOSIS — L82.1 SEBORRHEIC KERATOSES: ICD-10-CM

## 2020-11-19 DIAGNOSIS — L81.9 ATYPICAL PIGMENTED SKIN LESION: ICD-10-CM

## 2020-11-19 DIAGNOSIS — I10 ESSENTIAL HYPERTENSION: Primary | ICD-10-CM

## 2020-11-19 PROCEDURE — 11300 SHAVE SKIN LESION 0.5 CM/<: CPT | Performed by: NURSE PRACTITIONER

## 2020-11-19 RX ORDER — AMLODIPINE BESYLATE 5 MG/1
5 TABLET ORAL DAILY
Qty: 30 TABLET | Refills: 2 | Status: SHIPPED | OUTPATIENT
Start: 2020-11-19 | End: 2020-12-15

## 2020-11-19 NOTE — PROGRESS NOTES
Procedure   Biopsy    Date/Time: 11/19/2020 9:41 AM  Performed by: Bonita Romero APRN  Authorized by: Bonita Romero APRN     Procedure Details - Skin Biopsy:     Body area: head/neck    Head/neck location: R ear    Initial size (mm): 10    Final defect size (mm): 12    Malignancy: malignancy unknown      Destruction method: shave biopsy      Comments:   Informed consent gained.   Plain lidocaine 1 cc.  Shave biopsy of right pinna.  Cautery performed.  Specimen sent for pathology.  Bleeding not encountered    Biopsy    Date/Time: 11/19/2020 9:44 AM  Performed by: Bonita Romero APRN  Authorized by: Boniat Romero APRN     Procedure Details - Skin Biopsy:     Body area: head/neck    Head/neck location: forehead    Initial size (mm): 14    Final defect size (mm): 17    Malignancy: benign lesion      Destruction method: shave biopsy      Comments:   Informed consent gained.  Anesthesia with plain lidocaine 1.5 cc.  After anesthesia had taken effect, shave biopsy of seborrheic keratosis lesion with min bleeding encountered controlled with cautery.  Antibiotic ointment applied.

## 2020-11-20 DIAGNOSIS — I10 ESSENTIAL HYPERTENSION: Primary | ICD-10-CM

## 2020-11-20 RX ORDER — AMLODIPINE BESYLATE 5 MG/1
5 TABLET ORAL DAILY
Qty: 30 TABLET | Refills: 2 | Status: SHIPPED | OUTPATIENT
Start: 2020-11-20 | End: 2020-12-15

## 2020-11-23 LAB
DX ICD CODE: NORMAL
PATH REPORT.FINAL DX SPEC: NORMAL
PATH REPORT.GROSS SPEC: NORMAL
PATH REPORT.SITE OF ORIGIN SPEC: NORMAL
PATHOLOGIST NAME: NORMAL
PAYMENT PROCEDURE: NORMAL

## 2020-12-07 DIAGNOSIS — M54.50 LUMBAR PAIN: ICD-10-CM

## 2020-12-07 RX ORDER — HYDROCODONE BITARTRATE AND ACETAMINOPHEN 5; 325 MG/1; MG/1
1 TABLET ORAL EVERY 8 HOURS PRN
Qty: 90 TABLET | Refills: 0 | Status: SHIPPED | OUTPATIENT
Start: 2020-12-07 | End: 2021-01-08 | Stop reason: SDUPTHER

## 2020-12-07 NOTE — TELEPHONE ENCOUNTER
Caller: Joy King    Relationship: Emergency Contact    Best call back number:   [phone]  Medication needed:   Requested Prescriptions     Pending Prescriptions Disp Refills   • HYDROcodone-acetaminophen (NORCO) 5-325 MG per tablet 90 tablet 0     Sig: Take 1 tablet by mouth Every 8 (Eight) Hours As Needed for Moderate Pain  or Severe Pain .       When do you need the refill by: 12/7/20      Does the patient have less than a 3 day supply:    [x] Yes  [x] No    What is the patient's preferred pharmacy:   [facility] [place], [place] - [address] [facility] [phone] [facility] [phone]   [phone]

## 2020-12-15 ENCOUNTER — OFFICE VISIT (OUTPATIENT)
Dept: FAMILY MEDICINE CLINIC | Facility: CLINIC | Age: 80
End: 2020-12-15

## 2020-12-15 VITALS
TEMPERATURE: 97.6 F | BODY MASS INDEX: 28.03 KG/M2 | HEIGHT: 66 IN | SYSTOLIC BLOOD PRESSURE: 130 MMHG | HEART RATE: 61 BPM | OXYGEN SATURATION: 97 % | WEIGHT: 174.4 LBS | DIASTOLIC BLOOD PRESSURE: 77 MMHG

## 2020-12-15 DIAGNOSIS — H60.311 ACUTE DIFFUSE OTITIS EXTERNA OF RIGHT EAR: Primary | ICD-10-CM

## 2020-12-15 DIAGNOSIS — H65.91 RIGHT OTITIS MEDIA WITH EFFUSION: ICD-10-CM

## 2020-12-15 DIAGNOSIS — R53.83 FATIGUE, UNSPECIFIED TYPE: ICD-10-CM

## 2020-12-15 DIAGNOSIS — E53.9 VITAMIN B DEFICIENCY: ICD-10-CM

## 2020-12-15 DIAGNOSIS — H65.02 NON-RECURRENT ACUTE SEROUS OTITIS MEDIA OF LEFT EAR: ICD-10-CM

## 2020-12-15 PROCEDURE — 99214 OFFICE O/P EST MOD 30 MIN: CPT | Performed by: NURSE PRACTITIONER

## 2020-12-15 RX ORDER — AMOXICILLIN 500 MG/1
500 CAPSULE ORAL 3 TIMES DAILY
Qty: 30 CAPSULE | Refills: 0 | Status: SHIPPED | OUTPATIENT
Start: 2020-12-15 | End: 2020-12-25

## 2020-12-15 NOTE — PROGRESS NOTES
Chief Complaint   Patient presents with   • Memory Loss   • Headache   • Mouth Lesions        Subjective   Shira King is a 80 y.o.  female who presents today for headaches.    HPI:  Patient complains of not feeling well for the past 2 weeks.  She has ear pain, headache and mouth lesions.  She is unsure which of these things started first but does note that since she has felt bad, it seems that she cannot remember as well.  Provider does not note any change in her short term or long term memory.    Shira King  has a past medical history of Gastroesophageal reflux disease (2/3/2020), Lumbar pain (7/12/2019), Mixed hyperlipidemia, and Valvular disease.    No Known Allergies    Current Outpatient Medications:   •  azelastine (ASTELIN) 0.1 % nasal spray, 2 sprays into the nostril(s) as directed by provider 2 (Two) Times a Day. Use in each nostril as directed, Disp: 1 each, Rfl: 12  •  carvedilol (COREG) 6.25 MG tablet, Take 1 tablet by mouth 2 (Two) Times a Day With Meals., Disp: 180 tablet, Rfl: 1  •  CBD (cannabidiol) oral oil, Take 4 drops by mouth 2 (Two) Times a Day., Disp: , Rfl:   •  HYDROcodone-acetaminophen (NORCO) 5-325 MG per tablet, Take 1 tablet by mouth Every 8 (Eight) Hours As Needed for Moderate Pain  or Severe Pain ., Disp: 90 tablet, Rfl: 0  •  losartan (COZAAR) 100 MG tablet, Take 1 tablet by mouth Daily., Disp: 90 tablet, Rfl: 1  •  meclizine (ANTIVERT) 25 MG tablet, Take 25 mg by mouth 2 (two) times a day., Disp: , Rfl:   •  Misc Natural Products (MOUTH TONIC PO), Take  by mouth., Disp: , Rfl:   •  omeprazole (priLOSEC) 20 MG capsule, Take 1 capsule by mouth Daily., Disp: 90 capsule, Rfl: 0  •  pravastatin (PRAVACHOL) 20 MG tablet, Take 1 tablet by mouth Daily., Disp: 90 tablet, Rfl: 1  •  temazepam (RESTORIL) 30 MG capsule, TAKE ONE CAPSULE BY MOUTH EVERY EVENING AS NEEDED FOR SLEEP, Disp: 90 capsule, Rfl: 1  •  ALPRAZolam (XANAX) 0.25 MG tablet, Take 1 tablet by mouth 2  (Two) Times a Day As Needed for Anxiety., Disp: 30 tablet, Rfl: 0  •  amoxicillin (AMOXIL) 500 MG capsule, Take 1 capsule by mouth 3 (Three) Times a Day for 10 days., Disp: 30 capsule, Rfl: 0  •  aspirin 81 MG chewable tablet, Chew 81 mg daily., Disp: , Rfl:   •  neomycin-polymyxin-hydrocortisone (CORTISPORIN) 3.5-30194-8 otic solution, Administer 3 drops to the right ear 3 (Three) Times a Day., Disp: 10 mL, Rfl: 0  Past Medical History:   Diagnosis Date   • Gastroesophageal reflux disease 2/3/2020   • Lumbar pain 7/12/2019   • Mixed hyperlipidemia    • Valvular disease      Past Surgical History:   Procedure Laterality Date   • BLADDER REPAIR  2005   • BREAST SURGERY     • BUNIONECTOMY  2007   • CATARACT EXTRACTION Bilateral 2005   • CHOLECYSTECTOMY     • ENDOSCOPY  2005   • HERNIA REPAIR  1963   • HYSTERECTOMY  1979   • NECK SURGERY  1974    VERTEBRA   • REPLACEMENT TOTAL KNEE BILATERAL     • TONSILLECTOMY       Social History     Socioeconomic History   • Marital status:      Spouse name: Not on file   • Number of children: Not on file   • Years of education: Not on file   • Highest education level: Not on file   Tobacco Use   • Smoking status: Never Smoker   • Smokeless tobacco: Never Used   Substance and Sexual Activity   • Alcohol use: No   • Drug use: No   • Sexual activity: Not Currently     Partners: Male     Family History   Problem Relation Age of Onset   • Stroke Mother    • Heart disease Mother    • Stroke Sister    • Heart attack Maternal Grandmother        Family history, surgical history, past medical history, Allergies and med's reviewed with patient today and updated in [facility] EMR.     ROS:  Review of Systems   Constitutional: Negative.  Negative for fatigue, fever and unexpected weight change.   HENT: Positive for ear pain and mouth sores. Negative for facial swelling, sore throat and trouble swallowing.    Eyes: Negative.  Negative for photophobia, discharge and visual disturbance.  "  Respiratory: Negative.  Negative for cough, chest tightness and shortness of breath.    Cardiovascular: Negative.  Negative for chest pain and palpitations.   Gastrointestinal: Negative.  Negative for abdominal pain, diarrhea, nausea and vomiting.   Endocrine: Negative.  Negative for polydipsia, polyphagia and polyuria.   Genitourinary: Negative.  Negative for dysuria, flank pain and frequency.   Musculoskeletal: Positive for arthralgias and back pain. Negative for gait problem and neck pain.   Skin: Negative.  Negative for rash.   Allergic/Immunologic: Negative.    Neurological: Positive for headaches. Negative for dizziness and light-headedness.   Hematological: Negative.    Psychiatric/Behavioral: Positive for sleep disturbance. Negative for self-injury and suicidal ideas.       OBJECTIVE:  Vitals:    12/15/20 0812   BP: 130/77   BP Location: Right arm   Patient Position: Sitting   Cuff Size: Large Adult   Pulse: 61   Temp: 97.6 °F (36.4 °C)   SpO2: 97%   Weight: 79.1 kg (174 lb 6.4 oz)   Height: 167.6 cm (66\")     Physical Exam  Vitals signs and nursing note reviewed.   Constitutional:       General: She is not in acute distress.     Appearance: Normal appearance. She is well-developed. She is not diaphoretic.      Comments: Patient appears tired.   HENT:      Head: Normocephalic and atraumatic.      Right Ear: External ear normal.      Left Ear: External ear normal.      Ears:      Comments: Bilateral TM are erythematous with suppurative effusion present, R>L.  Right canal edematous.     Nose: Nose normal.      Mouth/Throat:      Mouth: Mucous membranes are moist.      Pharynx: Oropharynx is clear.      Comments: Corners of mouth are cracked and raw.  Eyes:      Conjunctiva/sclera: Conjunctivae normal.      Pupils: Pupils are equal, round, and reactive to light.   Neck:      Musculoskeletal: Normal range of motion and neck supple.   Cardiovascular:      Rate and Rhythm: Normal rate and regular rhythm.      " Heart sounds: Normal heart sounds. No murmur.   Pulmonary:      Effort: Pulmonary effort is normal. No respiratory distress.      Breath sounds: Normal breath sounds.   Abdominal:      General: Bowel sounds are normal. There is no distension.      Palpations: Abdomen is soft.      Tenderness: There is no abdominal tenderness.   Musculoskeletal: Normal range of motion.   Skin:     General: Skin is warm and dry.      Capillary Refill: Capillary refill takes less than 2 seconds.      Findings: No erythema.   Neurological:      General: No focal deficit present.      Mental Status: She is alert and oriented to person, place, and time. Mental status is at baseline.   Psychiatric:         Mood and Affect: Mood normal.         Behavior: Behavior normal.         Thought Content: Thought content normal.         Judgment: Judgment normal.         ASSESSMENT/ PLAN:    Diagnoses and all orders for this visit:    1. Acute diffuse otitis externa of right ear (Primary)  -     amoxicillin (AMOXIL) 500 MG capsule; Take 1 capsule by mouth 3 (Three) Times a Day for 10 days.  Dispense: 30 capsule; Refill: 0  -     neomycin-polymyxin-hydrocortisone (CORTISPORIN) 3.5-27607-2 otic solution; Administer 3 drops to the right ear 3 (Three) Times a Day.  Dispense: 10 mL; Refill: 0    2. Right otitis media with effusion  -     amoxicillin (AMOXIL) 500 MG capsule; Take 1 capsule by mouth 3 (Three) Times a Day for 10 days.  Dispense: 30 capsule; Refill: 0    3. Non-recurrent acute serous otitis media of left ear    4. Vitamin B deficiency    5. Fatigue, unspecified type    Recommended better diet and regular meal planning as well as vit b complex supplement.    Orders Placed Today:     New Medications Ordered This Visit   Medications   • amoxicillin (AMOXIL) 500 MG capsule     Sig: Take 1 capsule by mouth 3 (Three) Times a Day for 10 days.     Dispense:  30 capsule     Refill:  0   • neomycin-polymyxin-hydrocortisone (CORTISPORIN) 3.5-98557-8  otic solution     Sig: Administer 3 drops to the right ear 3 (Three) Times a Day.     Dispense:  10 mL     Refill:  0        Management Plan:     An After Visit Summary was printed and given to the patient at discharge.    Follow-up: Return in about 3 months (around 3/15/2021) for Next scheduled follow up.    Bonita Romero, APRN 12/15/2020 18:33 CST  This note was electronically signed.

## 2020-12-28 ENCOUNTER — OFFICE VISIT (OUTPATIENT)
Dept: FAMILY MEDICINE CLINIC | Facility: CLINIC | Age: 80
End: 2020-12-28

## 2020-12-28 VITALS
BODY MASS INDEX: 27.51 KG/M2 | HEIGHT: 66 IN | HEART RATE: 61 BPM | OXYGEN SATURATION: 97 % | TEMPERATURE: 97.5 F | SYSTOLIC BLOOD PRESSURE: 142 MMHG | DIASTOLIC BLOOD PRESSURE: 72 MMHG | WEIGHT: 171.2 LBS

## 2020-12-28 DIAGNOSIS — I10 ESSENTIAL HYPERTENSION: Primary | ICD-10-CM

## 2020-12-28 DIAGNOSIS — J30.89 NON-SEASONAL ALLERGIC RHINITIS, UNSPECIFIED TRIGGER: ICD-10-CM

## 2020-12-28 DIAGNOSIS — H65.23 BILATERAL CHRONIC SEROUS OTITIS MEDIA: ICD-10-CM

## 2020-12-28 PROCEDURE — 99213 OFFICE O/P EST LOW 20 MIN: CPT | Performed by: FAMILY MEDICINE

## 2020-12-28 RX ORDER — LEVOCETIRIZINE DIHYDROCHLORIDE 5 MG/1
5 TABLET, FILM COATED ORAL EVERY EVENING
Qty: 30 TABLET | Refills: 2 | Status: SHIPPED | OUTPATIENT
Start: 2020-12-28 | End: 2021-04-23

## 2020-12-28 RX ORDER — LEVOCETIRIZINE DIHYDROCHLORIDE 5 MG/1
5 TABLET, FILM COATED ORAL EVERY EVENING
Qty: 30 TABLET | Refills: 2 | Status: SHIPPED | OUTPATIENT
Start: 2020-12-28 | End: 2020-12-28

## 2021-01-08 DIAGNOSIS — M54.50 LUMBAR PAIN: ICD-10-CM

## 2021-01-08 DIAGNOSIS — F41.9 ANXIETY: ICD-10-CM

## 2021-01-08 RX ORDER — HYDROCODONE BITARTRATE AND ACETAMINOPHEN 5; 325 MG/1; MG/1
1 TABLET ORAL EVERY 8 HOURS PRN
Qty: 90 TABLET | Refills: 0 | Status: SHIPPED | OUTPATIENT
Start: 2021-01-08 | End: 2021-02-08 | Stop reason: SDUPTHER

## 2021-01-08 RX ORDER — ALPRAZOLAM 0.25 MG/1
0.25 TABLET ORAL 2 TIMES DAILY PRN
Qty: 30 TABLET | Refills: 0 | Status: SHIPPED | OUTPATIENT
Start: 2021-01-08 | End: 2021-03-24

## 2021-01-08 RX ORDER — OMEPRAZOLE 20 MG/1
20 CAPSULE, DELAYED RELEASE ORAL DAILY
Qty: 90 CAPSULE | Refills: 1 | Status: SHIPPED | OUTPATIENT
Start: 2021-01-08 | End: 2021-05-04

## 2021-01-12 ENCOUNTER — OFFICE VISIT (OUTPATIENT)
Dept: FAMILY MEDICINE CLINIC | Facility: CLINIC | Age: 81
End: 2021-01-12

## 2021-01-12 VITALS
TEMPERATURE: 97.5 F | BODY MASS INDEX: 27.38 KG/M2 | OXYGEN SATURATION: 99 % | HEART RATE: 62 BPM | HEIGHT: 66 IN | WEIGHT: 170.4 LBS | DIASTOLIC BLOOD PRESSURE: 83 MMHG | SYSTOLIC BLOOD PRESSURE: 187 MMHG

## 2021-01-12 DIAGNOSIS — I10 UNCONTROLLED HYPERTENSION: ICD-10-CM

## 2021-01-12 DIAGNOSIS — M25.562 ACUTE PAIN OF BOTH KNEES: ICD-10-CM

## 2021-01-12 DIAGNOSIS — M54.50 LUMBAR PAIN: Primary | ICD-10-CM

## 2021-01-12 DIAGNOSIS — G47.00 INSOMNIA, UNSPECIFIED TYPE: ICD-10-CM

## 2021-01-12 DIAGNOSIS — M25.561 ACUTE PAIN OF BOTH KNEES: ICD-10-CM

## 2021-01-12 DIAGNOSIS — I10 ESSENTIAL HYPERTENSION: ICD-10-CM

## 2021-01-12 PROCEDURE — 99214 OFFICE O/P EST MOD 30 MIN: CPT | Performed by: NURSE PRACTITIONER

## 2021-01-12 NOTE — PROGRESS NOTES
"Chief Complaint  Fall (1/10/21), Pain (neck), and Insomnia (refill)    Subjective          Shira King presents to [facility] for   History of Present Illness  Patient had a fall last weekend.  She tripped over her 's oxygen tubing.  She landed on both knees and she has bilateral knee replacements.  She has soft tissue bruising but no swelling.  Ambulation is not impaired.    REFILLS:  Patient has ongoing prescriptions of Norco, Restoril and Xanax.  The Norco and Xanax are PRN; however, she does take the Restoril nightly.  She is aware of risks associated with long term use of controlled substances, especially at her advanced age.  She has shown neither abuse or misuse of the substances.  UDS and controlled substance agreement are up to date.  HTN:  Patient states \"I think my pressure is high this morning because I rushed to get here and just took my medication right before I walked out the door.\"  Patient has the ability to check her b/p at home.  She does admit to a headache in the top of her head this morning that is not limiting nor severe.    Objective   Vital Signs:   BP (!) 187/83 (BP Location: Right arm, Patient Position: Sitting, Cuff Size: Large Adult)   Pulse 62   Temp 97.5 °F (36.4 °C)   Ht 167.6 cm (66\") Comment: pt reported  Wt 77.3 kg (170 lb 6.4 oz)   SpO2 99%   BMI 27.50 kg/m²     Physical Exam  Vitals signs and nursing note reviewed.   Constitutional:       General: She is not in acute distress.     Appearance: She is well-developed. She is not diaphoretic.   HENT:      Head: Normocephalic and atraumatic.      Right Ear: Tympanic membrane, ear canal and external ear normal.      Left Ear: Tympanic membrane, ear canal and external ear normal.      Ears:      Comments: Mild right serous effusion.  Eyes:      Conjunctiva/sclera: Conjunctivae normal.      Pupils: Pupils are equal, round, and reactive to light.   Neck:      Musculoskeletal: Normal " range of motion and neck supple.   Cardiovascular:      Rate and Rhythm: Normal rate and regular rhythm.      Heart sounds: Normal heart sounds. No murmur.   Pulmonary:      Effort: Pulmonary effort is normal. No respiratory distress.      Breath sounds: Normal breath sounds.   Musculoskeletal: Normal range of motion.         General: Tenderness present.      Comments: Bilateral knees.   Skin:     General: Skin is warm and dry.      Capillary Refill: Capillary refill takes less than 2 seconds.      Findings: Bruising present. No erythema.      Comments: Bilateral bruising present to knees without edema.   Neurological:      General: No focal deficit present.      Mental Status: She is alert and oriented to person, place, and time. Mental status is at baseline.   Psychiatric:         Mood and Affect: Mood normal.         Behavior: Behavior normal.         Thought Content: Thought content normal.         Judgment: Judgment normal.        Result Review :                 Assessment and Plan    Problem List Items Addressed This Visit        Cardiac and Vasculature    Essential hypertension    Overview     HAS COMPONENT OF HYPERTENSIVE HEART DISEASE            Musculoskeletal and Injuries    Lumbar pain - Primary       Sleep    Insomnia      Other Visit Diagnoses     Acute pain of both knees        Uncontrolled hypertension          Patient instructed to continue b/p checks at home, daily for a week, and report readings >160/90.  She verbalizes understanding.      Follow Up   Return in about 3 months (around 4/12/2021) for Next scheduled follow up.  Patient was given instructions and counseling regarding her condition or for health maintenance advice. Please see specific information pulled into the AVS if appropriate.

## 2021-01-29 ENCOUNTER — TELEPHONE (OUTPATIENT)
Dept: FAMILY MEDICINE CLINIC | Facility: CLINIC | Age: 81
End: 2021-01-29

## 2021-01-29 DIAGNOSIS — R51.9 CHRONIC DAILY HEADACHE: Primary | ICD-10-CM

## 2021-02-02 NOTE — TELEPHONE ENCOUNTER
I have created referrals for MRI and Neurology.  I have notified patient's daughter and they would like MRI completed at [facility] and Neurology appt can be with [facility]

## 2021-02-04 DIAGNOSIS — R51.9 CHRONIC DAILY HEADACHE: Primary | ICD-10-CM

## 2021-02-04 DIAGNOSIS — R51.9 CHRONIC DAILY HEADACHE: ICD-10-CM

## 2021-02-04 DIAGNOSIS — J32.1 CHRONIC FRONTAL SINUSITIS: ICD-10-CM

## 2021-02-05 DIAGNOSIS — M54.50 LUMBAR PAIN: ICD-10-CM

## 2021-02-05 NOTE — TELEPHONE ENCOUNTER
Needs refill on     HYDROcodone-acetaminophen (NORCO) 5-325 MG per tablet    [facility] - [place], [place] - [address] - [phone]  - [phone] FX      Mri cam back good wondering how its coming with the CT of the sinuses

## 2021-02-08 ENCOUNTER — OFFICE VISIT (OUTPATIENT)
Dept: FAMILY MEDICINE CLINIC | Facility: CLINIC | Age: 81
End: 2021-02-08

## 2021-02-08 VITALS
WEIGHT: 169 LBS | HEART RATE: 59 BPM | HEIGHT: 66 IN | DIASTOLIC BLOOD PRESSURE: 82 MMHG | SYSTOLIC BLOOD PRESSURE: 173 MMHG | OXYGEN SATURATION: 99 % | TEMPERATURE: 97.5 F | BODY MASS INDEX: 27.16 KG/M2

## 2021-02-08 DIAGNOSIS — J32.2 CHRONIC ETHMOIDAL SINUSITIS: ICD-10-CM

## 2021-02-08 DIAGNOSIS — I10 ESSENTIAL HYPERTENSION: Primary | ICD-10-CM

## 2021-02-08 DIAGNOSIS — R51.9 CHRONIC INTRACTABLE HEADACHE, UNSPECIFIED HEADACHE TYPE: ICD-10-CM

## 2021-02-08 DIAGNOSIS — G89.29 CHRONIC INTRACTABLE HEADACHE, UNSPECIFIED HEADACHE TYPE: ICD-10-CM

## 2021-02-08 DIAGNOSIS — R04.0 EPISTAXIS: ICD-10-CM

## 2021-02-08 PROCEDURE — 99214 OFFICE O/P EST MOD 30 MIN: CPT | Performed by: FAMILY MEDICINE

## 2021-02-08 RX ORDER — LORATADINE 10 MG/1
10 TABLET ORAL DAILY
Qty: 30 TABLET | Refills: 1 | Status: SHIPPED | OUTPATIENT
Start: 2021-02-08 | End: 2021-04-06

## 2021-02-08 RX ORDER — AMLODIPINE BESYLATE 5 MG/1
5 TABLET ORAL DAILY
Qty: 90 TABLET | Refills: 1 | Status: SHIPPED | OUTPATIENT
Start: 2021-02-08 | End: 2021-05-27

## 2021-02-08 RX ORDER — CEPHALEXIN 500 MG/1
500 CAPSULE ORAL 3 TIMES DAILY
Qty: 30 CAPSULE | Refills: 0 | Status: SHIPPED | OUTPATIENT
Start: 2021-02-08 | End: 2021-04-06

## 2021-02-08 NOTE — PATIENT INSTRUCTIONS
Suspect Essential HTN.Good BP control is encouraged with Goal BP based on JNC 8 guidelines 2014 <140/90 for patients with known cardiac disease and diabetes. (LTIO. 2014:322 (5):507-520. doi:10.1001/lito.2013.24369): general population <60 yr old goal BP <140/90 and for those >60 <150/90.  For patients of all ages with Diabetes, CKD, Known CAD <140/90. Recommended to the patient to obtain electronic home BP machine with upper arm blood pressure cuff and to check regularly as instructed.  Keep BP log and bring to subsequent visits. Stable, at goal.  a. LABS: routine for hypertension recommended and ordered if necessary.  b. Recommend if you do not have a home BP machine to obtain an electronic machine with arm blood pressure cuff.      c. Monitor BP over the next week and keep log to bring back to office. Discussed medication therapy however pt wants to try to control with diet exercise. .  Your provider  has recommended self-monitoring of your blood pressure.  If you do not have a blood pressure cuff you may purchase one from the local pharmacy.  You may ask the pharmacist which brand and model they recommend.  Obtain your blood pressure measurement at least 2x per week.  You should also check your blood pressure if you experience any symptoms of blurred visit, dizziness or headache.  Please record all blood pressure measurements and bring them to next office visit.  If you have any questions about the accuracy of your blood pressure machine please bring it in to the office and our staff will be happy to check accuracy.   d. Encouraged to eat a low sodium heart healthy diet  e. Offered handout on HTN educational topics.  These were provided if patient requested these today.  f. MEDS: as listed in today's visit.  g. Risks/benefits of current and new medications discussed with the patient and or family today.  The patient/family are aware and accept that if there any side effects they should call or return to clinic  as soon as possible.  Appropriate F/U discussed for topics addressed today. All questions were answered to the  satisfactory state of patient/family.  Should symptoms fail to improve or worsen they agree to call or return to clinic or to go to the ER. Education handouts were offered on any new Rx if requested.  Discussed the importance of following up with any needed screening tests/labs/specialist appointments and any requested follow-up recommended by me today.  Importance of maintaining follow-up discussed and patient accepts that missed appointments can delay diagnosis and potentially lead to worsening of conditions.      Sinusitis, Adult  Sinusitis is inflammation of your sinuses. Sinuses are hollow spaces in the bones around your face. Your sinuses are located:  · Around your eyes.  · In the middle of your forehead.  · Behind your nose.  · In your cheekbones.  Mucus normally drains out of your sinuses. When your nasal tissues become inflamed or swollen, mucus can become trapped or blocked. This allows bacteria, viruses, and fungi to grow, which leads to infection. Most infections of the sinuses are caused by a virus.  Sinusitis can develop quickly. It can last for up to 4 weeks (acute) or for more than 12 weeks (chronic). Sinusitis often develops after a cold.  What are the causes?  This condition is caused by anything that creates swelling in the sinuses or stops mucus from draining. This includes:  · Allergies.  · Asthma.  · Infection from bacteria or viruses.  · Deformities or blockages in your nose or sinuses.  · Abnormal growths in the nose (nasal polyps).  · Pollutants, such as chemicals or irritants in the air.  · Infection from fungi (rare).  What increases the risk?  You are more likely to develop this condition if you:  · Have a weak body defense system (immune system).  · Do a lot of swimming or diving.  · Overuse nasal sprays.  · Smoke.  What are the signs or symptoms?  The main symptoms of this  condition are pain and a feeling of pressure around the affected sinuses. Other symptoms include:  · Stuffy nose or congestion.  · Thick drainage from your nose.  · Swelling and warmth over the affected sinuses.  · Headache.  · Upper toothache.  · A cough that may get worse at night.  · Extra mucus that collects in the throat or the back of the nose (postnasal drip).  · Decreased sense of smell and taste.  · Fatigue.  · A fever.  · Sore throat.  · Bad breath.  How is this diagnosed?  This condition is diagnosed based on:  · Your symptoms.  · Your medical history.  · A physical exam.  · Tests to find out if your condition is acute or chronic. This may include:  ? Checking your nose for nasal polyps.  ? Viewing your sinuses using a device that has a light (endoscope).  ? Testing for allergies or bacteria.  ? Imaging tests, such as an MRI or CT scan.  In rare cases, a bone biopsy may be done to rule out more serious types of fungal sinus disease.  How is this treated?  Treatment for sinusitis depends on the cause and whether your condition is chronic or acute.  · If caused by a virus, your symptoms should go away on their own within 10 days. You may be given medicines to relieve symptoms. They include:  ? Medicines that shrink swollen nasal passages (topical intranasal decongestants).  ? Medicines that treat allergies (antihistamines).  ? A spray that eases inflammation of the nostrils (topical intranasal corticosteroids).  ? Rinses that help get rid of thick mucus in your nose (nasal saline washes).  · If caused by bacteria, your health care provider may recommend waiting to see if your symptoms improve. Most bacterial infections will get better without antibiotic medicine. You may be given antibiotics if you have:  ? A severe infection.  ? A weak immune system.  · If caused by narrow nasal passages or nasal polyps, you may need to have surgery.  Follow these instructions at home:  Medicines  · Take, use, or apply  over-the-counter and prescription medicines only as told by your health care provider. These may include nasal sprays.  · If you were prescribed an antibiotic medicine, take it as told by your health care provider. Do not stop taking the antibiotic even if you start to feel better.  Hydrate and humidify    · Drink enough fluid to keep your urine pale yellow. Staying hydrated will help to thin your mucus.  · Use a cool mist humidifier to keep the humidity level in your home above 50%.  · Inhale steam for 10-15 minutes, 3-4 times a day, or as told by your health care provider. You can do this in the bathroom while a hot shower is running.  · Limit your exposure to cool or dry air.  Rest  · Rest as much as possible.  · Sleep with your head raised (elevated).  · Make sure you get enough sleep each night.  General instructions    · Apply a warm, moist washcloth to your face 3-4 times a day or as told by your health care provider. This will help with discomfort.  · Wash your hands often with soap and water to reduce your exposure to germs. If soap and water are not available, use hand .  · Do not smoke. Avoid being around people who are smoking (secondhand smoke).  · Keep all follow-up visits as told by your health care provider. This is important.  Contact a health care provider if:  · You have a fever.  · Your symptoms get worse.  · Your symptoms do not improve within 10 days.  Get help right away if:  · You have a severe headache.  · You have persistent vomiting.  · You have severe pain or swelling around your face or eyes.  · You have vision problems.  · You develop confusion.  · Your neck is stiff.  · You have trouble breathing.  Summary  · Sinusitis is soreness and inflammation of your sinuses. Sinuses are hollow spaces in the bones around your face.  · This condition is caused by nasal tissues that become inflamed or swollen. The swelling traps or blocks the flow of mucus. This allows bacteria, viruses,  and fungi to grow, which leads to infection.  · If you were prescribed an antibiotic medicine, take it as told by your health care provider. Do not stop taking the antibiotic even if you start to feel better.  · Keep all follow-up visits as told by your health care provider. This is important.  This information is not intended to replace advice given to you by your health care provider. Make sure you discuss any questions you have with your health care provider.  Document Revised: 05/20/2019 Document Reviewed: 05/20/2019  Elsevier Patient Education © 2020 [employer]

## 2021-02-08 NOTE — PROGRESS NOTES
OFFICE VISIT NOTE:    Shira King is a 81 y.o. female who presents today for Headache (Had for about 2 months) and Nose Bleed (Patient gets a clot of blood out frequently ).     Chronic HA x 2 months or so - does have to take Tylenol in the AM's due to this - takes routinely. MRI head done and was normal, except mild ethmoid sinus involvement. CT not needed.     Apparently she is on losartan and Coreg for BP, but she relates she is not taking one (not sure which one), and her BP is still high, so I've recommended to make sure she is on both!     Headache   This is a chronic problem. The current episode started more than 1 month ago. The problem occurs intermittently. The problem has been waxing and waning. The pain is located in the bilateral region. The pain does not radiate. The pain quality is similar to prior headaches. The quality of the pain is described as aching, band-like and squeezing. The pain is moderate. Associated symptoms include sinus pressure. Pertinent negatives include no abdominal pain, fever or weight loss. The symptoms are aggravated by weather changes and sneezing. She has tried acetaminophen for the symptoms. The treatment provided moderate relief.   Nose Bleed   The bleeding has been from the right nare. This is a new problem. The current episode started 1 to 4 weeks ago. The problem occurs every several days. The problem has been waxing and waning. The bleeding is associated with nothing. She has tried pressure for the symptoms. The treatment provided mild relief.        Past medical/surgical history, Family history, Social history, Allergies and Medications have been reviewed with the patient today and are updated in [facility] EMR. See below.  Past Medical History:   Diagnosis Date   • Chronic intractable headache 2/8/2021   • Gastroesophageal reflux disease 2/3/2020   • Lumbar pain 7/12/2019   • Mixed hyperlipidemia    • Valvular disease      Past Surgical History:   Procedure  Laterality Date   • BLADDER REPAIR  2005   • BREAST SURGERY     • BUNIONECTOMY  2007   • CATARACT EXTRACTION Bilateral 2005   • CHOLECYSTECTOMY     • ENDOSCOPY  2005   • HERNIA REPAIR  1963   • HYSTERECTOMY  1979   • NECK SURGERY  1974    VERTEBRA   • REPLACEMENT TOTAL KNEE BILATERAL     • TONSILLECTOMY       Family History   Problem Relation Age of Onset   • Stroke Mother    • Heart disease Mother    • Stroke Sister    • Heart attack Maternal Grandmother      Social History     Tobacco Use   • Smoking status: Never Smoker   • Smokeless tobacco: Never Used   Substance Use Topics   • Alcohol use: No   • Drug use: No       ALLERGIES:  Patient has no known allergies.    CURRENT MEDS:    Current Outpatient Medications:   •  ALPRAZolam (XANAX) 0.25 MG tablet, Take 1 tablet by mouth 2 (Two) Times a Day As Needed for Anxiety., Disp: 30 tablet, Rfl: 0  •  aspirin 81 MG chewable tablet, Chew 81 mg daily., Disp: , Rfl:   •  azelastine (ASTELIN) 0.1 % nasal spray, 2 sprays into the nostril(s) as directed by provider 2 (Two) Times a Day. Use in each nostril as directed, Disp: 1 each, Rfl: 12  •  carvedilol (COREG) 6.25 MG tablet, Take 1 tablet by mouth 2 (Two) Times a Day With Meals., Disp: 180 tablet, Rfl: 1  •  CBD (cannabidiol) oral oil, Take 4 drops by mouth 2 (Two) Times a Day., Disp: , Rfl:   •  HYDROcodone-acetaminophen (NORCO) 5-325 MG per tablet, Take 1 tablet by mouth Every 8 (Eight) Hours As Needed for Moderate Pain  or Severe Pain ., Disp: 90 tablet, Rfl: 0  •  levocetirizine (Xyzal) 5 MG tablet, Take 1 tablet by mouth Every Evening., Disp: 30 tablet, Rfl: 2  •  losartan (COZAAR) 100 MG tablet, Take 1 tablet by mouth Daily., Disp: 90 tablet, Rfl: 1  •  meclizine (ANTIVERT) 25 MG tablet, Take 25 mg by mouth 2 (two) times a day., Disp: , Rfl:   •  Misc Natural Products (MOUTH TONIC PO), Take  by mouth., Disp: , Rfl:   •  neomycin-polymyxin-hydrocortisone (CORTISPORIN) 3.5-68969-6 otic solution, Administer 3 drops to  "the right ear 3 (Three) Times a Day., Disp: 10 mL, Rfl: 0  •  omeprazole (priLOSEC) 20 MG capsule, Take 1 capsule by mouth Daily., Disp: 90 capsule, Rfl: 1  •  pravastatin (PRAVACHOL) 20 MG tablet, Take 1 tablet by mouth Daily., Disp: 90 tablet, Rfl: 1  •  temazepam (RESTORIL) 30 MG capsule, TAKE ONE CAPSULE BY MOUTH EVERY EVENING AS NEEDED FOR SLEEP, Disp: 90 capsule, Rfl: 1  •  amLODIPine (Norvasc) 5 MG tablet, Take 1 tablet by mouth Daily., Disp: 90 tablet, Rfl: 1  •  cephalexin (Keflex) 500 MG capsule, Take 1 capsule by mouth 3 (Three) Times a Day., Disp: 30 capsule, Rfl: 0  •  loratadine (CLARITIN) 10 MG tablet, Take 1 tablet by mouth Daily., Disp: 30 tablet, Rfl: 1    REVIEW OF SYSTEMS:  Review of Systems   Constitutional: Negative for activity change, fatigue, fever, unexpected weight gain and unexpected weight loss.   HENT: Positive for nosebleeds and sinus pressure.    Respiratory: Negative for shortness of breath.    Cardiovascular: Negative for chest pain.   Gastrointestinal: Negative for abdominal pain.   Genitourinary: Negative for difficulty urinating.   Skin: Negative for rash.   Neurological: Negative for syncope and headache.       PHYSICAL EXAMINATION:  Vital Signs:  /82 (BP Location: Left arm, Patient Position: Sitting, Cuff Size: Adult)   Pulse 59   Temp 97.5 °F (36.4 °C) (Infrared)   Ht 167.6 cm (65.98\")   Wt 76.7 kg (169 lb)   LMP  (LMP Unknown)   SpO2 99%   Breastfeeding No   BMI 27.29 kg/m²   Vitals:    02/08/21 1003 02/08/21 1023   Patient Position: Sitting Sitting       Physical Exam  Vitals signs and nursing note reviewed.   Constitutional:       General: She is not in acute distress.     Appearance: She is well-developed.   HENT:      Head: Normocephalic and atraumatic.      Nose: Mucosal edema and congestion present.      Right Nostril: Epistaxis (evidence of old dried on the right) present.      Left Nostril: No epistaxis.      Mouth/Throat:      Mouth: Mucous membranes " are moist.      Pharynx: Oropharynx is clear.   Eyes:      Extraocular Movements: Extraocular movements intact.      Pupils: Pupils are equal, round, and reactive to light.   Neck:      Musculoskeletal: Normal range of motion and neck supple.      Vascular: No JVD.   Cardiovascular:      Rate and Rhythm: Normal rate and regular rhythm.      Pulses: Normal pulses.      Heart sounds: Normal heart sounds.   Pulmonary:      Effort: Pulmonary effort is normal. No respiratory distress.      Breath sounds: Normal breath sounds.   Abdominal:      General: Bowel sounds are normal. There is no distension.      Palpations: Abdomen is soft.      Tenderness: There is no abdominal tenderness.   Musculoskeletal: Normal range of motion.      Right lower leg: No edema.      Left lower leg: No edema.   Skin:     General: Skin is warm and dry.      Capillary Refill: Capillary refill takes less than 2 seconds.      Findings: No rash.   Neurological:      General: No focal deficit present.      Mental Status: She is alert and oriented to person, place, and time.      Cranial Nerves: No cranial nerve deficit.   Psychiatric:         Mood and Affect: Mood normal.         Behavior: Behavior normal.         Procedures    ASSESSMENT/ PLAN:  Problem List Items Addressed This Visit        Cardiac and Vasculature    Essential hypertension - Primary    Overview     HAS COMPONENT OF HYPERTENSIVE HEART DISEASE         Relevant Medications    amLODIPine (Norvasc) 5 MG tablet       ENT    Chronic ethmoidal sinusitis    Relevant Medications    cephalexin (Keflex) 500 MG capsule    loratadine (CLARITIN) 10 MG tablet       Neuro    Chronic intractable headache    Relevant Orders    Ambulatory Referral to Neurology      Other Visit Diagnoses     Epistaxis        Relevant Medications    cephalexin (Keflex) 500 MG capsule    loratadine (CLARITIN) 10 MG tablet            Specific Patient Instructions:  MEDICATION Instructions: Encouraged patient to continue  routine medicines as prescribed and maintain compliance. Patient instructed to report any adverse side effects or reactions to medicines promptly to the office. Patient instructed to make us aware of any OTC or herbal meds or supplement use.    DIET Recommendations: Patient instructed and provided information on the following nutrition and diet recommendations: Calorie restriction for weight reduction and maintenance. Necessity for adequate daily intake of fluids/water. Also, diet information provided in AVS for specifics.    EXERCISE Instructions: Discussed with patient the need for routine aerobic activity for cardiovascular fitness, 3 times a week for about 30 minutes. Daily exercise for increased fitness and weight reduction goals.    SMOKING Recommendations: Counseled patient and encouraged them on smoking and tobacco cessation if applicable. Discussed the benefits to all body systems with smoking/tobacco cessation, including decreased cardiac/lung/stroke/cancer risk. Encouraged no vaping use.    HEALTH MAINTENANCE:  Counseling provided to patient/family about routine health maintenance and ANNUAL physicals/labs. Counseling on recommended Vaccinations appropriate for age needed.        Patient's Body mass index is 27.29 kg/m². BMI is above normal parameters. Recommendations include: exercise counseling and nutrition counseling.      Medications or Orders placed this visit:  Orders Placed This Encounter   Procedures   • Ambulatory Referral to Neurology     Referral Priority:   Routine     Referral Type:   Consultation     Referral Reason:   Specialty Services Required     Requested Specialty:   Neurology     Number of Visits Requested:   1       Medications DISCONTINUED this visit:  There are no discontinued medications.    FOLLOW-UP:  Return in about 3 months (around 5/8/2021) for Recheck, Next scheduled follow up.    I discussed the patients findings and my recommendations with patient.  An After Visit Summary  (AVS) was printed and given to the patient at discharge.        Reynaldo Cavazos MD, FAAFP  2/8/2021

## 2021-02-09 RX ORDER — HYDROCODONE BITARTRATE AND ACETAMINOPHEN 5; 325 MG/1; MG/1
1 TABLET ORAL EVERY 8 HOURS PRN
Qty: 90 TABLET | Refills: 0 | Status: SHIPPED | OUTPATIENT
Start: 2021-02-09 | End: 2021-03-09 | Stop reason: SDUPTHER

## 2021-02-12 DIAGNOSIS — I10 ESSENTIAL HYPERTENSION: ICD-10-CM

## 2021-02-12 RX ORDER — CARVEDILOL 6.25 MG/1
TABLET ORAL
Qty: 180 TABLET | Refills: 1 | Status: SHIPPED | OUTPATIENT
Start: 2021-02-12 | End: 2021-07-15 | Stop reason: SDUPTHER

## 2021-02-23 DIAGNOSIS — J32.2 CHRONIC ETHMOIDAL SINUSITIS: ICD-10-CM

## 2021-02-23 DIAGNOSIS — J32.1 CHRONIC FRONTAL SINUSITIS: Primary | ICD-10-CM

## 2021-02-23 DIAGNOSIS — J32.1 CHRONIC FRONTAL SINUSITIS: ICD-10-CM

## 2021-02-23 DIAGNOSIS — R51.9 CHRONIC DAILY HEADACHE: ICD-10-CM

## 2021-03-08 DIAGNOSIS — M54.50 LUMBAR PAIN: ICD-10-CM

## 2021-03-08 NOTE — TELEPHONE ENCOUNTER
Relationship: alejandra Dinero call back number: [phone]    Medication needed: HYDROcodone-acetaminophen (NORCO) 5-325 MG per tablet          When do you need the refill by: asap      Does the patient have less than a 3 day supply:  [x] Yes  [] No    What is the patient's preferred pharmacy:        [facility] [address] [phone] [facility] [phone]

## 2021-03-09 RX ORDER — HYDROCODONE BITARTRATE AND ACETAMINOPHEN 5; 325 MG/1; MG/1
1 TABLET ORAL EVERY 8 HOURS PRN
Qty: 90 TABLET | Refills: 0 | Status: SHIPPED | OUTPATIENT
Start: 2021-03-09 | End: 2021-04-09 | Stop reason: SDUPTHER

## 2021-03-16 NOTE — PROGRESS NOTES
[facility] - PODIATRY    Today's Date: 03/18/21    Patient Name: Shira King  MRN: [id number]  CSN: [id number]  PCP: Reynaldo Cavazos MD  Referring Provider: No ref. provider found    SUBJECTIVE     Chief Complaint   Patient presents with   • Left Foot - Ingrown Toenail   • Ingrown Toenail     left great toe pcp is Dr Reynaldo Cavazos      HPI: Shira iKng, a 81 y.o.female, comes to clinic as a(n) established patient complaining of ingrown toenail. Patient has h/o GERD, Lumbar pain, HLD, Valvular disease, periodic limb movement disorder. anxiety. Patient presents with complaints of left hallux nail pain that has been present for around a week and a half. Previously had nail avulsions of left hallux and 2nd nail as well as right 2nd nail in August. Left hallux nail has since re-grown and medial border is causing pain. Denies redness or drainage. Admits pain at 4/10 level and described as aching, throbbing and sharp. Pain is worse in closed toed shoes with pressure placed on nail. Relates previous treatment(s) including nail avulsion. Denies any constitutional symptoms. No other pedal complaints at this time.    Past Medical History:   Diagnosis Date   • Chronic intractable headache 2/8/2021   • Gastroesophageal reflux disease 2/3/2020   • Lumbar pain 7/12/2019   • Mixed hyperlipidemia    • Valvular disease      Past Surgical History:   Procedure Laterality Date   • BLADDER REPAIR  2005   • BREAST SURGERY     • BUNIONECTOMY  2007   • CATARACT EXTRACTION Bilateral 2005   • CHOLECYSTECTOMY     • ENDOSCOPY  2005   • HERNIA REPAIR  1963   • HYSTERECTOMY  1979   • NECK SURGERY  1974    VERTEBRA   • REPLACEMENT TOTAL KNEE BILATERAL     • TONSILLECTOMY       Family History   Problem Relation Age of Onset   • Stroke Mother    • Heart disease Mother    • Stroke Sister    • Heart attack Maternal Grandmother      Social History     Socioeconomic History   • Marital status:      Spouse name:  Not on file   • Number of children: Not on file   • Years of education: Not on file   • Highest education level: Not on file   Tobacco Use   • Smoking status: Never Smoker   • Smokeless tobacco: Never Used   Vaping Use   • Vaping Use: Never used   Substance and Sexual Activity   • Alcohol use: No   • Drug use: No   • Sexual activity: Not Currently     Partners: Male     No Known Allergies  Current Outpatient Medications   Medication Sig Dispense Refill   • ALPRAZolam (XANAX) 0.25 MG tablet Take 1 tablet by mouth 2 (Two) Times a Day As Needed for Anxiety. 30 tablet 0   • amLODIPine (Norvasc) 5 MG tablet Take 1 tablet by mouth Daily. 90 tablet 1   • aspirin 81 MG chewable tablet Chew 81 mg daily.     • azelastine (ASTELIN) 0.1 % nasal spray 2 sprays into the nostril(s) as directed by provider 2 (Two) Times a Day. Use in each nostril as directed 1 each 12   • carvedilol (COREG) 6.25 MG tablet TAKE 1 TABLET BY MOUTH TWICE DAILY WITH MEALS 180 tablet 1   • CBD (cannabidiol) oral oil Take 4 drops by mouth 2 (Two) Times a Day.     • HYDROcodone-acetaminophen (NORCO) 5-325 MG per tablet Take 1 tablet by mouth Every 8 (Eight) Hours As Needed for Moderate Pain  or Severe Pain . 90 tablet 0   • levocetirizine (Xyzal) 5 MG tablet Take 1 tablet by mouth Every Evening. 30 tablet 2   • loratadine (CLARITIN) 10 MG tablet Take 1 tablet by mouth Daily. 30 tablet 1   • losartan (COZAAR) 100 MG tablet Take 1 tablet by mouth Daily. 90 tablet 1   • meclizine (ANTIVERT) 25 MG tablet Take 25 mg by mouth 2 (two) times a day.     • Misc Natural Products (MOUTH TONIC PO) Take  by mouth.     • omeprazole (priLOSEC) 20 MG capsule Take 1 capsule by mouth Daily. 90 capsule 1   • pravastatin (PRAVACHOL) 20 MG tablet Take 1 tablet by mouth Daily. 90 tablet 1   • temazepam (RESTORIL) 30 MG capsule TAKE ONE CAPSULE BY MOUTH EVERY EVENING AS NEEDED FOR SLEEP 90 capsule 1   • cephalexin (Keflex) 500 MG capsule Take 1 capsule by mouth 3 (Three) Times a  Day. 30 capsule 0   • neomycin-polymyxin-hydrocortisone (CORTISPORIN) 3.5-31873-7 otic solution Administer 3 drops to the right ear 3 (Three) Times a Day. 10 mL 0     No current facility-administered medications for this visit.     Review of Systems   Constitutional: Negative for chills and fever.   HENT: Negative for congestion.    Respiratory: Negative for shortness of breath.    Cardiovascular: Negative for chest pain and leg swelling.   Gastrointestinal: Negative for constipation, diarrhea, nausea and vomiting.   Musculoskeletal: Positive for arthralgias and back pain. Negative for gait problem and myalgias.   Skin: Negative for wound.   Neurological: Positive for numbness.       OBJECTIVE     Vitals:    03/18/21 1407   BP: (!) 181/79   Pulse: 66   Temp: 96.6 °F (35.9 °C)       PHYSICAL EXAM  GEN:   Accompanied by caregiver.     Foot/Ankle Exam:       General:   Appearance: appears stated age and healthy and elderly    Orientation: AAOx3    Affect: appropriate    Gait: unimpaired    Assistance: independent    Shoe Gear:  Sandals    VASCULAR      Right Foot Vascularity   Dorsalis pedis:  2+  Posterior tibial:  2+  Skin Temperature: warm    Edema Grading:  None  CFT:  3  Pedal Hair Growth:  Present  Varicosities: mild varicosities       Left Foot Vascularity   Dorsalis pedis:  2+  Posterior tibial:  2+  Skin Temperature: warm    Edema Grading:  None  CFT:  3  Pedal Hair Growth:  Present  Varicosities: mild varicosities        NEUROLOGIC     Right Foot Neurologic   Normal sensation    Light touch sensation:  Normal  Vibratory sensation:  Normal  Hot/Cold sensation: normal       Left Foot Neurologic   Normal sensation    Light touch sensation:  Normal  Vibratory sensation:  Normal  Hot/cold sensation: normal       MUSCULOSKELETAL      Right Foot Musculoskeletal    Amputation   Right toes amputated: No    Ecchymosis:  None  Tenderness: none    Arch:  Normal  Hallux valgus: No    Hallux limitus: No       Left Foot  Musculoskeletal    Amputation   Left toes amputated: No    Ecchymosis:  None  Tenderness: toenails and toe 1    Arch:  Normal  Hallux valgus: No    Hallux limitus: No       DERMATOLOGIC     Right Foot Dermatologic   Skin: skin intact    Nails: onychomycosis, abnormally thick and dystrophic nails       Left Foot Dermatologic   Skin: skin intact    Nails: abnormally thick, dystrophic nails, ingrown toenail (hallux medial border) and absent (2nd toe)        RADIOLOGY/NUCLEAR:  No results found.    LABORATORY/CULTURE RESULTS:      PATHOLOGY RESULTS:       ASSESSMENT/PLAN     Diagnoses and all orders for this visit:    1. Ingrown toenail (Primary)    2. Foot pain, left    3. Pincer nail deformity    4. Onychomycosis    5. Peripheral vascular disease (CMS/HCC)    6. Advanced age      Comprehensive lower extremity examination and evaluation was performed.  Discussed findings and treatment plan including risks, benefits, and treatment options with patient in detail. Patient agreed with treatment plan.  After verbal consent obtained, nail(s) x1 debrided of offending borders with nail nipper without incidence  Patient may maintain nails and calluses at home utilizing emery board or pumice stone between visits as needed   An After Visit Summary was printed and given to the patient at discharge, including (if requested) any available informative/educational handouts regarding diagnosis, treatment, or medications. All questions were answered to patient/family satisfaction. Should symptoms fail to improve or worsen they agree to call or return to clinic or to go to the Emergency Department. Discussed the importance of following up with any needed screening tests/labs/specialist appointments and any requested follow-up recommended by me today. Importance of maintaining follow-up discussed and patient accepts that missed appointments can delay diagnosis and potentially lead to worsening of conditions.  Return if symptoms worsen or  fail to improve., or sooner if acute issues arise.      This document has been electronically signed by ROBIN Winston on March 18, 2021 14:38 CDT

## 2021-03-17 ENCOUNTER — TELEPHONE (OUTPATIENT)
Dept: PODIATRY | Facility: CLINIC | Age: 81
End: 2021-03-17

## 2021-03-17 ENCOUNTER — TELEPHONE (OUTPATIENT)
Dept: FAMILY MEDICINE CLINIC | Facility: CLINIC | Age: 81
End: 2021-03-17

## 2021-03-17 NOTE — TELEPHONE ENCOUNTER
Spoke with pt's daughter about appt 3/18 and asked her to come in at 815am or 1pm and she said she would have to call back.

## 2021-03-17 NOTE — TELEPHONE ENCOUNTER
Caller: Joy King    Relationship: Emergency Contact    Best call back number: [phone]    What is the best time to reach you:   anytime    Who are you requesting to speak with (clinical staff, provider,  specific staff member):   Bonita    Do you know the name of the person who called:   Pastora King (Patty)    What was the call regarding:   Patient accepted and wanted to confirm appt for 03/18 @ 1p    Do you require a callback:   Yes plz

## 2021-03-18 ENCOUNTER — OFFICE VISIT (OUTPATIENT)
Dept: PODIATRY | Facility: CLINIC | Age: 81
End: 2021-03-18

## 2021-03-18 VITALS
WEIGHT: 170.2 LBS | HEART RATE: 66 BPM | TEMPERATURE: 96.6 F | DIASTOLIC BLOOD PRESSURE: 79 MMHG | SYSTOLIC BLOOD PRESSURE: 181 MMHG | HEIGHT: 67 IN | BODY MASS INDEX: 26.71 KG/M2

## 2021-03-18 DIAGNOSIS — I73.9 PERIPHERAL VASCULAR DISEASE (HCC): ICD-10-CM

## 2021-03-18 DIAGNOSIS — L60.0 INGROWN TOENAIL: Primary | ICD-10-CM

## 2021-03-18 DIAGNOSIS — M79.672 FOOT PAIN, LEFT: ICD-10-CM

## 2021-03-18 DIAGNOSIS — R54 ADVANCED AGE: ICD-10-CM

## 2021-03-18 DIAGNOSIS — L60.8 PINCER NAIL DEFORMITY: ICD-10-CM

## 2021-03-18 DIAGNOSIS — B35.1 ONYCHOMYCOSIS: ICD-10-CM

## 2021-03-18 PROCEDURE — 11720 DEBRIDE NAIL 1-5: CPT | Performed by: NURSE PRACTITIONER

## 2021-03-18 PROCEDURE — 99213 OFFICE O/P EST LOW 20 MIN: CPT | Performed by: NURSE PRACTITIONER

## 2021-03-24 DIAGNOSIS — F41.9 ANXIETY: ICD-10-CM

## 2021-03-24 RX ORDER — ALPRAZOLAM 0.25 MG/1
TABLET ORAL
Qty: 30 TABLET | Refills: 0 | Status: SHIPPED | OUTPATIENT
Start: 2021-03-24 | End: 2021-04-08

## 2021-03-30 ENCOUNTER — TRANSCRIBE ORDERS (OUTPATIENT)
Dept: LAB | Facility: HOSPITAL | Age: 81
End: 2021-03-30

## 2021-03-30 DIAGNOSIS — Z01.818 PREOP TESTING: Primary | ICD-10-CM

## 2021-04-02 ENCOUNTER — LAB (OUTPATIENT)
Dept: LAB | Facility: HOSPITAL | Age: 81
End: 2021-04-02

## 2021-04-02 LAB — SARS-COV-2 ORF1AB RESP QL NAA+PROBE: NOT DETECTED

## 2021-04-02 PROCEDURE — C9803 HOPD COVID-19 SPEC COLLECT: HCPCS | Performed by: OTOLARYNGOLOGY

## 2021-04-02 PROCEDURE — U0004 COV-19 TEST NON-CDC HGH THRU: HCPCS | Performed by: OTOLARYNGOLOGY

## 2021-04-02 PROCEDURE — U0005 INFEC AGEN DETEC AMPLI PROBE: HCPCS | Performed by: OTOLARYNGOLOGY

## 2021-04-05 NOTE — PROGRESS NOTES
YOB: 1940  Location: [facility]  Location Address: [address] [place] [address], [facility], [address] [place] [address]  Location Phone: [phone]    Chief Complaint   Patient presents with   • Sinus Problem     headache       History of Present Illness  Shira King is a 81 y.o. female.  Shira King is here for evaluation of ENT complaints. The patient has had problems with nasal drainage, sinusitis, sinus pressure, postnasal drip, allergy, facial pressure and headaches.  The symptoms are localized to the bilateral frontal sinus and ethmoid sinus. The patient has had moderate to severe symptoms. The symptoms have been present for the last several months. The symptoms are aggravated by  no identifiable factors. The symptoms are improved by no identifiable factors.           Past Medical History:   Diagnosis Date   • Chronic intractable headache 2021   • Gastroesophageal reflux disease 2/3/2020   • Lumbar pain 2019   • Mixed hyperlipidemia    • Sinusitis    • Valvular disease        Past Surgical History:   Procedure Laterality Date   • BLADDER REPAIR     • BREAST SURGERY     • BUNIONECTOMY     • CARDIAC CATHETERIZATION     • CATARACT EXTRACTION Bilateral    • CHOLECYSTECTOMY     • ENDOSCOPY     • HERNIA REPAIR     • HYSTERECTOMY     • NECK SURGERY      VERTEBRA   • REPLACEMENT TOTAL KNEE BILATERAL     • TONSILLECTOMY         Outpatient Medications Marked as Taking for the 21 encounter (Office Visit) with Bernard Levin MD   Medication Sig Dispense Refill   • ALPRAZolam (XANAX) 0.25 MG tablet TAKE ONE TABLET BY MOUTH TWICE A DAY AS NEEDED FOR ANXIETY 30 tablet 0   • amLODIPine (Norvasc) 5 MG tablet Take 1 tablet by mouth Daily. 90 tablet 1   • aspirin 81 MG chewable tablet Chew 81 mg daily.     • B Complex Vitamins (Vitamin B Complex) tablet      • carvedilol (COREG) 6.25 MG tablet TAKE 1 TABLET BY MOUTH TWICE DAILY WITH MEALS 180 tablet 1   • CBD  (cannabidiol) oral oil Take 4 drops by mouth 2 (Two) Times a Day.     • HYDROcodone-acetaminophen (NORCO) 5-325 MG per tablet Take 1 tablet by mouth Every 8 (Eight) Hours As Needed for Moderate Pain  or Severe Pain . 90 tablet 0   • levocetirizine (Xyzal) 5 MG tablet Take 1 tablet by mouth Every Evening. 30 tablet 2   • losartan (COZAAR) 100 MG tablet Take 1 tablet by mouth Daily. 90 tablet 1   • meclizine (ANTIVERT) 25 MG tablet Take 25 mg by mouth 2 (two) times a day.     • metoclopramide (Reglan) 10 MG tablet      • Misc Natural Products (MOUTH TONIC PO) Take  by mouth.     • neomycin-polymyxin-hydrocortisone (CORTISPORIN) 3.5-52183-5 otic solution Administer 3 drops to the right ear 3 (Three) Times a Day. 10 mL 0   • omeprazole (priLOSEC) 20 MG capsule Take 1 capsule by mouth Daily. 90 capsule 1   • pravastatin (PRAVACHOL) 20 MG tablet Take 1 tablet by mouth Daily. 90 tablet 1   • temazepam (RESTORIL) 30 MG capsule TAKE ONE CAPSULE BY MOUTH EVERY EVENING AS NEEDED FOR SLEEP 90 capsule 1   • [DISCONTINUED] azelastine (ASTELIN) 0.1 % nasal spray 2 sprays into the nostril(s) as directed by provider 2 (Two) Times a Day. Use in each nostril as directed 1 each 12       Patient has no known allergies.    Family History   Problem Relation Age of Onset   • Stroke Mother    • Heart disease Mother    • Stroke Sister    • Heart attack Maternal Grandmother        Social History     Socioeconomic History   • Marital status:      Spouse name: Not on file   • Number of children: Not on file   • Years of education: Not on file   • Highest education level: Not on file   Tobacco Use   • Smoking status: Never Smoker   • Smokeless tobacco: Never Used   Vaping Use   • Vaping Use: Never used   Substance and Sexual Activity   • Alcohol use: No   • Drug use: No   • Sexual activity: Not Currently     Partners: Male       Review of Systems   Constitutional: Negative for activity change, appetite change, chills, diaphoresis,  fatigue, fever and unexpected weight change.   HENT: Positive for ear pain, postnasal drip, sinus pressure and sinus pain. Negative for congestion, dental problem, drooling, ear discharge, facial swelling, hearing loss, mouth sores, nosebleeds, rhinorrhea, sneezing, sore throat, tinnitus, trouble swallowing and voice change.    Eyes: Negative.    Respiratory: Negative.    Cardiovascular: Negative.    Gastrointestinal: Negative.    Endocrine: Negative.    Skin: Negative.    Allergic/Immunologic: Positive for environmental allergies. Negative for food allergies and immunocompromised state.   Neurological: Positive for headaches.   Hematological: Negative.    Psychiatric/Behavioral: Negative.        Vitals:    04/06/21 1031   BP: 129/68   Pulse: 61   Temp: 97.8 °F (36.6 °C)       Body mass index is 27.41 kg/m².    Objective     Physical Exam  Physical Exam  CONSTITUTIONAL: well nourished, alert, oriented, in no acute distress      COMMUNICATION AND VOICE: able to communicate normally, normal voice quality     HEAD: normocephalic, no lesions, atraumatic, no tenderness, no masses      FACE: appearance normal, no lesions, no tenderness, no deformities, facial motion symmetric     SALIVARY GLANDS: parotid glands with no tenderness, no swelling, no masses, submandibular glands with normal size, nontender     EYES: ocular motility normal, eyelids normal, orbits normal, no proptosis, conjunctiva normal , pupils equal, round      EARS:  Hearing: response to conversational voice normal bilaterally   External Ears: auricles without lesions  Otoscopic: tympanic membrane appearance normal, no lesions, no perforation, normal mobility, no fluid     NOSE:  External Nose: structure normal, no tenderness on palpation, no nasal discharge, no lesions, no evidence of trauma, nostrils patent   Intranasal Exam: see procedure note     ORAL:  Lips: upper and lower lips without lesion   Teeth: dentition within normal limits for age   Gums:  gingivae healthy   Oral Mucosa: oral mucosa normal, no mucosal lesions   Floor of Mouth: Warthin’s duct patent, mucosa normal  Tongue: lingual mucosa normal without lesions, normal tongue mobility   Palate: soft and hard palates with normal mucosa and structure  Oropharynx: oropharyngeal mucosa erythema with postnasal drainage     NECK: neck appearance normal, no mass,  noted without erythema or tenderness     THYROID: no overt thyromegaly, no tenderness, nodules or mass present on palpation, position midline      LYMPH NODES: no lymphadenopathy     CHEST/RESPIRATORY: respiratory effort normaL     CARDIOVASCULAR: extremities without cyanosis or edema       NEUROLOGIC/PSYCHIATRIC: oriented to time, place and person, mood normal, affect appropriate, CN II-XII intact grossly    Assessment/Plan   Diagnoses and all orders for this visit:    1. Chronic ethmoidal sinusitis (Primary)  -     azelastine (ASTELIN) 0.1 % nasal spray; 2 sprays into the nostril(s) as directed by provider 2 (Two) Times a Day. Use in each nostril as directed  Dispense: 30 mL; Refill: 11    2. Allergic rhinitis, unspecified seasonality, unspecified trigger  -     azelastine (ASTELIN) 0.1 % nasal spray; 2 sprays into the nostril(s) as directed by provider 2 (Two) Times a Day. Use in each nostril as directed  Dispense: 30 mL; Refill: 11    3. Gastroesophageal reflux disease, unspecified whether esophagitis present    4. Other acute sinusitis, recurrence not specified  -     azelastine (ASTELIN) 0.1 % nasal spray; 2 sprays into the nostril(s) as directed by provider 2 (Two) Times a Day. Use in each nostril as directed  Dispense: 30 mL; Refill: 11  -     predniSONE (DELTASONE) 10 MG tablet; Daily dose: 5 tabs for 2 days, then 4 tabs for 2 days, then 3 tabs for 2 days, then 2 tabs for 2 days, then 1 tab for 2 days  Dispense: 30 tablet; Refill: 0  -     amoxicillin (AMOXIL) 500 MG capsule; Take 1 capsule by mouth 2 (Two) Times a Day for 20 days.   Dispense: 40 capsule; Refill: 0    5. Nasal vestibulitis  -     azelastine (ASTELIN) 0.1 % nasal spray; 2 sprays into the nostril(s) as directed by provider 2 (Two) Times a Day. Use in each nostril as directed  Dispense: 30 mL; Refill: 11  -     mupirocin (BACTROBAN) 2 % ointment; Apply intranasally twice a day  Dispense: 22 g; Refill: 0    6. Hypertrophy of both inferior nasal turbinates  -     azelastine (ASTELIN) 0.1 % nasal spray; 2 sprays into the nostril(s) as directed by provider 2 (Two) Times a Day. Use in each nostril as directed  Dispense: 30 mL; Refill: 11      * Surgery not found *  No orders of the defined types were placed in this encounter.    Return in about 4 weeks (around 5/4/2021) for Recheck sinuses.       Patient Instructions   Advised to continue flonase and will start astelin, bactroban, amoxicillin, and a prednisone taper with follow-up appointment in 4 weeks.

## 2021-04-06 ENCOUNTER — OFFICE VISIT (OUTPATIENT)
Dept: OTOLARYNGOLOGY | Facility: CLINIC | Age: 81
End: 2021-04-06

## 2021-04-06 VITALS
HEIGHT: 67 IN | BODY MASS INDEX: 27.47 KG/M2 | WEIGHT: 175 LBS | DIASTOLIC BLOOD PRESSURE: 68 MMHG | SYSTOLIC BLOOD PRESSURE: 129 MMHG | TEMPERATURE: 97.8 F | HEART RATE: 61 BPM

## 2021-04-06 DIAGNOSIS — J30.9 ALLERGIC RHINITIS, UNSPECIFIED SEASONALITY, UNSPECIFIED TRIGGER: ICD-10-CM

## 2021-04-06 DIAGNOSIS — J01.80 OTHER ACUTE SINUSITIS, RECURRENCE NOT SPECIFIED: ICD-10-CM

## 2021-04-06 DIAGNOSIS — F41.9 ANXIETY: ICD-10-CM

## 2021-04-06 DIAGNOSIS — K21.9 GASTROESOPHAGEAL REFLUX DISEASE, UNSPECIFIED WHETHER ESOPHAGITIS PRESENT: ICD-10-CM

## 2021-04-06 DIAGNOSIS — J34.3 HYPERTROPHY OF BOTH INFERIOR NASAL TURBINATES: ICD-10-CM

## 2021-04-06 DIAGNOSIS — J32.2 CHRONIC ETHMOIDAL SINUSITIS: Primary | ICD-10-CM

## 2021-04-06 DIAGNOSIS — J34.89 NASAL VESTIBULITIS: ICD-10-CM

## 2021-04-06 PROCEDURE — 99214 OFFICE O/P EST MOD 30 MIN: CPT | Performed by: PHYSICIAN ASSISTANT

## 2021-04-06 PROCEDURE — 31231 NASAL ENDOSCOPY DX: CPT | Performed by: PHYSICIAN ASSISTANT

## 2021-04-06 RX ORDER — AZELASTINE 1 MG/ML
2 SPRAY, METERED NASAL 2 TIMES DAILY
Qty: 30 ML | Refills: 11 | Status: ON HOLD | OUTPATIENT
Start: 2021-04-06 | End: 2022-04-26

## 2021-04-06 RX ORDER — B-COMPLEX WITH VITAMIN C
1 TABLET ORAL DAILY
COMMUNITY
Start: 2021-02-01

## 2021-04-06 RX ORDER — AMOXICILLIN 500 MG/1
500 CAPSULE ORAL 2 TIMES DAILY
Qty: 40 CAPSULE | Refills: 0 | Status: SHIPPED | OUTPATIENT
Start: 2021-04-06 | End: 2021-04-26

## 2021-04-06 RX ORDER — PREDNISONE 10 MG/1
TABLET ORAL
Qty: 30 TABLET | Refills: 0 | OUTPATIENT
Start: 2021-04-06 | End: 2021-04-19

## 2021-04-06 RX ORDER — METOCLOPRAMIDE 10 MG/1
5 TABLET ORAL DAILY
COMMUNITY
End: 2021-10-14 | Stop reason: SDUPTHER

## 2021-04-06 NOTE — PROGRESS NOTES
OPERATIVE NOTE:  Shira YON King    DATE OF PROCEDURE: 4/6/21    PROCEDURE:   Rigid Nasal Endoscopy    ANESTHESIA:  None    REASON FOR PROCEDURE:  Procedure was recommend for persistent symptoms of chronic rhinosinusitis  Risks, benefits and alternatives were discussed.      DETAILS of OPERATION:  The patient was seated in the exam chair.  A rigid nasal endoscopy was performed.  The 0 degree scope was introduced into the nasal cavity and the following findings noted.    FINDINGS:  MUCOSAL SURFACES:   The mucosal surfaces demonstrated erythema and edema.    NASAL SEPTUM:  The nasal septum was noted to be midline.    INFERIOR TURBINATES:  The inferior turbinates were noted to have bilateral, inflammation, hypertrophy.    MIDDLE TURBINATES:  The middle turbinates were noted to have bilateral, inflammation      MIDDLE MEATUS:  The middle meatus was noted to have thick mucous, erythema, crusting and bilateral      SPHENOETHMOID RECESS:  The sphenoethmoid recess was noted to have normal mucosa      NASOPHARYNX:  The nasopharynx was noted to have no lesion or mass with erythema and postnasal drainage.

## 2021-04-06 NOTE — PATIENT INSTRUCTIONS
Advised to continue flonase and will start astelin, bactroban, amoxicillin, and a prednisone taper with follow-up appointment in 4 weeks.

## 2021-04-08 RX ORDER — ALPRAZOLAM 0.25 MG/1
TABLET ORAL
Qty: 30 TABLET | Refills: 0 | Status: SHIPPED | OUTPATIENT
Start: 2021-04-08 | End: 2021-04-09 | Stop reason: SDUPTHER

## 2021-04-09 ENCOUNTER — OFFICE VISIT (OUTPATIENT)
Dept: FAMILY MEDICINE CLINIC | Facility: CLINIC | Age: 81
End: 2021-04-09

## 2021-04-09 VITALS
SYSTOLIC BLOOD PRESSURE: 132 MMHG | HEIGHT: 66 IN | OXYGEN SATURATION: 95 % | WEIGHT: 169.2 LBS | TEMPERATURE: 97.7 F | DIASTOLIC BLOOD PRESSURE: 70 MMHG | HEART RATE: 64 BPM | BODY MASS INDEX: 27.19 KG/M2

## 2021-04-09 DIAGNOSIS — I10 ESSENTIAL HYPERTENSION: Primary | ICD-10-CM

## 2021-04-09 DIAGNOSIS — J32.2 CHRONIC ETHMOIDAL SINUSITIS: ICD-10-CM

## 2021-04-09 DIAGNOSIS — G47.00 INSOMNIA, UNSPECIFIED TYPE: ICD-10-CM

## 2021-04-09 DIAGNOSIS — M54.50 LUMBAR PAIN: ICD-10-CM

## 2021-04-09 DIAGNOSIS — F41.9 ANXIETY: ICD-10-CM

## 2021-04-09 DIAGNOSIS — M25.562 ACUTE PAIN OF BOTH KNEES: ICD-10-CM

## 2021-04-09 DIAGNOSIS — M25.561 ACUTE PAIN OF BOTH KNEES: ICD-10-CM

## 2021-04-09 PROCEDURE — 99213 OFFICE O/P EST LOW 20 MIN: CPT | Performed by: NURSE PRACTITIONER

## 2021-04-09 RX ORDER — HYDROCODONE BITARTRATE AND ACETAMINOPHEN 5; 325 MG/1; MG/1
1 TABLET ORAL EVERY 8 HOURS PRN
Qty: 90 TABLET | Refills: 0 | Status: SHIPPED | OUTPATIENT
Start: 2021-04-09 | End: 2021-05-11

## 2021-04-09 RX ORDER — TEMAZEPAM 30 MG/1
30 CAPSULE ORAL NIGHTLY PRN
Qty: 90 CAPSULE | Refills: 1 | Status: SHIPPED | OUTPATIENT
Start: 2021-04-09 | End: 2021-07-01

## 2021-04-09 RX ORDER — ALPRAZOLAM 0.25 MG/1
0.25 TABLET ORAL 2 TIMES DAILY PRN
Qty: 30 TABLET | Refills: 1 | Status: SHIPPED | OUTPATIENT
Start: 2021-04-09 | End: 2021-06-10

## 2021-04-09 NOTE — PROGRESS NOTES
"Answers for HPI/ROS submitted by the patient on 4/3/2021  Please describe your symptoms.: Follow up.  Have you had these symptoms before?: Yes  How long have you been having these symptoms?: Greater than 2 weeks  What is the primary reason for your visit?: Other    Chief Complaint  Back Pain (UTD), Anxiety, and Insomnia    Subjective          Shira King presents to [facility]  History of Present Illness  Patient presents for med refills of controlled substances.  She has taken the 3 medications for quite some time:  Norco, Xanax and Restoril.  She uses the Xanax sparingly but as needed for situational anxiety.  Norco, also PRN for chronic bilateral knee pain and arthritis pain.  Both effective when used.  She takes the Restoril most every night and is unable to sleep without the sleep aid.  She tried all OTC preparations prior to being prescribed a hypnotic.  She has shown neither signs of abuse or misuse of her medications.  UDS and controlled substance agreement are up to date.  MALU reviewed.  Patient is aware of risks associated with long term use of narcotic preparations.    Patient had ENT visit this week and underwent an in office procedure and states her sinuses are draining and her sinus pressure and pain are already much improved.  She is currently taking antibiotics and oral steroids for same.    Objective   Vital Signs:   /70 (BP Location: Right arm, Patient Position: Sitting, Cuff Size: Adult)   Pulse 64   Temp 97.7 °F (36.5 °C)   Ht 167.6 cm (66\") Comment: pt reported  Wt 76.7 kg (169 lb 3.2 oz)   SpO2 95%   BMI 27.31 kg/m²       Physical Exam  Vitals and nursing note reviewed. Exam conducted with a chaperone present (DIL.).   Constitutional:       General: She is not in acute distress.     Appearance: Normal appearance. She is well-developed. She is not ill-appearing or diaphoretic.   HENT:      Head: Normocephalic and atraumatic.   Eyes:      " Conjunctiva/sclera: Conjunctivae normal.      Pupils: Pupils are equal, round, and reactive to light.   Cardiovascular:      Rate and Rhythm: Normal rate and regular rhythm.      Heart sounds: Normal heart sounds. No murmur heard.     Pulmonary:      Effort: Pulmonary effort is normal. No respiratory distress.      Breath sounds: Normal breath sounds.   Abdominal:      General: Bowel sounds are normal. There is no distension.      Palpations: Abdomen is soft.      Tenderness: There is no abdominal tenderness.   Musculoskeletal:         General: Tenderness present. Normal range of motion.      Cervical back: Normal range of motion and neck supple.      Comments: Bilateral knee tenderness to palpation and with ROM.  Hand/finger pain with movement as well.   Skin:     General: Skin is warm and dry.      Capillary Refill: Capillary refill takes less than 2 seconds.      Findings: No erythema.   Neurological:      General: No focal deficit present.      Mental Status: She is alert and oriented to person, place, and time. Mental status is at baseline.   Psychiatric:         Mood and Affect: Mood normal.         Behavior: Behavior normal.         Thought Content: Thought content normal.         Judgment: Judgment normal.        Result Review :                 Assessment and Plan    Diagnoses and all orders for this visit:    1. Essential hypertension (Primary)    2. Anxiety  -     ALPRAZolam (XANAX) 0.25 MG tablet; Take 1 tablet by mouth 2 (Two) Times a Day As Needed for Anxiety.  Dispense: 30 tablet; Refill: 1    3. Acute pain of both knees    4. Chronic ethmoidal sinusitis    5. Insomnia, unspecified type  -     temazepam (RESTORIL) 30 MG capsule; Take 1 capsule by mouth At Night As Needed for Sleep.  Dispense: 90 capsule; Refill: 1        Follow Up   Return in about 3 months (around 7/9/2021) for Next scheduled follow up.  Patient was given instructions and counseling regarding her condition or for health maintenance  advice. Please see specific information pulled into the AVS if appropriate.

## 2021-04-09 NOTE — TELEPHONE ENCOUNTER
Patient was seen by Bonita today. Patient is needing refill on HYDROcodone-acetaminophen (NORCO) 5-325 MG per tablet, TID PRN. Patient is UTD on requirements at this time.

## 2021-04-19 ENCOUNTER — APPOINTMENT (OUTPATIENT)
Dept: CT IMAGING | Facility: HOSPITAL | Age: 81
End: 2021-04-19

## 2021-04-19 ENCOUNTER — HOSPITAL ENCOUNTER (EMERGENCY)
Facility: HOSPITAL | Age: 81
Discharge: HOME | End: 2021-04-19
Attending: EMERGENCY MEDICINE | Admitting: EMERGENCY MEDICINE

## 2021-04-19 VITALS
HEART RATE: 77 BPM | DIASTOLIC BLOOD PRESSURE: 65 MMHG | TEMPERATURE: 97.5 F | HEIGHT: 66 IN | BODY MASS INDEX: 27 KG/M2 | WEIGHT: 168 LBS | RESPIRATION RATE: 12 BRPM | OXYGEN SATURATION: 94 % | SYSTOLIC BLOOD PRESSURE: 126 MMHG

## 2021-04-19 DIAGNOSIS — R51.9 NONINTRACTABLE HEADACHE, UNSPECIFIED CHRONICITY PATTERN, UNSPECIFIED HEADACHE TYPE: Primary | ICD-10-CM

## 2021-04-19 LAB
ABO GROUP BLD: NORMAL
ANION GAP SERPL CALCULATED.3IONS-SCNC: 8 MMOL/L (ref 5–15)
APTT PPP: 32.9 SECONDS (ref 24.1–35)
BASOPHILS # BLD AUTO: 0.03 10*3/MM3 (ref 0–0.2)
BASOPHILS NFR BLD AUTO: 0.6 % (ref 0–1.5)
BLD GP AB SCN SERPL QL: NEGATIVE
BUN SERPL-MCNC: 9 MG/DL (ref 8–23)
BUN/CREAT SERPL: 17.3 (ref 7–25)
CALCIUM SPEC-SCNC: 9.3 MG/DL (ref 8.6–10.5)
CHLORIDE SERPL-SCNC: 103 MMOL/L (ref 98–107)
CO2 SERPL-SCNC: 26 MMOL/L (ref 22–29)
CREAT SERPL-MCNC: 0.52 MG/DL (ref 0.57–1)
DEPRECATED RDW RBC AUTO: 41.8 FL (ref 37–54)
EOSINOPHIL # BLD AUTO: 0.21 10*3/MM3 (ref 0–0.4)
EOSINOPHIL NFR BLD AUTO: 4 % (ref 0.3–6.2)
ERYTHROCYTE [DISTWIDTH] IN BLOOD BY AUTOMATED COUNT: 12.6 % (ref 12.3–15.4)
GFR SERPL CREATININE-BSD FRML MDRD: 113 ML/MIN/1.73
GLUCOSE SERPL-MCNC: 126 MG/DL (ref 65–99)
HCT VFR BLD AUTO: 44.8 % (ref 34–46.6)
HGB BLD-MCNC: 15.2 G/DL (ref 12–15.9)
IMM GRANULOCYTES # BLD AUTO: 0.03 10*3/MM3 (ref 0–0.05)
IMM GRANULOCYTES NFR BLD AUTO: 0.6 % (ref 0–0.5)
INR PPP: 0.99 (ref 0.91–1.09)
LYMPHOCYTES # BLD AUTO: 1.37 10*3/MM3 (ref 0.7–3.1)
LYMPHOCYTES NFR BLD AUTO: 26.2 % (ref 19.6–45.3)
MCH RBC QN AUTO: 30.2 PG (ref 26.6–33)
MCHC RBC AUTO-ENTMCNC: 33.9 G/DL (ref 31.5–35.7)
MCV RBC AUTO: 88.9 FL (ref 79–97)
MONOCYTES # BLD AUTO: 0.46 10*3/MM3 (ref 0.1–0.9)
MONOCYTES NFR BLD AUTO: 8.8 % (ref 5–12)
NEUTROPHILS NFR BLD AUTO: 3.12 10*3/MM3 (ref 1.7–7)
NEUTROPHILS NFR BLD AUTO: 59.8 % (ref 42.7–76)
NRBC BLD AUTO-RTO: 0 /100 WBC (ref 0–0.2)
PLATELET # BLD AUTO: 195 10*3/MM3 (ref 140–450)
PMV BLD AUTO: 8.6 FL (ref 6–12)
POTASSIUM SERPL-SCNC: 3.9 MMOL/L (ref 3.5–5.2)
PROTHROMBIN TIME: 12.7 SECONDS (ref 11.9–14.6)
RBC # BLD AUTO: 5.04 10*6/MM3 (ref 3.77–5.28)
RH BLD: POSITIVE
SODIUM SERPL-SCNC: 137 MMOL/L (ref 136–145)
T&S EXPIRATION DATE: NORMAL
WBC # BLD AUTO: 5.22 10*3/MM3 (ref 3.4–10.8)

## 2021-04-19 PROCEDURE — 25010000002 METOCLOPRAMIDE PER 10 MG: Performed by: EMERGENCY MEDICINE

## 2021-04-19 PROCEDURE — 85730 THROMBOPLASTIN TIME PARTIAL: CPT | Performed by: EMERGENCY MEDICINE

## 2021-04-19 PROCEDURE — 25010000002 DIPHENHYDRAMINE PER 50 MG: Performed by: EMERGENCY MEDICINE

## 2021-04-19 PROCEDURE — 80048 BASIC METABOLIC PNL TOTAL CA: CPT | Performed by: EMERGENCY MEDICINE

## 2021-04-19 PROCEDURE — 85025 COMPLETE CBC W/AUTO DIFF WBC: CPT | Performed by: EMERGENCY MEDICINE

## 2021-04-19 PROCEDURE — 86901 BLOOD TYPING SEROLOGIC RH(D): CPT | Performed by: EMERGENCY MEDICINE

## 2021-04-19 PROCEDURE — 70450 CT HEAD/BRAIN W/O DYE: CPT

## 2021-04-19 PROCEDURE — 85610 PROTHROMBIN TIME: CPT | Performed by: EMERGENCY MEDICINE

## 2021-04-19 PROCEDURE — 0 IOPAMIDOL PER 1 ML: Performed by: EMERGENCY MEDICINE

## 2021-04-19 PROCEDURE — 99285 EMERGENCY DEPT VISIT HI MDM: CPT

## 2021-04-19 PROCEDURE — 86900 BLOOD TYPING SEROLOGIC ABO: CPT | Performed by: EMERGENCY MEDICINE

## 2021-04-19 PROCEDURE — 70498 CT ANGIOGRAPHY NECK: CPT

## 2021-04-19 PROCEDURE — 70496 CT ANGIOGRAPHY HEAD: CPT

## 2021-04-19 PROCEDURE — 86850 RBC ANTIBODY SCREEN: CPT | Performed by: EMERGENCY MEDICINE

## 2021-04-19 PROCEDURE — 96374 THER/PROPH/DIAG INJ IV PUSH: CPT

## 2021-04-19 PROCEDURE — 96375 TX/PRO/DX INJ NEW DRUG ADDON: CPT

## 2021-04-19 RX ORDER — DEXAMETHASONE 4 MG/1
4 TABLET ORAL 3 TIMES DAILY
Qty: 9 TABLET | Refills: 0 | Status: SHIPPED | OUTPATIENT
Start: 2021-04-19 | End: 2021-04-23

## 2021-04-19 RX ORDER — METOCLOPRAMIDE HYDROCHLORIDE 5 MG/ML
10 INJECTION INTRAMUSCULAR; INTRAVENOUS ONCE
Status: COMPLETED | OUTPATIENT
Start: 2021-04-19 | End: 2021-04-19

## 2021-04-19 RX ORDER — HYDROCODONE BITARTRATE AND ACETAMINOPHEN 5; 325 MG/1; MG/1
1 TABLET ORAL ONCE
Status: COMPLETED | OUTPATIENT
Start: 2021-04-19 | End: 2021-04-19

## 2021-04-19 RX ORDER — DIPHENHYDRAMINE HYDROCHLORIDE 50 MG/ML
25 INJECTION INTRAMUSCULAR; INTRAVENOUS ONCE
Status: COMPLETED | OUTPATIENT
Start: 2021-04-19 | End: 2021-04-19

## 2021-04-19 RX ADMIN — IOPAMIDOL 100 ML: 755 INJECTION, SOLUTION INTRAVENOUS at 07:43

## 2021-04-19 RX ADMIN — DIPHENHYDRAMINE HYDROCHLORIDE 25 MG: 50 INJECTION, SOLUTION INTRAMUSCULAR; INTRAVENOUS at 06:41

## 2021-04-19 RX ADMIN — METOCLOPRAMIDE HYDROCHLORIDE 10 MG: 5 INJECTION INTRAMUSCULAR; INTRAVENOUS at 06:40

## 2021-04-19 RX ADMIN — HYDROCODONE BITARTRATE AND ACETAMINOPHEN 1 TABLET: 5; 325 TABLET ORAL at 10:41

## 2021-04-19 NOTE — ED PROVIDER NOTES
Subjective   History of Present Illness    Review of Systems    Past Medical History:   Diagnosis Date   • Chronic intractable headache 2/8/2021   • Gastroesophageal reflux disease 2/3/2020   • Lumbar pain 7/12/2019   • Mixed hyperlipidemia    • Sinusitis    • Valvular disease        No Known Allergies    Past Surgical History:   Procedure Laterality Date   • BLADDER REPAIR  2005   • BREAST SURGERY     • BUNIONECTOMY  2007   • CARDIAC CATHETERIZATION     • CATARACT EXTRACTION Bilateral 2005   • CHOLECYSTECTOMY     • ENDOSCOPY  2005   • HERNIA REPAIR  1963   • HYSTERECTOMY  1979   • NECK SURGERY  1974    VERTEBRA   • REPLACEMENT TOTAL KNEE BILATERAL     • TONSILLECTOMY         Family History   Problem Relation Age of Onset   • Stroke Mother    • Heart disease Mother    • Stroke Sister    • Heart attack Maternal Grandmother        Social History     Socioeconomic History   • Marital status:      Spouse name: Not on file   • Number of children: Not on file   • Years of education: Not on file   • Highest education level: Not on file   Tobacco Use   • Smoking status: Never Smoker   • Smokeless tobacco: Never Used   Vaping Use   • Vaping Use: Never used   Substance and Sexual Activity   • Alcohol use: No   • Drug use: No   • Sexual activity: Not Currently     Partners: Male           Objective   Physical Exam    Procedures           ED Course  ED Course as of Apr 19 0947   Mon Apr 19, 2021   0943 Patient with a history of headache chronically going on for a while got worse subsequent came the ER for evaluation CTAs have been negative I have discussed this case Dr. Cedeno we are trying to get her an appointment earlier than the scheduled appointment with neurology I have discussed this with the family and with the patient they are agreeable with this plan of care the patient be discharged home at this time does not appear to be in distress    [TS]   0943 Neurological examination intact there is no focal  neurological deficits noted.  There is no tenderness on palpation of the scalp itself.  And there is no bruits on the scalp.    [TS]   0944 Had extended discussion regarding pain management of the headaches she has Lortabs I guess she can keep on taking those if she wants to but post narcotic rebound headaches have been explained.  Furthermore the delirious side effect of narcotics in this age group have been explained.  Do not utilize nonsteroidal secondary to renal dysfunction etc. that can happen in this age group.  The best option will be to use Tylenol.  I am going to give her dexamethasone and see whether it helps her headaches.  I am avoiding using any of antimigraine medication because of causing vasoconstriction and worsening of the symptoms in this age group    [TS]   0933 Risks and benefits of treatments given and alternative treatment options discussed with patient/family. I answered all the questions in simple, plain language, and there was voiced understanding and agreement with plan of care. There were no further questions. Differential diagnosis discussed. Patient/family was advised that the practice of medicine is not always an exact science, and sometimes tests, physical exam, or history may not show the underlying conditions with certainty. Additionally, the condition may change or show itself later after initial presentation. There was also expressed understanding and agreement with this limitation of emergency medicine practice. Patient/family was asked to return to ED if any problem or issues or if condition worsens or does not improved. Patient/family agreed to follow up with PCP/specialist as advised, or return to ED if unable to see a provider in a timely fashion for continued symptoms.     [TS]      ED Course User Index  [TS] Juan Antonio John MD                                           [facility]    Final diagnoses:   Nonintractable headache, unspecified chronicity pattern, unspecified headache  type       ED Disposition  ED Disposition     ED Disposition Condition Comment    Discharge Stable           Reynaldo Cavazos MD  [address] [id number] [phone]    Schedule an appointment as soon as possible for a visit            Medication List      New Prescriptions    dexamethasone 4 MG tablet  Commonly known as: DECADRON  Take 1 tablet by mouth 3 (Three) Times a Day for 3 days.        Stop    predniSONE 10 MG tablet  Commonly known as: DELTASONE           Where to Get Your Medications      These medications were sent to [facility] - [place], [place] - [address] [facility] [phone] [facility] [phone]   [address] [id number]    Phone: [phone]   · dexamethasone 4 MG tablet          Juan Antonio John MD  04/19/21 6827

## 2021-04-19 NOTE — ED PROVIDER NOTES
Subjective   82 y/o female arrives evaluation of headache that began rather suddenly at 0430 today. She has a history of chronic headaches. On review of her charts she was seen by her PMD recently for this with MRI ( I cannot see this but the PMD's note states this was normal) as well as CT of the sinuses which was read as chronic sinusitis for which she has seen ENT and is currently on abx and steroids. She notes this headache feels different as it is in the right side and posterior aspect of her head that has no provoking/alleviating factors, weakness, vision or hearing changes, jaw claudication, falls, trauma or other issues. She arrives via EMS in [place]          Review of Systems   All other systems reviewed and are negative.      Past Medical History:   Diagnosis Date   • Chronic intractable headache 2/8/2021   • Gastroesophageal reflux disease 2/3/2020   • Lumbar pain 7/12/2019   • Mixed hyperlipidemia    • Sinusitis    • Valvular disease        No Known Allergies    Past Surgical History:   Procedure Laterality Date   • BLADDER REPAIR  2005   • BREAST SURGERY     • BUNIONECTOMY  2007   • CARDIAC CATHETERIZATION     • CATARACT EXTRACTION Bilateral 2005   • CHOLECYSTECTOMY     • ENDOSCOPY  2005   • HERNIA REPAIR  1963   • HYSTERECTOMY  1979   • NECK SURGERY  1974    VERTEBRA   • REPLACEMENT TOTAL KNEE BILATERAL     • TONSILLECTOMY         Family History   Problem Relation Age of Onset   • Stroke Mother    • Heart disease Mother    • Stroke Sister    • Heart attack Maternal Grandmother        Social History     Socioeconomic History   • Marital status:      Spouse name: Not on file   • Number of children: Not on file   • Years of education: Not on file   • Highest education level: Not on file   Tobacco Use   • Smoking status: Never Smoker   • Smokeless tobacco: Never Used   Vaping Use   • Vaping Use: Never used   Substance and Sexual Activity   • Alcohol use: No   • Drug use: No   • Sexual activity: Not  Currently     Partners: Male           Objective   Physical Exam  Vitals and nursing note reviewed.   Constitutional:       Appearance: Normal appearance. She is normal weight.   HENT:      Head: Normocephalic and atraumatic.      Right Ear: Tympanic membrane and external ear normal.      Left Ear: External ear normal.      Ears:      Comments: No mastoid tenderness  No tenderness of the temporal lobe      Nose: Nose normal.      Mouth/Throat:      Mouth: Mucous membranes are moist.      Pharynx: Oropharynx is clear.   Eyes:      Extraocular Movements: Extraocular movements intact.      Conjunctiva/sclera: Conjunctivae normal.      Pupils: Pupils are equal, round, and reactive to light.   Cardiovascular:      Rate and Rhythm: Normal rate and regular rhythm.      Pulses: Normal pulses.      Heart sounds: Normal heart sounds.   Pulmonary:      Effort: Pulmonary effort is normal.      Breath sounds: Normal breath sounds.   Musculoskeletal:         General: No swelling or tenderness. Normal range of motion.      Cervical back: Normal range of motion and neck supple. No tenderness.   Skin:     General: Skin is warm and dry.      Capillary Refill: Capillary refill takes less than 2 seconds.   Neurological:      General: No focal deficit present.      Mental Status: She is alert and oriented to person, place, and time. Mental status is at baseline.      Cranial Nerves: No cranial nerve deficit.      Sensory: No sensory deficit.      Motor: No weakness.      Coordination: Coordination normal.   Psychiatric:         Mood and Affect: Mood normal.         Behavior: Behavior normal.         Thought Content: Thought content normal.         Procedures           ED Course  ED Course as of Apr 19 2022   Mon Apr 19, 2021   0943 Patient with a history of headache chronically going on for a while got worse subsequent came the ER for evaluation CTAs have been negative I have discussed this case Dr. Cedeno we are trying to get her an  appointment earlier than the scheduled appointment with neurology I have discussed this with the family and with the patient they are agreeable with this plan of care the patient be discharged home at this time does not appear to be in distress    [TS]   0943 Neurological examination intact there is no focal neurological deficits noted.  There is no tenderness on palpation of the scalp itself.  And there is no bruits on the scalp.    [TS]   0944 Had extended discussion regarding pain management of the headaches she has Lortabs I guess she can keep on taking those if she wants to but post narcotic rebound headaches have been explained.  Furthermore the delirious side effect of narcotics in this age group have been explained.  Do not utilize nonsteroidal secondary to renal dysfunction etc. that can happen in this age group.  The best option will be to use Tylenol.  I am going to give her dexamethasone and see whether it helps her headaches.  I am avoiding using any of antimigraine medication because of causing vasoconstriction and worsening of the symptoms in this age group    [TS]   0945 Risks and benefits of treatments given and alternative treatment options discussed with patient/family. I answered all the questions in simple, plain language, and there was voiced understanding and agreement with plan of care. There were no further questions. Differential diagnosis discussed. Patient/family was advised that the practice of medicine is not always an exact science, and sometimes tests, physical exam, or history may not show the underlying conditions with certainty. Additionally, the condition may change or show itself later after initial presentation. There was also expressed understanding and agreement with this limitation of emergency medicine practice. Patient/family was asked to return to ED if any problem or issues or if condition worsens or does not improved. Patient/family agreed to follow up with  PCP/specialist as advised, or return to ED if unable to see a provider in a timely fashion for continued symptoms.     [TS]      ED Course User Index  [TS] Juan Antonio John MD      Endorsed to Dr. John as patient arrived at shift change     CT Head Without Contrast   Final Result   1. No acute intracranial findings.    2. Mild chronic microvascular changes and vascular calcification.   3. Small mucosal thickening at the left paranasal sinus.   This report was finalized on 04/19/2021 07:49 by Dr Catherine Colvin MD.      CT Angiogram Head   Final Result   Impression:    1. No arterial occlusion or flow-limiting stenosis in the neck. In   particular, there is no evidence of flow-limiting stenosis at the   internal carotid artery origins according to the NASCET assessment.   2. No intracranial flow limiting stenosis or large vessel occlusion.   3. Diffuse thyroid goiter.   This report was finalized on 04/19/2021 07:58 by Dr Ayan Street, .      CT Angiogram Neck   Final Result   Impression:    1. No arterial occlusion or flow-limiting stenosis in the neck. In   particular, there is no evidence of flow-limiting stenosis at the   internal carotid artery origins according to the NASCET assessment.   2. No intracranial flow limiting stenosis or large vessel occlusion.   3. Diffuse thyroid goiter.   This report was finalized on 04/19/2021 07:58 by Dr Ayan Street, .        Labs Reviewed   BASIC METABOLIC PANEL - Abnormal; Notable for the following components:       Result Value    Glucose 126 (*)     Creatinine 0.52 (*)     All other components within normal limits    Narrative:     GFR Normal >60  Chronic Kidney Disease <60  Kidney Failure <15     CBC WITH AUTO DIFFERENTIAL - Abnormal; Notable for the following components:    Immature Grans % 0.6 (*)     All other components within normal limits   PROTIME-INR - Normal   APTT - Normal   TYPE AND SCREEN   CBC AND DIFFERENTIAL    Narrative:     The following orders were  created for panel order CBC & Differential.  Procedure                               Abnormality         Status                     ---------                               -----------         ------                     CBC Auto Differential[[id number]]        Abnormal            Final result                 Please view results for these tests on the individual orders.                                          MDM    Final diagnoses:   Nonintractable headache, unspecified chronicity pattern, unspecified headache type       ED Disposition  ED Disposition     ED Disposition Condition Comment    Discharge Stable           Reynaldo Cavazos MD  [address] [id number] [phone]    Schedule an appointment as soon as possible for a visit            Medication List      New Prescriptions    dexamethasone 4 MG tablet  Commonly known as: DECADRON  Take 1 tablet by mouth 3 (Three) Times a Day for 3 days.        Stop    predniSONE 10 MG tablet  Commonly known as: DELTASONE           Where to Get Your Medications      These medications were sent to [facility] - [place], [place] - [address] [facility] - [phone]  - [phone]   [address] [id number]    Phone: [phone]   · dexamethasone 4 MG tablet          Gurinder Will MD  04/19/21 2022

## 2021-04-23 ENCOUNTER — OFFICE VISIT (OUTPATIENT)
Dept: FAMILY MEDICINE CLINIC | Facility: CLINIC | Age: 81
End: 2021-04-23

## 2021-04-23 VITALS
WEIGHT: 170.6 LBS | HEIGHT: 66 IN | OXYGEN SATURATION: 98 % | TEMPERATURE: 98.5 F | DIASTOLIC BLOOD PRESSURE: 72 MMHG | HEART RATE: 65 BPM | SYSTOLIC BLOOD PRESSURE: 123 MMHG | BODY MASS INDEX: 27.42 KG/M2

## 2021-04-23 DIAGNOSIS — G89.29 CHRONIC INTRACTABLE HEADACHE, UNSPECIFIED HEADACHE TYPE: Primary | ICD-10-CM

## 2021-04-23 DIAGNOSIS — H65.23 BILATERAL CHRONIC SEROUS OTITIS MEDIA: ICD-10-CM

## 2021-04-23 DIAGNOSIS — R51.9 CHRONIC INTRACTABLE HEADACHE, UNSPECIFIED HEADACHE TYPE: Primary | ICD-10-CM

## 2021-04-23 DIAGNOSIS — F41.9 ANXIETY: ICD-10-CM

## 2021-04-23 DIAGNOSIS — I10 ESSENTIAL HYPERTENSION: ICD-10-CM

## 2021-04-23 DIAGNOSIS — J30.89 NON-SEASONAL ALLERGIC RHINITIS, UNSPECIFIED TRIGGER: ICD-10-CM

## 2021-04-23 PROCEDURE — 99213 OFFICE O/P EST LOW 20 MIN: CPT | Performed by: FAMILY MEDICINE

## 2021-04-25 RX ORDER — LEVOCETIRIZINE DIHYDROCHLORIDE 5 MG/1
TABLET, FILM COATED ORAL
Qty: 90 TABLET | Refills: 2 | Status: SHIPPED | OUTPATIENT
Start: 2021-04-25 | End: 2022-02-08

## 2021-05-04 RX ORDER — OMEPRAZOLE 20 MG/1
CAPSULE, DELAYED RELEASE ORAL
Qty: 90 CAPSULE | Refills: 1 | Status: SHIPPED | OUTPATIENT
Start: 2021-05-04 | End: 2021-08-16

## 2021-05-11 DIAGNOSIS — M54.50 LUMBAR PAIN: ICD-10-CM

## 2021-05-11 RX ORDER — HYDROCODONE BITARTRATE AND ACETAMINOPHEN 5; 325 MG/1; MG/1
TABLET ORAL
Qty: 90 TABLET | Refills: 0 | Status: SHIPPED | OUTPATIENT
Start: 2021-05-11 | End: 2021-06-23

## 2021-05-17 RX ORDER — LOSARTAN POTASSIUM 100 MG/1
100 TABLET ORAL DAILY
Qty: 90 TABLET | Refills: 1 | Status: SHIPPED | OUTPATIENT
Start: 2021-05-17 | End: 2021-07-15 | Stop reason: SDUPTHER

## 2021-05-26 ENCOUNTER — OFFICE VISIT (OUTPATIENT)
Dept: NEUROLOGY | Facility: CLINIC | Age: 81
End: 2021-05-26

## 2021-05-26 VITALS
HEART RATE: 82 BPM | SYSTOLIC BLOOD PRESSURE: 128 MMHG | WEIGHT: 169.8 LBS | BODY MASS INDEX: 27.29 KG/M2 | DIASTOLIC BLOOD PRESSURE: 72 MMHG | HEIGHT: 66 IN | OXYGEN SATURATION: 94 %

## 2021-05-26 DIAGNOSIS — M50.30 DDD (DEGENERATIVE DISC DISEASE), CERVICAL: ICD-10-CM

## 2021-05-26 DIAGNOSIS — M54.81 BILATERAL OCCIPITAL NEURALGIA: Primary | ICD-10-CM

## 2021-05-26 DIAGNOSIS — M47.812 ARTHROPATHY OF CERVICAL FACET JOINT: ICD-10-CM

## 2021-05-26 PROCEDURE — 64405 NJX AA&/STRD GR OCPL NRV: CPT | Performed by: PHYSICIAN ASSISTANT

## 2021-05-26 PROCEDURE — 99214 OFFICE O/P EST MOD 30 MIN: CPT | Performed by: PHYSICIAN ASSISTANT

## 2021-05-26 RX ORDER — BUPIVACAINE HYDROCHLORIDE 5 MG/ML
5 INJECTION, SOLUTION PERINEURAL ONCE
Status: COMPLETED | OUTPATIENT
Start: 2021-05-26 | End: 2021-05-26

## 2021-05-26 RX ORDER — TRIAMCINOLONE ACETONIDE 40 MG/ML
40 INJECTION, SUSPENSION INTRA-ARTICULAR; INTRAMUSCULAR ONCE
Status: COMPLETED | OUTPATIENT
Start: 2021-05-26 | End: 2021-05-26

## 2021-05-26 RX ADMIN — BUPIVACAINE HYDROCHLORIDE 5 ML: 5 INJECTION, SOLUTION PERINEURAL at 14:56

## 2021-05-26 RX ADMIN — TRIAMCINOLONE ACETONIDE 40 MG: 40 INJECTION, SUSPENSION INTRA-ARTICULAR; INTRAMUSCULAR at 14:57

## 2021-05-27 ENCOUNTER — TRANSCRIBE ORDERS (OUTPATIENT)
Dept: LAB | Facility: HOSPITAL | Age: 81
End: 2021-05-27

## 2021-05-27 DIAGNOSIS — I10 ESSENTIAL HYPERTENSION: ICD-10-CM

## 2021-05-27 RX ORDER — AMLODIPINE BESYLATE 5 MG/1
TABLET ORAL
Qty: 90 TABLET | Refills: 1 | Status: SHIPPED | OUTPATIENT
Start: 2021-05-27 | End: 2022-02-08

## 2021-06-01 NOTE — PROGRESS NOTES
Neurology Progress Note      Referring provider: Reynaldo Cavazos MD    Chief Complaint:    Chronic headache disorder    Subjective     Subjective:  This is a very pleasant 81-year-old right-hand-dominant female routinely cared for by Reynaldo Cavazos, who is referred for evaluation of headaches that have been present for approximately the last 4 months.  This had an insidious onset and is associated sometimes with some light sensitivity, however, no visual disturbance, sound sensitivity, smell disturbance, etc.  The patient has no prior history of headache.  She does have extensive neck pain and stiffness.  She localizes the pain to the right greater than left occiput which extends along the scalp in the distribution of the lesser and greater occipital nerve.  Occasionally, she also has bifrontal headache and has not had any relief with over-the-counter analgesics.      Past Medical History:   Diagnosis Date   • Chronic intractable headache 2/8/2021   • Gastroesophageal reflux disease 2/3/2020   • Lumbar pain 7/12/2019   • Mixed hyperlipidemia    • Sinusitis    • Valvular disease      Past Surgical History:   Procedure Laterality Date   • BLADDER REPAIR  2005   • BREAST SURGERY     • BUNIONECTOMY  2007   • CARDIAC CATHETERIZATION     • CATARACT EXTRACTION Bilateral 2005   • CHOLECYSTECTOMY     • ENDOSCOPY  2005   • HERNIA REPAIR  1963   • HYSTERECTOMY  1979   • NECK SURGERY  1974    VERTEBRA   • REPLACEMENT TOTAL KNEE BILATERAL     • TONSILLECTOMY       Family History   Problem Relation Age of Onset   • Stroke Mother    • Heart disease Mother    • Stroke Sister    • Heart attack Maternal Grandmother      Social History     Tobacco Use   • Smoking status: Never Smoker   • Smokeless tobacco: Never Used   Vaping Use   • Vaping Use: Never used   Substance Use Topics   • Alcohol use: No   • Drug use: No       Medications:  Current Outpatient Medications   Medication Sig Dispense Refill   • ALPRAZolam (XANAX) 0.25 MG tablet  Take 1 tablet by mouth 2 (Two) Times a Day As Needed for Anxiety. 30 tablet 1   • aspirin 81 MG chewable tablet Chew 81 mg Every Other Day.     • azelastine (ASTELIN) 0.1 % nasal spray 2 sprays into the nostril(s) as directed by provider 2 (Two) Times a Day. Use in each nostril as directed 30 mL 11   • B Complex Vitamins (Vitamin B Complex) tablet      • carvedilol (COREG) 6.25 MG tablet TAKE 1 TABLET BY MOUTH TWICE DAILY WITH MEALS 180 tablet 1   • CBD (cannabidiol) oral oil Take 4 drops by mouth 2 (Two) Times a Day.     • HYDROcodone-acetaminophen (NORCO) 5-325 MG per tablet TAKE ONE TABLET BY MOUTH EVERY 8 HOURS AS NEEDED FOR MODERATE OR SEVERE PAIN 90 tablet 0   • levocetirizine (XYZAL) 5 MG tablet TAKE ONE TABLET BY MOUTH EVERY EVENING 90 tablet 2   • losartan (COZAAR) 100 MG tablet Take 1 tablet by mouth Daily. 90 tablet 1   • meclizine (ANTIVERT) 25 MG tablet Take 25 mg by mouth 2 (two) times a day.     • metoclopramide (Reglan) 10 MG tablet Take 5 mg by mouth Daily.     • Misc Natural Products (MOUTH TONIC PO) Take  by mouth.     • mupirocin (BACTROBAN) 2 % ointment Apply intranasally twice a day 22 g 0   • neomycin-polymyxin-hydrocortisone (CORTISPORIN) 3.5-30095-7 otic solution Administer 3 drops to the right ear 3 (Three) Times a Day. 10 mL 0   • omeprazole (priLOSEC) 20 MG capsule TAKE ONE CAPSULE BY MOUTH EVERY DAY 90 capsule 1   • pravastatin (PRAVACHOL) 20 MG tablet Take 1 tablet by mouth Daily. 90 tablet 1   • temazepam (RESTORIL) 30 MG capsule Take 1 capsule by mouth At Night As Needed for Sleep. 90 capsule 1   • amLODIPine (NORVASC) 5 MG tablet TAKE ONE TABLET BY MOUTH EVERY DAY 90 tablet 1     No current facility-administered medications for this visit.       Allergies:    Patient has no known allergies.    Review of Systems:   -A 14 point review of systems is completed and is negative.      Objective      Vital Signs       Physical Exam:    General Exam:  Head:  Normocephalic, atraumatic.  HEENT:  PERRLA.  Full EOM.  Neck:  No lymphadenopathy, thyromegaly or bruit.  Cardiac:  Regular rate and rhythm.  Normal S1, S2.  No murmur, rub or gallop.  Lungs:  Clear to auscultation bilaterally.  No wheeze, rales or rhonchi.  Abdomen:  Non-tender, Non-distended.  Bowel sounds normoactive.  Extremities: Full peripheral pulses.  No clubbing, cyanosis or edema.  Skin: No ulceration, breakdown or rash.      Neurologic Exam:  CERVICAL SPINE EXAMINATION:  RANGE OF MOTION: Reduced range of motion in all planes.  Patient has normal flexion, however, limited in extension, side rotation and side bending.  PALPATION: Patient is tender in the posterior strap musculature of the neck including the splenius cervicis, splenius capitis, levator spinae, upper and mid trapezius and rhomboids.  STRENGTH: 5/5 bilateral trapezius, deltoid, triceps, biceps, wrist extensors/flexors, finger opposition.  SENSATION: Light touch and pinprick intact C5-T1 bilaterally.  REFLEXES: DTRs are 2+ bilaterally at biceps, triceps, and brachioradialis.  [employer] and palmomental are negative bilaterally.  SPECIAL TESTS:  Tinel's & Phalen's are negative. Spurling's is negative bilaterally.     Coordination:  -Finger to nose intact BUEs  -Heel to shin intact BLEs  -No ataxia     Gait  -No signs of ataxia  -ambulates unassisted       Results Review:                    Assessment/Plan     Impression:  1.  Occipital headache  2.  DDD cervical  3.  Cervical facet arthropathy      Plan:  1.  I have reviewed the clinical and radiographic findings with the patient in detail.  The patient seems to have headaches that originate primarily from her cervical spine.  I did use the CT angiogram of her neck that was from 4/19/2021 demonstrating multilevel cervical degenerative disc disease.    2.  This does demonstrate a previous anterior cervical fusion at C6-7.  Unfortunately, she was fused and anterior listhesis of C6 in relation to C7 which then change the architecture  of her spine likely promoted to additional degenerative changes between C2 and C6.    3.  The patient has exceptional facet arthropathy at C2-3 and C3-4.  It is very possible that there is foraminal stenosis contributing to compression of these nerve roots that then referred pain into the occiput with the occipital nerve having its origins of the C2 and C3 nerve root.    4.  Today, I did provide bilateral occipital nerve block to the patient.  The patient had excellent relief with the anesthetic phase of this.    5.  We will plan on seeing her back in 1 month repeating the occipital nerve block at which time we will likely begin outpatient physical therapy to work on cervical range of motion, facet distraction with cervical traction, cervical myofascial release techniques, e-stim and massage.    Patient will call for any problems or questions in the interim.    Thank you, Dr. Cavazos, for the referral and the opportunity participate in the care of your patient.  Please call with any concern or question.    Sincerely,          Oren Couch PA-C  06/01/21  08:00 CDT      Greater than 55 minutes spent preparing the patient's visit in chart, reviewing past history and diagnostic studies including imaging and laboratory evaluation, obtaining clinical history, performing physical examination, and counseling the patient on the prescribed plan of therapy and care, ordering diagnostic testing, making appropriate referrals, documenting the encounter and interpretation of results and coordination of care.

## 2021-06-02 DIAGNOSIS — E78.2 MIXED HYPERLIPIDEMIA: ICD-10-CM

## 2021-06-03 RX ORDER — PRAVASTATIN SODIUM 20 MG
TABLET ORAL
Qty: 90 TABLET | Refills: 1 | Status: SHIPPED | OUTPATIENT
Start: 2021-06-03 | End: 2021-11-09

## 2021-06-10 DIAGNOSIS — F41.9 ANXIETY: ICD-10-CM

## 2021-06-10 RX ORDER — ALPRAZOLAM 0.25 MG/1
TABLET ORAL
Qty: 30 TABLET | Refills: 1 | Status: SHIPPED | OUTPATIENT
Start: 2021-06-10 | End: 2021-07-15 | Stop reason: SDUPTHER

## 2021-06-10 NOTE — TELEPHONE ENCOUNTER
Patient last seen 4.9.21. Patient has a follow up 7.9.21. UDS/CS will be obtained at July appointment.

## 2021-06-22 DIAGNOSIS — M54.50 LUMBAR PAIN: ICD-10-CM

## 2021-06-23 RX ORDER — HYDROCODONE BITARTRATE AND ACETAMINOPHEN 5; 325 MG/1; MG/1
TABLET ORAL
Qty: 90 TABLET | Refills: 0 | Status: SHIPPED | OUTPATIENT
Start: 2021-06-23 | End: 2021-07-27 | Stop reason: SDUPTHER

## 2021-07-01 ENCOUNTER — OFFICE VISIT (OUTPATIENT)
Dept: NEUROLOGY | Facility: CLINIC | Age: 81
End: 2021-07-01

## 2021-07-01 VITALS
HEART RATE: 79 BPM | HEIGHT: 66 IN | BODY MASS INDEX: 27.32 KG/M2 | SYSTOLIC BLOOD PRESSURE: 104 MMHG | OXYGEN SATURATION: 94 % | WEIGHT: 170 LBS | DIASTOLIC BLOOD PRESSURE: 58 MMHG

## 2021-07-01 DIAGNOSIS — M50.30 DDD (DEGENERATIVE DISC DISEASE), CERVICAL: ICD-10-CM

## 2021-07-01 DIAGNOSIS — M54.81 BILATERAL OCCIPITAL NEURALGIA: Primary | ICD-10-CM

## 2021-07-01 DIAGNOSIS — M47.812 ARTHROPATHY OF CERVICAL FACET JOINT: ICD-10-CM

## 2021-07-01 DIAGNOSIS — G47.9 DIFFICULTY SLEEPING: ICD-10-CM

## 2021-07-01 PROCEDURE — 99214 OFFICE O/P EST MOD 30 MIN: CPT | Performed by: PHYSICIAN ASSISTANT

## 2021-07-01 PROCEDURE — 64405 NJX AA&/STRD GR OCPL NRV: CPT | Performed by: PHYSICIAN ASSISTANT

## 2021-07-01 RX ORDER — TRIAMCINOLONE ACETONIDE 40 MG/ML
40 INJECTION, SUSPENSION INTRA-ARTICULAR; INTRAMUSCULAR ONCE
Status: COMPLETED | OUTPATIENT
Start: 2021-07-01 | End: 2021-07-01

## 2021-07-01 RX ORDER — TRAZODONE HYDROCHLORIDE 50 MG/1
TABLET ORAL
Qty: 60 TABLET | Refills: 0 | Status: SHIPPED | OUTPATIENT
Start: 2021-07-01 | End: 2021-08-30 | Stop reason: SDUPTHER

## 2021-07-01 RX ORDER — BUPIVACAINE HYDROCHLORIDE 5 MG/ML
5 INJECTION, SOLUTION PERINEURAL ONCE
Status: COMPLETED | OUTPATIENT
Start: 2021-07-01 | End: 2021-07-01

## 2021-07-01 RX ADMIN — BUPIVACAINE HYDROCHLORIDE 5 ML: 5 INJECTION, SOLUTION PERINEURAL at 15:28

## 2021-07-01 RX ADMIN — TRIAMCINOLONE ACETONIDE 40 MG: 40 INJECTION, SUSPENSION INTRA-ARTICULAR; INTRAMUSCULAR at 15:29

## 2021-07-01 NOTE — PROGRESS NOTES
Neurology Progress Note      Chief Complaint:    Bilateral occipital neuralgia  DDD cervical  Cervical facet arthropathy  Recent fall    Subjective     Subjective:  Patient presents today for repeat bilateral occipital nerve block with corticosteroid.  The patient had a recent fall and fractured her nasal bones.  She had no loss of consciousness.  This is exacerbated her neck pain and headaches.  She did receive about 2 weeks of initial response and benefit with the previous occipital nerve block injections.  She is ready to begin outpatient physical therapy.      Past Medical History:   Diagnosis Date   • Chronic intractable headache 2/8/2021   • Gastroesophageal reflux disease 2/3/2020   • Lumbar pain 7/12/2019   • Mixed hyperlipidemia    • Sinusitis    • Valvular disease      Past Surgical History:   Procedure Laterality Date   • BLADDER REPAIR  2005   • BREAST SURGERY     • BUNIONECTOMY  2007   • CARDIAC CATHETERIZATION     • CATARACT EXTRACTION Bilateral 2005   • CHOLECYSTECTOMY     • ENDOSCOPY  2005   • HERNIA REPAIR  1963   • HYSTERECTOMY  1979   • NECK SURGERY  1974    VERTEBRA   • REPLACEMENT TOTAL KNEE BILATERAL     • TONSILLECTOMY       Family History   Problem Relation Age of Onset   • Stroke Mother    • Heart disease Mother    • Stroke Sister    • Heart attack Maternal Grandmother      Social History     Tobacco Use   • Smoking status: Never Smoker   • Smokeless tobacco: Never Used   Vaping Use   • Vaping Use: Never used   Substance Use Topics   • Alcohol use: No   • Drug use: No       Medications:  Current Outpatient Medications   Medication Sig Dispense Refill   • ALPRAZolam (XANAX) 0.25 MG tablet TAKE ONE TABLET BY MOUTH TWICE A DAY AS NEEDED FOR ANXIETY 30 tablet 1   • amLODIPine (NORVASC) 5 MG tablet TAKE ONE TABLET BY MOUTH EVERY DAY 90 tablet 1   • aspirin 81 MG chewable tablet Chew 81 mg Every Other Day.     • azelastine (ASTELIN) 0.1 % nasal spray 2 sprays into the nostril(s) as directed by  provider 2 (Two) Times a Day. Use in each nostril as directed 30 mL 11   • B Complex Vitamins (Vitamin B Complex) tablet Take 1 tablet by mouth Daily.     • carvedilol (COREG) 6.25 MG tablet TAKE 1 TABLET BY MOUTH TWICE DAILY WITH MEALS 180 tablet 1   • CBD (cannabidiol) oral oil Take 4 drops by mouth 2 (Two) Times a Day.     • HYDROcodone-acetaminophen (NORCO) 5-325 MG per tablet TAKE ONE TABLET BY MOUTH EVERY 8 HOURS AS NEEDED FOR MODERATE OR SEVERE PAIN 90 tablet 0   • levocetirizine (XYZAL) 5 MG tablet TAKE ONE TABLET BY MOUTH EVERY EVENING 90 tablet 2   • losartan (COZAAR) 100 MG tablet Take 1 tablet by mouth Daily. 90 tablet 1   • meclizine (ANTIVERT) 25 MG tablet Take 25 mg by mouth 2 (two) times a day.     • metoclopramide (Reglan) 10 MG tablet Take 5 mg by mouth Daily.     • Misc Natural Products (MOUTH TONIC PO) Take  by mouth.     • mupirocin (BACTROBAN) 2 % ointment Apply intranasally twice a day 22 g 0   • neomycin-polymyxin-hydrocortisone (CORTISPORIN) 3.5-18264-9 otic solution Administer 3 drops to the right ear 3 (Three) Times a Day. 10 mL 0   • omeprazole (priLOSEC) 20 MG capsule TAKE ONE CAPSULE BY MOUTH EVERY DAY 90 capsule 1   • pravastatin (PRAVACHOL) 20 MG tablet TAKE ONE TABLET BY MOUTH EVERY DAY 90 tablet 1   • traZODone (DESYREL) 50 MG tablet 1-2 tabs po qhs for sleep 60 tablet 0     No current facility-administered medications for this visit.       Allergies:    Patient has no known allergies.    Review of Systems:   -A 14 point review of systems is completed and is negative.      Objective      Vital Signs  Heart Rate:  [79] 79  BP: (104)/(58) 104/58    Physical Exam:    General Exam:  Head:  Normocephalic, atraumatic.  HEENT: PERRLA.  Full EOM.  Neck:  No lymphadenopathy, thyromegaly or bruit.  Cardiac:  Regular rate and rhythm.  Normal S1, S2.  No murmur, rub or gallop.  Lungs:  Clear to auscultation bilaterally.  No wheeze, rales or rhonchi.  Abdomen:  Non-tender, Non-distended.  Bowel  sounds normoactive.  Extremities: Full peripheral pulses.  No clubbing, cyanosis or edema.  Skin: No ulceration, breakdown or rash.      Neurologic Exam:  CERVICAL SPINE EXAMINATION:  RANGE OF MOTION: The patient is able to flex, extend, rotate, and side bend without pain or difficulty.  There is full range of motion.    PALPATION: Nontender to palpation midline and through upper cervical musculature.  STRENGTH: 5/5 bilateral trapezius, deltoid, triceps, biceps, wrist extensors/flexors, finger opposition.  SENSATION: Light touch and pinprick intact C5-T1 bilaterally.  REFLEXES: DTRs are 2+ bilaterally at biceps, triceps, and brachioradialis.  [employer] and palmomental are negative bilaterally.  SPECIAL TESTS:  Tinel's & Phalen's are negative. Spurling's is negative bilaterally.     Coordination:  -Finger to nose intact BUEs  -Heel to shin intact BLEs  -No ataxia     Gait  -No signs of ataxia  -ambulates unassisted       PROCEDURE NOTE:     Occipital Nerve Block, Bilateral    After informed consent was obtained, anatomical landmarks were palpated in the insertion of the lesser and greater occipital nerve were located.  The skin was prepped with isopropyl alcohol and the area infiltrated with a 25-gauge 5/8 inch needle using a combination of 2.5 mL 0.25% marcaine and 0.5 mL Kenalog (40 mg/mL).    The procedure was duplicated on the contralateral side.    Hemostasis was assured.  There were no complications to the procedure.  Patient was observed 15 minutes following the procedure.        Assessment/Plan     Impression:  1.  Occipital headache  2.  DDD cervical  3.  Cervical facet arthropathy  4.  Recent fall  5.  Cervical myofascial pain  6.  Difficulty sleeping      Plan:  1.  Patient underwent bilateral occipital nerve blocks without any difficulty today.    2.  Start outpatient physical therapy.  Consider modalities such as traction, myofascial release, e-stim, heat, massage and other modalities.  Also asked him to  provide a home exercise program instruction program for her.    3.  Follow-up in 6 weeks at which time she has persistent pain and headaches will refer to Dr. Lee for consideration of cervical facet blocks or other injection to mitigate much of her ongoing and chronic pain.    4.  Patient also does have difficulty sleeping, however, I have advised for her not to be on concurrent use of temazepam and alprazolam.  We are going to discontinue the temazepam and begin trazodone 50 mg 1-2 tablets nightly for assistance with sleep.  This can further be titrated if needed.  If she has any difficulty, we may decrement her Restoril to 7.5 mg nightly and then discontinue this as able, thereafter.  We discussed the increased risk of dementia with long-term use of benzodiazepines.    The patient voices understanding and agreement with plan of care will call for concerns or questions in the interim.    Greater than 30 minutes spent preparing the patient's visit in chart, reviewing past history and diagnostic studies including imaging and laboratory evaluation, obtaining clinical history, performing physical examination, and counseling the patient on the prescribed plan of therapy and care, ordering diagnostic testing, making appropriate referrals, documenting the encounter and interpretation of results and coordination of care.          Oren Couch PA-C  07/01/21  14:33 CDT

## 2021-07-09 ENCOUNTER — CLINICAL SUPPORT (OUTPATIENT)
Dept: FAMILY MEDICINE CLINIC | Facility: CLINIC | Age: 81
End: 2021-07-09

## 2021-07-09 DIAGNOSIS — R30.9 PAINFUL URINATION: Primary | ICD-10-CM

## 2021-07-09 LAB
BILIRUB BLD-MCNC: NEGATIVE MG/DL
CLARITY, POC: CLEAR
COLOR UR: YELLOW
GLUCOSE UR STRIP-MCNC: NEGATIVE MG/DL
KETONES UR QL: NEGATIVE
LEUKOCYTE EST, POC: ABNORMAL
NITRITE UR-MCNC: NEGATIVE MG/ML
PH UR: 6.5 [PH] (ref 5–8)
PROT UR STRIP-MCNC: NEGATIVE MG/DL
RBC # UR STRIP: NEGATIVE /UL
SP GR UR: 1.01 (ref 1–1.03)
UROBILINOGEN UR QL: NORMAL

## 2021-07-09 PROCEDURE — 81003 URINALYSIS AUTO W/O SCOPE: CPT | Performed by: NURSE PRACTITIONER

## 2021-07-09 NOTE — PROGRESS NOTES
Patient presented in clinic for burning/painful sensations while urinating. Urine was collected and tested and resulted by provider. Urine results were normal and no further testing needed.

## 2021-07-15 ENCOUNTER — OFFICE VISIT (OUTPATIENT)
Dept: FAMILY MEDICINE CLINIC | Facility: CLINIC | Age: 81
End: 2021-07-15

## 2021-07-15 VITALS
SYSTOLIC BLOOD PRESSURE: 128 MMHG | DIASTOLIC BLOOD PRESSURE: 76 MMHG | WEIGHT: 168 LBS | TEMPERATURE: 97.9 F | HEART RATE: 71 BPM | HEIGHT: 66 IN | OXYGEN SATURATION: 96 % | BODY MASS INDEX: 27 KG/M2

## 2021-07-15 DIAGNOSIS — Z79.899 LONG-TERM USE OF HIGH-RISK MEDICATION: Primary | ICD-10-CM

## 2021-07-15 DIAGNOSIS — I10 ESSENTIAL HYPERTENSION: ICD-10-CM

## 2021-07-15 DIAGNOSIS — F41.9 ANXIETY: ICD-10-CM

## 2021-07-15 DIAGNOSIS — E11.40 TYPE 2 DIABETES MELLITUS WITH DIABETIC NEUROPATHY, WITHOUT LONG-TERM CURRENT USE OF INSULIN (HCC): ICD-10-CM

## 2021-07-15 DIAGNOSIS — H65.91 RIGHT OTITIS MEDIA WITH EFFUSION: ICD-10-CM

## 2021-07-15 LAB
EXPIRATION DATE: NORMAL
HBA1C MFR BLD: 5.4 %
Lab: NORMAL

## 2021-07-15 PROCEDURE — 83036 HEMOGLOBIN GLYCOSYLATED A1C: CPT | Performed by: NURSE PRACTITIONER

## 2021-07-15 PROCEDURE — 99214 OFFICE O/P EST MOD 30 MIN: CPT | Performed by: NURSE PRACTITIONER

## 2021-07-15 RX ORDER — LOSARTAN POTASSIUM 100 MG/1
100 TABLET ORAL DAILY
Qty: 90 TABLET | Refills: 1 | Status: SHIPPED | OUTPATIENT
Start: 2021-07-15 | End: 2022-02-08

## 2021-07-15 RX ORDER — CARVEDILOL 6.25 MG/1
6.25 TABLET ORAL 2 TIMES DAILY WITH MEALS
Qty: 180 TABLET | Refills: 1 | Status: SHIPPED | OUTPATIENT
Start: 2021-07-15 | End: 2021-11-09

## 2021-07-15 RX ORDER — AMOXICILLIN 500 MG/1
500 CAPSULE ORAL 2 TIMES DAILY
Qty: 20 CAPSULE | Refills: 0 | Status: SHIPPED | OUTPATIENT
Start: 2021-07-15 | End: 2021-08-13

## 2021-07-15 RX ORDER — ALPRAZOLAM 0.25 MG/1
0.25 TABLET ORAL 2 TIMES DAILY PRN
Qty: 30 TABLET | Refills: 2 | Status: SHIPPED | OUTPATIENT
Start: 2021-07-15 | End: 2021-09-23

## 2021-07-15 NOTE — PROGRESS NOTES
"Chief Complaint  Fall (3 weeks ago, cut nose (ER glued nose) ), Anxiety (3 month OV Xanax), Back Pain (3 month OV [place] ), and Ear Drainage (right ear pain )    Subjective          Shira King presents to [facility]  History of Present Illness    LESION: Patient has a lesion on the bridge of her nose. She states she feel 3 weeks ago and was seen at [facility] [place]. They glued her laceration together and steri-stripped it. She now has a small lesion that is erythematous and tender to the touch. She denies any drainage.     ANXIETY: Patient is here for medication refill. She states that she takes her xanax as needed. She states that she doesn't have to take this medication daily. When she does take the medication, she tolerates it well.  She has shown neither signs of abuse or misuse.  UDS and controlled substance agreement are updated at this encounter.  MALU reviewed.    BACK PAIN: Patient is taking Norco for pain q8h. She states that this does help her pain and she tolerates the medication well without any adverse effects. Risks and benefits of long term opioid use discussed. UDS and controlled substance agreement updated at this encounter.  MALU reviewed.    RIGHT EAR PAIN: She complains of right ear pain x 2 days. She denies any drainage. She has not taken anything over the counter for her symptoms. She does admit to a slight runny nose but no sore throat.     Objective   Vital Signs:   /76 (BP Location: Right arm, Patient Position: Sitting, Cuff Size: Large Adult)   Pulse 71   Temp 97.9 °F (36.6 °C)   Ht 167.6 cm (66\")   Wt 76.2 kg (168 lb)   SpO2 96%   BMI 27.12 kg/m²     Physical Exam  Constitutional:       Appearance: Normal appearance.   HENT:      Right Ear: Ear canal and external ear normal. A middle ear effusion is present. Tympanic membrane is erythematous and bulging.      Nose: Signs of injury and rhinorrhea present.   Eyes:      Pupils: Pupils are " equal, round, and reactive to light.   Cardiovascular:      Rate and Rhythm: Normal rate and regular rhythm.      Heart sounds: Normal heart sounds.   Pulmonary:      Effort: Pulmonary effort is normal.      Breath sounds: Normal breath sounds.   Musculoskeletal:         General: Normal range of motion.   Skin:     General: Skin is warm and dry.      Capillary Refill: Capillary refill takes less than 2 seconds.   Neurological:      Mental Status: She is alert and oriented to person, place, and time.        Result Review :                 Assessment and Plan    Diagnoses and all orders for this visit:    1. Long-term use of high-risk medication (Primary)  -     Compliance Drug Analysis, Ur - Urine, Clean Catch    2. Type 2 diabetes mellitus with diabetic neuropathy, without long-term current use of insulin (CMS/[facility])  -     POCT glycated hemoglobin, total    3. Right otitis media with effusion  -     amoxicillin (AMOXIL) 500 MG capsule; Take 1 capsule by mouth 2 (Two) Times a Day.  Dispense: 20 capsule; Refill: 0    4. Anxiety  -     ALPRAZolam (XANAX) 0.25 MG tablet; Take 1 tablet by mouth 2 (Two) Times a Day As Needed for Anxiety.  Dispense: 30 tablet; Refill: 2    5. Essential hypertension  -     carvedilol (COREG) 6.25 MG tablet; Take 1 tablet by mouth 2 (Two) Times a Day With Meals.  Dispense: 180 tablet; Refill: 1  -     losartan (COZAAR) 100 MG tablet; Take 1 tablet by mouth Daily.  Dispense: 90 tablet; Refill: 1        Follow Up   Return in about 3 months (around 10/15/2021) for Next scheduled follow up.  Patient was given instructions and counseling regarding her condition or for health maintenance advice. Please see specific information pulled into the AVS if appropriate.

## 2021-07-16 ENCOUNTER — CLINICAL SUPPORT (OUTPATIENT)
Dept: FAMILY MEDICINE CLINIC | Facility: CLINIC | Age: 81
End: 2021-07-16

## 2021-07-16 DIAGNOSIS — Z79.899 ENCOUNTER FOR LONG-TERM (CURRENT) USE OF HIGH-RISK MEDICATION: Primary | ICD-10-CM

## 2021-07-16 NOTE — PROGRESS NOTES
Patient presented for UDS. CS Contract was signed during ov on 7/15/21 however patient was unable to void. Patient was successful today.

## 2021-07-19 ENCOUNTER — TELEPHONE (OUTPATIENT)
Dept: NEUROLOGY | Facility: CLINIC | Age: 81
End: 2021-07-19

## 2021-07-19 NOTE — TELEPHONE ENCOUNTER
Caller: EZRA     Relationship: DAUGHTER    Best call back number: [phone]    What was the call regarding: PTS DAUGHTER EZRA, CALLING IN TODAY REGARDING REFERRAL TO PHYSICAL THERAPY- THE REFERRAL HAS NO NOTES OR LOCATION OF WHERE P.T. IS- PT WOULD LIKE TO GO TO [facility]- [address], [place] [id number] [phone].       Do you require a callback: IF NEEDED    PLEASE REVIEW AND ADVISE.   THANKS

## 2021-07-23 LAB — DRUGS UR: NORMAL

## 2021-07-27 DIAGNOSIS — M54.50 LUMBAR PAIN: ICD-10-CM

## 2021-07-27 RX ORDER — HYDROCODONE BITARTRATE AND ACETAMINOPHEN 5; 325 MG/1; MG/1
1 TABLET ORAL EVERY 8 HOURS PRN
Qty: 90 TABLET | Refills: 0 | Status: SHIPPED | OUTPATIENT
Start: 2021-07-27 | End: 2021-08-23 | Stop reason: SDUPTHER

## 2021-07-27 NOTE — TELEPHONE ENCOUNTER
Patient was last seen 7.15.21. Follow up scheduled 10.25.21 for Medicare wellness. UDS/CS up to date.

## 2021-07-27 NOTE — TELEPHONE ENCOUNTER
Caller: Joy King    Relationship: Emergency Contact    Best call back number: [phone]    Medication needed:   Requested Prescriptions     Pending Prescriptions Disp Refills   • HYDROcodone-acetaminophen (NORCO) 5-325 MG per tablet 90 tablet 0       What is the patient's preferred pharmacy: [facility] [address] [phone] [facility] [phone] FX

## 2021-08-13 ENCOUNTER — TELEPHONE (OUTPATIENT)
Dept: FAMILY MEDICINE CLINIC | Facility: CLINIC | Age: 81
End: 2021-08-13

## 2021-08-13 ENCOUNTER — OFFICE VISIT (OUTPATIENT)
Dept: NEUROLOGY | Facility: CLINIC | Age: 81
End: 2021-08-13

## 2021-08-13 VITALS
HEART RATE: 62 BPM | OXYGEN SATURATION: 97 % | WEIGHT: 163 LBS | RESPIRATION RATE: 17 BRPM | HEIGHT: 66 IN | BODY MASS INDEX: 26.2 KG/M2 | SYSTOLIC BLOOD PRESSURE: 104 MMHG | DIASTOLIC BLOOD PRESSURE: 70 MMHG

## 2021-08-13 DIAGNOSIS — M47.812 ARTHROPATHY OF CERVICAL FACET JOINT: ICD-10-CM

## 2021-08-13 DIAGNOSIS — M50.30 DDD (DEGENERATIVE DISC DISEASE), CERVICAL: ICD-10-CM

## 2021-08-13 DIAGNOSIS — M54.81 BILATERAL OCCIPITAL NEURALGIA: Primary | ICD-10-CM

## 2021-08-13 PROCEDURE — 64405 NJX AA&/STRD GR OCPL NRV: CPT | Performed by: PHYSICIAN ASSISTANT

## 2021-08-13 PROCEDURE — 99213 OFFICE O/P EST LOW 20 MIN: CPT | Performed by: PHYSICIAN ASSISTANT

## 2021-08-13 RX ORDER — BUPIVACAINE HYDROCHLORIDE 5 MG/ML
5 INJECTION, SOLUTION PERINEURAL ONCE
Status: COMPLETED | OUTPATIENT
Start: 2021-08-13 | End: 2021-08-13

## 2021-08-13 RX ORDER — TRIAMCINOLONE ACETONIDE 40 MG/ML
40 INJECTION, SUSPENSION INTRA-ARTICULAR; INTRAMUSCULAR ONCE
Status: COMPLETED | OUTPATIENT
Start: 2021-08-13 | End: 2021-08-13

## 2021-08-13 RX ADMIN — TRIAMCINOLONE ACETONIDE 40 MG: 40 INJECTION, SUSPENSION INTRA-ARTICULAR; INTRAMUSCULAR at 09:17

## 2021-08-13 RX ADMIN — BUPIVACAINE HYDROCHLORIDE 5 ML: 5 INJECTION, SOLUTION PERINEURAL at 09:17

## 2021-08-13 NOTE — PROGRESS NOTES
Neurology Progress Note      Chief Complaint:    Occipital neuralgia  DDD cervical  Cervical myofascial pain  Cervical facet arthropathy    Subjective     Subjective:  Patient returns to the clinic today for follow-up evaluation after undergoing previous occipital nerve blocks on 7/1/2021.   She also began outpatient physical therapy.  She continues to localize pain to the bilateral occipital with decreased range of motion.  She is using hydrocodone on an as-needed basis but approximately 3 times daily.  Patient is benefiting from outpatient physical therapy, however, she states she only gets about 2 to 3 hours of sustained pain relief following her therapy sessions.      Past Medical History:   Diagnosis Date   • Chronic intractable headache 2/8/2021   • Gastroesophageal reflux disease 2/3/2020   • Lumbar pain 7/12/2019   • Mixed hyperlipidemia    • Sinusitis    • Valvular disease      Past Surgical History:   Procedure Laterality Date   • BLADDER REPAIR  2005   • BREAST SURGERY     • BUNIONECTOMY  2007   • CARDIAC CATHETERIZATION     • CATARACT EXTRACTION Bilateral 2005   • CHOLECYSTECTOMY     • ENDOSCOPY  2005   • HERNIA REPAIR  1963   • HYSTERECTOMY  1979   • NECK SURGERY  1974    VERTEBRA   • REPLACEMENT TOTAL KNEE BILATERAL     • TONSILLECTOMY       Family History   Problem Relation Age of Onset   • Stroke Mother    • Heart disease Mother    • Stroke Sister    • Heart attack Maternal Grandmother      Social History     Tobacco Use   • Smoking status: Never Smoker   • Smokeless tobacco: Never Used   Vaping Use   • Vaping Use: Never used   Substance Use Topics   • Alcohol use: No   • Drug use: No       Medications:  Current Outpatient Medications   Medication Sig Dispense Refill   • ALPRAZolam (XANAX) 0.25 MG tablet Take 1 tablet by mouth 2 (Two) Times a Day As Needed for Anxiety. 30 tablet 2   • amLODIPine (NORVASC) 5 MG tablet TAKE ONE TABLET BY MOUTH EVERY DAY 90 tablet 1   • aspirin 81 MG chewable tablet  Chew 81 mg Every Other Day.     • azelastine (ASTELIN) 0.1 % nasal spray 2 sprays into the nostril(s) as directed by provider 2 (Two) Times a Day. Use in each nostril as directed 30 mL 11   • B Complex Vitamins (Vitamin B Complex) tablet Take 1 tablet by mouth Daily.     • carvedilol (COREG) 6.25 MG tablet Take 1 tablet by mouth 2 (Two) Times a Day With Meals. 180 tablet 1   • CBD (cannabidiol) oral oil Take 4 drops by mouth 2 (Two) Times a Day.     • HYDROcodone-acetaminophen (NORCO) 5-325 MG per tablet Take 1 tablet by mouth Every 8 (Eight) Hours As Needed for Moderate Pain . 90 tablet 0   • levocetirizine (XYZAL) 5 MG tablet TAKE ONE TABLET BY MOUTH EVERY EVENING 90 tablet 2   • losartan (COZAAR) 100 MG tablet Take 1 tablet by mouth Daily. 90 tablet 1   • meclizine (ANTIVERT) 25 MG tablet Take 25 mg by mouth 2 (two) times a day.     • metoclopramide (Reglan) 10 MG tablet Take 5 mg by mouth Daily.     • Misc Natural Products (MOUTH TONIC PO) Take  by mouth.     • neomycin-polymyxin-hydrocortisone (CORTISPORIN) 3.5-26612-2 otic solution Administer 3 drops to the right ear 3 (Three) Times a Day. 10 mL 0   • omeprazole (priLOSEC) 20 MG capsule TAKE ONE CAPSULE BY MOUTH EVERY DAY 90 capsule 1   • pravastatin (PRAVACHOL) 20 MG tablet TAKE ONE TABLET BY MOUTH EVERY DAY 90 tablet 1   • traZODone (DESYREL) 50 MG tablet 1-2 tabs po qhs for sleep 60 tablet 0     No current facility-administered medications for this visit.       Allergies:    Patient has no known allergies.    Review of Systems:   -A 14 point review of systems is completed and is negative.      Objective      Vital Signs       Physical Exam:    General Exam:  Head:  Normocephalic, atraumatic.  HEENT: PERRLA.  Full EOM.  Neck:  No lymphadenopathy, thyromegaly or bruit.  Cardiac:  Regular rate and rhythm.  Normal S1, S2.  No murmur, rub or gallop.  Lungs:  Clear to auscultation bilaterally.  No wheeze, rales or rhonchi.  Abdomen:  Non-tender, Non-distended.   Bowel sounds normoactive.  Extremities: Full peripheral pulses.  No clubbing, cyanosis or edema.  Skin: No ulceration, breakdown or rash.      Neurologic Exam:  CERVICAL SPINE EXAMINATION:  RANGE OF MOTION: Limited range of motion in all planes.  PALPATION: Tender to palpation midline and through upper cervical musculature.  STRENGTH: 5/5 bilateral trapezius, deltoid, triceps, biceps, wrist extensors/flexors, finger opposition.  SENSATION: Light touch and pinprick intact C5-T1 bilaterally.  REFLEXES: DTRs are 2+ bilaterally at biceps, triceps, and brachioradialis.  [employer] and palmomental are negative bilaterally.  SPECIAL TESTS:  Tinel's & Phalen's are negative. Spurling's is negative bilaterally.     Coordination:  -Finger to nose intact BUEs  -Heel to shin intact BLEs  -No ataxia     Gait  -No signs of ataxia  -ambulates unassisted       PROCEDURE NOTE:    Occipital Nerve Block, Bilateral    After informed consent was obtained, anatomical landmarks were palpated in the insertion of the lesser and greater occipital nerve were located.  The skin was prepped with isopropyl alcohol and the area infiltrated with a 25-gauge 5/8 inch needle using a combination of 2.5 mL 0.25% marcaine and 0.5 mL Kenalog (40 mg/mL).    The procedure was duplicated on the contralateral side.    Hemostasis was assured.  There were no complications to the procedure.  Patient was observed 15 minutes following the procedure.    Subjective Pain Assessment (patient reported):  Pre-procedure pain:  4/10  Post-procedure pain:  2/10      Assessment/Plan     Impression:  1.  Occipital headache  2.  DDD cervical  3.  Cervical facet arthropathy  4.  Recent fall  5.  Cervical myofascial pain  6.  Difficulty sleeping      Plan:  1.  Continue outpatient physical therapy.    2.  Refer to pain management for consideration of medial branch block, facet injection, rhizotomy, possible nerve block, etc.    3.  Follow-up with neurology on an as-needed  basis.    4.  Patient voices understanding and agreement with plan of care and will call for any concerns or questions in the interim.              Oren Couch PA-C  08/13/21  08:37 CDT

## 2021-08-13 NOTE — TELEPHONE ENCOUNTER
Pt thinks she might have another UTI.  She would like to come in for just a urine sample.     Please advise.

## 2021-08-16 RX ORDER — OMEPRAZOLE 20 MG/1
CAPSULE, DELAYED RELEASE ORAL
Qty: 90 CAPSULE | Refills: 1 | Status: SHIPPED | OUTPATIENT
Start: 2021-08-16 | End: 2021-11-09

## 2021-08-23 DIAGNOSIS — M54.50 LUMBAR PAIN: ICD-10-CM

## 2021-08-23 RX ORDER — HYDROCODONE BITARTRATE AND ACETAMINOPHEN 5; 325 MG/1; MG/1
1 TABLET ORAL EVERY 8 HOURS PRN
Qty: 90 TABLET | Refills: 0 | Status: SHIPPED | OUTPATIENT
Start: 2021-08-23 | End: 2021-09-23 | Stop reason: SDUPTHER

## 2021-08-23 NOTE — TELEPHONE ENCOUNTER
Caller: Joy King    Relationship: Emergency Contact    Best call back number: [phone]    Medication needed:   Requested Prescriptions     Pending Prescriptions Disp Refills   • HYDROcodone-acetaminophen (NORCO) 5-325 MG per tablet 90 tablet 0     Sig: Take 1 tablet by mouth Every 8 (Eight) Hours As Needed for Moderate Pain .       When do you need the refill by: ASAP    What additional details did the patient provide when requesting the medication: MEDICATION REFILL    Does the patient have less than a 3 day supply:  [x] Yes  [] No    What is the patient's preferred pharmacy: [facility] [place], [place] - [address] [facility] [phone] [facility] [phone] FX

## 2021-08-27 DIAGNOSIS — G47.9 DIFFICULTY SLEEPING: ICD-10-CM

## 2021-08-27 RX ORDER — TRAZODONE HYDROCHLORIDE 50 MG/1
TABLET ORAL
Qty: 60 TABLET | Refills: 0 | OUTPATIENT
Start: 2021-08-27

## 2021-08-27 NOTE — TELEPHONE ENCOUNTER
Ladonna from Dr. Couch's office asked if we would take over patients Trazodone. He has made her PRN. Her last visit with him was 8.13.21 (notes in her chart). Please advise.

## 2021-08-27 NOTE — TELEPHONE ENCOUNTER
PT'S DAUGHTER CALLED STATING "[employer]" HAD NOT RECEIVED THE REFILL APPROVAL FOR PT'S [place]    INFORMED PT THAT REFILL WAS SENT 8/23 AT 4:30 AND CONFIRMED AS RECEIVED BY PHARMACY, PT WILL CALL BACK IF THERE ARE FURTHER ISSUES    CALLBACK [phone]

## 2021-08-27 NOTE — TELEPHONE ENCOUNTER
Contacted Dr. Cavazos's office, explained that Shira is PRN at our office and requested they prescribe Trazodone.  Joy notified.

## 2021-08-30 RX ORDER — TRAZODONE HYDROCHLORIDE 50 MG/1
TABLET ORAL
Qty: 60 TABLET | Refills: 0 | Status: SHIPPED | OUTPATIENT
Start: 2021-08-30 | End: 2021-11-01

## 2021-08-30 NOTE — TELEPHONE ENCOUNTER
PATIENTS DAUGHTER EZRA CALLED REQUESTING AN UPDATE ON THIS. PATIENT WILL BE OUT OF MEDICATION 08/31.

## 2021-09-07 DIAGNOSIS — R63.4 WEIGHT LOSS: Primary | ICD-10-CM

## 2021-09-07 DIAGNOSIS — F43.21 SITUATIONAL DEPRESSION: ICD-10-CM

## 2021-09-07 RX ORDER — MIRTAZAPINE 15 MG/1
15 TABLET, FILM COATED ORAL NIGHTLY
Qty: 30 TABLET | Refills: 2 | Status: SHIPPED | OUTPATIENT
Start: 2021-09-07 | End: 2021-11-09

## 2021-09-16 ENCOUNTER — TRANSCRIBE ORDERS (OUTPATIENT)
Dept: ADMINISTRATIVE | Facility: HOSPITAL | Age: 81
End: 2021-09-16

## 2021-09-16 DIAGNOSIS — M54.2 CERVICALGIA: Primary | ICD-10-CM

## 2021-09-23 DIAGNOSIS — F41.9 ANXIETY: ICD-10-CM

## 2021-09-23 DIAGNOSIS — M54.50 LUMBAR PAIN: ICD-10-CM

## 2021-09-23 RX ORDER — ALPRAZOLAM 0.25 MG/1
TABLET ORAL
Qty: 30 TABLET | Refills: 2 | Status: SHIPPED | OUTPATIENT
Start: 2021-09-23 | End: 2021-10-21

## 2021-09-24 RX ORDER — HYDROCODONE BITARTRATE AND ACETAMINOPHEN 5; 325 MG/1; MG/1
1 TABLET ORAL EVERY 8 HOURS PRN
Qty: 90 TABLET | Refills: 0 | Status: SHIPPED | OUTPATIENT
Start: 2021-09-24 | End: 2021-10-14 | Stop reason: SDUPTHER

## 2021-09-29 ENCOUNTER — HOSPITAL ENCOUNTER (OUTPATIENT)
Dept: GENERAL RADIOLOGY | Facility: HOSPITAL | Age: 81
Discharge: HOME | End: 2021-09-29

## 2021-09-29 ENCOUNTER — HOSPITAL ENCOUNTER (OUTPATIENT)
Dept: MRI IMAGING | Facility: HOSPITAL | Age: 81
Discharge: HOME | End: 2021-09-29

## 2021-09-29 DIAGNOSIS — M54.2 CERVICALGIA: ICD-10-CM

## 2021-09-29 PROCEDURE — 72050 X-RAY EXAM NECK SPINE 4/5VWS: CPT

## 2021-09-29 PROCEDURE — 72141 MRI NECK SPINE W/O DYE: CPT

## 2021-10-07 ENCOUNTER — CLINICAL SUPPORT (OUTPATIENT)
Dept: FAMILY MEDICINE CLINIC | Facility: CLINIC | Age: 81
End: 2021-10-07

## 2021-10-07 DIAGNOSIS — R31.9 URINARY TRACT INFECTION WITH HEMATURIA, SITE UNSPECIFIED: Primary | ICD-10-CM

## 2021-10-07 DIAGNOSIS — N39.0 URINARY TRACT INFECTION WITH HEMATURIA, SITE UNSPECIFIED: Primary | ICD-10-CM

## 2021-10-07 DIAGNOSIS — R30.9 PAINFUL URINATION: Primary | ICD-10-CM

## 2021-10-07 LAB
BILIRUB BLD-MCNC: NEGATIVE MG/DL
CLARITY, POC: ABNORMAL
COLOR UR: ABNORMAL
GLUCOSE UR STRIP-MCNC: NEGATIVE MG/DL
KETONES UR QL: NEGATIVE
LEUKOCYTE EST, POC: ABNORMAL
NITRITE UR-MCNC: POSITIVE MG/ML
PH UR: 7 [PH] (ref 5–8)
PROT UR STRIP-MCNC: ABNORMAL MG/DL
RBC # UR STRIP: ABNORMAL /UL
SP GR UR: 1.02 (ref 1–1.03)
UROBILINOGEN UR QL: NORMAL

## 2021-10-07 PROCEDURE — 81003 URINALYSIS AUTO W/O SCOPE: CPT | Performed by: NURSE PRACTITIONER

## 2021-10-07 RX ORDER — NITROFURANTOIN 25; 75 MG/1; MG/1
100 CAPSULE ORAL 2 TIMES DAILY
Qty: 10 CAPSULE | Refills: 0 | Status: SHIPPED | OUTPATIENT
Start: 2021-10-07 | End: 2021-10-12

## 2021-10-07 NOTE — PROGRESS NOTES
Patient presented to the office with UTI symptoms. Urine was obtained, tested, an routed to provider to be resulted.

## 2021-10-14 ENCOUNTER — CLINICAL SUPPORT (OUTPATIENT)
Dept: FAMILY MEDICINE CLINIC | Facility: CLINIC | Age: 81
End: 2021-10-14

## 2021-10-14 DIAGNOSIS — E78.2 MIXED HYPERLIPIDEMIA: ICD-10-CM

## 2021-10-14 DIAGNOSIS — R31.9 URINARY TRACT INFECTION WITH HEMATURIA, SITE UNSPECIFIED: Primary | ICD-10-CM

## 2021-10-14 DIAGNOSIS — E11.40 TYPE 2 DIABETES MELLITUS WITH DIABETIC NEUROPATHY, WITHOUT LONG-TERM CURRENT USE OF INSULIN (HCC): ICD-10-CM

## 2021-10-14 DIAGNOSIS — R53.83 FATIGUE, UNSPECIFIED TYPE: ICD-10-CM

## 2021-10-14 DIAGNOSIS — M54.50 LUMBAR PAIN: ICD-10-CM

## 2021-10-14 DIAGNOSIS — N39.0 URINARY TRACT INFECTION WITH HEMATURIA, SITE UNSPECIFIED: Primary | ICD-10-CM

## 2021-10-14 DIAGNOSIS — I10 ESSENTIAL HYPERTENSION: ICD-10-CM

## 2021-10-14 DIAGNOSIS — Z00.00 MEDICARE ANNUAL WELLNESS VISIT, SUBSEQUENT: ICD-10-CM

## 2021-10-14 DIAGNOSIS — Z23 FLU VACCINE NEED: ICD-10-CM

## 2021-10-14 LAB
BILIRUB BLD-MCNC: ABNORMAL MG/DL
CLARITY, POC: CLEAR
COLOR UR: YELLOW
GLUCOSE UR STRIP-MCNC: NEGATIVE MG/DL
KETONES UR QL: NEGATIVE
LEUKOCYTE EST, POC: ABNORMAL
NITRITE UR-MCNC: POSITIVE MG/ML
PH UR: 7 [PH] (ref 5–8)
PROT UR STRIP-MCNC: ABNORMAL MG/DL
RBC # UR STRIP: ABNORMAL /UL
SP GR UR: 1.02 (ref 1–1.03)
UROBILINOGEN UR QL: ABNORMAL

## 2021-10-14 PROCEDURE — 90662 IIV NO PRSV INCREASED AG IM: CPT | Performed by: NURSE PRACTITIONER

## 2021-10-14 PROCEDURE — 81003 URINALYSIS AUTO W/O SCOPE: CPT | Performed by: NURSE PRACTITIONER

## 2021-10-14 PROCEDURE — G0008 ADMIN INFLUENZA VIRUS VAC: HCPCS | Performed by: NURSE PRACTITIONER

## 2021-10-14 RX ORDER — SULFAMETHOXAZOLE AND TRIMETHOPRIM 800; 160 MG/1; MG/1
1 TABLET ORAL 2 TIMES DAILY
Qty: 14 TABLET | Refills: 0 | Status: SHIPPED | OUTPATIENT
Start: 2021-10-14 | End: 2021-10-25

## 2021-10-14 NOTE — PROGRESS NOTES
Patient presented to the office for labs. Patient also presented for UTI symptoms. Specimen was obtained, tested, and results sent to provider for resulting.

## 2021-10-15 LAB
ALBUMIN SERPL-MCNC: 4.3 G/DL (ref 3.6–4.6)
ALBUMIN/GLOB SERPL: 2 {RATIO} (ref 1.2–2.2)
ALP SERPL-CCNC: 86 IU/L (ref 44–121)
ALT SERPL-CCNC: 13 IU/L (ref 0–32)
AST SERPL-CCNC: 33 IU/L (ref 0–40)
BASOPHILS # BLD AUTO: 0 X10E3/UL (ref 0–0.2)
BASOPHILS NFR BLD AUTO: 1 %
BILIRUB SERPL-MCNC: 0.6 MG/DL (ref 0–1.2)
BUN SERPL-MCNC: 10 MG/DL (ref 8–27)
BUN/CREAT SERPL: 13 (ref 12–28)
CALCIUM SERPL-MCNC: 9.4 MG/DL (ref 8.7–10.3)
CHLORIDE SERPL-SCNC: 101 MMOL/L (ref 96–106)
CHOLEST SERPL-MCNC: 130 MG/DL (ref 100–199)
CO2 SERPL-SCNC: 23 MMOL/L (ref 20–29)
CREAT SERPL-MCNC: 0.78 MG/DL (ref 0.57–1)
EOSINOPHIL # BLD AUTO: 0.2 X10E3/UL (ref 0–0.4)
EOSINOPHIL NFR BLD AUTO: 3 %
ERYTHROCYTE [DISTWIDTH] IN BLOOD BY AUTOMATED COUNT: 12.4 % (ref 11.7–15.4)
GLOBULIN SER CALC-MCNC: 2.1 G/DL (ref 1.5–4.5)
GLUCOSE SERPL-MCNC: 94 MG/DL (ref 65–99)
HBA1C MFR BLD: 5.8 % (ref 4.8–5.6)
HCT VFR BLD AUTO: 39 % (ref 34–46.6)
HDLC SERPL-MCNC: 49 MG/DL
HGB BLD-MCNC: 13.3 G/DL (ref 11.1–15.9)
IMM GRANULOCYTES # BLD AUTO: 0 X10E3/UL (ref 0–0.1)
IMM GRANULOCYTES NFR BLD AUTO: 0 %
LDLC SERPL CALC-MCNC: 60 MG/DL (ref 0–99)
LDLC/HDLC SERPL: 1.2 RATIO (ref 0–3.2)
LYMPHOCYTES # BLD AUTO: 1.2 X10E3/UL (ref 0.7–3.1)
LYMPHOCYTES NFR BLD AUTO: 24 %
MCH RBC QN AUTO: 31.7 PG (ref 26.6–33)
MCHC RBC AUTO-ENTMCNC: 34.1 G/DL (ref 31.5–35.7)
MCV RBC AUTO: 93 FL (ref 79–97)
MICROALBUMIN UR-MCNC: 38 UG/ML
MONOCYTES # BLD AUTO: 0.5 X10E3/UL (ref 0.1–0.9)
MONOCYTES NFR BLD AUTO: 10 %
NEUTROPHILS # BLD AUTO: 3 X10E3/UL (ref 1.4–7)
NEUTROPHILS NFR BLD AUTO: 62 %
PLATELET # BLD AUTO: 230 X10E3/UL (ref 150–450)
POTASSIUM SERPL-SCNC: 4.5 MMOL/L (ref 3.5–5.2)
PROT SERPL-MCNC: 6.4 G/DL (ref 6–8.5)
RBC # BLD AUTO: 4.2 X10E6/UL (ref 3.77–5.28)
SODIUM SERPL-SCNC: 136 MMOL/L (ref 134–144)
T4 SERPL-MCNC: 6.2 UG/DL (ref 4.5–12)
TRIGL SERPL-MCNC: 117 MG/DL (ref 0–149)
TSH SERPL DL<=0.005 MIU/L-ACNC: 2.18 UIU/ML (ref 0.45–4.5)
VLDLC SERPL CALC-MCNC: 21 MG/DL (ref 5–40)
WBC # BLD AUTO: 4.9 X10E3/UL (ref 3.4–10.8)

## 2021-10-18 ENCOUNTER — TELEPHONE (OUTPATIENT)
Dept: FAMILY MEDICINE CLINIC | Facility: CLINIC | Age: 81
End: 2021-10-18

## 2021-10-18 ENCOUNTER — OUTSIDE FACILITY SERVICE (OUTPATIENT)
Dept: FAMILY MEDICINE CLINIC | Facility: CLINIC | Age: 81
End: 2021-10-18

## 2021-10-18 RX ORDER — METOCLOPRAMIDE 10 MG/1
5 TABLET ORAL DAILY
Qty: 120 TABLET | Refills: 2 | Status: SHIPPED | OUTPATIENT
Start: 2021-10-18 | End: 2022-04-27 | Stop reason: HOSPADM

## 2021-10-18 RX ORDER — HYDROCODONE BITARTRATE AND ACETAMINOPHEN 5; 325 MG/1; MG/1
1 TABLET ORAL EVERY 8 HOURS PRN
Qty: 90 TABLET | Refills: 0 | Status: SHIPPED | OUTPATIENT
Start: 2021-10-18 | End: 2021-11-09 | Stop reason: SDUPTHER

## 2021-10-18 NOTE — TELEPHONE ENCOUNTER
Pt is having dizzy spells, BP 80/50 and continuing UTI symptoms. Pt's daughter in law stated she is not herself. No availability with Dr. Cavazos today, Pt was advised to see attention at ER. Pt's daughter in law voiced understanding.

## 2021-10-20 ENCOUNTER — TELEPHONE (OUTPATIENT)
Dept: FAMILY MEDICINE CLINIC | Facility: CLINIC | Age: 81
End: 2021-10-20

## 2021-10-20 NOTE — TELEPHONE ENCOUNTER
Pt has hospital follow up scheduled with Mrs. Bonita Romero 10/25/21 11:30. Pt was admitted to [facility] 10/18/21, dc 10/20/21. Please complete TCM.

## 2021-10-21 ENCOUNTER — TRANSITIONAL CARE MANAGEMENT TELEPHONE ENCOUNTER (OUTPATIENT)
Dept: FAMILY MEDICINE CLINIC | Facility: CLINIC | Age: 81
End: 2021-10-21

## 2021-10-21 DIAGNOSIS — F41.9 ANXIETY: ICD-10-CM

## 2021-10-21 RX ORDER — ALPRAZOLAM 0.25 MG/1
TABLET ORAL
Qty: 30 TABLET | Refills: 2 | Status: SHIPPED | OUTPATIENT
Start: 2021-10-21 | End: 2022-02-01

## 2021-10-25 ENCOUNTER — OFFICE VISIT (OUTPATIENT)
Dept: FAMILY MEDICINE CLINIC | Facility: CLINIC | Age: 81
End: 2021-10-25

## 2021-10-25 VITALS
WEIGHT: 155.6 LBS | HEART RATE: 66 BPM | HEIGHT: 66 IN | BODY MASS INDEX: 25.01 KG/M2 | OXYGEN SATURATION: 96 % | DIASTOLIC BLOOD PRESSURE: 65 MMHG | SYSTOLIC BLOOD PRESSURE: 108 MMHG | TEMPERATURE: 97.7 F

## 2021-10-25 DIAGNOSIS — Z09 HOSPITAL DISCHARGE FOLLOW-UP: Primary | ICD-10-CM

## 2021-10-25 DIAGNOSIS — N39.0 URINARY TRACT INFECTION WITH HEMATURIA, SITE UNSPECIFIED: ICD-10-CM

## 2021-10-25 DIAGNOSIS — R63.0 POOR APPETITE: ICD-10-CM

## 2021-10-25 DIAGNOSIS — R31.9 URINARY TRACT INFECTION WITH HEMATURIA, SITE UNSPECIFIED: ICD-10-CM

## 2021-10-25 DIAGNOSIS — R63.4 WEIGHT LOSS: ICD-10-CM

## 2021-10-25 DIAGNOSIS — Z87.440 HISTORY OF RECURRENT UTIS: ICD-10-CM

## 2021-10-25 PROCEDURE — 1111F DSCHRG MED/CURRENT MED MERGE: CPT | Performed by: NURSE PRACTITIONER

## 2021-10-25 PROCEDURE — 99496 TRANSJ CARE MGMT HIGH F2F 7D: CPT | Performed by: NURSE PRACTITIONER

## 2021-10-25 RX ORDER — CIPROFLOXACIN 500 MG/1
500 TABLET, FILM COATED ORAL 2 TIMES DAILY
Qty: 14 TABLET | Refills: 0 | Status: SHIPPED | OUTPATIENT
Start: 2021-10-25 | End: 2022-03-30

## 2021-10-25 RX ORDER — CEFUROXIME AXETIL 500 MG/1
500 TABLET ORAL 2 TIMES DAILY
COMMUNITY
Start: 2021-10-21 | End: 2022-03-30

## 2021-10-25 NOTE — PROGRESS NOTES
"Transitional Care Follow Up Visit  Subjective     Shira King is a 81 y.o. female who presents for a transitional care management visit.    Within 48 business hours after discharge our office contacted her via telephone to coordinate her care and needs.      I reviewed and discussed the details of that call along with the discharge summary, hospital problems, inpatient lab results, inpatient diagnostic studies, and consultation reports with Shira.     Current outpatient and discharge medications have been reconciled for the patient.  Reviewed by: Bonita Romero, ROBIN      Date of TCM Phone Call 10/21/2021   [facility]   Date of Admission 10/18/2021   Date of Discharge 10/20/2021   Discharge Disposition Home or Self Care     Risk for Readmission (LACE) No data recorded    History of Present Illness   Course During Hospital Stay:  Patient states she was admitted through the ER on 10/18/2021 when she was taken there for low b/p and confusion.  She has a history of recurrent UTI's and was currently being treated with Bactrim DS (started 10/14/2021).  She states she got IVF and IV antibiotics and readily began feeling much better.  She was discharged home on 10/20/2021 on Ceftin 500 mg BID and was instructed to take the last 2 days of Bactrim as well.  She reports feeling better until today when \"it just doesn't feel right when I urinate again.  I have burning and bladder spasms.\"  Of note, she was treated with Macrobid on 10/7/2021 for 5 days.    Hospital records are not available at the time of her office visit today.  They have been requested.     The following portions of the patient's history were reviewed and updated as appropriate: allergies, current medications, past family history, past medical history, past social history, past surgical history and problem list.    Review of Systems   Constitutional: Positive for activity change, appetite change and fatigue. Negative for " fever.   Respiratory: Negative.  Negative for cough and shortness of breath.    Cardiovascular: Negative.  Negative for chest pain.   Gastrointestinal: Negative.    Genitourinary: Positive for difficulty urinating, dysuria, frequency, pelvic pain and urgency.   Musculoskeletal: Positive for back pain.        Chronic, stable.   Skin: Negative.    Neurological: Positive for weakness and headaches.        Headaches are chronic, stable.  Weakness upon returning home from hospitalization, generalized in nature.   Psychiatric/Behavioral: Positive for sleep disturbance.        Chronic, stable.       Objective   Physical Exam  Vitals and nursing note reviewed.   Constitutional:       General: She is not in acute distress.     Appearance: Normal appearance. She is well-developed and normal weight. She is ill-appearing. She is not diaphoretic.   HENT:      Head: Normocephalic and atraumatic.   Eyes:      Conjunctiva/sclera: Conjunctivae normal.      Pupils: Pupils are equal, round, and reactive to light.   Cardiovascular:      Rate and Rhythm: Normal rate and regular rhythm.      Heart sounds: Normal heart sounds. No murmur heard.      Pulmonary:      Effort: Pulmonary effort is normal. No respiratory distress.      Breath sounds: Normal breath sounds.   Abdominal:      General: Bowel sounds are normal. There is no distension.      Palpations: Abdomen is soft.      Tenderness: There is no abdominal tenderness.   Musculoskeletal:         General: Normal range of motion.      Cervical back: Normal range of motion and neck supple.   Skin:     General: Skin is warm and dry.      Capillary Refill: Capillary refill takes less than 2 seconds.      Findings: No erythema.   Neurological:      Mental Status: She is alert and oriented to person, place, and time.   Psychiatric:         Behavior: Behavior normal.         Thought Content: Thought content normal.         Judgment: Judgment normal.         Assessment/Plan   Problems  Addressed this Visit     None      Visit Diagnoses     Hospital discharge follow-up    -  Primary    Urinary tract infection with hematuria, site unspecified        Relevant Medications    cefuroxime (CEFTIN) 500 MG tablet    ciprofloxacin (Cipro) 500 MG tablet    Other Relevant Orders    Urinalysis With Culture If Indicated - Urine, Clean Catch    History of recurrent UTIs        Relevant Orders    Urinalysis With Culture If Indicated - Urine, Clean Catch    Weight loss        Poor appetite          Diagnoses       Codes Comments    Hospital discharge follow-up    -  Primary ICD-10-CM: Z09  ICD-9-CM: V67.59     Urinary tract infection with hematuria, site unspecified     ICD-10-CM: N39.0, R31.9  ICD-9-CM: 599.0, 599.70     History of recurrent UTIs     ICD-10-CM: Z87.440  ICD-9-CM: V13.02     Weight loss     ICD-10-CM: R63.4  ICD-9-CM: 783.21     Poor appetite     ICD-10-CM: R63.0  ICD-9-CM: 783.0         Discussed possible need for urologist referral.  Will obtain a C&S from today's sample as well as review results of labs from hospitalization.  Will make further recommendations once this information is reviewed.      Will follow up in 2 weeks.

## 2021-10-28 ENCOUNTER — TELEPHONE (OUTPATIENT)
Dept: FAMILY MEDICINE CLINIC | Facility: CLINIC | Age: 81
End: 2021-10-28

## 2021-10-28 LAB
APPEARANCE UR: ABNORMAL
BACTERIA #/AREA URNS HPF: ABNORMAL /[HPF]
BACTERIA UR CULT: ABNORMAL
BACTERIA UR CULT: ABNORMAL
BILIRUB UR QL STRIP: NEGATIVE
CASTS URNS QL MICRO: ABNORMAL /LPF
COLOR UR: YELLOW
EPI CELLS #/AREA URNS HPF: ABNORMAL /HPF (ref 0–10)
GLUCOSE UR QL: NEGATIVE
HGB UR QL STRIP: NEGATIVE
KETONES UR QL STRIP: NEGATIVE
LEUKOCYTE ESTERASE UR QL STRIP: ABNORMAL
MICRO URNS: ABNORMAL
NITRITE UR QL STRIP: NEGATIVE
OTHER ANTIBIOTIC SUSC ISLT: ABNORMAL
PH UR STRIP: 7.5 [PH] (ref 5–7.5)
PROT UR QL STRIP: NEGATIVE
RBC #/AREA URNS HPF: ABNORMAL /HPF (ref 0–2)
SP GR UR: 1.01 (ref 1–1.03)
URINALYSIS REFLEX: ABNORMAL
UROBILINOGEN UR STRIP-MCNC: 0.2 MG/DL (ref 0.2–1)
WBC #/AREA URNS HPF: >30 /HPF (ref 0–5)

## 2021-10-28 NOTE — TELEPHONE ENCOUNTER
Patients daughter called stating patient had mentioned getting referred to a Urologist. I don't see a referral placed. Do you know or remember having the conversation?

## 2021-10-28 NOTE — TELEPHONE ENCOUNTER
Urology wants to see 3 culture reports.  Unfortunately, we will have to wait for the next UTI, get a culture, then refer.   Family

## 2021-11-01 DIAGNOSIS — G47.9 DIFFICULTY SLEEPING: ICD-10-CM

## 2021-11-01 RX ORDER — TRAZODONE HYDROCHLORIDE 50 MG/1
TABLET ORAL
Qty: 60 TABLET | Refills: 5 | Status: SHIPPED | OUTPATIENT
Start: 2021-11-01 | End: 2022-02-08

## 2021-11-08 DIAGNOSIS — I10 ESSENTIAL HYPERTENSION: ICD-10-CM

## 2021-11-08 DIAGNOSIS — F43.21 SITUATIONAL DEPRESSION: ICD-10-CM

## 2021-11-08 DIAGNOSIS — E78.2 MIXED HYPERLIPIDEMIA: ICD-10-CM

## 2021-11-08 DIAGNOSIS — R11.0 NAUSEA: Primary | ICD-10-CM

## 2021-11-08 DIAGNOSIS — R63.4 WEIGHT LOSS: ICD-10-CM

## 2021-11-08 RX ORDER — ONDANSETRON 4 MG/1
4 TABLET, FILM COATED ORAL EVERY 8 HOURS PRN
Qty: 15 TABLET | Refills: 0 | Status: ON HOLD | OUTPATIENT
Start: 2021-11-08 | End: 2022-04-26

## 2021-11-09 RX ORDER — PRAVASTATIN SODIUM 20 MG
TABLET ORAL
Qty: 90 TABLET | Refills: 1 | Status: ON HOLD | OUTPATIENT
Start: 2021-11-09 | End: 2022-04-26

## 2021-11-09 RX ORDER — OMEPRAZOLE 20 MG/1
CAPSULE, DELAYED RELEASE ORAL
Qty: 90 CAPSULE | Refills: 1 | Status: ON HOLD | OUTPATIENT
Start: 2021-11-09 | End: 2022-04-26

## 2021-11-09 RX ORDER — MIRTAZAPINE 15 MG/1
TABLET, FILM COATED ORAL
Qty: 90 TABLET | Refills: 1 | Status: SHIPPED | OUTPATIENT
Start: 2021-11-09 | End: 2022-02-08

## 2021-11-09 RX ORDER — CARVEDILOL 6.25 MG/1
TABLET ORAL
Qty: 180 TABLET | Refills: 1 | Status: ON HOLD | OUTPATIENT
Start: 2021-11-09 | End: 2022-04-26

## 2021-11-09 RX ORDER — HYDROCODONE BITARTRATE AND ACETAMINOPHEN 5; 325 MG/1; MG/1
1 TABLET ORAL EVERY 8 HOURS PRN
Qty: 90 TABLET | Refills: 0 | Status: SHIPPED | OUTPATIENT
Start: 2021-11-09 | End: 2021-12-29

## 2021-11-12 DIAGNOSIS — G47.00 INSOMNIA, UNSPECIFIED TYPE: Primary | ICD-10-CM

## 2021-11-12 RX ORDER — TEMAZEPAM 30 MG/1
CAPSULE ORAL
Qty: 90 CAPSULE | Refills: 0 | Status: SHIPPED | OUTPATIENT
Start: 2021-11-12 | End: 2022-03-30

## 2021-11-12 NOTE — TELEPHONE ENCOUNTER
Caller: Shira King    Relationship: Self    Best call back number: [phone]    Caller requesting test results SELF     What test was performed: BLOOD WORK     When was the test performed: 11/8/2021     Where was the test performed: IN OFFICE

## 2021-12-27 ENCOUNTER — CLINICAL SUPPORT (OUTPATIENT)
Dept: FAMILY MEDICINE CLINIC | Facility: CLINIC | Age: 81
End: 2021-12-27

## 2021-12-27 DIAGNOSIS — N39.0 URINARY TRACT INFECTION WITH HEMATURIA, SITE UNSPECIFIED: Primary | ICD-10-CM

## 2021-12-27 DIAGNOSIS — R31.9 URINARY TRACT INFECTION WITH HEMATURIA, SITE UNSPECIFIED: Primary | ICD-10-CM

## 2021-12-27 LAB
BILIRUB BLD-MCNC: NEGATIVE MG/DL
CLARITY, POC: ABNORMAL
COLOR UR: YELLOW
GLUCOSE UR STRIP-MCNC: NEGATIVE MG/DL
KETONES UR QL: NEGATIVE
LEUKOCYTE EST, POC: ABNORMAL
NITRITE UR-MCNC: NEGATIVE MG/ML
PH UR: 7 [PH] (ref 5–8)
PROT UR STRIP-MCNC: NEGATIVE MG/DL
RBC # UR STRIP: NEGATIVE /UL
SP GR UR: 1.01 (ref 1–1.03)
UROBILINOGEN UR QL: NORMAL

## 2021-12-27 PROCEDURE — 81003 URINALYSIS AUTO W/O SCOPE: CPT | Performed by: FAMILY MEDICINE

## 2021-12-27 NOTE — PROGRESS NOTES
Patient presented to the office with UTI symptoms. Patient left a urine specimen, specimen was tested, and routed to Provider for review.

## 2021-12-28 ENCOUNTER — TELEPHONE (OUTPATIENT)
Dept: FAMILY MEDICINE CLINIC | Facility: CLINIC | Age: 81
End: 2021-12-28

## 2021-12-28 DIAGNOSIS — N39.0 URINARY TRACT INFECTION WITHOUT HEMATURIA, SITE UNSPECIFIED: Primary | ICD-10-CM

## 2021-12-28 RX ORDER — NITROFURANTOIN 25; 75 MG/1; MG/1
100 CAPSULE ORAL 2 TIMES DAILY
Qty: 10 CAPSULE | Refills: 0 | Status: SHIPPED | OUTPATIENT
Start: 2021-12-28 | End: 2022-01-02

## 2021-12-28 NOTE — TELEPHONE ENCOUNTER
Caller: FabianaMontse    Relationship: Emergency Contact    Best call back number: [phone]    Caller requesting test results: DAUGHTER     What test was performed:URINALYSIS     When was the test performed: Monday 12/27/2021     Where was the test performed: IN OFFICE     Additional notes:     [email or web address] [place], [place] - [address] [facility] - [phone]  - [phone]   [phone]

## 2021-12-29 DIAGNOSIS — M54.50 LUMBAR PAIN: ICD-10-CM

## 2021-12-29 RX ORDER — HYDROCODONE BITARTRATE AND ACETAMINOPHEN 5; 325 MG/1; MG/1
TABLET ORAL
Qty: 90 TABLET | Refills: 0 | Status: SHIPPED | OUTPATIENT
Start: 2021-12-29 | End: 2022-01-21 | Stop reason: SDUPTHER

## 2021-12-30 ENCOUNTER — TELEPHONE (OUTPATIENT)
Dept: FAMILY MEDICINE CLINIC | Facility: CLINIC | Age: 81
End: 2021-12-30

## 2022-01-19 ENCOUNTER — PATIENT OUTREACH (OUTPATIENT)
Dept: CASE MANAGEMENT | Facility: OTHER | Age: 82
End: 2022-01-19
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
## 2022-01-21 DIAGNOSIS — M54.50 LUMBAR PAIN: ICD-10-CM

## 2022-01-21 NOTE — TELEPHONE ENCOUNTER
Caller: Montse Jung    Relationship: Emergency Contact    Best call back number: [phone]    Requested Prescriptions:   Requested Prescriptions     Pending Prescriptions Disp Refills   • HYDROcodone-acetaminophen (NORCO) 5-325 MG per tablet 90 tablet 0     Sig: Take 1 tablet by mouth Every 8 (Eight) Hours As Needed for Moderate Pain .        Pharmacy where request should be sent: [facility] [address] [phone] [facility] [phone] FX     Additional details provided by patient: DAUGHTER STATED PATIENT IS UNSURE IF FOLLOW UP APPOINTMENT IS NEEDED FOR NEXT REFILL OR NOT.   PLEASE CALL TO INFORM PATIENT REGARDLESS OF ABILITY TO FILL OR NEEDING APPOINTMENT     Does the patient have less than a 3 day supply:  [x] Yes  [] No    Flash Seo Rep   01/21/22 14:34 CST

## 2022-01-21 NOTE — TELEPHONE ENCOUNTER
Rx Refill Note  Requested Prescriptions     Pending Prescriptions Disp Refills   • HYDROcodone-acetaminophen (NORCO) 5-325 MG per tablet 90 tablet 0     Sig: Take 1 tablet by mouth Every 8 (Eight) Hours As Needed for Moderate Pain .      Last office visit with prescribing clinician: 4/23/2021      Next office visit with prescribing clinician: Visit date not found   UDS/CS on 7/6/21  Swapna Mccann MA  01/21/22, 16:21 CST

## 2022-01-26 ENCOUNTER — TELEPHONE (OUTPATIENT)
Dept: FAMILY MEDICINE CLINIC | Facility: CLINIC | Age: 82
End: 2022-01-26

## 2022-01-28 RX ORDER — HYDROCODONE BITARTRATE AND ACETAMINOPHEN 5; 325 MG/1; MG/1
1 TABLET ORAL EVERY 8 HOURS PRN
Qty: 90 TABLET | Refills: 0 | Status: SHIPPED | OUTPATIENT
Start: 2022-01-28 | End: 2022-02-11 | Stop reason: SDUPTHER

## 2022-01-31 DIAGNOSIS — F41.9 ANXIETY: ICD-10-CM

## 2022-02-01 RX ORDER — ALPRAZOLAM 0.25 MG/1
TABLET ORAL
Qty: 30 TABLET | Refills: 2 | Status: SHIPPED | OUTPATIENT
Start: 2022-02-01 | End: 2022-02-11 | Stop reason: SDUPTHER

## 2022-02-08 DIAGNOSIS — J30.89 NON-SEASONAL ALLERGIC RHINITIS, UNSPECIFIED TRIGGER: ICD-10-CM

## 2022-02-08 DIAGNOSIS — I10 ESSENTIAL HYPERTENSION: ICD-10-CM

## 2022-02-08 DIAGNOSIS — R63.4 WEIGHT LOSS: ICD-10-CM

## 2022-02-08 DIAGNOSIS — F43.21 SITUATIONAL DEPRESSION: ICD-10-CM

## 2022-02-08 DIAGNOSIS — H65.23 BILATERAL CHRONIC SEROUS OTITIS MEDIA: ICD-10-CM

## 2022-02-08 DIAGNOSIS — G47.9 DIFFICULTY SLEEPING: ICD-10-CM

## 2022-02-08 RX ORDER — MIRTAZAPINE 15 MG/1
TABLET, FILM COATED ORAL
Qty: 90 TABLET | Refills: 1 | Status: ON HOLD | OUTPATIENT
Start: 2022-02-08 | End: 2022-04-26

## 2022-02-08 RX ORDER — LOSARTAN POTASSIUM 100 MG/1
TABLET ORAL
Qty: 90 TABLET | Refills: 1 | Status: ON HOLD | OUTPATIENT
Start: 2022-02-08 | End: 2022-04-26

## 2022-02-08 RX ORDER — LEVOCETIRIZINE DIHYDROCHLORIDE 5 MG/1
TABLET, FILM COATED ORAL
Qty: 90 TABLET | Refills: 2 | Status: ON HOLD | OUTPATIENT
Start: 2022-02-08 | End: 2022-04-26

## 2022-02-08 RX ORDER — TRAZODONE HYDROCHLORIDE 50 MG/1
TABLET ORAL
Qty: 60 TABLET | Refills: 5 | Status: ON HOLD | OUTPATIENT
Start: 2022-02-08 | End: 2022-04-26

## 2022-02-08 RX ORDER — AMLODIPINE BESYLATE 5 MG/1
TABLET ORAL
Qty: 90 TABLET | Refills: 1 | Status: ON HOLD | OUTPATIENT
Start: 2022-02-08 | End: 2022-04-26

## 2022-02-08 NOTE — TELEPHONE ENCOUNTER
Rx Refill Note  Requested Prescriptions     Pending Prescriptions Disp Refills   • mirtazapine (REMERON) 15 MG tablet [Pharmacy Med Name: MIRTAZAPINE 15MG TABS] 90 tablet 1     Sig: TAKE ONE TABLET BY MOUTH EVERY DAY IN THE EVENING   • traZODone (DESYREL) 50 MG tablet [Pharmacy Med Name: TRAZODONE HCL 50MG TABS] 60 tablet 5     Sig: TAKE ONE TO TWO TABLETS BY MOUTH AT BEDTIME FOR SLEEP   • losartan (COZAAR) 100 MG tablet [Pharmacy Med Name: LOSARTAN POTASSIUM 100MG TABS] 90 tablet 1     Sig: TAKE ONE TABLET BY MOUTH EVERY DAY      Last office visit with prescribing clinician: 10/25/2021      Next office visit with prescribing clinician: 02/11/2022  }  Quynh Morris MA  02/08/22, 15:16 CST

## 2022-02-08 NOTE — TELEPHONE ENCOUNTER
Rx Refill Note  Requested Prescriptions     Pending Prescriptions Disp Refills   • levocetirizine (XYZAL) 5 MG tablet [Pharmacy Med Name: LEVOCETIRIZINE DIHYDROCHLORI 5 TABS] 90 tablet 2     Sig: TAKE ONE TABLET BY MOUTH EVERY EVENING      Last office visit with prescribing clinician: 4/23/2021      Next office visit with prescribing clinician: 2/11/2022     Quynh Morris MA  02/08/22, 15:15 CST

## 2022-02-08 NOTE — TELEPHONE ENCOUNTER
Rx Refill Note  Requested Prescriptions     Pending Prescriptions Disp Refills   • amLODIPine (NORVASC) 5 MG tablet [Pharmacy Med Name: AMLODIPINE BESYLATE 5MG TABS] 90 tablet 1     Sig: TAKE ONE TABLET BY MOUTH EVERY DAY      Last office visit with prescribing clinician: 10/25/2021     Next office visit with prescribing clinician: 02/11/2022  }  Quynh Morris MA  02/08/22, 15:14 CST

## 2022-02-11 ENCOUNTER — OFFICE VISIT (OUTPATIENT)
Dept: FAMILY MEDICINE CLINIC | Facility: CLINIC | Age: 82
End: 2022-02-11

## 2022-02-11 VITALS
DIASTOLIC BLOOD PRESSURE: 66 MMHG | HEART RATE: 65 BPM | OXYGEN SATURATION: 98 % | HEIGHT: 66 IN | BODY MASS INDEX: 25.55 KG/M2 | TEMPERATURE: 97.7 F | SYSTOLIC BLOOD PRESSURE: 127 MMHG | WEIGHT: 159 LBS

## 2022-02-11 DIAGNOSIS — D22.9 SUSPICIOUS NEVUS: ICD-10-CM

## 2022-02-11 DIAGNOSIS — S90.32XA CONTUSION OF LEFT FOOT, INITIAL ENCOUNTER: ICD-10-CM

## 2022-02-11 DIAGNOSIS — I10 ESSENTIAL HYPERTENSION: ICD-10-CM

## 2022-02-11 DIAGNOSIS — F41.9 ANXIETY: Primary | ICD-10-CM

## 2022-02-11 DIAGNOSIS — M54.50 LUMBAR PAIN: ICD-10-CM

## 2022-02-11 PROCEDURE — 99214 OFFICE O/P EST MOD 30 MIN: CPT | Performed by: FAMILY MEDICINE

## 2022-02-11 RX ORDER — ALPRAZOLAM 0.25 MG/1
0.25 TABLET ORAL 2 TIMES DAILY PRN
Qty: 30 TABLET | Refills: 2 | Status: SHIPPED | OUTPATIENT
Start: 2022-02-11 | End: 2022-03-30 | Stop reason: SDUPTHER

## 2022-02-11 RX ORDER — HYDROCODONE BITARTRATE AND ACETAMINOPHEN 5; 325 MG/1; MG/1
1 TABLET ORAL EVERY 8 HOURS PRN
Qty: 90 TABLET | Refills: 0 | Status: SHIPPED | OUTPATIENT
Start: 2022-02-11 | End: 2022-03-01 | Stop reason: SDUPTHER

## 2022-02-11 NOTE — PATIENT INSTRUCTIONS
"[email or web address]">   Generalized Anxiety Disorder, Adult  Generalized anxiety disorder (DEMETRIUS) is a mental health condition. Unlike normal worries, anxiety related to DEMETRIUS is not triggered by a specific event. These worries do not fade or get better with time. DEMETRIUS interferes with relationships, work, and school.  DEMETRIUS symptoms can vary from mild to severe. People with severe DEMETRIUS can have intense waves of anxiety with physical symptoms that are similar to panic attacks.  What are the causes?  The exact cause of DEMETRIUS is not known, but the following are believed to have an impact:  · Differences in natural brain chemicals.  · Genes passed down from parents to children.  · Differences in the way threats are perceived.  · Development during childhood.  · Personality.  What increases the risk?  The following factors may make you more likely to develop this condition:  · Being female.  · Having a family history of anxiety disorders.  · Being very shy.  · Experiencing very stressful life events, such as the death of a loved one.  · Having a very stressful family environment.  What are the signs or symptoms?  People with DEMETRIUS often worry excessively about many things in their lives, such as their health and family. Symptoms may also include:  · Mental and emotional symptoms:  ? Worrying excessively about natural disasters.  ? Fear of being late.  ? Difficulty concentrating.  ? Fears that others are judging your performance.  · Physical symptoms:  ? Fatigue.  ? Headaches, muscle tension, muscle twitches, trembling, or feeling shaky.  ? Feeling like your heart is pounding or beating very fast.  ? Feeling out of breath or like you cannot take a deep breath.  ? Having trouble falling asleep or staying asleep, or experiencing restlessness.  ? Sweating.  ? Nausea, diarrhea, or irritable bowel syndrome (IBS).  · Behavioral symptoms:  ? Experiencing erratic moods or irritability.  ? Avoidance of new situations.  ? Avoidance of " people.  ? Extreme difficulty making decisions.  How is this diagnosed?  This condition is diagnosed based on your symptoms and medical history. You will also have a physical exam. Your health care provider may perform tests to rule out other possible causes of your symptoms.  To be diagnosed with DEMETRIUS, a person must have anxiety that:  · Is out of his or her control.  · Affects several different aspects of his or her life, such as work and relationships.  · Causes distress that makes him or her unable to take part in normal activities.  · Includes at least three symptoms of DEMETRIUS, such as restlessness, fatigue, trouble concentrating, irritability, muscle tension, or sleep problems.  Before your health care provider can confirm a diagnosis of DEMETRIUS, these symptoms must be present more days than they are not, and they must last for 6 months or longer.  How is this treated?  This condition may be treated with:  · Medicine. Antidepressant medicine is usually prescribed for long-term daily control. Anti-anxiety medicines may be added in severe cases, especially when panic attacks occur.  · Talk therapy (psychotherapy). Certain types of talk therapy can be helpful in treating DEMETRIUS by providing support, education, and guidance. Options include:  ? Cognitive behavioral therapy (CBT). People learn coping skills and self-calming techniques to ease their physical symptoms. They learn to identify unrealistic thoughts and behaviors and to replace them with more appropriate thoughts and behaviors.  ? Acceptance and commitment therapy (ACT). This treatment teaches people how to be mindful as a way to cope with unwanted thoughts and feelings.  ? Biofeedback. This process trains you to manage your body's response (physiological response) through breathing techniques and relaxation methods. You will work with a therapist while machines are used to monitor your physical symptoms.  · Stress management techniques. These include yoga,  meditation, and exercise.  A mental health specialist can help determine which treatment is best for you. Some people see improvement with one type of therapy. However, other people require a combination of therapies.  Follow these instructions at home:  Lifestyle  · Maintain a consistent routine and schedule.  · Anticipate stressful situations. Create a plan, and allow extra time to work with your plan.  · Practice stress management or self-calming techniques that you have learned from your therapist or your health care provider.  General instructions  · Take over-the-counter and prescription medicines only as told by your health care provider.  · Understand that you are likely to have setbacks. Accept this and be kind to yourself as you persist to take better care of yourself.  · Recognize and accept your accomplishments, even if you  them as small.  · Keep all follow-up visits as told by your health care provider. This is important.  Contact a health care provider if:  · Your symptoms do not get better.  · Your symptoms get worse.  · You have signs of depression, such as:  ? A persistently sad or irritable mood.  ? Loss of enjoyment in activities that used to bring you neeraj.  ? Change in weight or eating.  ? Changes in sleeping habits.  ? Avoiding friends or family members.  ? Loss of energy for normal tasks.  ? Feelings of guilt or worthlessness.  Get help right away if:  · You have serious thoughts about hurting yourself or others.  If you ever feel like you may hurt yourself or others, or have thoughts about taking your own life, get help right away. Go to your nearest emergency department or:  · Call your local emergency services (781 in the [place]  · Call a suicide crisis helpline, such as the National Suicide Prevention Lifeline at 1-845.799.7796. This is open 24 hours a day in the [place]  · Text the Crisis Text Line at 773718 (in the [place]  Summary  · Generalized anxiety disorder (DEMETRIUS) is a mental  "health condition that involves worry that is not triggered by a specific event.  · People with DEMETRIUS often worry excessively about many things in their lives, such as their health and family.  · DEMETRIUS may cause symptoms such as restlessness, trouble concentrating, sleep problems, frequent sweating, nausea, diarrhea, headaches, and trembling or muscle twitching.  · A mental health specialist can help determine which treatment is best for you. Some people see improvement with one type of therapy. However, other people require a combination of therapies.  This information is not intended to replace advice given to you by your health care provider. Make sure you discuss any questions you have with your health care provider.  Document Revised: 10/07/2020 Document Reviewed: 10/07/2020  [employer] Patient Education © 2021 [employer]      [email or web address]">   Chronic Back Pain  When back pain lasts longer than 3 months, it is called chronic back pain. The cause of your back pain may not be known. Some common causes include:  · Wear and tear (degenerative disease) of the bones, ligaments, or disks in your back.  · Inflammation and stiffness in your back (arthritis).  People who have chronic back pain often go through certain periods in which the pain is more intense (flare-ups). Many people can learn to manage the pain with home care.  Follow these instructions at home:  Pay attention to any changes in your symptoms. Take these actions to help with your pain:  Managing pain and stiffness         · If directed, apply ice to the painful area. Your health care provider may recommend applying ice during the first 24-48 hours after a flare-up begins. To do this:  ? Put ice in a plastic bag.  ? Place a towel between your skin and the bag.  ? Leave the ice on for 20 minutes, 2-3 times per day.  · If directed, apply heat to the affected area as often as told by your health care provider. Use the heat source that " your health care provider recommends, such as a moist heat pack or a heating pad.  ? Place a towel between your skin and the heat source.  ? Leave the heat on for 20-30 minutes.  ? Remove the heat if your skin turns bright red. This is especially important if you are unable to feel pain, heat, or cold. You may have a greater risk of getting burned.  · Try soaking in a warm tub.  Activity    · Avoid bending and other activities that make the problem worse.  · Maintain a proper position when standing or sitting:  ? When standing, keep your upper back and neck straight, with your shoulders pulled back. Avoid slouching.  ? When sitting, keep your back straight and relax your shoulders. Do not round your shoulders or pull them backward.  · Do not sit or  one place for long periods of time.  · Take brief periods of rest throughout the day. This will reduce your pain. Resting in a lying or standing position is usually better than sitting to rest.  · When you are resting for longer periods, mix in some mild activity or stretching between periods of rest. This will help to prevent stiffness and pain.  · Get regular exercise. Ask your health care provider what activities are safe for you.  · Do not lift anything that is heavier than 10 lb (4.5 kg), or the limit that you are told, until your health care provider says that it is safe. Always use proper lifting technique, which includes:  ? Bending your knees.  ? Keeping the load close to your body.  ? Avoiding twisting.  · Sleep on a firm mattress in a comfortable position. Try lying on your side with your knees slightly bent. If you lie on your back, put a pillow under your knees.    Medicines  · Treatment may include medicines for pain and inflammation taken by mouth or applied to the skin, prescription pain medicine, or muscle relaxants. Take over-the-counter and prescription medicines only as told by your health care provider.  · Ask your health care provider if the  medicine prescribed to you:  ? Requires you to avoid driving or using machinery.  ? Can cause constipation. You may need to take these actions to prevent or treat constipation:  § Drink enough fluid to keep your urine pale yellow.  § Take over-the-counter or prescription medicines.  § Eat foods that are high in fiber, such as beans, whole grains, and fresh fruits and vegetables.  § Limit foods that are high in fat and processed sugars, such as fried or sweet foods.  General instructions  · Do not use any products that contain nicotine or tobacco, such as cigarettes, e-cigarettes, and chewing tobacco. If you need help quitting, ask your health care provider.  · Keep all follow-up visits as told by your health care provider. This is important.  Contact a health care provider if:  · You have pain that is not relieved with rest or medicine.  · Your pain gets worse, or you have new pain.  · You have a high fever.  · You have rapid weight loss.  · You have trouble doing your normal activities.  Get help right away if:  · You have weakness or numbness in one or both of your legs or feet.  · You have trouble controlling your bladder or your bowels.  · You have severe back pain and have any of the following:  ? Nausea or vomiting.  ? Pain in your abdomen.  ? Shortness of breath or you faint.  Summary  · Chronic back pain is back pain that lasts longer than 3 months.  · When a flare-up begins, apply ice to the painful area for the first 24-48 hours.  · Apply a moist heat pad or use a heating pad on the painful area as directed by your health care provider.  · When you are resting for longer periods, mix in some mild activity or stretching between periods of rest. This will help to prevent stiffness and pain.  This information is not intended to replace advice given to you by your health care provider. Make sure you discuss any questions you have with your health care provider.  Document Revised: 01/27/2021 Document Reviewed:  01/27/2021  [employer] Patient Education © 2021 [employer]

## 2022-02-11 NOTE — PROGRESS NOTES
OFFICE VISIT NOTE:    Shira King is a 82 y.o. female who presents today for Back Pain (3 month med refill for NORCO), Anxiety (3 month med refill for Xanax), and Foot Pain (Left foot/ Pt stated she dropped something on foot).     Patient presents for follow-up of long term use of high risk medication (narcotics, sedatives, stimulants or other controlled medications). The patient has read and signed the [facility] Controlled Substance Contract - copy in chart and updated annually. A recent Rikki was reviewed and is present in the chart, updated annually and as needed. UDS has been reviewed and is up to date, and performed at least annually and PRN discretion of provider. No aberrant behavioral issues are noted, and no significant side effects/complications are present. The patient is aware of the potential for addiction and dependence of these medications and accepts responsibility for proper med use. Patient is aware of the PRN use of these medications (unless otherwise prescribed), and not to be used routinely.     Dropped something on the 1st MTP area of the left foot about 5 days ago - ambulatory well. Doing well - exam reveals no crepitus.     Also, noted a right anterior temple area lesion and Bonita excised in past, but recurred and now with 6-7mm diameter and irregular borders and central ulceration - refer to derm.     Back Pain  This is a chronic problem. The current episode started more than 1 year ago. The problem occurs constantly. The problem is unchanged. The pain is present in the lumbar spine and sacro-iliac. The quality of the pain is described as cramping and aching. The pain does not radiate. The pain is severe. The pain is worse during the day. The symptoms are aggravated by bending and twisting. Stiffness is present in the morning. Pertinent negatives include no abdominal pain, chest pain, fever or weight loss. She has tried analgesics, bed rest and home exercises for the symptoms.  The treatment provided significant relief.   Anxiety  Presents for follow-up visit. Symptoms include nervous/anxious behavior. Patient reports no chest pain, excessive worry or shortness of breath. Symptoms occur most days. The severity of symptoms is severe. The quality of sleep is good. Nighttime awakenings: occasional.     Compliance with medications is %.   Foot Pain  This is a new problem. The current episode started in the past 7 days. The problem occurs intermittently. The problem has been waxing and waning. Pertinent negatives include no abdominal pain, chest pain, fatigue, fever or rash. The symptoms are aggravated by standing and walking. She has tried rest and position changes for the symptoms. The treatment provided significant relief.        Past medical/surgical history, Family history, Social history, Allergies and Medications have been reviewed with the patient today and are updated in [facility] EMR. See below.  Past Medical History:   Diagnosis Date   • Chronic intractable headache 2/8/2021   • Gastroesophageal reflux disease 2/3/2020   • Lumbar pain 7/12/2019   • Mixed hyperlipidemia    • Sinusitis    • Valvular disease      Past Surgical History:   Procedure Laterality Date   • BLADDER REPAIR  2005   • BREAST SURGERY     • BUNIONECTOMY  2007   • CARDIAC CATHETERIZATION     • CATARACT EXTRACTION Bilateral 2005   • CHOLECYSTECTOMY     • ENDOSCOPY  2005   • HERNIA REPAIR  1963   • HYSTERECTOMY  1979   • NECK SURGERY  1974    VERTEBRA   • REPLACEMENT TOTAL KNEE BILATERAL     • TONSILLECTOMY       Family History   Problem Relation Age of Onset   • Stroke Mother    • Heart disease Mother    • Stroke Sister    • Heart attack Maternal Grandmother      Social History     Tobacco Use   • Smoking status: Never Smoker   • Smokeless tobacco: Never Used   Vaping Use   • Vaping Use: Never used   Substance Use Topics   • Alcohol use: No   • Drug use: No       ALLERGIES:  Patient has no known  allergies.    CURRENT MEDS:    Current Outpatient Medications:   •  ALPRAZolam (XANAX) 0.25 MG tablet, Take 1 tablet by mouth 2 (Two) Times a Day As Needed for Anxiety., Disp: 30 tablet, Rfl: 2  •  amLODIPine (NORVASC) 5 MG tablet, TAKE ONE TABLET BY MOUTH EVERY DAY, Disp: 90 tablet, Rfl: 1  •  aspirin 81 MG chewable tablet, Chew 81 mg Every Other Day., Disp: , Rfl:   •  azelastine (ASTELIN) 0.1 % nasal spray, 2 sprays into the nostril(s) as directed by provider 2 (Two) Times a Day. Use in each nostril as directed, Disp: 30 mL, Rfl: 11  •  B Complex Vitamins (Vitamin B Complex) tablet, Take 1 tablet by mouth Daily., Disp: , Rfl:   •  carvedilol (COREG) 6.25 MG tablet, TAKE ONE TABLET BY MOUTH TWICE A DAY WITH MEALS, Disp: 180 tablet, Rfl: 1  •  CBD (cannabidiol) oral oil, Take 4 drops by mouth 2 (Two) Times a Day., Disp: , Rfl:   •  cefuroxime (CEFTIN) 500 MG tablet, Take 500 mg by mouth 2 (two) times a day., Disp: , Rfl:   •  ciprofloxacin (Cipro) 500 MG tablet, Take 1 tablet by mouth 2 (Two) Times a Day., Disp: 14 tablet, Rfl: 0  •  HYDROcodone-acetaminophen (NORCO) 5-325 MG per tablet, Take 1 tablet by mouth Every 8 (Eight) Hours As Needed for Moderate Pain ., Disp: 90 tablet, Rfl: 0  •  levocetirizine (XYZAL) 5 MG tablet, TAKE ONE TABLET BY MOUTH EVERY EVENING, Disp: 90 tablet, Rfl: 2  •  losartan (COZAAR) 100 MG tablet, TAKE ONE TABLET BY MOUTH EVERY DAY, Disp: 90 tablet, Rfl: 1  •  meclizine (ANTIVERT) 25 MG tablet, Take 25 mg by mouth 2 (two) times a day., Disp: , Rfl:   •  metoclopramide (Reglan) 10 MG tablet, Take 0.5 tablets by mouth Daily., Disp: 120 tablet, Rfl: 2  •  mirtazapine (REMERON) 15 MG tablet, TAKE ONE TABLET BY MOUTH EVERY DAY IN THE EVENING, Disp: 90 tablet, Rfl: 1  •  Misc Natural Products (MOUTH TONIC PO), Take  by mouth., Disp: , Rfl:   •  neomycin-polymyxin-hydrocortisone (CORTISPORIN) 3.5-72475-1 otic solution, Administer 3 drops to the right ear 3 (Three) Times a Day., Disp: 10 mL, Rfl:  "0  •  omeprazole (priLOSEC) 20 MG capsule, TAKE ONE CAPSULE BY MOUTH EVERY DAY, Disp: 90 capsule, Rfl: 1  •  ondansetron (Zofran) 4 MG tablet, Take 1 tablet by mouth Every 8 (Eight) Hours As Needed for Nausea or Vomiting., Disp: 15 tablet, Rfl: 0  •  pravastatin (PRAVACHOL) 20 MG tablet, TAKE ONE TABLET BY MOUTH EVERY DAY, Disp: 90 tablet, Rfl: 1  •  temazepam (RESTORIL) 30 MG capsule, TAKE ONE CAPSULE BY MOUTH EVERY EVENING AS NEEDED FOR SLEEP, Disp: 90 capsule, Rfl: 0  •  traZODone (DESYREL) 50 MG tablet, TAKE ONE TO TWO TABLETS BY MOUTH AT BEDTIME FOR SLEEP, Disp: 60 tablet, Rfl: 5    REVIEW OF SYSTEMS:  Review of Systems   Constitutional: Negative for activity change, fatigue, fever, unexpected weight gain and unexpected weight loss.   Respiratory: Negative for shortness of breath.    Cardiovascular: Negative for chest pain.   Gastrointestinal: Negative for abdominal pain.   Genitourinary: Negative for difficulty urinating.   Musculoskeletal: Positive for back pain.   Skin: Negative for rash.   Neurological: Negative for syncope and headache.   Psychiatric/Behavioral: The patient is nervous/anxious.        PHYSICAL EXAMINATION:  Vital Signs:  /66 (BP Location: Right arm, Patient Position: Sitting, Cuff Size: Adult)   Pulse 65   Temp 97.7 °F (36.5 °C) (Infrared)   Ht 167.6 cm (65.98\")   Wt 72.1 kg (159 lb)   LMP  (LMP Unknown)   SpO2 98%   Breastfeeding No   BMI 25.68 kg/m²   Vitals:    02/11/22 1105 02/11/22 1136   Patient Position: Sitting Sitting       Physical Exam  Vitals and nursing note reviewed.   Constitutional:       General: She is not in acute distress.     Appearance: She is well-developed.   HENT:      Head: Normocephalic and atraumatic.      Nose: Nose normal.      Mouth/Throat:      Mouth: Mucous membranes are moist.      Pharynx: Oropharynx is clear.   Eyes:      Extraocular Movements: Extraocular movements intact.      Pupils: Pupils are equal, round, and reactive to light.   Neck: "      Vascular: No JVD.   Cardiovascular:      Rate and Rhythm: Normal rate and regular rhythm.      Pulses: Normal pulses.      Heart sounds: Normal heart sounds.   Pulmonary:      Effort: Pulmonary effort is normal. No respiratory distress.      Breath sounds: Normal breath sounds.   Abdominal:      General: Bowel sounds are normal. There is no distension.      Palpations: Abdomen is soft.      Tenderness: There is no abdominal tenderness.   Musculoskeletal:         General: Tenderness (dorsum of  with resolving bruise) present. Normal range of motion.      Cervical back: Normal range of motion and neck supple.      Right lower leg: No edema.      Left lower leg: No edema.   Skin:     General: Skin is warm and dry.      Capillary Refill: Capillary refill takes less than 2 seconds.      Findings: No rash.      Comments: Contusion to the left dorsum MTP foot area without crepitus.     Also, right anterior temple area was excised before, but recurs - does have irregular borders and is about 6-7mm in diameter - central ulceration noted.    Neurological:      General: No focal deficit present.      Mental Status: She is alert and oriented to person, place, and time.      Cranial Nerves: No cranial nerve deficit.   Psychiatric:         Mood and Affect: Mood normal.         Behavior: Behavior normal.         Procedures    ASSESSMENT/ PLAN:  Problem List Items Addressed This Visit        Cardiac and Vasculature    Essential hypertension    Overview     HAS COMPONENT OF HYPERTENSIVE HEART DISEASE            Musculoskeletal and Injuries    Lumbar pain    Relevant Medications    HYDROcodone-acetaminophen (NORCO) 5-325 MG per tablet      Other Visit Diagnoses     Anxiety    -  Primary    Relevant Medications    ALPRAZolam (XANAX) 0.25 MG tablet    Contusion of left foot, initial encounter        Suspicious nevus        Relevant Orders    Ambulatory Referral to Dermatology            Specific Patient  Instructions:  MEDICATION Instructions: Encouraged patient to continue routine medicines as prescribed and maintain compliance. Patient instructed to report any adverse side effects or reactions to medicines promptly to the office. Patient instructed to make us aware of any OTC or herbal meds or supplement use.    DIET Recommendations: Patient instructed and provided information on the following nutrition and diet recommendations: Calorie restriction for weight reduction and maintenance. Necessity for adequate daily intake of fluids/water. Also, diet information provided in AVS for specifics.    EXERCISE Instructions: Discussed with patient the need for routine aerobic activity for cardiovascular fitness, 3 times a week for about 30 minutes. Daily exercise for increased fitness and weight reduction goals.    SMOKING Recommendations: Counseled patient and encouraged them on smoking and tobacco cessation if applicable. Discussed the benefits to all body systems with smoking/tobacco cessation, including decreased cardiac/lung/stroke/cancer risk. Encouraged no vaping use.    HEALTH MAINTENANCE:  Counseling provided to patient/family about routine health maintenance and ANNUAL physicals/labs. Counseling on recommended Vaccinations appropriate for age needed.        Patient's Body mass index is 25.68 kg/m². indicating that she is overweight (BMI 25-29.9). Patient's (Body mass index is 25.68 kg/m².) indicates that they are overweight with health conditions that include hypertension and osteoarthritis . Weight is unchanged. BMI is is above average; BMI management plan is completed. We discussed portion control and increasing exercise. .      Medications or Orders placed this visit:  Orders Placed This Encounter   Procedures   • Ambulatory Referral to Dermatology     Referral Priority:   Routine     Referral Type:   Consultation     Referral Reason:   Specialty Services Required     Requested Specialty:   Dermatology      Number of Visits Requested:   1       Medications DISCONTINUED this visit:  Medications Discontinued During This Encounter   Medication Reason   • HYDROcodone-acetaminophen (NORCO) 5-325 MG per tablet Reorder   • ALPRAZolam (XANAX) 0.25 MG tablet Reorder       FOLLOW-UP:  Return in about 3 months (around 5/11/2022) for Recheck, Next scheduled follow up.    I discussed the patients findings and my recommendations with patient.  An After Visit Summary (AVS) was printed and given to the patient at discharge.        Reynaldo Cavazos MD, FAAFP  2/11/2022

## 2022-03-01 DIAGNOSIS — M54.50 LUMBAR PAIN: ICD-10-CM

## 2022-03-01 NOTE — TELEPHONE ENCOUNTER
Rx Refill Note  Requested Prescriptions     Pending Prescriptions Disp Refills   • HYDROcodone-acetaminophen (NORCO) 5-325 MG per tablet 90 tablet 0     Sig: Take 1 tablet by mouth Every 8 (Eight) Hours As Needed for Moderate Pain .      Last office visit with prescribing clinician: 2/11/2022      Next office visit with prescribing clinician: 5/11/2022  Requirements are up to date.   Last Refill 2/11/2022    Quynh Morris MA  03/01/22, 12:35 CST

## 2022-03-01 NOTE — TELEPHONE ENCOUNTER
Caller: JEROMY DEE    Relationship: Child    Best call back number: [phone]    Requested Prescriptions:   Requested Prescriptions     Pending Prescriptions Disp Refills   • HYDROcodone-acetaminophen (NORCO) 5-325 MG per tablet 90 tablet 0     Sig: Take 1 tablet by mouth Every 8 (Eight) Hours As Needed for Moderate Pain .        Pharmacy where request should be sent: [facility] [address] [phone] [facility] [phone] FX     Additional details provided by patient: 2 PILLS    Does the patient have less than a 3 day supply:  [x] Yes  [] No    Flash Valladares Rep   03/01/22 08:55 CST

## 2022-03-03 RX ORDER — HYDROCODONE BITARTRATE AND ACETAMINOPHEN 5; 325 MG/1; MG/1
1 TABLET ORAL EVERY 8 HOURS PRN
Qty: 90 TABLET | Refills: 0 | Status: SHIPPED | OUTPATIENT
Start: 2022-03-03 | End: 2022-03-31 | Stop reason: SDUPTHER

## 2022-03-04 DIAGNOSIS — M54.50 LUMBAR PAIN: ICD-10-CM

## 2022-03-04 RX ORDER — HYDROCODONE BITARTRATE AND ACETAMINOPHEN 5; 325 MG/1; MG/1
1 TABLET ORAL EVERY 8 HOURS PRN
Qty: 90 TABLET | Refills: 0 | OUTPATIENT
Start: 2022-03-04

## 2022-03-04 NOTE — TELEPHONE ENCOUNTER
Caller: Montse Jung    Relationship: Emergency Contact    Best call back number: [phone]      Requested Prescriptions     Pending Prescriptions Disp Refills   • HYDROcodone-acetaminophen (NORCO) 5-325 MG per tablet 90 tablet 0     Sig: Take 1 tablet by mouth Every 8 (Eight) Hours As Needed for Moderate Pain .        Pharmacy where request should be sent: [facility] [address] [phone] [facility] [phone] FX         Does the patient have less than a 3 day supply:  [x] Yes  [] No    Flash Hensley Rep   03/04/22 10:32 CST

## 2022-03-04 NOTE — TELEPHONE ENCOUNTER
Rx Refill Note  Requested Prescriptions     Pending Prescriptions Disp Refills   • HYDROcodone-acetaminophen (NORCO) 5-325 MG per tablet 90 tablet 0     Sig: Take 1 tablet by mouth Every 8 (Eight) Hours As Needed for Moderate Pain .      Last office visit with prescribing clinician: 2/11/2022      Next office visit with prescribing clinician: 5/11/2022   Requirements are up to date.   Quynh Morris MA  03/04/22, 10:34 CST

## 2022-03-30 ENCOUNTER — OFFICE VISIT (OUTPATIENT)
Dept: FAMILY MEDICINE CLINIC | Facility: CLINIC | Age: 82
End: 2022-03-30

## 2022-03-30 VITALS
HEIGHT: 66 IN | WEIGHT: 163.2 LBS | BODY MASS INDEX: 26.23 KG/M2 | DIASTOLIC BLOOD PRESSURE: 70 MMHG | HEART RATE: 65 BPM | SYSTOLIC BLOOD PRESSURE: 136 MMHG | OXYGEN SATURATION: 99 % | TEMPERATURE: 97.1 F

## 2022-03-30 DIAGNOSIS — N39.46 MIXED STRESS AND URGE URINARY INCONTINENCE: ICD-10-CM

## 2022-03-30 DIAGNOSIS — F41.9 ANXIETY: ICD-10-CM

## 2022-03-30 DIAGNOSIS — N81.0 FEMALE URETHROCELE: ICD-10-CM

## 2022-03-30 DIAGNOSIS — Z87.440 HISTORY OF RECURRENT UTIS: Primary | ICD-10-CM

## 2022-03-30 PROCEDURE — 99213 OFFICE O/P EST LOW 20 MIN: CPT | Performed by: NURSE PRACTITIONER

## 2022-03-30 RX ORDER — ALPRAZOLAM 0.25 MG/1
0.25 TABLET ORAL 2 TIMES DAILY PRN
Qty: 60 TABLET | Refills: 2 | Status: ON HOLD | OUTPATIENT
Start: 2022-03-30 | End: 2022-04-26

## 2022-03-30 NOTE — PROGRESS NOTES
"Chief Complaint  Bladder Prolapse    Subjective          Shira King presents to [facility]  History of Present Illness  Patient presents with complaints of her prolapsed bladder.  It is becoming increasingly uncomfortable.  She has skin irritation; urinary frequency and urinary incontinence.  She previously was seen but did not feel comfortable with that provider.  She is requesting a referral for surgical evaluation.  Objective   Vital Signs:   /70 (BP Location: Right arm, Patient Position: Sitting, Cuff Size: Adult)   Pulse 65   Temp 97.1 °F (36.2 °C)   Ht 167.6 cm (66\") Comment: pt reported  Wt 74 kg (163 lb 3.2 oz)   SpO2 99%   BMI 26.34 kg/m²     BMI is above normal parameters. Recommendations: none (medical contraindication).  Patient's while, although in overweight category, is appropriate for patient.  She does not appear overweight.       Physical Exam  Vitals and nursing note reviewed. Exam conducted with a chaperone present.   Constitutional:       General: She is not in acute distress.     Appearance: Normal appearance. She is not ill-appearing.   Cardiovascular:      Rate and Rhythm: Normal rate and regular rhythm.      Heart sounds: Normal heart sounds.   Pulmonary:      Effort: Pulmonary effort is normal.      Breath sounds: Normal breath sounds.   Genitourinary:     Comments: No pelvic exam today.  Previous cystocele is well documented.  Musculoskeletal:      Right lower leg: No edema.      Left lower leg: No edema.   Skin:     General: Skin is warm and dry.   Neurological:      Mental Status: She is alert and oriented to person, place, and time.   Psychiatric:         Thought Content: Thought content normal.        Result Review :                 Assessment and Plan    Diagnoses and all orders for this visit:    1. History of recurrent UTIs (Primary)  -     Ambulatory Referral to Obstetrics / Gynecology    2. Mixed stress and urge urinary " incontinence  -     Ambulatory Referral to Obstetrics / Gynecology    3. 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 Anxiety  -     ALPRAZolam (XANAX) 0.25 MG tablet; Take 1 tablet by mouth 2 (Two) Times a Day As Needed for Anxiety or Sleep.  Dispense: 60 tablet; Refill: 2    Rikki reviewed.  UDS and controlled substance agreement are up to date.    Follow Up   Return in about 2 months (around 5/30/2022) for Next scheduled follow up.  Patient was given instructions and counseling regarding her condition or for health maintenance advice. Please see specific information pulled into the AVS if appropriate.

## 2022-03-31 DIAGNOSIS — M54.50 LUMBAR PAIN: ICD-10-CM

## 2022-04-04 RX ORDER — HYDROCODONE BITARTRATE AND ACETAMINOPHEN 5; 325 MG/1; MG/1
1 TABLET ORAL EVERY 8 HOURS PRN
Qty: 90 TABLET | Refills: 0 | Status: SHIPPED | OUTPATIENT
Start: 2022-04-04 | End: 2022-04-20

## 2022-04-20 ENCOUNTER — OFFICE VISIT (OUTPATIENT)
Dept: FAMILY MEDICINE CLINIC | Facility: CLINIC | Age: 82
End: 2022-04-20

## 2022-04-20 VITALS
BODY MASS INDEX: 26.68 KG/M2 | WEIGHT: 166 LBS | DIASTOLIC BLOOD PRESSURE: 92 MMHG | SYSTOLIC BLOOD PRESSURE: 142 MMHG | HEIGHT: 66 IN | OXYGEN SATURATION: 98 % | HEART RATE: 65 BPM | TEMPERATURE: 97.9 F

## 2022-04-20 DIAGNOSIS — M51.36 DDD (DEGENERATIVE DISC DISEASE), LUMBAR: Primary | ICD-10-CM

## 2022-04-20 DIAGNOSIS — M54.50 LUMBAR PAIN: ICD-10-CM

## 2022-04-20 PROCEDURE — 96372 THER/PROPH/DIAG INJ SC/IM: CPT | Performed by: NURSE PRACTITIONER

## 2022-04-20 PROCEDURE — 99213 OFFICE O/P EST LOW 20 MIN: CPT | Performed by: NURSE PRACTITIONER

## 2022-04-20 RX ORDER — TRIAMCINOLONE ACETONIDE 40 MG/ML
80 INJECTION, SUSPENSION INTRA-ARTICULAR; INTRAMUSCULAR ONCE
Status: COMPLETED | OUTPATIENT
Start: 2022-04-20 | End: 2022-04-20

## 2022-04-20 RX ORDER — HYDROCODONE BITARTRATE AND ACETAMINOPHEN 7.5; 325 MG/1; MG/1
1 TABLET ORAL EVERY 8 HOURS PRN
Qty: 90 TABLET | Refills: 0 | Status: SHIPPED | OUTPATIENT
Start: 2022-04-20 | End: 2022-05-27

## 2022-04-20 RX ADMIN — TRIAMCINOLONE ACETONIDE 80 MG: 40 INJECTION, SUSPENSION INTRA-ARTICULAR; INTRAMUSCULAR at 16:39

## 2022-04-20 NOTE — PROGRESS NOTES
"Chief Complaint  Arthritis (Sides, neck, back pain. Pain is 10 when getting out of bed.)    Subjective          Shira King presents to [facility]  History of Present Illness  BACK PAIN:  Worsening of back pain over the past week or so.  Her pain medication is only holding the pain for a couple of hours.  She has decreased activities of daily living due to the increased back pain.  It has been several months since she has had a steroid injection.  She has been on current dose of Norco for years.  UDS and controlled substance agreement are up to date.  MALU reviewed.  Patient has shown compliance with her medications.    Objective   Vital Signs:   /92 (BP Location: Right arm, Patient Position: Sitting, Cuff Size: Adult)   Pulse 65   Temp 97.9 °F (36.6 °C)   Ht 167.6 cm (66\")   Wt 75.3 kg (166 lb)   SpO2 98%   BMI 26.79 kg/m²            Physical Exam  Vitals and nursing note reviewed. Exam conducted with a chaperone present.   Constitutional:       General: She is not in acute distress.     Appearance: Normal appearance. She is not ill-appearing.   HENT:      Head: Normocephalic and atraumatic.   Cardiovascular:      Rate and Rhythm: Normal rate and regular rhythm.      Pulses: Normal pulses.      Heart sounds: Normal heart sounds. No murmur heard.  Pulmonary:      Effort: Pulmonary effort is normal.      Breath sounds: Normal breath sounds.   Musculoskeletal:         General: Tenderness present.      Right lower leg: No edema.      Left lower leg: No edema.      Comments: Lumbar tenderness.   Skin:     General: Skin is warm and dry.   Neurological:      Mental Status: She is alert and oriented to person, place, and time.   Psychiatric:         Thought Content: Thought content normal.        Result Review :                 Assessment and Plan    Diagnoses and all orders for this visit:    1. DDD (degenerative disc disease), lumbar (Primary)  -     " HYDROcodone-acetaminophen (NORCO) 7.5-325 MG per tablet; Take 1 tablet by mouth Every 8 (Eight) Hours As Needed for Moderate Pain .  Dispense: 90 tablet; Refill: 0  -     triamcinolone acetonide (KENALOG-40) injection 80 mg    2. Lumbar pain  -     HYDROcodone-acetaminophen (NORCO) 7.5-325 MG per tablet; Take 1 tablet by mouth Every 8 (Eight) Hours As Needed for Moderate Pain .  Dispense: 90 tablet; Refill: 0  -     triamcinolone acetonide (KENALOG-40) injection 80 mg        Follow Up   Return in about 3 months (around 7/20/2022) for Medicare Wellness with labs prior.  Patient was given instructions and counseling regarding her condition or for health maintenance advice. Please see specific information pulled into the AVS if appropriate.

## 2022-04-22 ENCOUNTER — TELEPHONE (OUTPATIENT)
Dept: FAMILY MEDICINE CLINIC | Facility: CLINIC | Age: 82
End: 2022-04-22

## 2022-04-22 NOTE — TELEPHONE ENCOUNTER
Patient is still waking up in a lot of pain. She increased her Norco to 1.5, and the steroid shot seemed to help for a little bit. Please advise.

## 2022-04-25 DIAGNOSIS — M25.50 ARTHRALGIA, UNSPECIFIED JOINT: Primary | ICD-10-CM

## 2022-04-25 NOTE — TELEPHONE ENCOUNTER
I've ordered some lab work to make sure there isn't something new, some other cause, for the increased joint pain.  Non-fasting.  If this is normal, we can discuss new imaging or referral to pain management.

## 2022-04-26 ENCOUNTER — HOSPITAL ENCOUNTER (OUTPATIENT)
Facility: HOSPITAL | Age: 82
Setting detail: OBSERVATION
Discharge: HOME | End: 2022-04-27
Attending: EMERGENCY MEDICINE | Admitting: FAMILY MEDICINE

## 2022-04-26 ENCOUNTER — APPOINTMENT (OUTPATIENT)
Dept: CT IMAGING | Facility: HOSPITAL | Age: 82
End: 2022-04-26

## 2022-04-26 ENCOUNTER — APPOINTMENT (OUTPATIENT)
Dept: GENERAL RADIOLOGY | Facility: HOSPITAL | Age: 82
End: 2022-04-26

## 2022-04-26 ENCOUNTER — APPOINTMENT (OUTPATIENT)
Dept: MRI IMAGING | Facility: HOSPITAL | Age: 82
End: 2022-04-26

## 2022-04-26 DIAGNOSIS — G89.29 CHRONIC INTRACTABLE HEADACHE, UNSPECIFIED HEADACHE TYPE: ICD-10-CM

## 2022-04-26 DIAGNOSIS — R41.3 MEMORY LOSS: ICD-10-CM

## 2022-04-26 DIAGNOSIS — R40.0 SOMNOLENCE, DAYTIME: ICD-10-CM

## 2022-04-26 DIAGNOSIS — J34.3 HYPERTROPHY OF BOTH INFERIOR NASAL TURBINATES: ICD-10-CM

## 2022-04-26 DIAGNOSIS — F03.90 DEMENTIA WITHOUT BEHAVIORAL DISTURBANCE, UNSPECIFIED DEMENTIA TYPE: ICD-10-CM

## 2022-04-26 DIAGNOSIS — R41.82 ALTERED MENTAL STATUS, UNSPECIFIED ALTERED MENTAL STATUS TYPE: Primary | ICD-10-CM

## 2022-04-26 DIAGNOSIS — I10 ESSENTIAL HYPERTENSION: ICD-10-CM

## 2022-04-26 DIAGNOSIS — I73.9 PERIPHERAL VASCULAR DISEASE: ICD-10-CM

## 2022-04-26 DIAGNOSIS — R54 ADVANCED AGE: ICD-10-CM

## 2022-04-26 DIAGNOSIS — J32.2 CHRONIC ETHMOIDAL SINUSITIS: ICD-10-CM

## 2022-04-26 DIAGNOSIS — R41.2 RETROGRADE AMNESIA: ICD-10-CM

## 2022-04-26 DIAGNOSIS — G47.61 PERIODIC LIMB MOVEMENT: ICD-10-CM

## 2022-04-26 DIAGNOSIS — G47.00 INSOMNIA, UNSPECIFIED TYPE: ICD-10-CM

## 2022-04-26 DIAGNOSIS — E78.2 MIXED HYPERLIPIDEMIA: ICD-10-CM

## 2022-04-26 DIAGNOSIS — J01.80 OTHER ACUTE SINUSITIS, RECURRENCE NOT SPECIFIED: ICD-10-CM

## 2022-04-26 DIAGNOSIS — R41.0 CONFUSION: ICD-10-CM

## 2022-04-26 DIAGNOSIS — J34.89 NASAL VESTIBULITIS: ICD-10-CM

## 2022-04-26 DIAGNOSIS — G47.33 OBSTRUCTIVE SLEEP APNEA: ICD-10-CM

## 2022-04-26 DIAGNOSIS — K21.9 GASTROESOPHAGEAL REFLUX DISEASE, UNSPECIFIED WHETHER ESOPHAGITIS PRESENT: ICD-10-CM

## 2022-04-26 DIAGNOSIS — M25.50 ARTHRALGIA, UNSPECIFIED JOINT: ICD-10-CM

## 2022-04-26 DIAGNOSIS — Z74.09 IMPAIRED MOBILITY: ICD-10-CM

## 2022-04-26 DIAGNOSIS — E87.1 HYPONATREMIA: ICD-10-CM

## 2022-04-26 DIAGNOSIS — R06.83 SNORING: ICD-10-CM

## 2022-04-26 DIAGNOSIS — R41.89 IMPAIRED COGNITION: ICD-10-CM

## 2022-04-26 DIAGNOSIS — R51.9 CHRONIC INTRACTABLE HEADACHE, UNSPECIFIED HEADACHE TYPE: ICD-10-CM

## 2022-04-26 DIAGNOSIS — R13.10 DYSPHAGIA, UNSPECIFIED TYPE: ICD-10-CM

## 2022-04-26 DIAGNOSIS — Z79.899 POLYPHARMACY: ICD-10-CM

## 2022-04-26 DIAGNOSIS — A77.0 RMSF (ROCKY MOUNTAIN SPOTTED FEVER): ICD-10-CM

## 2022-04-26 DIAGNOSIS — M54.50 LUMBAR PAIN: ICD-10-CM

## 2022-04-26 DIAGNOSIS — J30.9 ALLERGIC RHINITIS, UNSPECIFIED SEASONALITY, UNSPECIFIED TRIGGER: ICD-10-CM

## 2022-04-26 LAB
ALBUMIN SERPL-MCNC: 4.6 G/DL (ref 3.5–5.2)
ALBUMIN/GLOB SERPL: 1.8 G/DL
ALP SERPL-CCNC: 102 U/L (ref 39–117)
ALT SERPL W P-5'-P-CCNC: 15 U/L (ref 1–33)
AMMONIA BLD-SCNC: 39 UMOL/L (ref 11–51)
ANION GAP SERPL CALCULATED.3IONS-SCNC: 11 MMOL/L (ref 5–15)
ARTERIAL PATENCY WRIST A: ABNORMAL
AST SERPL-CCNC: 36 U/L (ref 1–32)
ATMOSPHERIC PRESS: 760 MMHG
BASE EXCESS BLDA CALC-SCNC: 1.9 MMOL/L (ref 0–2)
BASOPHILS # BLD AUTO: 0.03 10*3/MM3 (ref 0–0.2)
BASOPHILS NFR BLD AUTO: 0.5 % (ref 0–1.5)
BDY SITE: ABNORMAL
BILIRUB SERPL-MCNC: 0.8 MG/DL (ref 0–1.2)
BILIRUB UR QL STRIP: NEGATIVE
BODY TEMPERATURE: 37 C
BUN SERPL-MCNC: 11 MG/DL (ref 8–23)
BUN/CREAT SERPL: 17.5 (ref 7–25)
CALCIUM SPEC-SCNC: 9.8 MG/DL (ref 8.6–10.5)
CHLORIDE SERPL-SCNC: 98 MMOL/L (ref 98–107)
CLARITY UR: ABNORMAL
CO2 SERPL-SCNC: 23 MMOL/L (ref 22–29)
COLOR UR: ABNORMAL
CREAT SERPL-MCNC: 0.63 MG/DL (ref 0.57–1)
CRP SERPL-MCNC: <0.3 MG/DL (ref 0–0.5)
D-LACTATE SERPL-SCNC: 1 MMOL/L (ref 0.5–2)
DEPRECATED RDW RBC AUTO: 39.5 FL (ref 37–54)
EGFRCR SERPLBLD CKD-EPI 2021: 87.6 ML/MIN/1.73
EOSINOPHIL # BLD AUTO: 0.08 10*3/MM3 (ref 0–0.4)
EOSINOPHIL NFR BLD AUTO: 1.3 % (ref 0.3–6.2)
ERYTHROCYTE [DISTWIDTH] IN BLOOD BY AUTOMATED COUNT: 12.1 % (ref 12.3–15.4)
GLOBULIN UR ELPH-MCNC: 2.6 GM/DL
GLUCOSE SERPL-MCNC: 153 MG/DL (ref 65–99)
GLUCOSE UR STRIP-MCNC: NEGATIVE MG/DL
HCO3 BLDA-SCNC: 26.8 MMOL/L (ref 20–26)
HCT VFR BLD AUTO: 44.1 % (ref 34–46.6)
HGB BLD-MCNC: 14.9 G/DL (ref 12–15.9)
HGB UR QL STRIP.AUTO: NEGATIVE
IMM GRANULOCYTES # BLD AUTO: 0.02 10*3/MM3 (ref 0–0.05)
IMM GRANULOCYTES NFR BLD AUTO: 0.3 % (ref 0–0.5)
KETONES UR QL STRIP: NEGATIVE
LEUKOCYTE ESTERASE UR QL STRIP.AUTO: NEGATIVE
LYMPHOCYTES # BLD AUTO: 1.22 10*3/MM3 (ref 0.7–3.1)
LYMPHOCYTES NFR BLD AUTO: 19.9 % (ref 19.6–45.3)
Lab: ABNORMAL
MAGNESIUM SERPL-MCNC: 2 MG/DL (ref 1.6–2.4)
MCH RBC QN AUTO: 30.3 PG (ref 26.6–33)
MCHC RBC AUTO-ENTMCNC: 33.8 G/DL (ref 31.5–35.7)
MCV RBC AUTO: 89.6 FL (ref 79–97)
MODALITY: ABNORMAL
MONOCYTES # BLD AUTO: 0.45 10*3/MM3 (ref 0.1–0.9)
MONOCYTES NFR BLD AUTO: 7.4 % (ref 5–12)
NEUTROPHILS NFR BLD AUTO: 4.32 10*3/MM3 (ref 1.7–7)
NEUTROPHILS NFR BLD AUTO: 70.6 % (ref 42.7–76)
NITRITE UR QL STRIP: NEGATIVE
NRBC BLD AUTO-RTO: 0 /100 WBC (ref 0–0.2)
PCO2 BLDA: 41.9 MM HG (ref 35–45)
PCO2 TEMP ADJ BLD: 41.9 MM HG (ref 35–45)
PH BLDA: 7.42 PH UNITS (ref 7.35–7.45)
PH UR STRIP.AUTO: 8.5 [PH] (ref 5–8)
PH, TEMP CORRECTED: 7.42 PH UNITS (ref 7.35–7.45)
PLATELET # BLD AUTO: 235 10*3/MM3 (ref 140–450)
PMV BLD AUTO: 8.7 FL (ref 6–12)
PO2 BLDA: 56 MM HG (ref 83–108)
PO2 TEMP ADJ BLD: 56 MM HG (ref 83–108)
POTASSIUM SERPL-SCNC: 4.2 MMOL/L (ref 3.5–5.2)
PROCALCITONIN SERPL-MCNC: 0.03 NG/ML (ref 0–0.25)
PROT SERPL-MCNC: 7.2 G/DL (ref 6–8.5)
PROT UR QL STRIP: NEGATIVE
RBC # BLD AUTO: 4.92 10*6/MM3 (ref 3.77–5.28)
SAO2 % BLDCOA: 90.7 % (ref 94–99)
SARS-COV-2 RNA PNL SPEC NAA+PROBE: NOT DETECTED
SODIUM SERPL-SCNC: 132 MMOL/L (ref 136–145)
SP GR UR STRIP: 1.01 (ref 1–1.03)
UROBILINOGEN UR QL STRIP: ABNORMAL
VENTILATOR MODE: ABNORMAL
WBC NRBC COR # BLD: 6.12 10*3/MM3 (ref 3.4–10.8)

## 2022-04-26 PROCEDURE — 84145 PROCALCITONIN (PCT): CPT | Performed by: EMERGENCY MEDICINE

## 2022-04-26 PROCEDURE — 92610 EVALUATE SWALLOWING FUNCTION: CPT | Performed by: SPEECH-LANGUAGE PATHOLOGIST

## 2022-04-26 PROCEDURE — C9803 HOPD COVID-19 SPEC COLLECT: HCPCS

## 2022-04-26 PROCEDURE — 83605 ASSAY OF LACTIC ACID: CPT | Performed by: EMERGENCY MEDICINE

## 2022-04-26 PROCEDURE — 25010000002 ONDANSETRON PER 1 MG: Performed by: FAMILY MEDICINE

## 2022-04-26 PROCEDURE — G0378 HOSPITAL OBSERVATION PER HR: HCPCS

## 2022-04-26 PROCEDURE — 83735 ASSAY OF MAGNESIUM: CPT | Performed by: EMERGENCY MEDICINE

## 2022-04-26 PROCEDURE — 25010000002 ONDANSETRON PER 1 MG: Performed by: EMERGENCY MEDICINE

## 2022-04-26 PROCEDURE — 86140 C-REACTIVE PROTEIN: CPT | Performed by: EMERGENCY MEDICINE

## 2022-04-26 PROCEDURE — 70450 CT HEAD/BRAIN W/O DYE: CPT

## 2022-04-26 PROCEDURE — P9612 CATHETERIZE FOR URINE SPEC: HCPCS

## 2022-04-26 PROCEDURE — 93005 ELECTROCARDIOGRAM TRACING: CPT | Performed by: EMERGENCY MEDICINE

## 2022-04-26 PROCEDURE — 71045 X-RAY EXAM CHEST 1 VIEW: CPT

## 2022-04-26 PROCEDURE — 96376 TX/PRO/DX INJ SAME DRUG ADON: CPT

## 2022-04-26 PROCEDURE — 82803 BLOOD GASES ANY COMBINATION: CPT

## 2022-04-26 PROCEDURE — 93010 ELECTROCARDIOGRAM REPORT: CPT | Performed by: EMERGENCY MEDICINE

## 2022-04-26 PROCEDURE — 96374 THER/PROPH/DIAG INJ IV PUSH: CPT

## 2022-04-26 PROCEDURE — 82140 ASSAY OF AMMONIA: CPT | Performed by: CLINICAL NURSE SPECIALIST

## 2022-04-26 PROCEDURE — 81003 URINALYSIS AUTO W/O SCOPE: CPT | Performed by: EMERGENCY MEDICINE

## 2022-04-26 PROCEDURE — 87635 SARS-COV-2 COVID-19 AMP PRB: CPT | Performed by: FAMILY MEDICINE

## 2022-04-26 PROCEDURE — 99203 OFFICE O/P NEW LOW 30 MIN: CPT | Performed by: CLINICAL NURSE SPECIALIST

## 2022-04-26 PROCEDURE — 36600 WITHDRAWAL OF ARTERIAL BLOOD: CPT

## 2022-04-26 PROCEDURE — 99285 EMERGENCY DEPT VISIT HI MDM: CPT

## 2022-04-26 PROCEDURE — 87040 BLOOD CULTURE FOR BACTERIA: CPT | Performed by: EMERGENCY MEDICINE

## 2022-04-26 PROCEDURE — 70551 MRI BRAIN STEM W/O DYE: CPT

## 2022-04-26 PROCEDURE — 96375 TX/PRO/DX INJ NEW DRUG ADDON: CPT

## 2022-04-26 PROCEDURE — 85025 COMPLETE CBC W/AUTO DIFF WBC: CPT | Performed by: EMERGENCY MEDICINE

## 2022-04-26 PROCEDURE — 80053 COMPREHEN METABOLIC PANEL: CPT | Performed by: EMERGENCY MEDICINE

## 2022-04-26 RX ORDER — OMEPRAZOLE 20 MG/1
20 CAPSULE, DELAYED RELEASE ORAL DAILY
COMMUNITY
End: 2022-04-27 | Stop reason: HOSPADM

## 2022-04-26 RX ORDER — PRAVASTATIN SODIUM 20 MG
20 TABLET ORAL NIGHTLY
COMMUNITY
End: 2022-05-06

## 2022-04-26 RX ORDER — SODIUM CHLORIDE, SODIUM LACTATE, POTASSIUM CHLORIDE, CALCIUM CHLORIDE 600; 310; 30; 20 MG/100ML; MG/100ML; MG/100ML; MG/100ML
75 INJECTION, SOLUTION INTRAVENOUS CONTINUOUS
Status: DISCONTINUED | OUTPATIENT
Start: 2022-04-26 | End: 2022-04-27 | Stop reason: HOSPADM

## 2022-04-26 RX ORDER — SODIUM CHLORIDE 0.9 % (FLUSH) 0.9 %
10 SYRINGE (ML) INJECTION AS NEEDED
Status: DISCONTINUED | OUTPATIENT
Start: 2022-04-26 | End: 2022-04-27 | Stop reason: HOSPADM

## 2022-04-26 RX ORDER — CARVEDILOL 6.25 MG/1
6.25 TABLET ORAL 2 TIMES DAILY WITH MEALS
COMMUNITY
End: 2022-05-06

## 2022-04-26 RX ORDER — LEVOCETIRIZINE DIHYDROCHLORIDE 5 MG/1
5 TABLET, FILM COATED ORAL EVERY EVENING
COMMUNITY
End: 2022-04-27 | Stop reason: HOSPADM

## 2022-04-26 RX ORDER — SODIUM CHLORIDE 0.9 % (FLUSH) 0.9 %
10 SYRINGE (ML) INJECTION EVERY 12 HOURS SCHEDULED
Status: DISCONTINUED | OUTPATIENT
Start: 2022-04-26 | End: 2022-04-27 | Stop reason: HOSPADM

## 2022-04-26 RX ORDER — TRAZODONE HYDROCHLORIDE 50 MG/1
50-100 TABLET ORAL NIGHTLY
COMMUNITY
End: 2022-05-25

## 2022-04-26 RX ORDER — LOSARTAN POTASSIUM 50 MG/1
100 TABLET ORAL DAILY
Status: DISCONTINUED | OUTPATIENT
Start: 2022-04-26 | End: 2022-04-27 | Stop reason: HOSPADM

## 2022-04-26 RX ORDER — ASPIRIN 81 MG/1
81 TABLET, CHEWABLE ORAL EVERY OTHER DAY
Status: DISCONTINUED | OUTPATIENT
Start: 2022-04-26 | End: 2022-04-27 | Stop reason: HOSPADM

## 2022-04-26 RX ORDER — FAMOTIDINE 20 MG/1
20 TABLET, FILM COATED ORAL
Status: DISCONTINUED | OUTPATIENT
Start: 2022-04-26 | End: 2022-04-27 | Stop reason: HOSPADM

## 2022-04-26 RX ORDER — LOSARTAN POTASSIUM 100 MG/1
100 TABLET ORAL DAILY
COMMUNITY
End: 2022-08-03

## 2022-04-26 RX ORDER — PRAVASTATIN SODIUM 20 MG
20 TABLET ORAL NIGHTLY
Status: DISCONTINUED | OUTPATIENT
Start: 2022-04-26 | End: 2022-04-27 | Stop reason: HOSPADM

## 2022-04-26 RX ORDER — MIRTAZAPINE 15 MG/1
15 TABLET, FILM COATED ORAL NIGHTLY
Status: DISCONTINUED | OUTPATIENT
Start: 2022-04-26 | End: 2022-04-26

## 2022-04-26 RX ORDER — AMLODIPINE BESYLATE 5 MG/1
5 TABLET ORAL DAILY
COMMUNITY
End: 2022-08-03

## 2022-04-26 RX ORDER — CARVEDILOL 6.25 MG/1
6.25 TABLET ORAL 2 TIMES DAILY WITH MEALS
Status: DISCONTINUED | OUTPATIENT
Start: 2022-04-26 | End: 2022-04-27 | Stop reason: HOSPADM

## 2022-04-26 RX ORDER — HYDROCODONE BITARTRATE AND ACETAMINOPHEN 7.5; 325 MG/1; MG/1
1 TABLET ORAL EVERY 8 HOURS PRN
Status: DISCONTINUED | OUTPATIENT
Start: 2022-04-26 | End: 2022-04-27 | Stop reason: HOSPADM

## 2022-04-26 RX ORDER — ONDANSETRON 2 MG/ML
4 INJECTION INTRAMUSCULAR; INTRAVENOUS ONCE
Status: COMPLETED | OUTPATIENT
Start: 2022-04-26 | End: 2022-04-26

## 2022-04-26 RX ORDER — AMLODIPINE BESYLATE 5 MG/1
5 TABLET ORAL DAILY
Status: DISCONTINUED | OUTPATIENT
Start: 2022-04-26 | End: 2022-04-27 | Stop reason: HOSPADM

## 2022-04-26 RX ORDER — TRAZODONE HYDROCHLORIDE 50 MG/1
50 TABLET ORAL NIGHTLY
Status: DISCONTINUED | OUTPATIENT
Start: 2022-04-26 | End: 2022-04-27 | Stop reason: HOSPADM

## 2022-04-26 RX ORDER — ONDANSETRON 2 MG/ML
4 INJECTION INTRAMUSCULAR; INTRAVENOUS EVERY 6 HOURS PRN
Status: DISCONTINUED | OUTPATIENT
Start: 2022-04-26 | End: 2022-04-27 | Stop reason: HOSPADM

## 2022-04-26 RX ORDER — MIRTAZAPINE 15 MG/1
15 TABLET, FILM COATED ORAL NIGHTLY
COMMUNITY
End: 2022-04-27 | Stop reason: HOSPADM

## 2022-04-26 RX ADMIN — ONDANSETRON 4 MG: 2 INJECTION INTRAMUSCULAR; INTRAVENOUS at 11:16

## 2022-04-26 RX ADMIN — SODIUM CHLORIDE, POTASSIUM CHLORIDE, SODIUM LACTATE AND CALCIUM CHLORIDE 75 ML/HR: 600; 310; 30; 20 INJECTION, SOLUTION INTRAVENOUS at 16:13

## 2022-04-26 RX ADMIN — TRAZODONE HYDROCHLORIDE 50 MG: 50 TABLET ORAL at 20:22

## 2022-04-26 RX ADMIN — ONDANSETRON 4 MG: 2 INJECTION INTRAMUSCULAR; INTRAVENOUS at 15:05

## 2022-04-26 RX ADMIN — PRAVASTATIN SODIUM 20 MG: 20 TABLET ORAL at 20:22

## 2022-04-26 RX ADMIN — HYDROCODONE BITARTRATE AND ACETAMINOPHEN 1 TABLET: 7.5; 325 TABLET ORAL at 15:01

## 2022-04-26 RX ADMIN — AMLODIPINE BESYLATE 5 MG: 5 TABLET ORAL at 16:56

## 2022-04-26 RX ADMIN — FAMOTIDINE 20 MG: 20 TABLET, FILM COATED ORAL at 16:56

## 2022-04-26 RX ADMIN — ASPIRIN 81 MG: 81 TABLET, CHEWABLE ORAL at 16:13

## 2022-04-26 RX ADMIN — CARVEDILOL 6.25 MG: 6.25 TABLET, FILM COATED ORAL at 18:14

## 2022-04-26 RX ADMIN — LOSARTAN POTASSIUM 100 MG: 50 TABLET, FILM COATED ORAL at 16:56

## 2022-04-27 ENCOUNTER — READMISSION MANAGEMENT (OUTPATIENT)
Dept: CALL CENTER | Facility: HOSPITAL | Age: 82
End: 2022-04-27

## 2022-04-27 ENCOUNTER — APPOINTMENT (OUTPATIENT)
Dept: ULTRASOUND IMAGING | Facility: HOSPITAL | Age: 82
End: 2022-04-27

## 2022-04-27 ENCOUNTER — APPOINTMENT (OUTPATIENT)
Dept: NEUROLOGY | Facility: HOSPITAL | Age: 82
End: 2022-04-27

## 2022-04-27 ENCOUNTER — APPOINTMENT (OUTPATIENT)
Dept: CARDIOLOGY | Facility: HOSPITAL | Age: 82
End: 2022-04-27

## 2022-04-27 VITALS
OXYGEN SATURATION: 99 % | HEART RATE: 76 BPM | RESPIRATION RATE: 18 BRPM | HEIGHT: 66 IN | BODY MASS INDEX: 26.18 KG/M2 | DIASTOLIC BLOOD PRESSURE: 71 MMHG | SYSTOLIC BLOOD PRESSURE: 148 MMHG | TEMPERATURE: 97.9 F | WEIGHT: 162.92 LBS

## 2022-04-27 LAB
ALBUMIN SERPL-MCNC: 4.5 G/DL (ref 3.5–5.2)
ALBUMIN/GLOB SERPL: 1.8 G/DL
ALP SERPL-CCNC: 87 U/L (ref 39–117)
ALT SERPL W P-5'-P-CCNC: 13 U/L (ref 1–33)
ANA SER QL: NEGATIVE
ANION GAP SERPL CALCULATED.3IONS-SCNC: 10 MMOL/L (ref 5–15)
AST SERPL-CCNC: 33 U/L (ref 1–32)
BH CV ECHO MEAS - AO MAX PG: 8 MMHG
BH CV ECHO MEAS - AO MEAN PG: 5 MMHG
BH CV ECHO MEAS - AO ROOT DIAM: 3.2 CM
BH CV ECHO MEAS - AO V2 MAX: 141 CM/SEC
BH CV ECHO MEAS - AO V2 VTI: 35.4 CM
BH CV ECHO MEAS - AVA(I,D): 2.9 CM2
BH CV ECHO MEAS - EDV(CUBED): 81.2 ML
BH CV ECHO MEAS - ESV(CUBED): 16.6 ML
BH CV ECHO MEAS - FS: 41.1 %
BH CV ECHO MEAS - IVS/LVPW: 0.91 CM
BH CV ECHO MEAS - IVSD: 1.28 CM
BH CV ECHO MEAS - LA DIMENSION: 3.4 CM
BH CV ECHO MEAS - LAT PEAK E' VEL: 7.5 CM/SEC
BH CV ECHO MEAS - LV MASS(C)D: 219.7 GRAMS
BH CV ECHO MEAS - LV MAX PG: 5.5 MMHG
BH CV ECHO MEAS - LV MEAN PG: 3 MMHG
BH CV ECHO MEAS - LV V1 MAX: 117 CM/SEC
BH CV ECHO MEAS - LV V1 VTI: 32.4 CM
BH CV ECHO MEAS - LVIDD: 4.3 CM
BH CV ECHO MEAS - LVIDS: 2.6 CM
BH CV ECHO MEAS - LVOT AREA: 3.1 CM2
BH CV ECHO MEAS - LVOT DIAM: 2 CM
BH CV ECHO MEAS - LVPWD: 1.4 CM
BH CV ECHO MEAS - MED PEAK E' VEL: 5.4 CM/SEC
BH CV ECHO MEAS - MR MAX PG: 110.3 MMHG
BH CV ECHO MEAS - MR MAX VEL: 525 CM/SEC
BH CV ECHO MEAS - MR MEAN PG: 81 MMHG
BH CV ECHO MEAS - MR MEAN VEL: 435 CM/SEC
BH CV ECHO MEAS - MR VTI: 207 CM
BH CV ECHO MEAS - MV A MAX VEL: 141 CM/SEC
BH CV ECHO MEAS - MV DEC SLOPE: 243.5 CM/SEC2
BH CV ECHO MEAS - MV DEC TIME: 0.42 MSEC
BH CV ECHO MEAS - MV E MAX VEL: 96.3 CM/SEC
BH CV ECHO MEAS - MV E/A: 0.68
BH CV ECHO MEAS - RAP SYSTOLE: 10 MMHG
BH CV ECHO MEAS - RVSP: 40.7 MMHG
BH CV ECHO MEAS - SV(LVOT): 101.8 ML
BH CV ECHO MEAS - TR MAX PG: 30.7 MMHG
BH CV ECHO MEAS - TR MAX VEL: 277 CM/SEC
BH CV ECHO MEASUREMENTS AVERAGE E/E' RATIO: 14.93
BILIRUB SERPL-MCNC: 1.1 MG/DL (ref 0–1.2)
BUN SERPL-MCNC: 13 MG/DL (ref 8–23)
BUN/CREAT SERPL: 20 (ref 7–25)
CALCIUM SPEC-SCNC: 10 MG/DL (ref 8.6–10.5)
CHLORIDE SERPL-SCNC: 101 MMOL/L (ref 98–107)
CHOLEST SERPL-MCNC: 147 MG/DL (ref 0–200)
CO2 SERPL-SCNC: 23 MMOL/L (ref 22–29)
CREAT SERPL-MCNC: 0.65 MG/DL (ref 0.57–1)
CRP SERPL-MCNC: <1 MG/L (ref 0–10)
DEPRECATED RDW RBC AUTO: 39.9 FL (ref 37–54)
EGFRCR SERPLBLD CKD-EPI 2021: 86.9 ML/MIN/1.73
ERYTHROCYTE [DISTWIDTH] IN BLOOD BY AUTOMATED COUNT: 12.1 % (ref 12.3–15.4)
ERYTHROCYTE [SEDIMENTATION RATE] IN BLOOD BY WESTERGREN METHOD: 4 MM/HR (ref 0–40)
GLOBULIN UR ELPH-MCNC: 2.5 GM/DL
GLUCOSE BLDC GLUCOMTR-MCNC: 114 MG/DL (ref 70–130)
GLUCOSE SERPL-MCNC: 138 MG/DL (ref 65–99)
HBA1C MFR BLD: 5.7 % (ref 4.8–5.6)
HCT VFR BLD AUTO: 43.1 % (ref 34–46.6)
HDLC SERPL-MCNC: 55 MG/DL (ref 40–60)
HGB BLD-MCNC: 14.9 G/DL (ref 12–15.9)
LDLC SERPL CALC-MCNC: 78 MG/DL (ref 0–100)
LDLC/HDLC SERPL: 1.43 {RATIO}
LEFT ATRIUM VOLUME INDEX: 22.1 ML/M2
LEFT ATRIUM VOLUME: 40.5 CM3
MAXIMAL PREDICTED HEART RATE: 136 BPM
MCH RBC QN AUTO: 31 PG (ref 26.6–33)
MCHC RBC AUTO-ENTMCNC: 34.6 G/DL (ref 31.5–35.7)
MCV RBC AUTO: 89.6 FL (ref 79–97)
PLATELET # BLD AUTO: 248 10*3/MM3 (ref 140–450)
PMV BLD AUTO: 8.9 FL (ref 6–12)
POTASSIUM SERPL-SCNC: 3.9 MMOL/L (ref 3.5–5.2)
PROT SERPL-MCNC: 7 G/DL (ref 6–8.5)
QT INTERVAL: 408 MS
QTC INTERVAL: 433 MS
RBC # BLD AUTO: 4.81 10*6/MM3 (ref 3.77–5.28)
RHEUMATOID FACT SERPL-ACNC: <10 IU/ML
SODIUM SERPL-SCNC: 134 MMOL/L (ref 136–145)
STRESS TARGET HR: 116 BPM
TRIGL SERPL-MCNC: 68 MG/DL (ref 0–150)
TSH SERPL DL<=0.05 MIU/L-ACNC: 1.08 UIU/ML (ref 0.27–4.2)
VLDLC SERPL-MCNC: 14 MG/DL (ref 5–40)
WBC NRBC COR # BLD: 7.73 10*3/MM3 (ref 3.4–10.8)

## 2022-04-27 PROCEDURE — 25010000002 ONDANSETRON PER 1 MG: Performed by: FAMILY MEDICINE

## 2022-04-27 PROCEDURE — G0378 HOSPITAL OBSERVATION PER HR: HCPCS

## 2022-04-27 PROCEDURE — 95816 EEG AWAKE AND DROWSY: CPT | Performed by: PSYCHIATRY & NEUROLOGY

## 2022-04-27 PROCEDURE — 93306 TTE W/DOPPLER COMPLETE: CPT | Performed by: EMERGENCY MEDICINE

## 2022-04-27 PROCEDURE — 85027 COMPLETE CBC AUTOMATED: CPT | Performed by: FAMILY MEDICINE

## 2022-04-27 PROCEDURE — 97165 OT EVAL LOW COMPLEX 30 MIN: CPT

## 2022-04-27 PROCEDURE — 80053 COMPREHEN METABOLIC PANEL: CPT | Performed by: FAMILY MEDICINE

## 2022-04-27 PROCEDURE — 97161 PT EVAL LOW COMPLEX 20 MIN: CPT | Performed by: PHYSICAL THERAPIST

## 2022-04-27 PROCEDURE — 96125 COGNITIVE TEST BY HC PRO: CPT | Performed by: SPEECH-LANGUAGE PATHOLOGIST

## 2022-04-27 PROCEDURE — 93880 EXTRACRANIAL BILAT STUDY: CPT | Performed by: SURGERY

## 2022-04-27 PROCEDURE — 96376 TX/PRO/DX INJ SAME DRUG ADON: CPT

## 2022-04-27 PROCEDURE — 25010000002 PERFLUTREN 6.52 MG/ML SUSPENSION: Performed by: FAMILY MEDICINE

## 2022-04-27 PROCEDURE — 99213 OFFICE O/P EST LOW 20 MIN: CPT | Performed by: PSYCHIATRY & NEUROLOGY

## 2022-04-27 PROCEDURE — 84443 ASSAY THYROID STIM HORMONE: CPT | Performed by: FAMILY MEDICINE

## 2022-04-27 PROCEDURE — 93306 TTE W/DOPPLER COMPLETE: CPT

## 2022-04-27 PROCEDURE — 82962 GLUCOSE BLOOD TEST: CPT

## 2022-04-27 PROCEDURE — 95816 EEG AWAKE AND DROWSY: CPT

## 2022-04-27 PROCEDURE — 93880 EXTRACRANIAL BILAT STUDY: CPT

## 2022-04-27 PROCEDURE — 80061 LIPID PANEL: CPT | Performed by: FAMILY MEDICINE

## 2022-04-27 PROCEDURE — 99214 OFFICE O/P EST MOD 30 MIN: CPT | Performed by: CLINICAL NURSE SPECIALIST

## 2022-04-27 PROCEDURE — 83036 HEMOGLOBIN GLYCOSYLATED A1C: CPT | Performed by: FAMILY MEDICINE

## 2022-04-27 RX ORDER — FAMOTIDINE 20 MG/1
20 TABLET, FILM COATED ORAL 2 TIMES DAILY PRN
Qty: 180 TABLET | Refills: 0 | Status: SHIPPED | OUTPATIENT
Start: 2022-04-27 | End: 2022-06-24 | Stop reason: SDUPTHER

## 2022-04-27 RX ADMIN — HYDROCODONE BITARTRATE AND ACETAMINOPHEN 1 TABLET: 7.5; 325 TABLET ORAL at 15:02

## 2022-04-27 RX ADMIN — CARVEDILOL 6.25 MG: 6.25 TABLET, FILM COATED ORAL at 09:04

## 2022-04-27 RX ADMIN — LOSARTAN POTASSIUM 100 MG: 50 TABLET, FILM COATED ORAL at 09:04

## 2022-04-27 RX ADMIN — FAMOTIDINE 20 MG: 20 TABLET, FILM COATED ORAL at 09:04

## 2022-04-27 RX ADMIN — HYDROCODONE BITARTRATE AND ACETAMINOPHEN 1 TABLET: 7.5; 325 TABLET ORAL at 03:51

## 2022-04-27 RX ADMIN — Medication 10 ML: at 09:05

## 2022-04-27 RX ADMIN — PERFLUTREN 8.48 MG: 6.52 INJECTION, SUSPENSION INTRAVENOUS at 14:41

## 2022-04-27 RX ADMIN — AMLODIPINE BESYLATE 5 MG: 5 TABLET ORAL at 09:04

## 2022-04-27 RX ADMIN — ONDANSETRON 4 MG: 2 INJECTION INTRAMUSCULAR; INTRAVENOUS at 04:04

## 2022-04-27 NOTE — OUTREACH NOTE
Prep Survey    Flowsheet Row Responses   [facility] patient discharged from? [facility]   Is LACE score < 7 ? Yes   Emergency Room discharge w/ pulse ox? No   Eligibility [facility]   Date of Admission 04/26/22   Date of Discharge 04/27/22   Discharge Disposition Home-Health Care Sv   Discharge diagnosis Altered mental status   Does the patient have one of the following disease processes/diagnoses(primary or secondary)? Other   Does the patient have Home health ordered? Yes   What is the Home health agency?  [facility]    Is there a DME ordered? No   Prep survey completed? Yes          FENG CORONEL - Registered Nurse

## 2022-04-28 ENCOUNTER — TRANSITIONAL CARE MANAGEMENT TELEPHONE ENCOUNTER (OUTPATIENT)
Dept: CALL CENTER | Facility: HOSPITAL | Age: 82
End: 2022-04-28

## 2022-04-28 NOTE — OUTREACH NOTE
Call Center TCM Note    Flowsheet Row Responses   [facility] patient discharged from? [facility]   Does the patient have one of the following disease processes/diagnoses(primary or secondary)? Other   TCM attempt successful? Yes   Call start time 1149   Call end time 1151   Discharge diagnosis Altered mental status   Is patient permission given to speak with other caregiver? Yes   List who call center can speak with PENNY DEE   Person spoke with today (if not patient) and relationship PENNY DEE- JANELLE   Meds reviewed with patient/caregiver? Yes   Is the patient having any side effects they believe may be caused by any medication additions or changes? No   Does the patient have all medications ordered at discharge? Yes   Is the patient taking all medications as directed (includes completed medication regime)? Yes   Does the patient have a primary care provider?  Yes   Does the patient have an appointment with their PCP within 7 days of discharge? Yes   Comments regarding PCP PCP APPOINTMENT IS 5/3/22   Has the patient kept scheduled appointments due by today? N/A   What is the Home health agency?  [facility]    Has home health visited the patient within 72 hours of discharge? Call prior to 72 hours   Psychosocial issues? No   Did the patient receive a copy of their discharge instructions? Yes   Nursing interventions Reviewed instructions with patient   What is the patient's perception of their health status since discharge? Improving   Is the patient/caregiver able to teach back signs and symptoms related to disease process for when to call PCP? Yes   Is the patient/caregiver able to teach back signs and symptoms related to disease process for when to call 911? Yes   Is the patient/caregiver able to teach back the hierarchy of who to call/visit for symptoms/problems? PCP, Specialist, Home health nurse, Urgent Care, ED, 911 Yes   If the patient is a current smoker, are they able to teach back resources  for cessation? Not a smoker   TCM call completed? Yes          Sagrario Luo LPN    4/28/2022, 11:52 CDT

## 2022-05-01 LAB
BACTERIA SPEC AEROBE CULT: NORMAL
BACTERIA SPEC AEROBE CULT: NORMAL

## 2022-05-03 ENCOUNTER — OFFICE VISIT (OUTPATIENT)
Dept: FAMILY MEDICINE CLINIC | Facility: CLINIC | Age: 82
End: 2022-05-03

## 2022-05-03 VITALS
HEIGHT: 67 IN | BODY MASS INDEX: 24.89 KG/M2 | DIASTOLIC BLOOD PRESSURE: 65 MMHG | WEIGHT: 158.6 LBS | TEMPERATURE: 97.8 F | HEART RATE: 65 BPM | OXYGEN SATURATION: 97 % | SYSTOLIC BLOOD PRESSURE: 120 MMHG

## 2022-05-03 DIAGNOSIS — F41.9 ANXIETY: ICD-10-CM

## 2022-05-03 DIAGNOSIS — I69.319 CVA, OLD, COGNITIVE DEFICITS: ICD-10-CM

## 2022-05-03 DIAGNOSIS — R11.0 NAUSEA: ICD-10-CM

## 2022-05-03 DIAGNOSIS — Z92.89 HISTORY OF RECENT HOSPITALIZATION: Primary | ICD-10-CM

## 2022-05-03 DIAGNOSIS — R13.12 OROPHARYNGEAL DYSPHAGIA: ICD-10-CM

## 2022-05-03 DIAGNOSIS — R41.82 ALTERED MENTAL STATUS, UNSPECIFIED ALTERED MENTAL STATUS TYPE: ICD-10-CM

## 2022-05-03 PROCEDURE — 1111F DSCHRG MED/CURRENT MED MERGE: CPT | Performed by: NURSE PRACTITIONER

## 2022-05-03 PROCEDURE — 99495 TRANSJ CARE MGMT MOD F2F 14D: CPT | Performed by: NURSE PRACTITIONER

## 2022-05-03 RX ORDER — ONDANSETRON 4 MG/1
4 TABLET, ORALLY DISINTEGRATING ORAL EVERY 8 HOURS PRN
Qty: 30 TABLET | Refills: 2 | Status: SHIPPED | OUTPATIENT
Start: 2022-05-03 | End: 2022-05-27

## 2022-05-03 RX ORDER — ALPRAZOLAM 0.5 MG/1
0.5 TABLET ORAL 2 TIMES DAILY PRN
COMMUNITY
End: 2022-05-03

## 2022-05-03 RX ORDER — ALPRAZOLAM 0.25 MG/1
0.25 TABLET ORAL 2 TIMES DAILY PRN
Qty: 60 TABLET | Refills: 2 | Status: SHIPPED | OUTPATIENT
Start: 2022-05-03 | End: 2022-08-01 | Stop reason: SDUPTHER

## 2022-05-03 RX ORDER — DONEPEZIL HYDROCHLORIDE 10 MG/1
TABLET, FILM COATED ORAL
Qty: 30 TABLET | Refills: 1 | Status: SHIPPED | OUTPATIENT
Start: 2022-05-03 | End: 2022-05-25

## 2022-05-03 RX ORDER — METOCLOPRAMIDE 5 MG/1
5 TABLET ORAL DAILY
Qty: 90 TABLET | Refills: 0 | Status: ON HOLD | OUTPATIENT
Start: 2022-05-03 | End: 2022-06-14

## 2022-05-03 NOTE — PROGRESS NOTES
Transitional Care Follow Up Visit  Subjective     Shira King is a 82 y.o. female who presents for a transitional care management visit.    Within 48 business hours after discharge our office contacted her via telephone to coordinate her care and needs.      I reviewed and discussed the details of that call along with the discharge summary, hospital problems, inpatient lab results, inpatient diagnostic studies, and consultation reports with Shira.     Current outpatient and discharge medications have been reconciled for the patient.  Reviewed by: ROBIN Montenegro      Date of TCM Phone Call 4/27/2022   [facility]   Date of Admission 4/26/2022   Date of Discharge 4/27/2022   [place] [facility] [place] [facility] [place] for [place] (LACE) Score: 5 (4/27/2022  5:01 AM)      History of Present Illness   Course During Hospital Stay:  Patient has essentially no memory of events leading up to her ER visit that resulted in hospitalization on 4/26/2022.  She had confusion and memory loss.    Imaging Results (All)      Procedure Component Value Units Date/Time     MRI Brain Without Contrast [[id number]] Collected: 04/26/22 1739       Updated: 04/26/22 1747     Narrative:       EXAMINATION:  MRI BRAIN WO CONTRAST-  4/26/2022 5:17 PM CDT     HISTORY: Stroke, follow up. R41.82-Altered mental status, unspecified;  R41.0-Disorientation, unspecified; R41.2-Retrograde amnesia.     TECHNIQUE: Multiplanar imaging was performed in a high field magnet.     COMPARISON: No comparison study.     FINDINGS: There is no evidence of acute infarct on the diffusion  sequence. There are tiny chronic appearing bilateral cerebellar  infarcts. There is a vague area of T1 low signal and slight increased T2  signal in the right midbrain anteriorly. There is mild atrophy. On the  FLAIR sequence, there are scattered areas of predominantly subcortical  T2 high signal within both cerebral hemispheres.         Impression:       1. No evidence of acute infarct.  2. Subacute versus chronic probable infarct in the right midbrain  anteriorly. This measures 4 mm.  3. Chronic bilateral very small cerebellar infarcts.  4. T2 high signal in the hemispheric white matter is nonspecific and  likely due to chronic small vessel disease.  5. Mild atrophy.     Chief Complaint on Day of Discharge: none     History of Present Illness on Day of Discharge:   Patient is a 82-year  female presented ER with altered mental status and speaking difficulty.  Most of the information is from the daughter.  Patient does not remember much from yesterday.  Symptoms started at 12 AM this morning when she woke up.  Patient stated she felt out of place.  Patient was confused and did not know what was going on.  Patient walked to the couch.  A family member took the patient to the doctor's office, EMS was called and patient was shipped here to be evaluated for stroke.  Family concerned about stroke.  CT scan of the head in ER shows no acute changes.  Patient did not seem to remember anything about yesterday.  Patient has retrograde amnesia, behavior . patient also shows signs of dementia.        The following portions of the patient's history were reviewed and updated as appropriate: allergies, current medications, past family history, past medical history, past social history, past surgical history and problem list.    Review of Systems   Constitutional: Positive for fatigue.        Improved.   HENT: Negative.    Respiratory: Negative for cough and shortness of breath.    Cardiovascular: Negative.  Negative for chest pain and leg swelling.   Gastrointestinal: Negative.    Genitourinary: Negative.    Musculoskeletal: Positive for arthralgias and back pain.        Chronic, unchanged.   Neurological: Positive for weakness.        Chronic, unchanged.   Psychiatric/Behavioral: Positive for confusion and sleep disturbance. The patient is  nervous/anxious.        Objective   Physical Exam  Vitals and nursing note reviewed.   Constitutional:       General: She is not in acute distress.     Appearance: Normal appearance. She is normal weight. She is not ill-appearing.   HENT:      Head: Normocephalic and atraumatic.   Cardiovascular:      Rate and Rhythm: Normal rate and regular rhythm.      Pulses: Normal pulses.      Heart sounds: Normal heart sounds. No murmur heard.  Pulmonary:      Effort: Pulmonary effort is normal.      Breath sounds: Normal breath sounds.   Abdominal:      General: Bowel sounds are normal.   Musculoskeletal:         General: Normal range of motion.      Right lower leg: No edema.      Left lower leg: No edema.   Skin:     General: Skin is warm and dry.   Neurological:      Mental Status: She is alert and oriented to person, place, and time.   Psychiatric:         Thought Content: Thought content normal.      Comments: No signs of confusion noted at time of visit.         Assessment/Plan   Problems Addressed this Visit        Neuro    AMS (altered mental status)    Relevant Medications    donepezil (Aricept) 10 MG tablet      Other Visit Diagnoses     History of recent hospitalization    -  Primary    CVA, old, cognitive deficits        Anxiety        Relevant Medications    ALPRAZolam (XANAX) 0.25 MG tablet    Nausea        Relevant Medications    ondansetron ODT (Zofran ODT) 4 MG disintegrating tablet    Oropharyngeal dysphagia        Relevant Medications    metoclopramide (Reglan) 5 MG tablet      Diagnoses       Codes Comments    History of recent hospitalization    -  Primary ICD-10-CM: Z92.89  ICD-9-CM: V13.9     CVA, old, cognitive deficits     ICD-10-CM: I69.319  ICD-9-CM: 438.0     Altered mental status, unspecified altered mental status type     ICD-10-CM: R41.82  ICD-9-CM: 780.97     Anxiety     ICD-10-CM: F41.9  ICD-9-CM: 300.00     Nausea     ICD-10-CM: R11.0  ICD-9-CM: 787.02     Oropharyngeal dysphagia      ICD-10-CM: R13.12  ICD-9-CM: 787.22         Patient will follow up in 2 weeks, sooner if indicated.

## 2022-05-06 DIAGNOSIS — R13.12 OROPHARYNGEAL DYSPHAGIA: ICD-10-CM

## 2022-05-06 RX ORDER — METOCLOPRAMIDE 5 MG/1
TABLET ORAL
Qty: 90 TABLET | Refills: 0 | OUTPATIENT
Start: 2022-05-06

## 2022-05-06 RX ORDER — PRAVASTATIN SODIUM 20 MG
TABLET ORAL
Qty: 90 TABLET | Refills: 1 | Status: SHIPPED | OUTPATIENT
Start: 2022-05-06 | End: 2022-11-04

## 2022-05-06 RX ORDER — OMEPRAZOLE 20 MG/1
CAPSULE, DELAYED RELEASE ORAL
Qty: 90 CAPSULE | Refills: 1 | Status: SHIPPED | OUTPATIENT
Start: 2022-05-06 | End: 2022-05-25

## 2022-05-06 RX ORDER — CARVEDILOL 6.25 MG/1
TABLET ORAL
Qty: 180 TABLET | Refills: 1 | Status: SHIPPED | OUTPATIENT
Start: 2022-05-06 | End: 2022-11-04

## 2022-05-06 NOTE — TELEPHONE ENCOUNTER
Rx Refill Note  Requested Prescriptions     Pending Prescriptions Disp Refills   • metoclopramide (REGLAN) 5 MG tablet [Pharmacy Med Name: METOCLOPRAMIDE HCL 5MG TABS] 90 tablet 0     Sig: TAKE ONE TABLET BY MOUTH EVERY DAY      Last office visit with prescribing clinician: 5/3/2022      Next office visit with prescribing clinician: 5/24/2022     }  Sharda Hanley MA  05/06/22, 09:39 CDT

## 2022-05-06 NOTE — TELEPHONE ENCOUNTER
Patient pleasant and cooperative, visible in milieu.  Flat affect, denies SI/SIB, rated depression 0/10, anxiety 3/10.  Took scheduled medications with no problem.  Patient socialized and watched movie with peers.  No aggressive behavior noted.  Will continue to monitor closely   Rx Refill Note  Requested Prescriptions     Pending Prescriptions Disp Refills   • pravastatin (PRAVACHOL) 20 MG tablet [Pharmacy Med Name: PRAVASTATIN SODIUM 20MG TABS] 90 tablet 1     Sig: TAKE ONE TABLET BY MOUTH EVERY DAY   • carvedilol (COREG) 6.25 MG tablet [Pharmacy Med Name: CARVEDILOL 6.25MG TABS] 180 tablet 1     Sig: TAKE ONE TABLET BY MOUTH TWICE A DAY WITH MEALS   • omeprazole (priLOSEC) 20 MG capsule [Pharmacy Med Name: OMEPRAZOLE 20MG CPDR] 90 capsule 1     Sig: TAKE ONE CAPSULE BY MOUTH EVERY DAY      Last office visit with prescribing clinician: 5/3/2022      Next office visit with prescribing clinician: 5/24/2022         Sharda Hanley MA  05/06/22, 09:51 CDT

## 2022-05-12 ENCOUNTER — CLINICAL SUPPORT (OUTPATIENT)
Dept: FAMILY MEDICINE CLINIC | Facility: CLINIC | Age: 82
End: 2022-05-12

## 2022-05-12 NOTE — PROGRESS NOTES
Patient came into office to get one stitch removed on her right temple area.  She tolerated the removal well. No complications and she seemed happy with her results.

## 2022-05-13 ENCOUNTER — TELEPHONE (OUTPATIENT)
Dept: FAMILY MEDICINE CLINIC | Facility: CLINIC | Age: 82
End: 2022-05-13

## 2022-05-13 NOTE — TELEPHONE ENCOUNTER
Patient's daughter called about the medication Aricept. She states patient is having side effects of Vomiting, Diarrhea, and severe leg cramps. She wants to know if patient can stop medication and be prescribed a new medication. Please advise.

## 2022-05-13 NOTE — TELEPHONE ENCOUNTER
Stop medication.  I don't recommend starting another one that quickly.  Will discuss at next office visit.  The Aricept is the most tolerated of the medications.

## 2022-05-16 NOTE — TELEPHONE ENCOUNTER
I have concerns if she is still nauseated that the Aricept was not the cause.  Any related side effects would have lasted no longer than 48 hours.  My other concern is use of phenergan on elderly can precipitate balance issues and falls.

## 2022-05-16 NOTE — TELEPHONE ENCOUNTER
Spoke with Shira's daughter in law with Shira next to her. I advise her to quit taking the medication and she voiced it was stopped last Friday. Patient was feeling better but did show concern her zofran was not working. Asked if Bonita could call in Phenergan Suppository 25 MG could be called into [facility] to help. Please advise.

## 2022-05-17 DIAGNOSIS — M54.50 LUMBAR PAIN: ICD-10-CM

## 2022-05-17 DIAGNOSIS — M51.36 DDD (DEGENERATIVE DISC DISEASE), LUMBAR: ICD-10-CM

## 2022-05-17 RX ORDER — HYDROCODONE BITARTRATE AND ACETAMINOPHEN 7.5; 325 MG/1; MG/1
1 TABLET ORAL EVERY 8 HOURS PRN
Qty: 90 TABLET | Refills: 0 | OUTPATIENT
Start: 2022-05-17

## 2022-05-17 NOTE — TELEPHONE ENCOUNTER
Rx Refill Note  Requested Prescriptions     Pending Prescriptions Disp Refills   • HYDROcodone-acetaminophen (NORCO) 7.5-325 MG per tablet 90 tablet 0     Sig: Take 1 tablet by mouth Every 8 (Eight) Hours As Needed for Moderate Pain .      Last office visit with prescribing clinician: 5/3/2022      Next office visit with prescribing clinician: 5/24/2022     Drug Present     Carboxy-THC                    2                       ng/mg creat      Carboxy-THC is a metabolite of tetrahydrocannabinol (THC). Source      of THC is most commonly herbal marijuana or marijuana-based      products, but THC is also present in a scheduled prescription      medication. Trace amounts of THC can be present in hemp and      cannabidiol (CBD) products. This test is not intended to      distinguish between delta-9-tetrahydrocannabinol, the predominant      form of THC in most herbal or marijuana-based products, and      delta-8-tetrahydrocannabinol.     Hydrocodone                    619                     ng/mg creat     Hydromorphone                  89                      ng/mg creat     Dihydrocodeine                 185                     ng/mg creat     Norhydrocodone                 2064                    ng/mg creat      Sources of hydrocodone include scheduled prescription      medications. Hydromorphone, dihydrocodeine and norhydrocodone are      expected metabolites of hydrocodone. Hydromorphone and      dihydrocodeine are also available as scheduled prescription      medications.     Trazodone                      PRESENT     1,3 chlorophenyl piperazine    PRESENT      1,3-chlorophenyl piperazine is an expected metabolite of      trazodone.     Acetaminophen                  PRESENT   ====================================================================   Test                      Result    Flag   Units      Ref Range     Creatinine              132              mg/dL      >=20     Sharda Hanley MA  05/17/22, 15:30 CDT

## 2022-05-23 NOTE — THERAPY DISCHARGE NOTE
Acute Care - Speech Language Pathology Discharge Summary  [facility]       Patient Name: Shira King  : 1940  MRN: [id number]    Today's Date: 2022                   Admit Date: 2022      SLP Recommendation and Plan  Regular diet with thin liquids. Cognitive therapy.     Visit Dx:    ICD-10-CM ICD-9-CM   1. Altered mental status, unspecified altered mental status type  R41.82 780.97   2. Confusion  R41.0 298.9   3. Retrograde amnesia  R41.2 780.93   4. Hyponatremia  E87.1 276.1   5. Polypharmacy  Z79.899 V58.69   6. Dysphagia, unspecified type  R13.10 787.20   7. Impaired cognition  R41.89 294.9   8. Impaired mobility  Z74.09 799.89   9. Dementia without behavioral disturbance, unspecified dementia type ([facility])  F03.90 294.20   10. Advanced age  R54 797   11. Memory loss  R41.3 780.93   12. Allergic rhinitis, unspecified seasonality, unspecified trigger  J30.9 477.9   13. Other acute sinusitis, recurrence not specified  J01.80 461.8   14. Nasal vestibulitis  J34.89 478.19   15. Hypertrophy of both inferior nasal turbinates  J34.3 478.0   16. Peripheral vascular disease (HCC)  I73.9 443.9   17. Chronic ethmoidal sinusitis  J32.2 473.2   18. Chronic intractable headache, unspecified headache type  R51.9 784.0    G89.29    19. Somnolence, daytime  R40.0 780.54   20. Snoring  R06.83 786.09   21. Periodic limb movement  G47.61 327.51   22. Obstructive sleep apnea  G47.33 327.23   23. Insomnia, unspecified type  G47.00 780.52   24. Gastroesophageal reflux disease, unspecified whether esophagitis present  K21.9 530.81   25. Lumbar pain  M54.50 724.2   26. Mixed hyperlipidemia  E78.2 272.2   27. RMSF ([place] spotted fever)  A77.0 082.0   28. Essential hypertension  I10 401.9                SLP GOALS     Row Name 22 1300             Oral Nutrition/Hydration Goal 1 (SLP)    Oral Nutrition/Hydration Goal 1, SLP Pt will tolerate LRD without s/s of aspiration  -MM      Time Frame (Oral  Nutrition/Hydration Goal 1, SLP) short term goal (STG);by discharge  -MM      Barriers (Oral Nutrition/Hydration Goal 1, SLP) n/a  -MM      Progress/Outcomes (Oral Nutrition/Hydration Goal 1, SLP) discharged from facility;goal partially met  -MM              Attention Goal 1 (SLP)    Improve Attention by Goal 1 (SLP) complete selective attention task;complete divided attention task;80%;with minimal cues (75-90%)  -MM      Time Frame (Attention Goal 1, SLP) by discharge  -MM      Barriers (Attention Goal 1, SLP) n/a  -MM      Progress/Outcomes (Attention Goal 1, SLP) discharged from facility;goal not met  -MM              Orientation Goal 1 (SLP)    Improve Orientation Through Goal 1 (SLP) demonstrating orientation to day;demonstrating orientation to year;demonstrating orientation to month;demonstrating orientation to place;80%;with minimal cues (75-90%)  -MM      Time Frame (Orientation Goal 1, SLP) by discharge  -MM      Barriers (Orientation Goal 1, SLP) n/a  -MM      Progress/Outcomes (Orientation Goal 1, SLP) discharged from facility;goal not met  -MM              Memory Skills Goal 1 (SLP)    Improve Memory Skills Through Goal 1 (SLP) recalling related word lists immediately;recalling related word lists with an imposed delay;listen to a paragraph and answer questions;80%;with minimal cues (75-90%)  -MM      Time Frame (Memory Skills Goal 1, SLP) by discharge  -MM      Barriers (Memory Skills Goal 1, SLP) n/a  -MM      Progress/Outcomes (Memory Skills Goal 1, SLP) discharged from facility;goal not met  -MM              Functional Problem Solving Skills Goal 1 (SLP)    Improve Problem Solving Through Goal 1 (SLP) determine solutions to simple ADL/safety problems;complete organization/home management task;sequence steps in a task;80%;with minimal cues (75-90%)  -MM      Time Frame (Problem Solving Goal 1, SLP) by discharge  -MM      Barriers (Problem Solving Goal 1, SLP) n/a  -MM      Progress/Outcomes (Problem  Solving Goal 1, SLP) discharged from facility;goal not met  -ELLIS            User Key  (r) = Recorded By, (t) = Taken By, (c) = Cosigned By    Initials Name Provider Type    Mary Jane Meredith MS CCC-SLP Speech and Language Pathologist                        SLP Discharge Summary  Anticipated Discharge Disposition (SLP): unknown  Reason for Discharge: discharge from this facility  Progress Toward Achieving Short/long Term Goals: goals partially met within established timelines  Discharge Destination: home      Mary Jane Siddiqi MS CCC-SLP  5/23/2022

## 2022-05-25 ENCOUNTER — OFFICE VISIT (OUTPATIENT)
Dept: OBSTETRICS AND GYNECOLOGY | Facility: CLINIC | Age: 82
End: 2022-05-25

## 2022-05-25 VITALS
SYSTOLIC BLOOD PRESSURE: 104 MMHG | DIASTOLIC BLOOD PRESSURE: 50 MMHG | HEIGHT: 67 IN | BODY MASS INDEX: 23.7 KG/M2 | WEIGHT: 151 LBS

## 2022-05-25 DIAGNOSIS — N81.10 FEMALE CYSTOCELE: ICD-10-CM

## 2022-05-25 DIAGNOSIS — Z01.818 PRE-OP EXAM: ICD-10-CM

## 2022-05-25 DIAGNOSIS — Z78.9 NONSMOKER: ICD-10-CM

## 2022-05-25 DIAGNOSIS — N81.6 RECTOCELE: ICD-10-CM

## 2022-05-25 DIAGNOSIS — N99.3 VAGINAL VAULT PROLAPSE AFTER HYSTERECTOMY: Primary | ICD-10-CM

## 2022-05-25 PROCEDURE — 99204 OFFICE O/P NEW MOD 45 MIN: CPT | Performed by: OBSTETRICS & GYNECOLOGY

## 2022-05-25 RX ORDER — ACETAMINOPHEN 500 MG
1000 TABLET ORAL ONCE
Status: CANCELLED | OUTPATIENT
Start: 2022-05-25 | End: 2022-05-25

## 2022-05-25 RX ORDER — GABAPENTIN 100 MG/1
600 CAPSULE ORAL ONCE
Status: CANCELLED | OUTPATIENT
Start: 2022-05-25 | End: 2022-05-25

## 2022-05-25 NOTE — H&P
"Subjective     Chief Complaint   Patient presents with   • Bladder Prolapse     Pt here today as new pt with c/o prolapse. Pt voices having trouble emptying her bladder. Pt voices having urinary frequency as well. Pt voices having trouble with bowel movements as well. Pt voices no other concerns.        Shira King is a 82 y.o. year old who presents to be seen for pelvic prolapse.  Patient reports difficulty with emptying, with accompanying frequency.    The patient is s/p hysterectomy.  She reports positive vaginal pressure, bulge outside her body and sensation of incomplete bladder emptying, but denies urinary retention causing recurrent bladder infections.  + urinary incontinence, more during the night than during the day.  Patient also has constipation, but may have some stool trapping too since she says \"my bowels were out before too, and they are out again now\"  The patient feels like the problem began last year.  She is not currently sexually active, and is not interested in being sexually active in the future.  Shira King has had surgery for this problem in the past  Patient with prolapse repair in 2005; it sounds like she had an AR/PA, possibly with porcine graft material.    Past Medical History:   Diagnosis Date   • Chronic intractable headache 2/8/2021   • Gastroesophageal reflux disease 2/3/2020   • Lumbar pain 7/12/2019   • Mixed hyperlipidemia    • Sinusitis    • Valvular disease        Current Outpatient Medications:   •  ALPRAZolam (XANAX) 0.25 MG tablet, Take 1 tablet by mouth 2 (Two) Times a Day As Needed for Anxiety., Disp: 60 tablet, Rfl: 2  •  amLODIPine (NORVASC) 5 MG tablet, Take 5 mg by mouth Daily., Disp: , Rfl:   •  aspirin 81 MG chewable tablet, Chew 81 mg Every Other Day., Disp: , Rfl:   •  B Complex Vitamins (Vitamin B Complex) tablet, Take 1 tablet by mouth Daily., Disp: , Rfl:   •  carvedilol (COREG) 6.25 MG tablet, TAKE ONE TABLET BY MOUTH TWICE A DAY WITH MEALS, Disp: " 180 tablet, Rfl: 1  •  famotidine (PEPCID) 20 MG tablet, Take 1 tablet by mouth 2 (Two) Times a Day As Needed for Heartburn or Indigestion., Disp: 180 tablet, Rfl: 0  •  HYDROcodone-acetaminophen (NORCO) 7.5-325 MG per tablet, Take 1 tablet by mouth Every 8 (Eight) Hours As Needed for Moderate Pain ., Disp: 90 tablet, Rfl: 0  •  losartan (COZAAR) 100 MG tablet, Take 100 mg by mouth Daily., Disp: , Rfl:   •  metoclopramide (Reglan) 5 MG tablet, Take 1 tablet by mouth Daily., Disp: 90 tablet, Rfl: 0  •  ondansetron ODT (Zofran ODT) 4 MG disintegrating tablet, Place 1 tablet on the tongue Every 8 (Eight) Hours As Needed for Nausea., Disp: 30 tablet, Rfl: 2  •  pravastatin (PRAVACHOL) 20 MG tablet, TAKE ONE TABLET BY MOUTH EVERY DAY, Disp: 90 tablet, Rfl: 1  Family History   Problem Relation Age of Onset   • Stroke Mother    • Heart disease Mother    • Stroke Sister    • Heart attack Maternal Grandmother    • Stomach cancer Maternal Uncle      Social History     Socioeconomic History   • Marital status:    Tobacco Use   • Smoking status: Never Smoker   • Smokeless tobacco: Never Used   Vaping Use   • Vaping Use: Never used   Substance and Sexual Activity   • Alcohol use: No   • Drug use: No   • Sexual activity: Not Currently     Partners: Male     No Known Allergies    Family History   Problem Relation Age of Onset   • Stroke Mother    • Heart disease Mother    • Stroke Sister    • Heart attack Maternal Grandmother    • Stomach cancer Maternal Uncle      Review of Systems   HENT: Positive for hearing loss.    Eyes: Positive for visual disturbance (wears glasses).   Respiratory: Positive for shortness of breath (mildly, but this is constant and chronic., her baseline).    Cardiovascular: Negative for chest pain.   Gastrointestinal: Positive for abdominal pain (cramping across lower abdomen) and constipation.   Genitourinary: Positive for difficulty urinating, enuresis and vaginal pain (pressure). Negative for  "vaginal bleeding.   Musculoskeletal: Positive for arthralgias and back pain.   Neurological: Positive for dizziness (with position changes).           Objective   /50   Ht 168.9 cm (66.5\")   Wt 68.5 kg (151 lb)   LMP  (LMP Unknown)   BMI 24.01 kg/m²     Physical Exam  Vitals and nursing note reviewed.   Constitutional:       General: She is not in acute distress.     Appearance: She is well-developed.   HENT:      Head: Normocephalic and atraumatic.   Neck:      Thyroid: No thyromegaly.   Pulmonary:      Effort: Pulmonary effort is normal.   Abdominal:      General: There is no distension.      Palpations: Abdomen is soft.      Tenderness: There is no abdominal tenderness.   Genitourinary:     General: Normal vulva.      Comments: S/p hysterectomy, with poor vault support - apex at introitus.  + cystocele, + rectocele.  No urethral prolapse.  Vaginal mucosa pale, with loss of rugae  Musculoskeletal:         General: Normal range of motion.      Cervical back: Normal range of motion.   Skin:     General: Skin is warm and dry.   Neurological:      Mental Status: She is alert and oriented to person, place, and time.   Psychiatric:         Behavior: Behavior normal.         Judgment: Judgment normal.         Imaging   No data reviewed       Assessment & Plan    Diagnoses and all orders for this visit:    1. Vaginal vault prolapse after hysterectomy (Primary): Patient accompanied by daughter-in-law.  We are reviewed the options of laparoscopic colpopexy with anterior and posterior repair versus colpocleisis.  Pros and cons of each were reviewed.  Patient prefers the less invasive option and is being scheduled for a colpocleisis.  Postop expectations were explained.  I think that reducing her bladder will help it empty more completely and should help with her frequency.  -     Case Request; Standing  -     COVID PRE-OP / PRE-PROCEDURE SCREENING ORDER (NO ISOLATION) - Swab, Nasopharynx; Future  -     ECG 12 Lead; " Future  -     Case Request    2. Pre-op exam  -     CBC and Differential; Future    3. Female cystocele    4. Rectocele    5. Nonsmoker    Other orders  -     Follow Anesthesia Guidelines / Protocol; Future  -     Chlorhexidine Skin Prep; Future          Martina Street MD  5/25/2022  10:32 CDT

## 2022-05-25 NOTE — PROGRESS NOTES
"Subjective     Chief Complaint   Patient presents with   • Bladder Prolapse     Pt here today as new pt with c/o prolapse. Pt voices having trouble emptying her bladder. Pt voices having urinary frequency as well. Pt voices having trouble with bowel movements as well. Pt voices no other concerns.        Shira King is a 82 y.o. year old who presents to be seen for pelvic prolapse.  Patient reports difficulty with emptying, with accompanying frequency.    The patient is s/p hysterectomy.  She reports positive vaginal pressure, bulge outside her body and sensation of incomplete bladder emptying, but denies urinary retention causing recurrent bladder infections.  + urinary incontinence, more during the night than during the day.  Patient also has constipation, but may have some stool trapping too since she says \"my bowels were out before too, and they are out again now\"  The patient feels like the problem began last year.  She is not currently sexually active, and is not interested in being sexually active in the future.  Shira King has had surgery for this problem in the past  Patient with prolapse repair in 2005; it sounds like she had an AR/MS, possibly with porcine graft material.    Past Medical History:   Diagnosis Date   • Chronic intractable headache 2/8/2021   • Gastroesophageal reflux disease 2/3/2020   • Lumbar pain 7/12/2019   • Mixed hyperlipidemia    • Sinusitis    • Valvular disease        Current Outpatient Medications:   •  ALPRAZolam (XANAX) 0.25 MG tablet, Take 1 tablet by mouth 2 (Two) Times a Day As Needed for Anxiety., Disp: 60 tablet, Rfl: 2  •  amLODIPine (NORVASC) 5 MG tablet, Take 5 mg by mouth Daily., Disp: , Rfl:   •  aspirin 81 MG chewable tablet, Chew 81 mg Every Other Day., Disp: , Rfl:   •  B Complex Vitamins (Vitamin B Complex) tablet, Take 1 tablet by mouth Daily., Disp: , Rfl:   •  carvedilol (COREG) 6.25 MG tablet, TAKE ONE TABLET BY MOUTH TWICE A DAY WITH MEALS, Disp: " 180 tablet, Rfl: 1  •  famotidine (PEPCID) 20 MG tablet, Take 1 tablet by mouth 2 (Two) Times a Day As Needed for Heartburn or Indigestion., Disp: 180 tablet, Rfl: 0  •  HYDROcodone-acetaminophen (NORCO) 7.5-325 MG per tablet, Take 1 tablet by mouth Every 8 (Eight) Hours As Needed for Moderate Pain ., Disp: 90 tablet, Rfl: 0  •  losartan (COZAAR) 100 MG tablet, Take 100 mg by mouth Daily., Disp: , Rfl:   •  metoclopramide (Reglan) 5 MG tablet, Take 1 tablet by mouth Daily., Disp: 90 tablet, Rfl: 0  •  ondansetron ODT (Zofran ODT) 4 MG disintegrating tablet, Place 1 tablet on the tongue Every 8 (Eight) Hours As Needed for Nausea., Disp: 30 tablet, Rfl: 2  •  pravastatin (PRAVACHOL) 20 MG tablet, TAKE ONE TABLET BY MOUTH EVERY DAY, Disp: 90 tablet, Rfl: 1  Family History   Problem Relation Age of Onset   • Stroke Mother    • Heart disease Mother    • Stroke Sister    • Heart attack Maternal Grandmother    • Stomach cancer Maternal Uncle      Social History     Socioeconomic History   • Marital status:    Tobacco Use   • Smoking status: Never Smoker   • Smokeless tobacco: Never Used   Vaping Use   • Vaping Use: Never used   Substance and Sexual Activity   • Alcohol use: No   • Drug use: No   • Sexual activity: Not Currently     Partners: Male     No Known Allergies    Family History   Problem Relation Age of Onset   • Stroke Mother    • Heart disease Mother    • Stroke Sister    • Heart attack Maternal Grandmother    • Stomach cancer Maternal Uncle      Review of Systems   HENT: Positive for hearing loss.    Eyes: Positive for visual disturbance (wears glasses).   Respiratory: Positive for shortness of breath (mildly, but this is constant and chronic., her baseline).    Cardiovascular: Negative for chest pain.   Gastrointestinal: Positive for abdominal pain (cramping across lower abdomen) and constipation.   Genitourinary: Positive for difficulty urinating, enuresis and vaginal pain (pressure). Negative for  "vaginal bleeding.   Musculoskeletal: Positive for arthralgias and back pain.   Neurological: Positive for dizziness (with position changes).           Objective   /50   Ht 168.9 cm (66.5\")   Wt 68.5 kg (151 lb)   LMP  (LMP Unknown)   BMI 24.01 kg/m²     Physical Exam  Vitals and nursing note reviewed.   Constitutional:       General: She is not in acute distress.     Appearance: She is well-developed.   HENT:      Head: Normocephalic and atraumatic.   Neck:      Thyroid: No thyromegaly.   Pulmonary:      Effort: Pulmonary effort is normal.   Abdominal:      General: There is no distension.      Palpations: Abdomen is soft.      Tenderness: There is no abdominal tenderness.   Genitourinary:     General: Normal vulva.      Comments: S/p hysterectomy, with poor vault support - apex at introitus.  + cystocele, + rectocele.  No urethral prolapse.  Vaginal mucosa pale, with loss of rugae  Musculoskeletal:         General: Normal range of motion.      Cervical back: Normal range of motion.   Skin:     General: Skin is warm and dry.   Neurological:      Mental Status: She is alert and oriented to person, place, and time.   Psychiatric:         Behavior: Behavior normal.         Judgment: Judgment normal.         Imaging   No data reviewed       Assessment & Plan    Diagnoses and all orders for this visit:    1. Vaginal vault prolapse after hysterectomy (Primary): Patient accompanied by daughter-in-law.  We are reviewed the options of laparoscopic colpopexy with anterior and posterior repair versus colpocleisis.  Pros and cons of each were reviewed.  Patient prefers the less invasive option and is being scheduled for a colpocleisis.  Postop expectations were explained.  I think that reducing her bladder will help it empty more completely and should help with her frequency.  -     Case Request; Standing  -     COVID PRE-OP / PRE-PROCEDURE SCREENING ORDER (NO ISOLATION) - Swab, Nasopharynx; Future  -     ECG 12 Lead; " Future  -     Case Request    2. Pre-op exam  -     CBC and Differential; Future    3. Female cystocele    4. Rectocele    5. Nonsmoker    Other orders  -     Follow Anesthesia Guidelines / Protocol; Future  -     Chlorhexidine Skin Prep; Future          Martina Street MD  5/25/2022  10:32 CDT   None

## 2022-05-27 ENCOUNTER — OFFICE VISIT (OUTPATIENT)
Dept: FAMILY MEDICINE CLINIC | Facility: CLINIC | Age: 82
End: 2022-05-27

## 2022-05-27 VITALS
HEIGHT: 67 IN | BODY MASS INDEX: 24.23 KG/M2 | WEIGHT: 154.4 LBS | TEMPERATURE: 97.6 F | SYSTOLIC BLOOD PRESSURE: 130 MMHG | HEART RATE: 60 BPM | DIASTOLIC BLOOD PRESSURE: 69 MMHG | OXYGEN SATURATION: 97 %

## 2022-05-27 DIAGNOSIS — M54.50 LUMBAR PAIN: ICD-10-CM

## 2022-05-27 DIAGNOSIS — M51.36 DDD (DEGENERATIVE DISC DISEASE), LUMBAR: ICD-10-CM

## 2022-05-27 DIAGNOSIS — J30.9 ALLERGIC SINUSITIS: ICD-10-CM

## 2022-05-27 DIAGNOSIS — Z79.899 LONG-TERM USE OF HIGH-RISK MEDICATION: Primary | ICD-10-CM

## 2022-05-27 DIAGNOSIS — R11.0 NAUSEA: ICD-10-CM

## 2022-05-27 PROCEDURE — 99213 OFFICE O/P EST LOW 20 MIN: CPT | Performed by: NURSE PRACTITIONER

## 2022-05-27 RX ORDER — PROMETHAZINE HYDROCHLORIDE 25 MG/1
25 SUPPOSITORY RECTAL EVERY 6 HOURS PRN
Qty: 12 SUPPOSITORY | Refills: 1 | Status: SHIPPED | OUTPATIENT
Start: 2022-05-27 | End: 2022-06-24

## 2022-05-27 RX ORDER — FEXOFENADINE HCL 180 MG/1
180 TABLET ORAL DAILY
Qty: 10 TABLET | Refills: 0 | Status: SHIPPED | OUTPATIENT
Start: 2022-05-27 | End: 2022-06-24 | Stop reason: SDUPTHER

## 2022-05-27 RX ORDER — HYDROCODONE BITARTRATE AND ACETAMINOPHEN 5; 325 MG/1; MG/1
1 TABLET ORAL 3 TIMES DAILY PRN
Qty: 90 TABLET | Refills: 0 | Status: SHIPPED | OUTPATIENT
Start: 2022-05-27 | End: 2022-06-28 | Stop reason: SDUPTHER

## 2022-05-27 NOTE — PROGRESS NOTES
"Chief Complaint  Sinus Problem, Drug / Alcohol Assessment (NORCO, XANAX), and Nausea    Subjective          Shira King presents to [facility]  History of Present Illness  SINUS:  Allergy symptoms present with post nasal drainage.  MED REFILL/COMPLIANCE:  Patient presents for refill of Norco and Xanax.  She has shown compliance and no signs of abuse or misuse.  UDS and controlled substance agreement is updated today.  MALU is reviewed.  Patient is aware of risks associated with long term use of opioids and benzodiazepines.  NAUSEA:  She often wakes with nausea present.  Zofran is not effective for her.  She does best with phenergan suppositories.  No vomiting.    Objective   Vital Signs:  /69 (BP Location: Right arm, Patient Position: Sitting, Cuff Size: Adult)   Pulse 60   Temp 97.6 °F (36.4 °C)   Ht 168.9 cm (66.5\") Comment: pt reported  Wt 70 kg (154 lb 6.4 oz)   SpO2 97%   BMI 24.55 kg/m²      BMI is within normal parameters. No other follow-up for BMI required.      Physical Exam  Vitals and nursing note reviewed.   Constitutional:       General: She is not in acute distress.     Appearance: Normal appearance. She is normal weight. She is not ill-appearing.   HENT:      Head: Normocephalic and atraumatic.   Cardiovascular:      Rate and Rhythm: Normal rate and regular rhythm.      Heart sounds: Normal heart sounds. No murmur heard.  Pulmonary:      Effort: Pulmonary effort is normal.      Breath sounds: Normal breath sounds.   Musculoskeletal:      Right lower leg: No edema.      Left lower leg: No edema.   Skin:     General: Skin is warm and dry.   Neurological:      Mental Status: She is alert and oriented to person, place, and time.   Psychiatric:         Thought Content: Thought content normal.        Result Review :                 Assessment and Plan    Diagnoses and all orders for this visit:    1. Long-term use of high-risk medication (Primary)  -   "   Compliance Drug Analysis, Ur - Urine, Clean Catch    2. Lumbar pain  -     HYDROcodone-acetaminophen (NORCO) 5-325 MG per tablet; Take 1 tablet by mouth 3 (Three) Times a Day As Needed for Moderate Pain .  Dispense: 90 tablet; Refill: 0    3. DDD (degenerative disc disease), lumbar  -     HYDROcodone-acetaminophen (NORCO) 5-325 MG per tablet; Take 1 tablet by mouth 3 (Three) Times a Day As Needed for Moderate Pain .  Dispense: 90 tablet; Refill: 0    4. Nausea  -     promethazine (PHENERGAN) 25 MG suppository; Insert 1 suppository into the rectum Every 6 (Six) Hours As Needed for Nausea or Vomiting.  Dispense: 12 suppository; Refill: 1    5. Allergic sinusitis  -     fexofenadine (Allegra Allergy) 180 MG tablet; Take 1 tablet by mouth Daily.  Dispense: 10 tablet; Refill: 0             Follow Up   Return in about 3 months (around 8/27/2022) for Medicare Wellness with labs prior.  Patient was given instructions and counseling regarding her condition or for health maintenance advice. Please see specific information pulled into the AVS if appropriate.

## 2022-06-01 ENCOUNTER — PRE-ADMISSION TESTING (OUTPATIENT)
Dept: PREADMISSION TESTING | Facility: HOSPITAL | Age: 82
End: 2022-06-01

## 2022-06-01 VITALS
HEIGHT: 66 IN | DIASTOLIC BLOOD PRESSURE: 68 MMHG | BODY MASS INDEX: 24.59 KG/M2 | RESPIRATION RATE: 18 BRPM | HEART RATE: 66 BPM | SYSTOLIC BLOOD PRESSURE: 130 MMHG | OXYGEN SATURATION: 97 % | WEIGHT: 153 LBS

## 2022-06-01 LAB
ANION GAP SERPL CALCULATED.3IONS-SCNC: 10 MMOL/L (ref 5–15)
BUN SERPL-MCNC: 10 MG/DL (ref 8–23)
BUN/CREAT SERPL: 16.7 (ref 7–25)
CALCIUM SPEC-SCNC: 9.3 MG/DL (ref 8.6–10.5)
CHLORIDE SERPL-SCNC: 102 MMOL/L (ref 98–107)
CO2 SERPL-SCNC: 25 MMOL/L (ref 22–29)
CREAT SERPL-MCNC: 0.6 MG/DL (ref 0.57–1)
EGFRCR SERPLBLD CKD-EPI 2021: 88.6 ML/MIN/1.73
GLUCOSE SERPL-MCNC: 118 MG/DL (ref 65–99)
POTASSIUM SERPL-SCNC: 4.2 MMOL/L (ref 3.5–5.2)
SODIUM SERPL-SCNC: 137 MMOL/L (ref 136–145)

## 2022-06-01 PROCEDURE — 85025 COMPLETE CBC W/AUTO DIFF WBC: CPT | Performed by: OBSTETRICS & GYNECOLOGY

## 2022-06-01 PROCEDURE — 36415 COLL VENOUS BLD VENIPUNCTURE: CPT

## 2022-06-01 PROCEDURE — 80048 BASIC METABOLIC PNL TOTAL CA: CPT

## 2022-06-01 NOTE — DISCHARGE INSTRUCTIONS
Before you come to the hospital        Arrival time: AS DIRECTED BY OFFICE     YOU MAY TAKE THE FOLLOWING MEDICATION(S) THE MORNING OF SURGERY WITH A SIP OF WATER: ***  Carvedilol    Hold losartan for 24 hours prior to surgery          ALL OTHER HOME MEDICATION CHECK WITH YOUR PHYSICIAN (especially if you are taking diabetes medicines or blood thinners)    Do not take any Erectile Dysfunction medications (EX: CIALIS, VIAGRA) 24 hours prior to surgery      If you were given and instructed to use a germ- killing soap, use as directed the night before surgery and the morning of surgery before coming to the hospital.             Eating and drinking restrictions prior to scheduled arrival time    2 Hours before arrival time STOP   Drinking Clear liquids (water, apple juice-no pulp)     6 Hours before arrival time STOP   Milk or drinks that contain milk, full liquids    6 Hours before arrival time STOP   Light meals or foods, such as toast or cereal    8 Hours before arrival time STOP   Heavy foods, such as meat, fried foods, or fatty foods    (It is extremely important that you follow these guidelines to prevent delay or cancelation of your procedure)     Clear Liquids  Water and flavored water                                                                      Clear Fruit juices, such as cranberry juice and apple juice.  Black coffee (NO cream of any kind, including powdered).  Plain tea  Clear bouillon or broth.  Flavored gelatin.  Soda.  Gatorade or Powerade.  Full liquid examples  Juices that have pulp.  Frozen ice pops that contain fruit pieces.  Coffee with creamer  Milk.  Yogurt.              MANAGING PAIN AFTER SURGERY    We know you are probably wondering what your pain will be like after surgery.  Following surgery it is unrealistic to expect you will not have pain.   Pain is how our bodies let us know that something is wrong or cautions us to be careful.  That said, our goal is to make your pain  tolerable.    Methods we may use to treat your pain include (oral or IV medications, PCAs, epidurals, nerve blocks, etc.)   While some procedures require IV pain medications for a short time after surgery, transitioning to pain medications by mouth allows for better management of pain.   Your nurse will encourage you to take oral pain medications whenever possible.  IV medications work almost immediately, but only last a short while.  Taking medications by mouth allows for a more constant level of medication in your blood stream for a longer period of time.      Once your pain is out of control it is harder to get back under control.  It is important you are aware when your next dose of pain medication is due.  If you are admitted, your nurse may write the time of your next dose on the white board in your room to help you remember.      We are interested in your pain and encourage you to inform us about aggravating factors during your visit.   Many times a simple repositioning every few hours can make a big difference.    If your physician says it is okay, do not let your pain prevent you from getting out of bed. Be sure to call your nurse for assistance prior to getting up so you do not fall.      Before surgery, please decide your tolerable pain goal.  These faces help describe the pain ratings we use on a 0-10 scale.   Be prepared to tell us your goal and whether or not you take pain or anxiety medications at home.

## 2022-06-05 LAB — DRUGS UR: NORMAL

## 2022-06-10 ENCOUNTER — LAB (OUTPATIENT)
Dept: LAB | Facility: HOSPITAL | Age: 82
End: 2022-06-10

## 2022-06-10 DIAGNOSIS — N99.3 VAGINAL VAULT PROLAPSE AFTER HYSTERECTOMY: ICD-10-CM

## 2022-06-10 LAB — SARS-COV-2 ORF1AB RESP QL NAA+PROBE: NOT DETECTED

## 2022-06-10 PROCEDURE — C9803 HOPD COVID-19 SPEC COLLECT: HCPCS

## 2022-06-10 PROCEDURE — U0005 INFEC AGEN DETEC AMPLI PROBE: HCPCS

## 2022-06-10 PROCEDURE — U0004 COV-19 TEST NON-CDC HGH THRU: HCPCS

## 2022-06-14 ENCOUNTER — ANESTHESIA (OUTPATIENT)
Dept: PERIOP | Facility: HOSPITAL | Age: 82
End: 2022-06-14

## 2022-06-14 ENCOUNTER — ANESTHESIA EVENT (OUTPATIENT)
Dept: PERIOP | Facility: HOSPITAL | Age: 82
End: 2022-06-14

## 2022-06-14 ENCOUNTER — HOSPITAL ENCOUNTER (OUTPATIENT)
Facility: HOSPITAL | Age: 82
Setting detail: HOSPITAL OUTPATIENT SURGERY
Discharge: HOME | End: 2022-06-14
Attending: OBSTETRICS & GYNECOLOGY | Admitting: OBSTETRICS & GYNECOLOGY

## 2022-06-14 VITALS
SYSTOLIC BLOOD PRESSURE: 136 MMHG | RESPIRATION RATE: 18 BRPM | TEMPERATURE: 98 F | HEART RATE: 69 BPM | DIASTOLIC BLOOD PRESSURE: 60 MMHG | OXYGEN SATURATION: 93 %

## 2022-06-14 DIAGNOSIS — Z09 S/P GYNECOLOGICAL SURGERY, FOLLOW-UP EXAM: Primary | ICD-10-CM

## 2022-06-14 DIAGNOSIS — N99.3 VAGINAL VAULT PROLAPSE AFTER HYSTERECTOMY: ICD-10-CM

## 2022-06-14 LAB
ABO GROUP BLD: NORMAL
BLD GP AB SCN SERPL QL: NEGATIVE
RH BLD: POSITIVE
T&S EXPIRATION DATE: NORMAL

## 2022-06-14 PROCEDURE — 57120 COLPOCLEISIS LE FORT TYPE: CPT | Performed by: OBSTETRICS & GYNECOLOGY

## 2022-06-14 PROCEDURE — 25010000002 ONDANSETRON PER 1 MG: Performed by: NURSE ANESTHETIST, CERTIFIED REGISTERED

## 2022-06-14 PROCEDURE — 86900 BLOOD TYPING SEROLOGIC ABO: CPT | Performed by: OBSTETRICS & GYNECOLOGY

## 2022-06-14 PROCEDURE — 86901 BLOOD TYPING SEROLOGIC RH(D): CPT | Performed by: OBSTETRICS & GYNECOLOGY

## 2022-06-14 PROCEDURE — 25010000002 PROPOFOL 10 MG/ML EMULSION: Performed by: NURSE ANESTHETIST, CERTIFIED REGISTERED

## 2022-06-14 PROCEDURE — 25010000002 DEXAMETHASONE PER 1 MG: Performed by: NURSE ANESTHETIST, CERTIFIED REGISTERED

## 2022-06-14 PROCEDURE — 25010000002 CEFOXITIN PER 1 G: Performed by: OBSTETRICS & GYNECOLOGY

## 2022-06-14 PROCEDURE — 86850 RBC ANTIBODY SCREEN: CPT | Performed by: OBSTETRICS & GYNECOLOGY

## 2022-06-14 PROCEDURE — 25010000002 FENTANYL CITRATE (PF) 100 MCG/2ML SOLUTION: Performed by: NURSE ANESTHETIST, CERTIFIED REGISTERED

## 2022-06-14 RX ORDER — DEXAMETHASONE SODIUM PHOSPHATE 4 MG/ML
INJECTION, SOLUTION INTRA-ARTICULAR; INTRALESIONAL; INTRAMUSCULAR; INTRAVENOUS; SOFT TISSUE AS NEEDED
Status: DISCONTINUED | OUTPATIENT
Start: 2022-06-14 | End: 2022-06-14 | Stop reason: SURG

## 2022-06-14 RX ORDER — METOCLOPRAMIDE 5 MG/1
5 TABLET ORAL DAILY
COMMUNITY
End: 2022-06-24

## 2022-06-14 RX ORDER — SODIUM CHLORIDE, SODIUM LACTATE, POTASSIUM CHLORIDE, CALCIUM CHLORIDE 600; 310; 30; 20 MG/100ML; MG/100ML; MG/100ML; MG/100ML
9 INJECTION, SOLUTION INTRAVENOUS CONTINUOUS
Status: DISCONTINUED | OUTPATIENT
Start: 2022-06-14 | End: 2022-06-14 | Stop reason: HOSPADM

## 2022-06-14 RX ORDER — FLUMAZENIL 0.1 MG/ML
0.2 INJECTION INTRAVENOUS AS NEEDED
Status: DISCONTINUED | OUTPATIENT
Start: 2022-06-14 | End: 2022-06-14 | Stop reason: HOSPADM

## 2022-06-14 RX ORDER — DROPERIDOL 2.5 MG/ML
0.62 INJECTION, SOLUTION INTRAMUSCULAR; INTRAVENOUS ONCE AS NEEDED
Status: DISCONTINUED | OUTPATIENT
Start: 2022-06-14 | End: 2022-06-14 | Stop reason: HOSPADM

## 2022-06-14 RX ORDER — PROPOFOL 10 MG/ML
VIAL (ML) INTRAVENOUS AS NEEDED
Status: DISCONTINUED | OUTPATIENT
Start: 2022-06-14 | End: 2022-06-14 | Stop reason: SURG

## 2022-06-14 RX ORDER — OXYCODONE AND ACETAMINOPHEN 10; 325 MG/1; MG/1
1 TABLET ORAL ONCE AS NEEDED
Status: COMPLETED | OUTPATIENT
Start: 2022-06-14 | End: 2022-06-14

## 2022-06-14 RX ORDER — MAGNESIUM HYDROXIDE 1200 MG/15ML
LIQUID ORAL AS NEEDED
Status: DISCONTINUED | OUTPATIENT
Start: 2022-06-14 | End: 2022-06-14 | Stop reason: HOSPADM

## 2022-06-14 RX ORDER — ACETAMINOPHEN 500 MG
1000 TABLET ORAL ONCE
Status: COMPLETED | OUTPATIENT
Start: 2022-06-14 | End: 2022-06-14

## 2022-06-14 RX ORDER — SODIUM CHLORIDE 0.9 % (FLUSH) 0.9 %
10 SYRINGE (ML) INJECTION AS NEEDED
Status: DISCONTINUED | OUTPATIENT
Start: 2022-06-14 | End: 2022-06-14 | Stop reason: HOSPADM

## 2022-06-14 RX ORDER — ONDANSETRON 2 MG/ML
4 INJECTION INTRAMUSCULAR; INTRAVENOUS ONCE AS NEEDED
Status: DISCONTINUED | OUTPATIENT
Start: 2022-06-14 | End: 2022-06-14 | Stop reason: HOSPADM

## 2022-06-14 RX ORDER — LABETALOL HYDROCHLORIDE 5 MG/ML
5 INJECTION, SOLUTION INTRAVENOUS
Status: DISCONTINUED | OUTPATIENT
Start: 2022-06-14 | End: 2022-06-14 | Stop reason: HOSPADM

## 2022-06-14 RX ORDER — OXYCODONE AND ACETAMINOPHEN 7.5; 325 MG/1; MG/1
2 TABLET ORAL EVERY 4 HOURS PRN
Status: DISCONTINUED | OUTPATIENT
Start: 2022-06-14 | End: 2022-06-14 | Stop reason: HOSPADM

## 2022-06-14 RX ORDER — LIDOCAINE HYDROCHLORIDE 20 MG/ML
INJECTION, SOLUTION EPIDURAL; INFILTRATION; INTRACAUDAL; PERINEURAL AS NEEDED
Status: DISCONTINUED | OUTPATIENT
Start: 2022-06-14 | End: 2022-06-14 | Stop reason: SURG

## 2022-06-14 RX ORDER — SODIUM CHLORIDE, SODIUM LACTATE, POTASSIUM CHLORIDE, CALCIUM CHLORIDE 600; 310; 30; 20 MG/100ML; MG/100ML; MG/100ML; MG/100ML
1000 INJECTION, SOLUTION INTRAVENOUS CONTINUOUS
Status: DISCONTINUED | OUTPATIENT
Start: 2022-06-14 | End: 2022-06-14 | Stop reason: HOSPADM

## 2022-06-14 RX ORDER — ONDANSETRON 2 MG/ML
INJECTION INTRAMUSCULAR; INTRAVENOUS AS NEEDED
Status: DISCONTINUED | OUTPATIENT
Start: 2022-06-14 | End: 2022-06-14 | Stop reason: SURG

## 2022-06-14 RX ORDER — FENTANYL CITRATE 50 UG/ML
INJECTION, SOLUTION INTRAMUSCULAR; INTRAVENOUS AS NEEDED
Status: DISCONTINUED | OUTPATIENT
Start: 2022-06-14 | End: 2022-06-14 | Stop reason: SURG

## 2022-06-14 RX ORDER — IBUPROFEN 600 MG/1
600 TABLET ORAL ONCE AS NEEDED
Status: DISCONTINUED | OUTPATIENT
Start: 2022-06-14 | End: 2022-06-14 | Stop reason: HOSPADM

## 2022-06-14 RX ORDER — DEXTROSE MONOHYDRATE 25 G/50ML
12.5 INJECTION, SOLUTION INTRAVENOUS AS NEEDED
Status: DISCONTINUED | OUTPATIENT
Start: 2022-06-14 | End: 2022-06-14 | Stop reason: HOSPADM

## 2022-06-14 RX ORDER — HYDROCODONE BITARTRATE AND ACETAMINOPHEN 5; 325 MG/1; MG/1
1 TABLET ORAL EVERY 6 HOURS PRN
Qty: 8 TABLET | Refills: 0 | OUTPATIENT
Start: 2022-06-14 | End: 2022-06-24

## 2022-06-14 RX ORDER — FENTANYL CITRATE 50 UG/ML
25 INJECTION, SOLUTION INTRAMUSCULAR; INTRAVENOUS
Status: DISCONTINUED | OUTPATIENT
Start: 2022-06-14 | End: 2022-06-14 | Stop reason: HOSPADM

## 2022-06-14 RX ORDER — EPHEDRINE SULFATE 50 MG/ML
INJECTION, SOLUTION INTRAVENOUS AS NEEDED
Status: DISCONTINUED | OUTPATIENT
Start: 2022-06-14 | End: 2022-06-14 | Stop reason: SURG

## 2022-06-14 RX ORDER — LIDOCAINE HYDROCHLORIDE 10 MG/ML
0.5 INJECTION, SOLUTION EPIDURAL; INFILTRATION; INTRACAUDAL; PERINEURAL ONCE AS NEEDED
Status: DISCONTINUED | OUTPATIENT
Start: 2022-06-14 | End: 2022-06-14 | Stop reason: HOSPADM

## 2022-06-14 RX ORDER — SODIUM CHLORIDE 0.9 % (FLUSH) 0.9 %
10 SYRINGE (ML) INJECTION EVERY 12 HOURS SCHEDULED
Status: DISCONTINUED | OUTPATIENT
Start: 2022-06-14 | End: 2022-06-14 | Stop reason: HOSPADM

## 2022-06-14 RX ORDER — SODIUM CHLORIDE 0.9 % (FLUSH) 0.9 %
3 SYRINGE (ML) INJECTION AS NEEDED
Status: DISCONTINUED | OUTPATIENT
Start: 2022-06-14 | End: 2022-06-14 | Stop reason: HOSPADM

## 2022-06-14 RX ORDER — NALOXONE HCL 0.4 MG/ML
0.4 VIAL (ML) INJECTION AS NEEDED
Status: DISCONTINUED | OUTPATIENT
Start: 2022-06-14 | End: 2022-06-14 | Stop reason: HOSPADM

## 2022-06-14 RX ORDER — GABAPENTIN 300 MG/1
600 CAPSULE ORAL ONCE
Status: COMPLETED | OUTPATIENT
Start: 2022-06-14 | End: 2022-06-14

## 2022-06-14 RX ADMIN — LIDOCAINE HYDROCHLORIDE 100 MG: 20 INJECTION, SOLUTION EPIDURAL; INFILTRATION; INTRACAUDAL; PERINEURAL at 08:56

## 2022-06-14 RX ADMIN — ONDANSETRON 4 MG: 2 INJECTION INTRAMUSCULAR; INTRAVENOUS at 09:40

## 2022-06-14 RX ADMIN — SODIUM CHLORIDE, POTASSIUM CHLORIDE, SODIUM LACTATE AND CALCIUM CHLORIDE 1000 ML: 600; 310; 30; 20 INJECTION, SOLUTION INTRAVENOUS at 07:50

## 2022-06-14 RX ADMIN — ACETAMINOPHEN 1000 MG: 500 TABLET ORAL at 07:42

## 2022-06-14 RX ADMIN — OXYCODONE AND ACETAMINOPHEN 1 TABLET: 325; 10 TABLET ORAL at 10:16

## 2022-06-14 RX ADMIN — DEXAMETHASONE SODIUM PHOSPHATE 4 MG: 4 INJECTION, SOLUTION INTRA-ARTICULAR; INTRALESIONAL; INTRAMUSCULAR; INTRAVENOUS; SOFT TISSUE at 09:40

## 2022-06-14 RX ADMIN — FENTANYL CITRATE 100 MCG: 50 INJECTION, SOLUTION INTRAMUSCULAR; INTRAVENOUS at 08:56

## 2022-06-14 RX ADMIN — EPHEDRINE SULFATE 15 MG: 50 INJECTION INTRAVENOUS at 09:15

## 2022-06-14 RX ADMIN — PROPOFOL 100 MG: 10 INJECTION, EMULSION INTRAVENOUS at 08:56

## 2022-06-14 RX ADMIN — SODIUM CHLORIDE 2 G: 9 INJECTION, SOLUTION INTRAVENOUS at 09:00

## 2022-06-14 RX ADMIN — GABAPENTIN 600 MG: 300 CAPSULE ORAL at 07:42

## 2022-06-14 NOTE — ANESTHESIA POSTPROCEDURE EVALUATION
Patient: Shira King    Procedure Summary     Date: 06/14/22 Room / Location:  [facility] OR 09 /  [facility]    Anesthesia Start: 0856 Anesthesia Stop: 0950    Procedure: COLPOCLEISIS (N/A Vagina) Diagnosis:       Vaginal vault prolapse after hysterectomy      (Vaginal vault prolapse after hysterectomy [N99.3])    Surgeons: Martina Street MD Provider: Pavel Parker CRNA    Anesthesia Type: general ASA Status: 3          Anesthesia Type: general    Vitals  Vitals Value Taken Time   /71 06/14/22 1011   Temp 98 °F (36.7 °C) 06/14/22 1011   Pulse 70 06/14/22 1021   Resp 16 06/14/22 1011   SpO2 96 % 06/14/22 1021   Vitals shown include unvalidated device data.        Post Anesthesia Care and Evaluation    Patient location during evaluation: PACU  Patient participation: complete - patient participated  Level of consciousness: awake and alert  Pain management: adequate    Airway patency: patent  Anesthetic complications: No anesthetic complications    Cardiovascular status: acceptable  Respiratory status: acceptable  Hydration status: acceptable    Comments: Blood pressure 136/60, pulse 69, temperature 98 °F (36.7 °C), resp. rate 18, SpO2 93 %, not currently breastfeeding.    Pt discharged from PACU based on francy score >8

## 2022-06-14 NOTE — OP NOTE
Prince King  : 1940  MRN: [id number]  [facility]: [id number]  Date: 2022    Operative Note    COLPOCLEISIS      Pre-op Diagnosis:  Vaginal vault prolapse after hysterectomy [N99.3]   Post-op Diagnosis:  Post-Op Diagnosis Codes:     * Vaginal vault prolapse after hysterectomy [N99.3]   Procedure: Colpocliesis   Surgeon: Surgeon(s):  Martina Street MD       Anesthesia: General   Estimated Blood Loss: 25   mls   Fluids: 700   mls   UOP: clear   ABx: 2 grams ancef   Specimens:  None   Findings: Good support, with complete obliteration of the vagina at the conslusion of the procedure     Complications:     none     Description of Procedure:       Into the operating room, where she was prepped and draped in the usual sterile fashion in the dorsal lithotomy position using Irwin stirrups.  Patient's bladder was drained by placing a villalta catheter.  A rectangular shaped area of mucosa covering the entire posterior vaginal wall was outlined using a scalpel.  The mucosa within this border was removed using Metzenbaum scissors and pickups.  The tissue was discarded.  Bovie cautery was used where necessary to control any small bleeding.  Attention was then turned to the anterior vaginal wall, where a rectangular shaped piece of mucosa was outlined with the scalpel in a similar fashion.  Again, Metzenbaum scissors and pickups were used to remove the mucosa from the anterior vaginal wall, just as had been previously done for the posterior vaginal wall.  At this point in the procedure, all of the vaginal mucosa had been removed except for small tracts of epithelialized mucosa approximately 1 cm wide which remained along the right and left lateral sidewalls of the vagina.  The patient's prolapse was then reduced in a stepwise fashion, placing horizontal rows of 3-0 Vicryl interrupted figure-of-eight sutures.  At the right and left angle suture at each horizontal row, the epithelialized mucosa was inverted  to create a small tract for drainage of any vaginal discharge.  With each horizontal row of suture the patient's prolapse was successively reduced even further until the entire length of the vagina had been completely obliterated.  At the introitus the mucosal edges were reapproximated in a running fashion with 2-0 Vicryl swedged-on suture.  At the conclusion of the procedure, the patient had complete reduction of her prolapse, good support at her perineum, and unchanged appearance of her external genitalia.  The patient experienced scant blood loss for the procedure.  Sponge lap and needle counts were correct ×2.  The patient tolerated the procedure well.  And was taken to the recovery room in stable condition.      Martina Street MD   6/14/2022  09:48 CDT

## 2022-06-14 NOTE — INTERVAL H&P NOTE
H&P reviewed. The patient was examined and there are no changes to the H&P.  Patient without any changes to status or symptoms.  She has no further questions about surgery.

## 2022-06-14 NOTE — ANESTHESIA PROCEDURE NOTES
Airway  Date/Time: 6/14/2022 8:57 AM    General Information and Staff    CRNA/CAA: Pavel Parker CRNA    Indications and Patient Condition  Indications for airway management: CNS depression    Preoxygenated: yes  Mask difficulty assessment: 0 - not attempted    Final Airway Details  Final airway type: supraglottic airway      Successful airway: unique  Size 3    Number of attempts at approach: 1  Assessment: lips, teeth, and gum same as pre-op and atraumatic intubation    Additional Comments  Atraumatic Insertion

## 2022-06-14 NOTE — ANESTHESIA PREPROCEDURE EVALUATION
Anesthesia Evaluation     Patient summary reviewed   history of anesthetic complications: PONV  NPO Solid Status: > 8 hours             Airway   Mallampati: II  TM distance: >3 FB  Neck ROM: full  Dental      Pulmonary    (-) COPD, asthma, sleep apnea, not a smoker  Cardiovascular   Exercise tolerance: good (4-7 METS)    (+) hypertension, hyperlipidemia,   (-) pacemaker, past MI, angina, cardiac stents      Neuro/Psych  (+) CVA,    (-) seizures, TIA  GI/Hepatic/Renal/Endo    (+)  GERD,    (-) liver disease, no renal disease, diabetes    Musculoskeletal     Abdominal    Substance History      OB/GYN          Other                        Anesthesia Plan    ASA 3     general     intravenous induction     Anesthetic plan, risks, benefits, and alternatives have been provided, discussed and informed consent has been obtained with: patient.        CODE STATUS:

## 2022-06-24 ENCOUNTER — APPOINTMENT (OUTPATIENT)
Dept: CARDIOLOGY | Facility: HOSPITAL | Age: 82
End: 2022-06-24

## 2022-06-24 ENCOUNTER — READMISSION MANAGEMENT (OUTPATIENT)
Dept: CALL CENTER | Facility: HOSPITAL | Age: 82
End: 2022-06-24

## 2022-06-24 ENCOUNTER — APPOINTMENT (OUTPATIENT)
Dept: GENERAL RADIOLOGY | Facility: HOSPITAL | Age: 82
End: 2022-06-24

## 2022-06-24 ENCOUNTER — HOSPITAL ENCOUNTER (OUTPATIENT)
Facility: HOSPITAL | Age: 82
Setting detail: OBSERVATION
Discharge: HOME | End: 2022-06-24
Attending: STUDENT IN AN ORGANIZED HEALTH CARE EDUCATION/TRAINING PROGRAM | Admitting: FAMILY MEDICINE

## 2022-06-24 VITALS
TEMPERATURE: 98.3 F | RESPIRATION RATE: 18 BRPM | SYSTOLIC BLOOD PRESSURE: 109 MMHG | DIASTOLIC BLOOD PRESSURE: 64 MMHG | HEIGHT: 66 IN | WEIGHT: 168 LBS | HEART RATE: 70 BPM | BODY MASS INDEX: 27 KG/M2 | OXYGEN SATURATION: 95 %

## 2022-06-24 DIAGNOSIS — G89.29 CHRONIC INTRACTABLE HEADACHE, UNSPECIFIED HEADACHE TYPE: ICD-10-CM

## 2022-06-24 DIAGNOSIS — I73.9 PERIPHERAL VASCULAR DISEASE: ICD-10-CM

## 2022-06-24 DIAGNOSIS — R41.3 MEMORY LOSS: ICD-10-CM

## 2022-06-24 DIAGNOSIS — G47.33 OBSTRUCTIVE SLEEP APNEA: ICD-10-CM

## 2022-06-24 DIAGNOSIS — R40.0 SOMNOLENCE, DAYTIME: ICD-10-CM

## 2022-06-24 DIAGNOSIS — F03.90 DEMENTIA WITHOUT BEHAVIORAL DISTURBANCE, UNSPECIFIED DEMENTIA TYPE: ICD-10-CM

## 2022-06-24 DIAGNOSIS — R06.83 SNORING: ICD-10-CM

## 2022-06-24 DIAGNOSIS — R41.0 DISORIENTATION: ICD-10-CM

## 2022-06-24 DIAGNOSIS — J30.9 ALLERGIC RHINITIS, UNSPECIFIED SEASONALITY, UNSPECIFIED TRIGGER: ICD-10-CM

## 2022-06-24 DIAGNOSIS — G47.00 INSOMNIA, UNSPECIFIED TYPE: ICD-10-CM

## 2022-06-24 DIAGNOSIS — E78.2 MIXED HYPERLIPIDEMIA: ICD-10-CM

## 2022-06-24 DIAGNOSIS — J34.3 HYPERTROPHY OF BOTH INFERIOR NASAL TURBINATES: ICD-10-CM

## 2022-06-24 DIAGNOSIS — A77.0 RMSF (ROCKY MOUNTAIN SPOTTED FEVER): ICD-10-CM

## 2022-06-24 DIAGNOSIS — I10 ESSENTIAL HYPERTENSION: ICD-10-CM

## 2022-06-24 DIAGNOSIS — G47.61 PERIODIC LIMB MOVEMENT: ICD-10-CM

## 2022-06-24 DIAGNOSIS — J01.80 OTHER ACUTE SINUSITIS, RECURRENCE NOT SPECIFIED: ICD-10-CM

## 2022-06-24 DIAGNOSIS — R07.9 CHEST PAIN, UNSPECIFIED TYPE: Primary | ICD-10-CM

## 2022-06-24 DIAGNOSIS — J32.2 CHRONIC ETHMOIDAL SINUSITIS: ICD-10-CM

## 2022-06-24 DIAGNOSIS — R54 ADVANCED AGE: ICD-10-CM

## 2022-06-24 DIAGNOSIS — K21.9 GASTROESOPHAGEAL REFLUX DISEASE, UNSPECIFIED WHETHER ESOPHAGITIS PRESENT: ICD-10-CM

## 2022-06-24 DIAGNOSIS — J34.89 NASAL VESTIBULITIS: ICD-10-CM

## 2022-06-24 DIAGNOSIS — J30.9 ALLERGIC SINUSITIS: ICD-10-CM

## 2022-06-24 DIAGNOSIS — M54.50 LUMBAR PAIN: ICD-10-CM

## 2022-06-24 DIAGNOSIS — R51.9 CHRONIC INTRACTABLE HEADACHE, UNSPECIFIED HEADACHE TYPE: ICD-10-CM

## 2022-06-24 LAB
ALBUMIN SERPL-MCNC: 3.8 G/DL (ref 3.5–5.2)
ALBUMIN SERPL-MCNC: 4 G/DL (ref 3.5–5.2)
ALBUMIN/GLOB SERPL: 1.9 G/DL
ALBUMIN/GLOB SERPL: 1.9 G/DL
ALP SERPL-CCNC: 100 U/L (ref 39–117)
ALP SERPL-CCNC: 89 U/L (ref 39–117)
ALT SERPL W P-5'-P-CCNC: 16 U/L (ref 1–33)
ALT SERPL W P-5'-P-CCNC: 17 U/L (ref 1–33)
ANION GAP SERPL CALCULATED.3IONS-SCNC: 7 MMOL/L (ref 5–15)
ANION GAP SERPL CALCULATED.3IONS-SCNC: 7 MMOL/L (ref 5–15)
APTT PPP: 28.7 SECONDS (ref 24.1–35)
AST SERPL-CCNC: 31 U/L (ref 1–32)
AST SERPL-CCNC: 32 U/L (ref 1–32)
BACTERIA UR QL AUTO: ABNORMAL /HPF
BASOPHILS # BLD AUTO: 0.04 10*3/MM3 (ref 0–0.2)
BASOPHILS # BLD AUTO: 0.05 10*3/MM3 (ref 0–0.2)
BASOPHILS NFR BLD AUTO: 0.7 % (ref 0–1.5)
BASOPHILS NFR BLD AUTO: 0.9 % (ref 0–1.5)
BH CV STRESS BP STAGE 1: NORMAL
BH CV STRESS BP STAGE 2: NORMAL
BH CV STRESS BP STAGE 3: NORMAL
BH CV STRESS DOB - ATROPINE STAGE 3: 0.3
BH CV STRESS DOSE DOBUTAMINE STAGE 1: 10
BH CV STRESS DOSE DOBUTAMINE STAGE 2: 20
BH CV STRESS DOSE DOBUTAMINE STAGE 3: 30
BH CV STRESS DURATION MIN STAGE 1: 3
BH CV STRESS DURATION MIN STAGE 2: 3
BH CV STRESS DURATION MIN STAGE 3: 3
BH CV STRESS DURATION SEC STAGE 1: 0
BH CV STRESS DURATION SEC STAGE 2: 0
BH CV STRESS DURATION SEC STAGE 3: 50
BH CV STRESS HR STAGE 1: 68
BH CV STRESS HR STAGE 2: 86
BH CV STRESS HR STAGE 3: 142
BH CV STRESS PROTOCOL 1: NORMAL
BH CV STRESS RECOVERY BP: NORMAL MMHG
BH CV STRESS RECOVERY HR: 85 BPM
BH CV STRESS STAGE 1: 1
BH CV STRESS STAGE 2: 2
BH CV STRESS STAGE 3: 3
BILIRUB SERPL-MCNC: 0.4 MG/DL (ref 0–1.2)
BILIRUB SERPL-MCNC: 0.6 MG/DL (ref 0–1.2)
BILIRUB UR QL STRIP: NEGATIVE
BUN SERPL-MCNC: 6 MG/DL (ref 8–23)
BUN SERPL-MCNC: 7 MG/DL (ref 8–23)
BUN/CREAT SERPL: 10.5 (ref 7–25)
BUN/CREAT SERPL: 14 (ref 7–25)
CALCIUM SPEC-SCNC: 8.9 MG/DL (ref 8.6–10.5)
CALCIUM SPEC-SCNC: 9.2 MG/DL (ref 8.6–10.5)
CHLORIDE SERPL-SCNC: 102 MMOL/L (ref 98–107)
CHLORIDE SERPL-SCNC: 99 MMOL/L (ref 98–107)
CHOLEST SERPL-MCNC: 121 MG/DL (ref 0–200)
CLARITY UR: CLEAR
CO2 SERPL-SCNC: 25 MMOL/L (ref 22–29)
CO2 SERPL-SCNC: 28 MMOL/L (ref 22–29)
COLOR UR: YELLOW
CREAT SERPL-MCNC: 0.5 MG/DL (ref 0.57–1)
CREAT SERPL-MCNC: 0.57 MG/DL (ref 0.57–1)
DEPRECATED RDW RBC AUTO: 43.5 FL (ref 37–54)
DEPRECATED RDW RBC AUTO: 44 FL (ref 37–54)
EGFRCR SERPLBLD CKD-EPI 2021: 89.7 ML/MIN/1.73
EGFRCR SERPLBLD CKD-EPI 2021: 92.6 ML/MIN/1.73
EOSINOPHIL # BLD AUTO: 0.22 10*3/MM3 (ref 0–0.4)
EOSINOPHIL # BLD AUTO: 0.23 10*3/MM3 (ref 0–0.4)
EOSINOPHIL NFR BLD AUTO: 3.9 % (ref 0.3–6.2)
EOSINOPHIL NFR BLD AUTO: 4.2 % (ref 0.3–6.2)
ERYTHROCYTE [DISTWIDTH] IN BLOOD BY AUTOMATED COUNT: 13.1 % (ref 12.3–15.4)
ERYTHROCYTE [DISTWIDTH] IN BLOOD BY AUTOMATED COUNT: 13.1 % (ref 12.3–15.4)
GLOBULIN UR ELPH-MCNC: 2 GM/DL
GLOBULIN UR ELPH-MCNC: 2.1 GM/DL
GLUCOSE SERPL-MCNC: 114 MG/DL (ref 65–99)
GLUCOSE SERPL-MCNC: 130 MG/DL (ref 65–99)
GLUCOSE UR STRIP-MCNC: NEGATIVE MG/DL
HBA1C MFR BLD: 5.9 % (ref 4.8–5.6)
HCT VFR BLD AUTO: 35.8 % (ref 34–46.6)
HCT VFR BLD AUTO: 38.5 % (ref 34–46.6)
HDLC SERPL-MCNC: 57 MG/DL (ref 40–60)
HGB BLD-MCNC: 12.4 G/DL (ref 12–15.9)
HGB BLD-MCNC: 13.1 G/DL (ref 12–15.9)
HGB UR QL STRIP.AUTO: ABNORMAL
HYALINE CASTS UR QL AUTO: ABNORMAL /LPF
IMM GRANULOCYTES # BLD AUTO: 0.02 10*3/MM3 (ref 0–0.05)
IMM GRANULOCYTES # BLD AUTO: 0.03 10*3/MM3 (ref 0–0.05)
IMM GRANULOCYTES NFR BLD AUTO: 0.4 % (ref 0–0.5)
IMM GRANULOCYTES NFR BLD AUTO: 0.5 % (ref 0–0.5)
INR PPP: 1.07 (ref 0.91–1.09)
KETONES UR QL STRIP: NEGATIVE
LDLC SERPL CALC-MCNC: 52 MG/DL (ref 0–100)
LDLC/HDLC SERPL: 0.95 {RATIO}
LEUKOCYTE ESTERASE UR QL STRIP.AUTO: ABNORMAL
LYMPHOCYTES # BLD AUTO: 1.44 10*3/MM3 (ref 0.7–3.1)
LYMPHOCYTES # BLD AUTO: 1.74 10*3/MM3 (ref 0.7–3.1)
LYMPHOCYTES NFR BLD AUTO: 25.2 % (ref 19.6–45.3)
LYMPHOCYTES NFR BLD AUTO: 31.4 % (ref 19.6–45.3)
MAXIMAL PREDICTED HEART RATE: 136 BPM
MCH RBC QN AUTO: 31.3 PG (ref 26.6–33)
MCH RBC QN AUTO: 31.8 PG (ref 26.6–33)
MCHC RBC AUTO-ENTMCNC: 34 G/DL (ref 31.5–35.7)
MCHC RBC AUTO-ENTMCNC: 34.6 G/DL (ref 31.5–35.7)
MCV RBC AUTO: 91.8 FL (ref 79–97)
MCV RBC AUTO: 92.1 FL (ref 79–97)
MONOCYTES # BLD AUTO: 0.51 10*3/MM3 (ref 0.1–0.9)
MONOCYTES # BLD AUTO: 0.53 10*3/MM3 (ref 0.1–0.9)
MONOCYTES NFR BLD AUTO: 9.2 % (ref 5–12)
MONOCYTES NFR BLD AUTO: 9.3 % (ref 5–12)
NEUTROPHILS NFR BLD AUTO: 2.99 10*3/MM3 (ref 1.7–7)
NEUTROPHILS NFR BLD AUTO: 3.45 10*3/MM3 (ref 1.7–7)
NEUTROPHILS NFR BLD AUTO: 54 % (ref 42.7–76)
NEUTROPHILS NFR BLD AUTO: 60.3 % (ref 42.7–76)
NITRITE UR QL STRIP: NEGATIVE
NRBC BLD AUTO-RTO: 0 /100 WBC (ref 0–0.2)
NRBC BLD AUTO-RTO: 0 /100 WBC (ref 0–0.2)
PERCENT MAX PREDICTED HR: 104.41 %
PH UR STRIP.AUTO: 7.5 [PH] (ref 5–8)
PLATELET # BLD AUTO: 190 10*3/MM3 (ref 140–450)
PLATELET # BLD AUTO: 206 10*3/MM3 (ref 140–450)
PMV BLD AUTO: 8.2 FL (ref 6–12)
PMV BLD AUTO: 8.2 FL (ref 6–12)
POTASSIUM SERPL-SCNC: 4.3 MMOL/L (ref 3.5–5.2)
POTASSIUM SERPL-SCNC: 4.5 MMOL/L (ref 3.5–5.2)
PROT SERPL-MCNC: 5.8 G/DL (ref 6–8.5)
PROT SERPL-MCNC: 6.1 G/DL (ref 6–8.5)
PROT UR QL STRIP: NEGATIVE
PROTHROMBIN TIME: 13.5 SECONDS (ref 11.9–14.6)
QT INTERVAL: 396 MS
QTC INTERVAL: 411 MS
RBC # BLD AUTO: 3.9 10*6/MM3 (ref 3.77–5.28)
RBC # BLD AUTO: 4.18 10*6/MM3 (ref 3.77–5.28)
RBC # UR STRIP: ABNORMAL /HPF
REF LAB TEST METHOD: ABNORMAL
SARS-COV-2 RNA PNL SPEC NAA+PROBE: NOT DETECTED
SODIUM SERPL-SCNC: 134 MMOL/L (ref 136–145)
SODIUM SERPL-SCNC: 134 MMOL/L (ref 136–145)
SP GR UR STRIP: 1.01 (ref 1–1.03)
SQUAMOUS #/AREA URNS HPF: ABNORMAL /HPF
STRESS BASELINE BP: NORMAL MMHG
STRESS BASELINE HR: 73 BPM
STRESS PERCENT HR: 123 %
STRESS POST EXERCISE DUR MIN: 9 MIN
STRESS POST EXERCISE DUR SEC: 50 SEC
STRESS POST PEAK BP: NORMAL MMHG
STRESS POST PEAK HR: 142 BPM
STRESS TARGET HR: 116 BPM
TRIGL SERPL-MCNC: 50 MG/DL (ref 0–150)
TROPONIN T SERPL-MCNC: <0.01 NG/ML (ref 0–0.03)
UROBILINOGEN UR QL STRIP: ABNORMAL
VLDLC SERPL-MCNC: 12 MG/DL (ref 5–40)
WBC # UR STRIP: ABNORMAL /HPF
WBC NRBC COR # BLD: 5.54 10*3/MM3 (ref 3.4–10.8)
WBC NRBC COR # BLD: 5.71 10*3/MM3 (ref 3.4–10.8)

## 2022-06-24 PROCEDURE — 85025 COMPLETE CBC W/AUTO DIFF WBC: CPT | Performed by: STUDENT IN AN ORGANIZED HEALTH CARE EDUCATION/TRAINING PROGRAM

## 2022-06-24 PROCEDURE — 80053 COMPREHEN METABOLIC PANEL: CPT | Performed by: STUDENT IN AN ORGANIZED HEALTH CARE EDUCATION/TRAINING PROGRAM

## 2022-06-24 PROCEDURE — 96365 THER/PROPH/DIAG IV INF INIT: CPT

## 2022-06-24 PROCEDURE — 83036 HEMOGLOBIN GLYCOSYLATED A1C: CPT | Performed by: INTERNAL MEDICINE

## 2022-06-24 PROCEDURE — 93352 ADMIN ECG CONTRAST AGENT: CPT | Performed by: INTERNAL MEDICINE

## 2022-06-24 PROCEDURE — G0378 HOSPITAL OBSERVATION PER HR: HCPCS

## 2022-06-24 PROCEDURE — 93005 ELECTROCARDIOGRAM TRACING: CPT | Performed by: STUDENT IN AN ORGANIZED HEALTH CARE EDUCATION/TRAINING PROGRAM

## 2022-06-24 PROCEDURE — 85610 PROTHROMBIN TIME: CPT | Performed by: STUDENT IN AN ORGANIZED HEALTH CARE EDUCATION/TRAINING PROGRAM

## 2022-06-24 PROCEDURE — 96374 THER/PROPH/DIAG INJ IV PUSH: CPT

## 2022-06-24 PROCEDURE — 71045 X-RAY EXAM CHEST 1 VIEW: CPT

## 2022-06-24 PROCEDURE — 85730 THROMBOPLASTIN TIME PARTIAL: CPT | Performed by: STUDENT IN AN ORGANIZED HEALTH CARE EDUCATION/TRAINING PROGRAM

## 2022-06-24 PROCEDURE — 85025 COMPLETE CBC W/AUTO DIFF WBC: CPT | Performed by: INTERNAL MEDICINE

## 2022-06-24 PROCEDURE — 93017 CV STRESS TEST TRACING ONLY: CPT

## 2022-06-24 PROCEDURE — 25010000002 ONDANSETRON PER 1 MG: Performed by: INTERNAL MEDICINE

## 2022-06-24 PROCEDURE — 81001 URINALYSIS AUTO W/SCOPE: CPT | Performed by: STUDENT IN AN ORGANIZED HEALTH CARE EDUCATION/TRAINING PROGRAM

## 2022-06-24 PROCEDURE — 87635 SARS-COV-2 COVID-19 AMP PRB: CPT | Performed by: STUDENT IN AN ORGANIZED HEALTH CARE EDUCATION/TRAINING PROGRAM

## 2022-06-24 PROCEDURE — 93010 ELECTROCARDIOGRAM REPORT: CPT | Performed by: INTERNAL MEDICINE

## 2022-06-24 PROCEDURE — 36415 COLL VENOUS BLD VENIPUNCTURE: CPT

## 2022-06-24 PROCEDURE — 25010000002 ATROPINE SULFATE: Performed by: INTERNAL MEDICINE

## 2022-06-24 PROCEDURE — 0 DOBUTAMINE PER 250 MG: Performed by: INTERNAL MEDICINE

## 2022-06-24 PROCEDURE — C9803 HOPD COVID-19 SPEC COLLECT: HCPCS

## 2022-06-24 PROCEDURE — 99285 EMERGENCY DEPT VISIT HI MDM: CPT

## 2022-06-24 PROCEDURE — 93018 CV STRESS TEST I&R ONLY: CPT | Performed by: INTERNAL MEDICINE

## 2022-06-24 PROCEDURE — 80053 COMPREHEN METABOLIC PANEL: CPT | Performed by: INTERNAL MEDICINE

## 2022-06-24 PROCEDURE — 25010000002 PERFLUTREN 6.52 MG/ML SUSPENSION: Performed by: INTERNAL MEDICINE

## 2022-06-24 PROCEDURE — 93350 STRESS TTE ONLY: CPT

## 2022-06-24 PROCEDURE — 96375 TX/PRO/DX INJ NEW DRUG ADDON: CPT

## 2022-06-24 PROCEDURE — 93350 STRESS TTE ONLY: CPT | Performed by: INTERNAL MEDICINE

## 2022-06-24 PROCEDURE — 84484 ASSAY OF TROPONIN QUANT: CPT | Performed by: INTERNAL MEDICINE

## 2022-06-24 PROCEDURE — 84484 ASSAY OF TROPONIN QUANT: CPT | Performed by: STUDENT IN AN ORGANIZED HEALTH CARE EDUCATION/TRAINING PROGRAM

## 2022-06-24 PROCEDURE — 80061 LIPID PANEL: CPT | Performed by: INTERNAL MEDICINE

## 2022-06-24 RX ORDER — CARVEDILOL 6.25 MG/1
6.25 TABLET ORAL 2 TIMES DAILY WITH MEALS
Status: DISCONTINUED | OUTPATIENT
Start: 2022-06-24 | End: 2022-06-24

## 2022-06-24 RX ORDER — SODIUM CHLORIDE 0.9 % (FLUSH) 0.9 %
10 SYRINGE (ML) INJECTION AS NEEDED
Status: DISCONTINUED | OUTPATIENT
Start: 2022-06-24 | End: 2022-06-24

## 2022-06-24 RX ORDER — FAMOTIDINE 20 MG/1
20 TABLET, FILM COATED ORAL 2 TIMES DAILY
Status: DISCONTINUED | OUTPATIENT
Start: 2022-06-24 | End: 2022-06-24

## 2022-06-24 RX ORDER — AMLODIPINE BESYLATE 5 MG/1
5 TABLET ORAL DAILY
Status: DISCONTINUED | OUTPATIENT
Start: 2022-06-24 | End: 2022-06-24

## 2022-06-24 RX ORDER — ACETAMINOPHEN 500 MG
1000 TABLET ORAL ONCE
Status: COMPLETED | OUTPATIENT
Start: 2022-06-24 | End: 2022-06-24

## 2022-06-24 RX ORDER — DOBUTAMINE HYDROCHLORIDE 100 MG/100ML
10-50 INJECTION INTRAVENOUS CONTINUOUS
Status: DISCONTINUED | OUTPATIENT
Start: 2022-06-24 | End: 2022-06-24

## 2022-06-24 RX ORDER — HYDROCODONE BITARTRATE AND ACETAMINOPHEN 5; 325 MG/1; MG/1
1 TABLET ORAL 3 TIMES DAILY PRN
Status: DISCONTINUED | OUTPATIENT
Start: 2022-06-24 | End: 2022-06-24

## 2022-06-24 RX ORDER — FAMOTIDINE 20 MG/1
20 TABLET, FILM COATED ORAL 2 TIMES DAILY
Qty: 180 TABLET | Refills: 0 | Status: SHIPPED | OUTPATIENT
Start: 2022-06-24 | End: 2022-08-09

## 2022-06-24 RX ORDER — FEXOFENADINE HCL 180 MG/1
180 TABLET ORAL DAILY PRN
Qty: 10 TABLET | Refills: 0 | Status: SHIPPED | OUTPATIENT
Start: 2022-06-24

## 2022-06-24 RX ORDER — METOPROLOL TARTRATE 5 MG/5ML
5 INJECTION INTRAVENOUS ONCE
Status: COMPLETED | OUTPATIENT
Start: 2022-06-24 | End: 2022-06-24

## 2022-06-24 RX ORDER — FAMOTIDINE 20 MG/1
20 TABLET, FILM COATED ORAL 2 TIMES DAILY PRN
Status: DISCONTINUED | OUTPATIENT
Start: 2022-06-24 | End: 2022-06-24

## 2022-06-24 RX ORDER — LOSARTAN POTASSIUM 50 MG/1
100 TABLET ORAL DAILY
Status: DISCONTINUED | OUTPATIENT
Start: 2022-06-24 | End: 2022-06-24 | Stop reason: HOSPADM

## 2022-06-24 RX ORDER — ONDANSETRON 2 MG/ML
4 INJECTION INTRAMUSCULAR; INTRAVENOUS EVERY 6 HOURS PRN
Status: DISCONTINUED | OUTPATIENT
Start: 2022-06-24 | End: 2022-06-24

## 2022-06-24 RX ORDER — ACETAMINOPHEN 325 MG/1
650 TABLET ORAL EVERY 4 HOURS PRN
Status: DISCONTINUED | OUTPATIENT
Start: 2022-06-24 | End: 2022-06-24

## 2022-06-24 RX ORDER — PRAVASTATIN SODIUM 20 MG
20 TABLET ORAL DAILY
Status: DISCONTINUED | OUTPATIENT
Start: 2022-06-24 | End: 2022-06-24

## 2022-06-24 RX ORDER — SODIUM CHLORIDE 0.9 % (FLUSH) 0.9 %
10 SYRINGE (ML) INJECTION EVERY 12 HOURS SCHEDULED
Status: DISCONTINUED | OUTPATIENT
Start: 2022-06-24 | End: 2022-06-24

## 2022-06-24 RX ADMIN — Medication 10 ML: at 10:51

## 2022-06-24 RX ADMIN — LOSARTAN POTASSIUM 100 MG: 50 TABLET, FILM COATED ORAL at 10:44

## 2022-06-24 RX ADMIN — METOPROLOL TARTRATE 5 MG: 1 INJECTION, SOLUTION INTRAVENOUS at 09:44

## 2022-06-24 RX ADMIN — ATROPINE SULFATE 0.3 MG: 0.1 INJECTION, SOLUTION ENDOTRACHEAL; INTRAMUSCULAR; INTRAVENOUS; SUBCUTANEOUS at 09:38

## 2022-06-24 RX ADMIN — CARVEDILOL 6.25 MG: 6.25 TABLET, FILM COATED ORAL at 10:43

## 2022-06-24 RX ADMIN — HYDROCODONE BITARTRATE AND ACETAMINOPHEN 1 TABLET: 5; 325 TABLET ORAL at 10:50

## 2022-06-24 RX ADMIN — ONDANSETRON 4 MG: 2 INJECTION INTRAMUSCULAR; INTRAVENOUS at 10:50

## 2022-06-24 RX ADMIN — AMLODIPINE BESYLATE 5 MG: 5 TABLET ORAL at 10:45

## 2022-06-24 RX ADMIN — ACETAMINOPHEN 1000 MG: 500 TABLET ORAL at 02:28

## 2022-06-24 RX ADMIN — PERFLUTREN 8.48 MG: 6.52 INJECTION, SUSPENSION INTRAVENOUS at 09:12

## 2022-06-24 RX ADMIN — FAMOTIDINE 20 MG: 20 TABLET, FILM COATED ORAL at 10:43

## 2022-06-24 RX ADMIN — Medication 10 MCG/KG/MIN: at 09:12

## 2022-06-24 RX ADMIN — PRAVASTATIN SODIUM 20 MG: 20 TABLET ORAL at 10:45

## 2022-06-24 NOTE — OUTREACH NOTE
Prep Survey    Flowsheet Row Responses   [facility] facility patient discharged from? [facility]   Is LACE score < 7 ? No   Emergency Room discharge w/ pulse ox? No   Eligibility [facility]   Date of Admission 06/14/22   Date of Discharge 06/24/22   Discharge Disposition Home or Self Care   Discharge diagnosis Colpocliesis for vaginal vault prolapse   Does the patient have one of the following disease processes/diagnoses(primary or secondary)? General Surgery   Does the patient have Home health ordered? No   Is there a DME ordered? No   Prep survey completed? Yes          LUIS WEINBERG - Registered Nurse

## 2022-06-27 ENCOUNTER — TRANSITIONAL CARE MANAGEMENT TELEPHONE ENCOUNTER (OUTPATIENT)
Dept: CALL CENTER | Facility: HOSPITAL | Age: 82
End: 2022-06-27

## 2022-06-27 NOTE — OUTREACH NOTE
Call Center TCM Note    Flowsheet Row Responses   [facility] patient discharged from? [place]   Does the patient have one of the following disease processes/diagnoses(primary or secondary)? General Surgery   TCM attempt successful? Yes  [No current verbal release on file.]   Call start time 1146   Call end time 1151   Discharge diagnosis Colpocliesis for vaginal vault prolapse   Meds reviewed with patient/caregiver? Yes   Is the patient having any side effects they believe may be caused by any medication additions or changes? No   Does the patient have all medications related to this admission filled (includes all antibiotics, pain medications, etc.) Yes   Is the patient taking all medications as directed (includes completed medication regime)? Yes   Does the patient have a follow up appointment scheduled with their surgeon? Yes  [Post op 6/29/22]   Has the patient kept scheduled appointments due by today? N/A   Comments Patient declined TCM hopsital d/c follow up. Patient reports dtr will make appt. for her    Has home health visited the patient within 72 hours of discharge? N/A   Psychosocial issues? No   Did the patient receive a copy of their discharge instructions? Yes   Nursing interventions Reviewed instructions with patient   What is the patient's perception of their health status since discharge? Improving   Nursing interventions Nurse provided patient education   Is the patient /caregiver able to teach back basic post-op care? No tub bath, swimming, or hot tub until instructed by MD, Practice 'cough and deep breath'   Is the patient/caregiver able to teach back signs and symptoms of incisional infection? Fever   Is the patient/caregiver able to teach back steps to recovery at home? Set small, achievable goals for return to baseline health, Rest and rebuild strength, gradually increase activity, Eat a well-balance diet   If the patient is a current smoker, are they able to teach back resources for  cessation? Not a smoker   Is the patient/caregiver able to teach back the hierarchy of who to call/visit for symptoms/problems? PCP, Specialist, Home health nurse, Urgent Care, ED, 911 Yes   TCM call completed? Yes          Lynn Patricia RN    6/27/2022, 11:53 CDT

## 2022-06-28 DIAGNOSIS — M54.50 LUMBAR PAIN: ICD-10-CM

## 2022-06-28 DIAGNOSIS — M51.36 DDD (DEGENERATIVE DISC DISEASE), LUMBAR: ICD-10-CM

## 2022-06-28 RX ORDER — HYDROCODONE BITARTRATE AND ACETAMINOPHEN 5; 325 MG/1; MG/1
1 TABLET ORAL 3 TIMES DAILY PRN
Qty: 90 TABLET | Refills: 0 | Status: SHIPPED | OUTPATIENT
Start: 2022-06-28 | End: 2022-08-01 | Stop reason: SDUPTHER

## 2022-06-28 NOTE — TELEPHONE ENCOUNTER
Rx Refill Note  Requested Prescriptions     Pending Prescriptions Disp Refills   • HYDROcodone-acetaminophen (NORCO) 5-325 MG per tablet 90 tablet 0     Sig: Take 1 tablet by mouth 3 (Three) Times a Day As Needed for Moderate Pain .      Last office visit with prescribing clinician: 5/27/2022      Next office visit with prescribing clinician: 8/24/2022     [facility] COMP DRUG ANALYSIS,UR   ====================================================================   Test                             Result       Flag       Units   Drug Present     Alprazolam                     18                      ng/mg creat     Alpha-hydroxyalprazolam        100                     ng/mg creat      Source of alprazolam is a scheduled prescription medication.      Alpha-hydroxyalprazolam is an expected metabolite of alprazolam.     Hydrocodone                    669                     ng/mg creat     Hydromorphone                  235                     ng/mg creat     Dihydrocodeine                 106                     ng/mg creat     Norhydrocodone                 1156                    ng/mg creat      Sources of hydrocodone include scheduled prescription      medications. Hydromorphone, dihydrocodeine and norhydrocodone are      expected metabolites of hydrocodone. Hydromorphone and      dihydrocodeine are also available as scheduled prescription      medications.     Acetaminophen                  PRESENT     Salicylate                     PRESENT   ====================================================================   Test                      Result    Flag   Units      Ref Range     Creatinine              131              mg/dL      >=20       Sharda Hanley MA  06/28/22, 16:05 CDT

## 2022-06-29 ENCOUNTER — OFFICE VISIT (OUTPATIENT)
Dept: OBSTETRICS AND GYNECOLOGY | Facility: CLINIC | Age: 82
End: 2022-06-29

## 2022-06-29 VITALS
WEIGHT: 151 LBS | HEIGHT: 66 IN | SYSTOLIC BLOOD PRESSURE: 124 MMHG | DIASTOLIC BLOOD PRESSURE: 62 MMHG | BODY MASS INDEX: 24.27 KG/M2

## 2022-06-29 DIAGNOSIS — Z09 S/P GYNECOLOGICAL SURGERY, FOLLOW-UP EXAM: ICD-10-CM

## 2022-06-29 DIAGNOSIS — N30.00 ACUTE CYSTITIS WITHOUT HEMATURIA: Primary | ICD-10-CM

## 2022-06-29 PROCEDURE — 99024 POSTOP FOLLOW-UP VISIT: CPT | Performed by: OBSTETRICS & GYNECOLOGY

## 2022-06-29 RX ORDER — NITROFURANTOIN 25; 75 MG/1; MG/1
100 CAPSULE ORAL 2 TIMES DAILY
Qty: 14 CAPSULE | Refills: 0 | Status: SHIPPED | OUTPATIENT
Start: 2022-06-29 | End: 2022-08-26

## 2022-06-29 NOTE — PROGRESS NOTES
"Subjective   Chief Complaint   Patient presents with   • Post-op     Pt here today for 2 week post op colpocleisis. Pt voices having some leakage of urine. Pt voices no other concerns.      Shira King is a 82 y.o. year old  presenting to be seen for her post-operative visit.  She had a Colpocliesis 2 weeks ago.  Currently she reports pain for only a few days after surgery, but is not having any pain at this time.  She is not having scant vaginal bleeding.  Patient reports that right after surgery her bladder felt like it was emptying very well, a marked improvement over her baseline.  But about three days ago something changed, and now she feels like bladder is not emptying again, and she has to return to the bathroom frequently.  Patient also having mild bladder leakage, but reports that this is not much different than prior to surgery.    No Additional Complaints Reported    The following portions of the patient's history were reviewed and updated as appropriate:current medications and allergies    Review of Systems      Objective   /62   Ht 167.6 cm (66\")   Wt 68.5 kg (151 lb)   LMP  (LMP Unknown)   BMI 24.37 kg/m²     General:  well developed; well nourished  no acute distress   Abdomen: Not performed.   Pelvis: Clinical staff was present for exam.  Surgical site healing well and well-supported.  No induration or drainage.          Assessment   1. Pt is 2 weeks s/p Colpocliesis  2. Site healing well, but patient reports urinary frequency     Plan   1. Suspect UTI.  Sending UA with culture    No orders of the defined types were placed in this encounter.         This note was electronically signed.    Martina Street MD  2022  "

## 2022-06-30 ENCOUNTER — TELEPHONE (OUTPATIENT)
Dept: OBSTETRICS AND GYNECOLOGY | Facility: CLINIC | Age: 82
End: 2022-06-30

## 2022-07-01 LAB
APPEARANCE UR: ABNORMAL
BACTERIA #/AREA URNS HPF: ABNORMAL /HPF
BACTERIA UR CULT: NO GROWTH
BACTERIA UR CULT: NORMAL
BILIRUB UR QL STRIP: NEGATIVE
COLOR UR: ABNORMAL
EPI CELLS #/AREA URNS HPF: ABNORMAL /HPF
GLUCOSE UR QL STRIP: NEGATIVE
HGB UR QL STRIP: NEGATIVE
KETONES UR QL STRIP: ABNORMAL
LEUKOCYTE ESTERASE UR QL STRIP: ABNORMAL
NITRITE UR QL STRIP: NEGATIVE
PH UR STRIP: 6.5 [PH] (ref 5–8)
PROT UR QL STRIP: ABNORMAL
RBC #/AREA URNS HPF: ABNORMAL /HPF
SP GR UR STRIP: 1.03 (ref 1–1.03)
UROBILINOGEN UR STRIP-MCNC: ABNORMAL MG/DL
WBC #/AREA URNS HPF: ABNORMAL /HPF

## 2022-07-01 NOTE — TELEPHONE ENCOUNTER
Please advise patient, or her daughter, that urine urinalysis looked like she was dehydrated, but that there was no growth of bacteria on the culture.  Thx

## 2022-07-01 NOTE — TELEPHONE ENCOUNTER
Pt daughter notified that it just looked like she was dehydrated and that there was no growth of bacteria on culture. She understood. BS

## 2022-07-05 NOTE — ED PROVIDER NOTES
Subjective   Patient states that she began having chest pain in the did not radiate anywhere starting around 10 PM.  She states that she got some aspirin nitroglycerin by EMS.  States that she is not having any fevers, chills, numbness, tingling, dysuria, hematuria, hematochezia.  States that she has a history of skin cancer and a stroke.  Denies any recent falls.          Review of Systems   All other systems reviewed and are negative.      Past Medical History:   Diagnosis Date   • Cancer (HCC)     skin   • Chronic intractable headache 02/08/2021   • Gastroesophageal reflux disease 02/03/2020   • Hypertension    • Lumbar pain 07/12/2019   • Mixed hyperlipidemia    • PONV (postoperative nausea and vomiting)    • Sinusitis    • Stroke (HCC)    • Valvular disease        Allergies   Allergen Reactions   • Aricept [Donepezil Hcl] Other (See Comments)     Shaking, sweating       Past Surgical History:   Procedure Laterality Date   • BLADDER REPAIR  2005   • BREAST SURGERY     • BUNIONECTOMY  2007   • CARDIAC CATHETERIZATION     • CATARACT EXTRACTION Bilateral 2005   • CHOLECYSTECTOMY     • COLPOCLEISIS N/A 6/14/2022    Procedure: COLPOCLEISIS;  Surgeon: Martina Street MD;  Location: [facility];  Service: Obstetrics/Gynecology;  Laterality: N/A;   • ENDOSCOPY  2005   • HERNIA REPAIR  1963   • HYSTERECTOMY  1979    KRISTAN BSO for fibroids   • NECK SURGERY  1974    VERTEBRA   • REPLACEMENT TOTAL KNEE BILATERAL Bilateral    • TONSILLECTOMY         Family History   Problem Relation Age of Onset   • Stroke Mother    • Heart disease Mother    • Stroke Sister    • Heart attack Maternal Grandmother    • Stomach cancer Maternal Uncle        Social History     Socioeconomic History   • Marital status:    Tobacco Use   • Smoking status: Never Smoker   • Smokeless tobacco: Never Used   Vaping Use   • Vaping Use: Never used   Substance and Sexual Activity   • Alcohol use: No   • Drug use: No   • Sexual activity: Not Currently      Partners: Male           Objective   Physical Exam  Vitals and nursing note reviewed.   Constitutional:       General: She is not in acute distress.     Appearance: Normal appearance. She is not toxic-appearing or diaphoretic.   HENT:      Head: Normocephalic and atraumatic.      Nose: Nose normal.   Eyes:      General:         Right eye: No discharge.         Left eye: No discharge.      Extraocular Movements: Extraocular movements intact.      Conjunctiva/sclera: Conjunctivae normal.   Cardiovascular:      Rate and Rhythm: Normal rate.   Pulmonary:      Effort: Pulmonary effort is normal. No respiratory distress.      Breath sounds: No rhonchi.   Abdominal:      General: Abdomen is flat.   Musculoskeletal:         General: Normal range of motion.      Cervical back: Normal range of motion.   Skin:     General: Skin is warm.   Neurological:      General: No focal deficit present.      Mental Status: She is alert and oriented to person, place, and time. Mental status is at baseline.   Psychiatric:         Mood and Affect: Mood normal.         Behavior: Behavior normal.         Thought Content: Thought content normal.         Judgment: Judgment normal.         Procedures           ED Course                                           MDM  Number of Diagnoses or Management Options  Chest pain, unspecified type  Diagnosis management comments: This is a 82yoF presenting with chest pain. Patient arrived hemodynamically stable and was afebrile. Patient was placed on the monitor and IV access was established.     Patient has received a total 324mg of aspirin since the onset of chest pain.     Differentials include, but are not limited to ACS, PE, musculoskeletal pain, infection. HEART score is 6. Plan to obtain cbc, cmp, ekg, troponin x 2, control pain, and reassess. EKG was obtained and did not reveal any malignant/unstable dysrhythmia or any acute ST elevations. Presentation not consistent with other acute, emergent causes  of chest pain at this time. Low suspicion for pneumothorax as the patient is saturating well and has no radiographic evidence of a pneumothorax. Low suspicion for dissection there is no widened mediastinum, hypotension, pulse deficits, and no tearing back/abdominal pain. Low suspicion for tamponade as there is no JVD, muffled heart sounds, electrical alternans on EKG, and no hypotension. Low suspicion for pericarditis as there is no diffuse ST elevation or NM depression and the patient is afebrile. Low suspicion for myocarditis as there is no persistent tachycardia, recent viral illness, hypotension, or evidence of LVH. Low suspicion for acute PE as low risk per WELLS criteria and no evidence of DVT such as calf swelling, tenderness, palpable tortuous lower extremity vein, or lakisha's sign. I reassessed the patient and discussed the findings of the work up so far. I told her that we will admit for further workup of her chest pain. I answered all her questions regarding the emergency department evaluation, diagnosis, and treatment plan in plain and simple language that she was able to understand.     I told her that the next step in treatment would be admission to the hospital for further workup and care. She voiced agreement with the plan of care so far and had no further questions. I told her that there is always some diagnostic uncertainty in the ER and that her work up, current physical exam, and even her current presentation may not always reveal other underlying conditions. I also went over the fact that her condition may change or worsen while being in the hospital. I told her that they may even find more things that require treatment or follow-up. She expressed understanding and agreed that there are reasonable limitations with the practice of emergency medicine.    The hospitalist service was consulted for evaluation and admission. The hospitalist service assumed primary care of the patient and admitted the  patient in stable condition.         Amount and/or Complexity of Data Reviewed  Clinical lab tests: reviewed and ordered        Final diagnoses:   Chest pain, unspecified type       ED Disposition  ED Disposition     ED Disposition   Discharge    Condition   --    Comment   Physician of Record for Attribution - Please select from Treatment Team: LISA CHADWICK [[id number]]   Review needed by CMO to determine Physician of Record: Bonita Craven, APRN  [address] [id number] [phone]      1 week .         Medication List      Changed    famotidine 20 MG tablet  Commonly known as: PEPCID  Take 1 tablet by mouth 2 (Two) Times a Day.  What changed:   · when to take this  · reasons to take this     fexofenadine 180 MG tablet  Commonly known as: Allegra Allergy  Take 1 tablet by mouth Daily As Needed (Allergies).  What changed:   · when to take this  · reasons to take this        Stop    HYDROcodone-acetaminophen 5-325 MG per tablet  Commonly known as: Norco     metoclopramide 5 MG tablet  Commonly known as: REGLAN     promethazine 25 MG suppository  Commonly known as: PHENERGAN           Where to Get Your Medications      These medications were sent to [facility] - [place], [place] - [address] [facility] - [phone]  - [phone]   [address] [address]    Phone: [phone]   · famotidine 20 MG tablet  · fexofenadine 180 MG tablet          Davdi Rdz MD  07/05/22 0522       David Rdz MD  07/05/22 4929

## 2022-08-01 DIAGNOSIS — M51.36 DDD (DEGENERATIVE DISC DISEASE), LUMBAR: ICD-10-CM

## 2022-08-01 DIAGNOSIS — M54.50 LUMBAR PAIN: ICD-10-CM

## 2022-08-01 DIAGNOSIS — F41.9 ANXIETY: ICD-10-CM

## 2022-08-01 RX ORDER — ALPRAZOLAM 0.25 MG/1
0.25 TABLET ORAL 2 TIMES DAILY PRN
Qty: 60 TABLET | Refills: 2 | Status: SHIPPED | OUTPATIENT
Start: 2022-08-01 | End: 2022-08-26 | Stop reason: SDUPTHER

## 2022-08-01 RX ORDER — HYDROCODONE BITARTRATE AND ACETAMINOPHEN 5; 325 MG/1; MG/1
1 TABLET ORAL 3 TIMES DAILY PRN
Qty: 90 TABLET | Refills: 0 | Status: SHIPPED | OUTPATIENT
Start: 2022-08-01 | End: 2022-08-26 | Stop reason: SDUPTHER

## 2022-08-01 NOTE — TELEPHONE ENCOUNTER
Rx Refill Note  Requested Prescriptions     Pending Prescriptions Disp Refills   • ALPRAZolam (XANAX) 0.25 MG tablet 60 tablet 2     Sig: Take 1 tablet by mouth 2 (Two) Times a Day As Needed for Anxiety.      Last office visit with prescribing clinician: 5/27/2022      Next office visit with prescribing clinician: 8/1/2022  UDS/CS is up to date    Quynh Morris MA  08/01/22, 12:49 CDT

## 2022-08-01 NOTE — TELEPHONE ENCOUNTER
Rx Refill Note  Requested Prescriptions     Pending Prescriptions Disp Refills   • HYDROcodone-acetaminophen (NORCO) 5-325 MG per tablet 90 tablet 0     Sig: Take 1 tablet by mouth 3 (Three) Times a Day As Needed for Moderate Pain .      Last office visit with prescribing clinician: 5/27/2022      Next office visit with prescribing clinician: 8/24/2022  UDS/CS is up to date    Quynh Morris MA  08/01/22, 12:48 CDT

## 2022-08-03 RX ORDER — AMLODIPINE BESYLATE 5 MG/1
TABLET ORAL
Qty: 90 TABLET | Refills: 1 | Status: SHIPPED | OUTPATIENT
Start: 2022-08-03 | End: 2023-01-30

## 2022-08-03 RX ORDER — LOSARTAN POTASSIUM 100 MG/1
TABLET ORAL
Qty: 90 TABLET | Refills: 1 | Status: SHIPPED | OUTPATIENT
Start: 2022-08-03 | End: 2023-01-30

## 2022-08-09 NOTE — TELEPHONE ENCOUNTER
Rx Refill Note  Requested Prescriptions     Pending Prescriptions Disp Refills   • famotidine (PEPCID) 20 MG tablet [Pharmacy Med Name: FAMOTIDINE 20MG TABS] 180 tablet 0     Sig: TAKE ONE TABLET BY MOUTH TWICE A DAY      Last office visit with prescribing clinician: 5/27/2022      Next office visit with prescribing clinician: 8/24/2022-3mth    Patient is overdue for annual with labs prior. Please arrange to be completed with next upcoming appt on 8/24/22.     Yelena Randhawa CMA  08/09/22, 09:58 CDT

## 2022-08-10 RX ORDER — FAMOTIDINE 20 MG/1
TABLET, FILM COATED ORAL
Qty: 180 TABLET | Refills: 0 | Status: SHIPPED | OUTPATIENT
Start: 2022-08-10 | End: 2022-11-04

## 2022-08-10 NOTE — TELEPHONE ENCOUNTER
I apologize, I didn't realize annual and lab appt were scheduled for Oct. Appts can stay as arranged already.

## 2022-08-26 ENCOUNTER — OFFICE VISIT (OUTPATIENT)
Dept: FAMILY MEDICINE CLINIC | Facility: CLINIC | Age: 82
End: 2022-08-26

## 2022-08-26 VITALS
DIASTOLIC BLOOD PRESSURE: 72 MMHG | BODY MASS INDEX: 26.03 KG/M2 | TEMPERATURE: 97.2 F | WEIGHT: 162 LBS | HEART RATE: 70 BPM | OXYGEN SATURATION: 98 % | SYSTOLIC BLOOD PRESSURE: 141 MMHG | HEIGHT: 66 IN

## 2022-08-26 DIAGNOSIS — F41.9 ANXIETY: ICD-10-CM

## 2022-08-26 DIAGNOSIS — Z79.899 LONG-TERM USE OF HIGH-RISK MEDICATION: Primary | ICD-10-CM

## 2022-08-26 DIAGNOSIS — M51.36 DDD (DEGENERATIVE DISC DISEASE), LUMBAR: ICD-10-CM

## 2022-08-26 DIAGNOSIS — M54.50 LUMBAR PAIN: ICD-10-CM

## 2022-08-26 PROCEDURE — 99214 OFFICE O/P EST MOD 30 MIN: CPT | Performed by: NURSE PRACTITIONER

## 2022-08-26 RX ORDER — HYDROCODONE BITARTRATE AND ACETAMINOPHEN 5; 325 MG/1; MG/1
1 TABLET ORAL 3 TIMES DAILY PRN
Qty: 90 TABLET | Refills: 0 | Status: SHIPPED | OUTPATIENT
Start: 2022-08-26 | End: 2022-09-29 | Stop reason: SDUPTHER

## 2022-08-26 RX ORDER — ALPRAZOLAM 0.25 MG/1
0.25 TABLET ORAL 2 TIMES DAILY PRN
Qty: 60 TABLET | Refills: 2 | Status: SHIPPED | OUTPATIENT
Start: 2022-08-26 | End: 2022-12-08

## 2022-08-26 NOTE — PROGRESS NOTES
"Chief Complaint  3 months    Subjective    {Problem List  Visit Diagnosis   Encounters  Notes  Medications  Labs  Result Review Imaging  Media :23}    Shira King presents to [facility]  History of Present Illness  Patient presents for compliance visit for alprazolam and hydrocodone.  She uses both on an as needed basis for pain control and anxiety.  Both continue to work well for patient.  She has shown no signs of abuse or misuse.  MALU is reviewed.  Patient is aware of risks associated with long term use of opioids and benzodiazepines including, but not limited to, addiction and tolerance.    Objective   Vital Signs:  /72 (BP Location: Left arm, Patient Position: Sitting, Cuff Size: Adult)   Pulse 70   Temp 97.2 °F (36.2 °C)   Ht 167.6 cm (66\")   Wt 73.5 kg (162 lb)   SpO2 98%   BMI 26.15 kg/m²   Estimated body mass index is 26.15 kg/m² as calculated from the following:    Height as of this encounter: 167.6 cm (66\").    Weight as of this encounter: 73.5 kg (162 lb).          Physical Exam  Vitals and nursing note reviewed.   Constitutional:       General: She is not in acute distress.     Appearance: Normal appearance. She is not ill-appearing.   HENT:      Head: Normocephalic and atraumatic.   Neck:      Vascular: No carotid bruit.   Cardiovascular:      Rate and Rhythm: Normal rate and regular rhythm.      Heart sounds: Normal heart sounds. No murmur heard.  Pulmonary:      Effort: Pulmonary effort is normal.      Breath sounds: Normal breath sounds.   Skin:     General: Skin is warm and dry.   Neurological:      Mental Status: She is alert and oriented to person, place, and time.   Psychiatric:         Thought Content: Thought content normal.        Result Review :                Assessment and Plan   Diagnoses and all orders for this visit:    1. Long-term use of high-risk medication (Primary)    2. Anxiety  -     ALPRAZolam (XANAX) 0.25 MG " tablet; Take 1 tablet by mouth 2 (Two) Times a Day As Needed for Anxiety.  Dispense: 60 tablet; Refill: 2    3. Lumbar pain  -     HYDROcodone-acetaminophen (NORCO) 5-325 MG per tablet; Take 1 tablet by mouth 3 (Three) Times a Day As Needed for Moderate Pain .  Dispense: 90 tablet; Refill: 0    4. DDD (degenerative disc disease), lumbar  -     HYDROcodone-acetaminophen (NORCO) 5-325 MG per tablet; Take 1 tablet by mouth 3 (Three) Times a Day As Needed for Moderate Pain .  Dispense: 90 tablet; Refill: 0             Follow Up   Return in about 3 months (around 11/26/2022) for Medicare Wellness with labs prior.  Patient was given instructions and counseling regarding her condition or for health maintenance advice. Please see specific information pulled into the AVS if appropriate.     ROBIN Montenegro  This note is electronically signed.

## 2022-09-29 DIAGNOSIS — M51.36 DDD (DEGENERATIVE DISC DISEASE), LUMBAR: ICD-10-CM

## 2022-09-29 DIAGNOSIS — M54.50 LUMBAR PAIN: ICD-10-CM

## 2022-09-30 DIAGNOSIS — M54.50 LUMBAR PAIN: ICD-10-CM

## 2022-09-30 DIAGNOSIS — M51.36 DDD (DEGENERATIVE DISC DISEASE), LUMBAR: ICD-10-CM

## 2022-09-30 RX ORDER — HYDROCODONE BITARTRATE AND ACETAMINOPHEN 5; 325 MG/1; MG/1
TABLET ORAL
Qty: 90 TABLET | Refills: 0 | OUTPATIENT
Start: 2022-09-30

## 2022-09-30 RX ORDER — HYDROCODONE BITARTRATE AND ACETAMINOPHEN 5; 325 MG/1; MG/1
1 TABLET ORAL 3 TIMES DAILY PRN
Qty: 90 TABLET | Refills: 0 | Status: SHIPPED | OUTPATIENT
Start: 2022-09-30 | End: 2022-10-28 | Stop reason: SDUPTHER

## 2022-10-11 ENCOUNTER — LAB (OUTPATIENT)
Dept: FAMILY MEDICINE CLINIC | Facility: CLINIC | Age: 82
End: 2022-10-11

## 2022-10-11 DIAGNOSIS — E78.2 MIXED HYPERLIPIDEMIA: ICD-10-CM

## 2022-10-11 DIAGNOSIS — E11.40 TYPE 2 DIABETES MELLITUS WITH DIABETIC NEUROPATHY, WITHOUT LONG-TERM CURRENT USE OF INSULIN: ICD-10-CM

## 2022-10-11 DIAGNOSIS — Z00.00 MEDICARE ANNUAL WELLNESS VISIT, SUBSEQUENT: ICD-10-CM

## 2022-10-11 DIAGNOSIS — R53.83 FATIGUE, UNSPECIFIED TYPE: ICD-10-CM

## 2022-10-11 DIAGNOSIS — I10 ESSENTIAL HYPERTENSION: Primary | ICD-10-CM

## 2022-10-12 LAB
ALBUMIN SERPL-MCNC: 4.3 G/DL (ref 3.6–4.6)
ALBUMIN/GLOB SERPL: 1.9 {RATIO} (ref 1.2–2.2)
ALP SERPL-CCNC: 90 IU/L (ref 44–121)
ALT SERPL-CCNC: 17 IU/L (ref 0–32)
AST SERPL-CCNC: 39 IU/L (ref 0–40)
BASOPHILS # BLD AUTO: 0 X10E3/UL (ref 0–0.2)
BASOPHILS NFR BLD AUTO: 1 %
BILIRUB SERPL-MCNC: 0.8 MG/DL (ref 0–1.2)
BUN SERPL-MCNC: 10 MG/DL (ref 8–27)
BUN/CREAT SERPL: 13 (ref 12–28)
CALCIUM SERPL-MCNC: 9.6 MG/DL (ref 8.7–10.3)
CHLORIDE SERPL-SCNC: 102 MMOL/L (ref 96–106)
CHOLEST SERPL-MCNC: 136 MG/DL (ref 100–199)
CO2 SERPL-SCNC: 24 MMOL/L (ref 20–29)
CREAT SERPL-MCNC: 0.77 MG/DL (ref 0.57–1)
EGFRCR SERPLBLD CKD-EPI 2021: 76 ML/MIN/1.73
EOSINOPHIL # BLD AUTO: 0.2 X10E3/UL (ref 0–0.4)
EOSINOPHIL NFR BLD AUTO: 3 %
ERYTHROCYTE [DISTWIDTH] IN BLOOD BY AUTOMATED COUNT: 12.3 % (ref 11.7–15.4)
GLOBULIN SER CALC-MCNC: 2.3 G/DL (ref 1.5–4.5)
GLUCOSE SERPL-MCNC: 116 MG/DL (ref 70–99)
HBA1C MFR BLD: 5.9 % (ref 4.8–5.6)
HCT VFR BLD AUTO: 43 % (ref 34–46.6)
HDLC SERPL-MCNC: 54 MG/DL
HGB BLD-MCNC: 14.1 G/DL (ref 11.1–15.9)
IMM GRANULOCYTES # BLD AUTO: 0 X10E3/UL (ref 0–0.1)
IMM GRANULOCYTES NFR BLD AUTO: 0 %
LDLC SERPL CALC-MCNC: 67 MG/DL (ref 0–99)
LDLC/HDLC SERPL: 1.2 RATIO (ref 0–3.2)
LYMPHOCYTES # BLD AUTO: 1.5 X10E3/UL (ref 0.7–3.1)
LYMPHOCYTES NFR BLD AUTO: 26 %
MCH RBC QN AUTO: 30.4 PG (ref 26.6–33)
MCHC RBC AUTO-ENTMCNC: 32.8 G/DL (ref 31.5–35.7)
MCV RBC AUTO: 93 FL (ref 79–97)
MONOCYTES # BLD AUTO: 0.6 X10E3/UL (ref 0.1–0.9)
MONOCYTES NFR BLD AUTO: 10 %
NEUTROPHILS # BLD AUTO: 3.3 X10E3/UL (ref 1.4–7)
NEUTROPHILS NFR BLD AUTO: 60 %
PLATELET # BLD AUTO: 267 X10E3/UL (ref 150–450)
POTASSIUM SERPL-SCNC: 4.7 MMOL/L (ref 3.5–5.2)
PROT SERPL-MCNC: 6.6 G/DL (ref 6–8.5)
RBC # BLD AUTO: 4.64 X10E6/UL (ref 3.77–5.28)
SODIUM SERPL-SCNC: 140 MMOL/L (ref 134–144)
T4 SERPL-MCNC: 6.9 UG/DL (ref 4.5–12)
TRIGL SERPL-MCNC: 79 MG/DL (ref 0–149)
TSH SERPL DL<=0.005 MIU/L-ACNC: 2.04 UIU/ML (ref 0.45–4.5)
VLDLC SERPL CALC-MCNC: 15 MG/DL (ref 5–40)
WBC # BLD AUTO: 5.6 X10E3/UL (ref 3.4–10.8)

## 2022-10-23 ENCOUNTER — HOSPITAL ENCOUNTER (EMERGENCY)
Facility: HOSPITAL | Age: 82
Discharge: HOME | End: 2022-10-23
Attending: STUDENT IN AN ORGANIZED HEALTH CARE EDUCATION/TRAINING PROGRAM | Admitting: STUDENT IN AN ORGANIZED HEALTH CARE EDUCATION/TRAINING PROGRAM

## 2022-10-23 ENCOUNTER — APPOINTMENT (OUTPATIENT)
Dept: CT IMAGING | Facility: HOSPITAL | Age: 82
End: 2022-10-23

## 2022-10-23 ENCOUNTER — APPOINTMENT (OUTPATIENT)
Dept: GENERAL RADIOLOGY | Facility: HOSPITAL | Age: 82
End: 2022-10-23

## 2022-10-23 VITALS
DIASTOLIC BLOOD PRESSURE: 72 MMHG | TEMPERATURE: 98.6 F | HEIGHT: 66 IN | HEART RATE: 76 BPM | RESPIRATION RATE: 16 BRPM | OXYGEN SATURATION: 97 % | BODY MASS INDEX: 24.43 KG/M2 | WEIGHT: 152 LBS | SYSTOLIC BLOOD PRESSURE: 159 MMHG

## 2022-10-23 DIAGNOSIS — R07.9 CHEST PAIN, UNSPECIFIED TYPE: Primary | ICD-10-CM

## 2022-10-23 LAB
ALBUMIN SERPL-MCNC: 4.3 G/DL (ref 3.5–5.2)
ALBUMIN/GLOB SERPL: 1.7 G/DL
ALP SERPL-CCNC: 97 U/L (ref 39–117)
ALT SERPL W P-5'-P-CCNC: 17 U/L (ref 1–33)
ANION GAP SERPL CALCULATED.3IONS-SCNC: 10 MMOL/L (ref 5–15)
AST SERPL-CCNC: 41 U/L (ref 1–32)
BASOPHILS # BLD AUTO: 0.04 10*3/MM3 (ref 0–0.2)
BASOPHILS NFR BLD AUTO: 0.8 % (ref 0–1.5)
BILIRUB SERPL-MCNC: 0.7 MG/DL (ref 0–1.2)
BUN SERPL-MCNC: 8 MG/DL (ref 8–23)
BUN/CREAT SERPL: 13.3 (ref 7–25)
CALCIUM SPEC-SCNC: 9.5 MG/DL (ref 8.6–10.5)
CHLORIDE SERPL-SCNC: 104 MMOL/L (ref 98–107)
CO2 SERPL-SCNC: 26 MMOL/L (ref 22–29)
CREAT SERPL-MCNC: 0.6 MG/DL (ref 0.57–1)
D DIMER PPP FEU-MCNC: 1.11 MCGFEU/ML (ref 0–0.5)
DEPRECATED RDW RBC AUTO: 43.7 FL (ref 37–54)
EGFRCR SERPLBLD CKD-EPI 2021: 88.6 ML/MIN/1.73
EOSINOPHIL # BLD AUTO: 0.16 10*3/MM3 (ref 0–0.4)
EOSINOPHIL NFR BLD AUTO: 3.3 % (ref 0.3–6.2)
ERYTHROCYTE [DISTWIDTH] IN BLOOD BY AUTOMATED COUNT: 12.8 % (ref 12.3–15.4)
GLOBULIN UR ELPH-MCNC: 2.6 GM/DL
GLUCOSE SERPL-MCNC: 109 MG/DL (ref 65–99)
HCT VFR BLD AUTO: 42.8 % (ref 34–46.6)
HGB BLD-MCNC: 14.5 G/DL (ref 12–15.9)
HOLD SPECIMEN: NORMAL
HOLD SPECIMEN: NORMAL
IMM GRANULOCYTES # BLD AUTO: 0.01 10*3/MM3 (ref 0–0.05)
IMM GRANULOCYTES NFR BLD AUTO: 0.2 % (ref 0–0.5)
LYMPHOCYTES # BLD AUTO: 1.31 10*3/MM3 (ref 0.7–3.1)
LYMPHOCYTES NFR BLD AUTO: 27.1 % (ref 19.6–45.3)
MCH RBC QN AUTO: 31.6 PG (ref 26.6–33)
MCHC RBC AUTO-ENTMCNC: 33.9 G/DL (ref 31.5–35.7)
MCV RBC AUTO: 93.2 FL (ref 79–97)
MONOCYTES # BLD AUTO: 0.4 10*3/MM3 (ref 0.1–0.9)
MONOCYTES NFR BLD AUTO: 8.3 % (ref 5–12)
NEUTROPHILS NFR BLD AUTO: 2.92 10*3/MM3 (ref 1.7–7)
NEUTROPHILS NFR BLD AUTO: 60.3 % (ref 42.7–76)
NRBC BLD AUTO-RTO: 0 /100 WBC (ref 0–0.2)
PLATELET # BLD AUTO: 223 10*3/MM3 (ref 140–450)
PMV BLD AUTO: 8.6 FL (ref 6–12)
POTASSIUM SERPL-SCNC: 3.9 MMOL/L (ref 3.5–5.2)
PROT SERPL-MCNC: 6.9 G/DL (ref 6–8.5)
RBC # BLD AUTO: 4.59 10*6/MM3 (ref 3.77–5.28)
SODIUM SERPL-SCNC: 140 MMOL/L (ref 136–145)
TROPONIN T SERPL-MCNC: <0.01 NG/ML (ref 0–0.03)
TROPONIN T SERPL-MCNC: <0.01 NG/ML (ref 0–0.03)
WBC NRBC COR # BLD: 4.84 10*3/MM3 (ref 3.4–10.8)
WHOLE BLOOD HOLD COAG: NORMAL
WHOLE BLOOD HOLD SPECIMEN: NORMAL

## 2022-10-23 PROCEDURE — 93010 ELECTROCARDIOGRAM REPORT: CPT | Performed by: INTERNAL MEDICINE

## 2022-10-23 PROCEDURE — 99284 EMERGENCY DEPT VISIT MOD MDM: CPT

## 2022-10-23 PROCEDURE — 85379 FIBRIN DEGRADATION QUANT: CPT | Performed by: STUDENT IN AN ORGANIZED HEALTH CARE EDUCATION/TRAINING PROGRAM

## 2022-10-23 PROCEDURE — 71045 X-RAY EXAM CHEST 1 VIEW: CPT

## 2022-10-23 PROCEDURE — 93005 ELECTROCARDIOGRAM TRACING: CPT | Performed by: STUDENT IN AN ORGANIZED HEALTH CARE EDUCATION/TRAINING PROGRAM

## 2022-10-23 PROCEDURE — 36415 COLL VENOUS BLD VENIPUNCTURE: CPT

## 2022-10-23 PROCEDURE — 85025 COMPLETE CBC W/AUTO DIFF WBC: CPT | Performed by: STUDENT IN AN ORGANIZED HEALTH CARE EDUCATION/TRAINING PROGRAM

## 2022-10-23 PROCEDURE — 71275 CT ANGIOGRAPHY CHEST: CPT

## 2022-10-23 PROCEDURE — 80053 COMPREHEN METABOLIC PANEL: CPT | Performed by: STUDENT IN AN ORGANIZED HEALTH CARE EDUCATION/TRAINING PROGRAM

## 2022-10-23 PROCEDURE — 0 IOPAMIDOL PER 1 ML: Performed by: STUDENT IN AN ORGANIZED HEALTH CARE EDUCATION/TRAINING PROGRAM

## 2022-10-23 PROCEDURE — 84484 ASSAY OF TROPONIN QUANT: CPT | Performed by: STUDENT IN AN ORGANIZED HEALTH CARE EDUCATION/TRAINING PROGRAM

## 2022-10-23 RX ORDER — ALUMINA, MAGNESIA, AND SIMETHICONE 2400; 2400; 240 MG/30ML; MG/30ML; MG/30ML
10 SUSPENSION ORAL ONCE
Status: COMPLETED | OUTPATIENT
Start: 2022-10-23 | End: 2022-10-23

## 2022-10-23 RX ORDER — LIDOCAINE HYDROCHLORIDE 20 MG/ML
10 SOLUTION OROPHARYNGEAL ONCE
Status: COMPLETED | OUTPATIENT
Start: 2022-10-23 | End: 2022-10-23

## 2022-10-23 RX ORDER — ASPIRIN 81 MG/1
324 TABLET, CHEWABLE ORAL ONCE
Status: DISCONTINUED | OUTPATIENT
Start: 2022-10-23 | End: 2022-10-23 | Stop reason: HOSPADM

## 2022-10-23 RX ORDER — SODIUM CHLORIDE 0.9 % (FLUSH) 0.9 %
10 SYRINGE (ML) INJECTION AS NEEDED
Status: DISCONTINUED | OUTPATIENT
Start: 2022-10-23 | End: 2022-10-23 | Stop reason: HOSPADM

## 2022-10-23 RX ORDER — OMEPRAZOLE 20 MG/1
40 CAPSULE, DELAYED RELEASE ORAL DAILY
Qty: 30 CAPSULE | Refills: 0 | Status: SHIPPED | OUTPATIENT
Start: 2022-10-23 | End: 2022-11-04

## 2022-10-23 RX ADMIN — LIDOCAINE HYDROCHLORIDE 10 ML: 20 SOLUTION ORAL; TOPICAL at 08:20

## 2022-10-23 RX ADMIN — IOPAMIDOL 77 ML: 755 INJECTION, SOLUTION INTRAVENOUS at 09:47

## 2022-10-23 RX ADMIN — ALUMINUM HYDROXIDE, MAGNESIUM HYDROXIDE, AND DIMETHICONE 10 ML: 400; 400; 40 SUSPENSION ORAL at 08:22

## 2022-10-23 NOTE — ED PROVIDER NOTES
Subjective   History of Present Illness  Shira King is a 82 y.o. female with PMHx of HLD htn that presents to the Emergency Department with a chief complaint of chest pain. The pain began a couple hours ago. The pain started when the patient was sitting up after drinking coffee. Onset was gradual. Duration is intermittent. It is located on the left. It does radiate occasionally into her left armpit. The patient says it feels achy. There is associated nausea, resolved now, no diaphoresis.  She was admitted to the hospital in June after experiencing similar symptoms and had a low risk dobutamine stress test and was started on Pepcid as needed which she took this morning but it did not help.    On initial exam patient is conversant, clinically stable with VSS.    Review of Systems   Constitutional: Negative for chills and fever.   HENT: Negative for congestion and sore throat.    Eyes: Negative for pain and redness.   Respiratory: Negative for cough. Shortness of breath: all the time, not particularly worse today.    Cardiovascular: Positive for chest pain. Negative for palpitations.   Gastrointestinal: Negative for abdominal pain, diarrhea, nausea and vomiting.   Genitourinary: Negative for difficulty urinating and dysuria.   Musculoskeletal: Negative for gait problem and joint swelling.   Skin: Negative for rash and wound.   Neurological: Negative for syncope and light-headedness.       Past Medical History:   Diagnosis Date   • Cancer (HCC)     skin   • Chronic intractable headache 02/08/2021   • Gastroesophageal reflux disease 02/03/2020   • Hypertension    • Lumbar pain 07/12/2019   • Mixed hyperlipidemia    • PONV (postoperative nausea and vomiting)    • Sinusitis    • Stroke (HCC)    • Valvular disease        Allergies   Allergen Reactions   • Aricept [Donepezil Hcl] Other (See Comments)     Shaking, sweating       Past Surgical History:   Procedure Laterality Date   • BLADDER REPAIR  2005   • BREAST  SURGERY     • BUNIONECTOMY  2007   • CARDIAC CATHETERIZATION     • CATARACT EXTRACTION Bilateral 2005   • CHOLECYSTECTOMY     • COLPOCLEISIS N/A 6/14/2022    Procedure: COLPOCLEISIS;  Surgeon: Martina Street MD;  Location: [facility] OR;  Service: Obstetrics/Gynecology;  Laterality: N/A;   • ENDOSCOPY  2005   • HERNIA REPAIR  1963   • HYSTERECTOMY  1979    KRISTAN BSO for fibroids   • NECK SURGERY  1974    VERTEBRA   • REPLACEMENT TOTAL KNEE BILATERAL Bilateral    • TONSILLECTOMY         Family History   Problem Relation Age of Onset   • Stroke Mother    • Heart disease Mother    • Stroke Sister    • Heart attack Maternal Grandmother    • Stomach cancer Maternal Uncle        Social History     Socioeconomic History   • Marital status:    Tobacco Use   • Smoking status: Never   • Smokeless tobacco: Never   Vaping Use   • Vaping Use: Never used   Substance and Sexual Activity   • Alcohol use: No   • Drug use: No   • Sexual activity: Not Currently     Partners: Male           Objective   Physical Exam  Vitals reviewed.   Constitutional:       General: She is not in acute distress.  HENT:      Head: Normocephalic and atraumatic.   Eyes:      Extraocular Movements: Extraocular movements intact.      Conjunctiva/sclera: Conjunctivae normal.   Cardiovascular:      Rate and Rhythm: Normal rate and regular rhythm.      Pulses: Normal pulses.      Heart sounds: Normal heart sounds.   Pulmonary:      Effort: Pulmonary effort is normal. No respiratory distress.   Abdominal:      General: Abdomen is flat. There is no distension.      Tenderness: There is no abdominal tenderness.   Musculoskeletal:      Cervical back: Normal range of motion and neck supple.      Comments: No chest wall ttp   Skin:     General: Skin is warm and dry.   Neurological:      General: No focal deficit present.      Mental Status: She is alert. Mental status is at baseline.   Psychiatric:         Behavior: Behavior normal.         Thought Content:  Thought content normal.         Procedures           ED Course  ED Course as of 10/23/22 1018   Sun Oct 23, 2022   0716 -chest x-ray reviewed by me. no focal consolidations, no pulmonary edema, mediastinum not widened.  EKG reviewed by me; shows sinus rhythm, no focal ischemic changes, no arrhythmia. [AS]      ED Course User Index  [AS] Kareem Hanna MD                                           [facility] Shira King is a 82 y.o. female with PMH above who presents to the Emergency Department with chest pain. Diagnoses considered include ACS, MSK chest pain, pleurisy, GERD, pneumonia, pericarditis, aortic dissection.  Given her normal initial troponin obtained on arrival and history of liver stress test, other etiologies such as PE are considered so D-dimer will be ordered.  Also will give GI cocktail out of suspicion for possible reflux.  Patient hemodynamically stable; tamponade physiology unlikely. Given the differential, initial evaluation will include ECG, CXR, CBC, BMP, Tn x2.     ED Course:   - HEARS score calculated to be 6.  - Patient received maalox/viscous lidocaine  - EKG reviewed by me; shows sinus rhythm, no focal ischemic changes, no arrhythmia.  - chest x-ray reviewed by me and shows no focal consolidations, no mediastinal widening, no cardiomegaly, no other acute cardiopulmonary process.  - Lab studies reviewed by me and are significant for initial Tn wnl.  - On re-evaluation, pt felt better after GI cocktail. Repeat Troponin normal. At this time, ACS is unlikely given the above ECG interpretation and negative troponin. Aortic dissection unlikely given lack of widened mediastinum on CXR, pain does not radiate to the back, is not described as tearing, and peripheral pulses noted to be equal in bilateral upper extremities. Pericarditis unlikely as the pain is not positional, no diffuse ST changes on ECG. No sign of pneumonia on CXR. Tamponade physiology unlikely given BP stable, no JVD on  exam, no electrical alternans on ECG.     -I reviewed the patient's work-up with her and although she has an elevated heart score she has had a recent low risk stress test and I do not think that a cardiac catheterization would be emergently indicated in the context of the stress test with 2 normal troponins. Will start omeprazole, continue pepcid, and give maalox PRN and she can f/u PCP next few days. Patient received strict return precautions including worsening chest pain, shortness of breath, lightheadedness, passing out, or other concerns and was instructed to follow-up with their primary care provider immediately regarding their chest pain for consideration of further outpatient workup. All questions answered. Patient/family was understanding and in agreement with today's assessment and plan. The patient was monitored during their stay in the ED and dispositioned without acute event.    Electronically signed by:  Kareem Hanna MD 10/23/2022 10:18 CDT  Note: Dragon medical dictation software was used in the creation of this note.      Final diagnoses:   Chest pain, unspecified type       ED Disposition  ED Disposition     ED Disposition   Discharge    Condition   Stable    Comment   --             Bonita Romero, APRN  [address] [address]  [phone]               Medication List      New Prescriptions    aluminum-magnesium hydroxide 200-200 MG/5ML suspension  Take 10 mL by mouth Every 6 (Six) Hours As Needed for Heartburn.     omeprazole 20 MG capsule  Commonly known as: priLOSEC  Take 2 capsules by mouth Daily. Consider taking at night since your symptoms occurred after sleep           Where to Get Your Medications      These medications were sent to [facility] - [place], [place] - [address] [facility] - [phone]  - [phone]   [address] [address]    Phone: [phone]   · aluminum-magnesium hydroxide 200-200 MG/5ML suspension  · omeprazole 20 MG  capsule          Kareem Hanna MD  10/23/22 1012

## 2022-10-23 NOTE — DISCHARGE INSTRUCTIONS
Please return if your chest pain severely worsens, if you become lightheaded or pass out, if you have severe shortness of breath, or for any other emergent concerns. Otherwise please see your primary care doctor as soon as possible for repeat evaluation and consideration of more testing.  Start taking the omeprazole daily  Take the maalox as needed when pepcid doesn't work

## 2022-10-24 LAB
QT INTERVAL: 420 MS
QTC INTERVAL: 446 MS

## 2022-10-25 LAB
QT INTERVAL: 408 MS
QTC INTERVAL: 431 MS

## 2022-10-28 ENCOUNTER — OFFICE VISIT (OUTPATIENT)
Dept: FAMILY MEDICINE CLINIC | Facility: CLINIC | Age: 82
End: 2022-10-28

## 2022-10-28 VITALS
SYSTOLIC BLOOD PRESSURE: 121 MMHG | HEIGHT: 66 IN | OXYGEN SATURATION: 98 % | BODY MASS INDEX: 26.13 KG/M2 | TEMPERATURE: 97.7 F | HEART RATE: 68 BPM | WEIGHT: 162.6 LBS | DIASTOLIC BLOOD PRESSURE: 74 MMHG

## 2022-10-28 DIAGNOSIS — M15.9 PRIMARY OSTEOARTHRITIS INVOLVING MULTIPLE JOINTS: ICD-10-CM

## 2022-10-28 DIAGNOSIS — Z12.31 BREAST CANCER SCREENING BY MAMMOGRAM: ICD-10-CM

## 2022-10-28 DIAGNOSIS — M54.50 LUMBAR PAIN: ICD-10-CM

## 2022-10-28 DIAGNOSIS — M51.36 DDD (DEGENERATIVE DISC DISEASE), LUMBAR: ICD-10-CM

## 2022-10-28 DIAGNOSIS — Z23 FLU VACCINE NEED: ICD-10-CM

## 2022-10-28 DIAGNOSIS — Z78.0 POST-MENOPAUSAL: ICD-10-CM

## 2022-10-28 DIAGNOSIS — E66.3 OVERWEIGHT WITH BODY MASS INDEX (BMI) OF 26 TO 26.9 IN ADULT: ICD-10-CM

## 2022-10-28 DIAGNOSIS — Z00.00 MEDICARE ANNUAL WELLNESS VISIT, SUBSEQUENT: Primary | ICD-10-CM

## 2022-10-28 PROCEDURE — G0008 ADMIN INFLUENZA VIRUS VAC: HCPCS | Performed by: NURSE PRACTITIONER

## 2022-10-28 PROCEDURE — 1170F FXNL STATUS ASSESSED: CPT | Performed by: NURSE PRACTITIONER

## 2022-10-28 PROCEDURE — 1159F MED LIST DOCD IN RCRD: CPT | Performed by: NURSE PRACTITIONER

## 2022-10-28 PROCEDURE — G0439 PPPS, SUBSEQ VISIT: HCPCS | Performed by: NURSE PRACTITIONER

## 2022-10-28 PROCEDURE — 1125F AMNT PAIN NOTED PAIN PRSNT: CPT | Performed by: NURSE PRACTITIONER

## 2022-10-28 PROCEDURE — 90662 IIV NO PRSV INCREASED AG IM: CPT | Performed by: NURSE PRACTITIONER

## 2022-10-28 RX ORDER — CELECOXIB 200 MG/1
200 CAPSULE ORAL DAILY
Qty: 90 CAPSULE | Refills: 3 | Status: SHIPPED | OUTPATIENT
Start: 2022-10-28 | End: 2022-11-02 | Stop reason: SDUPTHER

## 2022-10-28 RX ORDER — HYDROCODONE BITARTRATE AND ACETAMINOPHEN 5; 325 MG/1; MG/1
1 TABLET ORAL 3 TIMES DAILY PRN
Qty: 90 TABLET | Refills: 0 | Status: SHIPPED | OUTPATIENT
Start: 2022-10-28 | End: 2022-12-01 | Stop reason: SDUPTHER

## 2022-10-28 RX ORDER — METOCLOPRAMIDE 10 MG/1
5 TABLET ORAL DAILY
COMMUNITY
Start: 2022-08-03 | End: 2022-11-04

## 2022-10-28 NOTE — PROGRESS NOTES
Subsequent Medicare Wellness Visit    Chief Complaint   Patient presents with   • Medicare Wellness-subsequent        Subjective    History of Present Illness:  Shira King is a 82 y.o. female who presents for a Subsequent Medicare Wellness Visit.    The following portions of the patient's history were reviewed and   updated as appropriate: allergies, current medications, past family history, past medical history, past social history, past surgical history and problem list.    Compared to one year ago, the patient feels her physical   health is better.    Compared to one year ago, the patient feels her mental   health is better.    Recent Hospitalizations:  She was admitted within the past 365 days at [facility].       Current Medical Providers:  Patient Care Team:  Bonita Romero APRN as PCP - General (Family Medicine)  Keyon Upton MD as Cardiologist (Cardiology)    Outpatient Medications Prior to Visit   Medication Sig Dispense Refill   • ALPRAZolam (XANAX) 0.25 MG tablet Take 1 tablet by mouth 2 (Two) Times a Day As Needed for Anxiety. 60 tablet 2   • aluminum-magnesium hydroxide 200-200 MG/5ML suspension Take 10 mL by mouth Every 6 (Six) Hours As Needed for Heartburn. 30 mL 0   • amLODIPine (NORVASC) 5 MG tablet TAKE ONE TABLET BY MOUTH EVERY DAY 90 tablet 1   • aspirin 81 MG chewable tablet Chew 81 mg Every Other Day.     • B Complex Vitamins (Vitamin B Complex) tablet Take 1 tablet by mouth Daily.     • carvedilol (COREG) 6.25 MG tablet TAKE ONE TABLET BY MOUTH TWICE A DAY WITH MEALS 180 tablet 1   • famotidine (PEPCID) 20 MG tablet TAKE ONE TABLET BY MOUTH TWICE A  tablet 0   • fexofenadine (Allegra Allergy) 180 MG tablet Take 1 tablet by mouth Daily As Needed (Allergies). 10 tablet 0   • losartan (COZAAR) 100 MG tablet TAKE ONE TABLET BY MOUTH EVERY DAY 90 tablet 1   • omeprazole (priLOSEC) 20 MG capsule Take 2 capsules by mouth Daily. Consider taking at night since your  symptoms occurred after sleep 30 capsule 0   • pravastatin (PRAVACHOL) 20 MG tablet TAKE ONE TABLET BY MOUTH EVERY DAY 90 tablet 1   • HYDROcodone-acetaminophen (NORCO) 5-325 MG per tablet Take 1 tablet by mouth 3 (Three) Times a Day As Needed for Moderate Pain. 90 tablet 0   • metoclopramide (REGLAN) 10 MG tablet Take 0.5 tablets by mouth Daily.       No facility-administered medications prior to visit.       Opioid medication/s are on active medication list.  and I have evaluated her active treatment plan and pain score trends (see table).  Vitals:    10/28/22 0933   PainSc:   8     I have reviewed the chart for potential of high risk medication and harmful drug interactions in the elderly.                Patient Active Problem List   Diagnosis   • Essential hypertension   • RMSF (Khari Mountain spotted fever)   • Mixed hyperlipidemia   • Lumbar pain   • Gastroesophageal reflux disease   • Insomnia   • Obstructive sleep apnea   • Periodic limb movement   • Snoring   • Somnolence, daytime   • Chronic intractable headache   • Chronic ethmoidal sinusitis   • Peripheral vascular disease (HCC)   • Hypertrophy of both inferior nasal turbinates   • Nasal vestibulitis   • Other acute sinusitis   • Allergic rhinitis   • AMS (altered mental status)   • Memory loss   • Advanced age   • Dementia without behavioral disturbance (HCC)   • Chest pain     Advance Care Planning  Advance Directive is on file.      Review of Systems   Constitutional: Negative.    HENT: Negative.    Respiratory: Negative.  Negative for cough and shortness of breath.    Cardiovascular: Positive for chest pain and leg swelling.        At times.  Recently seen in ER with chest pain, full negative cardiac work up.   Gastrointestinal: Positive for constipation. Negative for diarrhea.        Chronic, fairly well controlled with occasional laxative.   Endocrine: Positive for polyuria.   Musculoskeletal: Positive for arthralgias and back pain.        Chronic,  "stable.   Neurological: Negative.    Psychiatric/Behavioral: The patient is nervous/anxious.         Objective    Vitals:    10/28/22 0933   BP: 121/74   BP Location: Right arm   Patient Position: Sitting   Cuff Size: Adult   Pulse: 68   Temp: 97.7 °F (36.5 °C)   SpO2: 98%   Weight: 73.8 kg (162 lb 9.6 oz)   Height: 167.6 cm (66\")   PainSc:   8     Estimated body mass index is 26.24 kg/m² as calculated from the following:    Height as of this encounter: 167.6 cm (66\").    Weight as of this encounter: 73.8 kg (162 lb 9.6 oz).    BMI is >= 25 and <30. (Overweight) The following options were offered after discussion;: exercise counseling/recommendations and nutrition counseling/recommendations         Does the patient have evidence of cognitive impairment? No    Physical Exam  Vitals and nursing note reviewed. Exam conducted with a chaperone present (Daughter, Jhony).   Constitutional:       General: She is not in acute distress.     Appearance: Normal appearance. She is not ill-appearing.   HENT:      Head: Normocephalic and atraumatic.   Neck:      Vascular: No carotid bruit.   Cardiovascular:      Rate and Rhythm: Normal rate and regular rhythm.      Pulses: Normal pulses.      Heart sounds: Normal heart sounds. No murmur heard.  Pulmonary:      Effort: Pulmonary effort is normal.      Breath sounds: Normal breath sounds.   Abdominal:      General: Bowel sounds are normal.      Palpations: Abdomen is soft.   Musculoskeletal:         General: Normal range of motion.      Cervical back: Normal range of motion and neck supple.      Right lower leg: No edema.      Left lower leg: No edema.   Lymphadenopathy:      Cervical: No cervical adenopathy.   Skin:     General: Skin is warm and dry.      Capillary Refill: Capillary refill takes less than 2 seconds.   Neurological:      Mental Status: She is alert and oriented to person, place, and time.   Psychiatric:         Thought Content: Thought content normal.       Lab " Results   Component Value Date    CHLPL 136 10/11/2022    TRIG 79 10/11/2022    HDL 54 10/11/2022    LDL 67 10/11/2022    VLDL 15 10/11/2022    HGBA1C 5.9 (H) 10/11/2022            HEALTH RISK ASSESSMENT    Smoking Status:  Social History     Tobacco Use   Smoking Status Never   Smokeless Tobacco Never     Alcohol Consumption:  Social History     Substance and Sexual Activity   Alcohol Use No     Fall Risk Screen:    STEADI Fall Risk Assessment was completed, and patient is at LOW risk for falls.Assessment completed on:10/28/2022    Depression Screening:  PHQ-2/PHQ-9 Depression Screening 10/28/2022   Retired Total Score -   Little Interest or Pleasure in Doing Things 0-->not at all   Feeling Down, Depressed or Hopeless 0-->not at all   PHQ-9: Brief Depression Severity Measure Score 0       Health Habits and Functional and Cognitive Screening:  Functional & Cognitive Status 10/28/2022   Do you have difficulty preparing food and eating? No   Do you have difficulty bathing yourself, getting dressed or grooming yourself? No   Do you have difficulty using the toilet? No   Do you have difficulty moving around from place to place? Yes   Do you have trouble with steps or getting out of a bed or a chair? Yes   Current Diet Well Balanced Diet   Dental Exam Not up to date   Eye Exam Not up to date   Exercise (times per week) 2 times per week   Current Exercises Include Walking   Current Exercise Activities Include -   Do you need help using the phone?  No   Are you deaf or do you have serious difficulty hearing?  No   Do you need help with transportation? No   Do you need help shopping? No   Do you need help preparing meals?  No   Do you need help with housework?  Yes   Do you need help with laundry? No   Do you need help taking your medications? Yes   Do you need help managing money? No   Do you ever drive or ride in a car without wearing a seat belt? No   Have you felt unusual stress, anger or loneliness in the last month?  Yes   Who do you live with? Alone   If you need help, do you have trouble finding someone available to you? No   Have you been bothered in the last four weeks by sexual problems? No   Do you have difficulty concentrating, remembering or making decisions? Yes       Age-appropriate Screening Schedule:  Refer to the list below for future screening recommendations based on patient's age, sex and/or medical conditions. Orders for these recommended tests are listed in the plan section. The patient has been provided with a written plan.    Health Maintenance   Topic Date Due   • ZOSTER VACCINE (3 of 3) 12/28/2020   • MAMMOGRAM  11/17/2021   • DIABETIC EYE EXAM  12/16/2022 (Originally 12/5/2016)   • URINE MICROALBUMIN  10/28/2023 (Originally 10/14/2022)   • DXA SCAN  11/17/2022   • HEMOGLOBIN A1C  04/11/2023   • LIPID PANEL  10/11/2023   • TDAP/TD VACCINES (3 - Td or Tdap) 06/24/2031   • INFLUENZA VACCINE  Completed              Assessment & Plan   CMS Preventative Services Quick Reference  Risk Factors Identified During Encounter  Chronic Pain   Immunizations Discussed/Encouraged (specific Immunizations; Shingrix  Inactivity/Sedentary  Obesity/Overweight   Polypharmacy  The above risks/problems have been discussed with the patient.  Follow up actions/plans if indicated are seen below in the Assessment/Plan Section.  Pertinent information has been shared with the patient in the After Visit Summary.    Diagnoses and all orders for this visit:    1. Medicare annual wellness visit, subsequent (Primary)    2. Primary osteoarthritis involving multiple joints  -     celecoxib (CeleBREX) 200 MG capsule; Take 1 capsule by mouth Daily.  Dispense: 90 capsule; Refill: 3    3. Lumbar pain  -     HYDROcodone-acetaminophen (NORCO) 5-325 MG per tablet; Take 1 tablet by mouth 3 (Three) Times a Day As Needed for Moderate Pain.  Dispense: 90 tablet; Refill: 0    4. DDD (degenerative disc disease), lumbar  -     HYDROcodone-acetaminophen  (NORCO) 5-325 MG per tablet; Take 1 tablet by mouth 3 (Three) Times a Day As Needed for Moderate Pain.  Dispense: 90 tablet; Refill: 0    5. Flu vaccine need  -     Fluzone High-Dose 65+yrs ([id number])    6. Breast cancer screening by mammogram  -     Mammo Screening Digital Tomosynthesis Bilateral With CAD; Future    7. Post-menopausal  -     DEXA Bone Density Axial; Future    8. Overweight with body mass index (BMI) of 26 to 26.9 in adult    Patient encouraged to partake of healthy diet rich in fresh fruits and vegetables as well as lean proteins.  Patient encouraged to participate in daily exercise with goal of 30 min sustained activity (low impact).    Follow Up:   Return in about 3 months (around 1/28/2023) for Next scheduled follow up.     An After Visit Summary and PPPS were made available to the patient.                   ROBIN Montenegro   This note is electronically signed.

## 2022-11-02 DIAGNOSIS — M15.9 PRIMARY OSTEOARTHRITIS INVOLVING MULTIPLE JOINTS: ICD-10-CM

## 2022-11-02 RX ORDER — CELECOXIB 200 MG/1
200 CAPSULE ORAL DAILY
Qty: 90 CAPSULE | Refills: 3 | Status: SHIPPED | OUTPATIENT
Start: 2022-11-02

## 2022-11-02 NOTE — TELEPHONE ENCOUNTER
Rx Refill Note  Requested Prescriptions     Pending Prescriptions Disp Refills   • celecoxib (CeleBREX) 200 MG capsule 90 capsule 3     Sig: Take 1 capsule by mouth Daily.      Last office visit with prescribing clinician: 10/28/2022      Next office visit with prescribing clinician: 1/31/2023 11/02/22, 12:22 CDT  Anna sarmiento  Labs:10/11/2022

## 2022-11-02 NOTE — TELEPHONE ENCOUNTER
Caller: KAZ MUNGUIA     Relationship: DAUGHTER     Best call back number: [phone]    Requested Prescriptions:      celecoxib (CeleBREX) 200 MG capsule  200 mg, Daily         Pharmacy where request should be sent:    [facility] [place], [place] - [address] [facility] [phone] [facility] [phone]   [phone]  Additional details provided by patient: STATES THE MEDICATION IS REALLY HELPING AND IS REQUESTING A REFILL   Does the patient have less than a 3 day supply:  [] Yes  [x] No    Flash Brady Rep   11/02/22 09:10 CDT

## 2022-11-04 RX ORDER — CARVEDILOL 6.25 MG/1
TABLET ORAL
Qty: 180 TABLET | Refills: 1 | Status: SHIPPED | OUTPATIENT
Start: 2022-11-04

## 2022-11-04 RX ORDER — PRAVASTATIN SODIUM 20 MG
TABLET ORAL
Qty: 90 TABLET | Refills: 1 | Status: SHIPPED | OUTPATIENT
Start: 2022-11-04

## 2022-11-04 RX ORDER — FAMOTIDINE 20 MG/1
TABLET, FILM COATED ORAL
Qty: 180 TABLET | Refills: 0 | Status: SHIPPED | OUTPATIENT
Start: 2022-11-04 | End: 2023-01-30

## 2022-11-04 RX ORDER — OMEPRAZOLE 20 MG/1
CAPSULE, DELAYED RELEASE ORAL
Qty: 90 CAPSULE | Refills: 0 | Status: SHIPPED | OUTPATIENT
Start: 2022-11-04 | End: 2022-12-05

## 2022-11-04 RX ORDER — METOCLOPRAMIDE 10 MG/1
TABLET ORAL
Qty: 315 TABLET | Refills: 2 | Status: SHIPPED | OUTPATIENT
Start: 2022-11-04

## 2022-11-04 NOTE — TELEPHONE ENCOUNTER
Rx Refill Note  Requested Prescriptions     Pending Prescriptions Disp Refills   • omeprazole (priLOSEC) 20 MG capsule [Pharmacy Med Name: OMEPRAZOLE 20MG CPDR] 30 capsule 0     Sig: TAKE TWO CAPSULES BY MOUTH EVERY DAY (CONSIDER TAKING AT NIGHT SINCE SYMPTOMS OCCUR AFTER SLEEP)   • metoclopramide (REGLAN) 10 MG tablet [Pharmacy Med Name: METOCLOPRAMIDE HCL 10MG TABS] 315 tablet 2     Sig: TAKE ONE-HALF TABLET BY MOUTH EVERY DAY      Last office visit with prescribing clinician: 10/28/2022     Anna Dunne MA  11/04/22, 13:57 CDT  LAB:10/13/2022

## 2022-11-09 ENCOUNTER — OFFICE VISIT (OUTPATIENT)
Dept: OBSTETRICS AND GYNECOLOGY | Facility: CLINIC | Age: 82
End: 2022-11-09

## 2022-11-09 VITALS
SYSTOLIC BLOOD PRESSURE: 112 MMHG | HEIGHT: 66 IN | DIASTOLIC BLOOD PRESSURE: 60 MMHG | BODY MASS INDEX: 25.88 KG/M2 | WEIGHT: 161 LBS

## 2022-11-09 DIAGNOSIS — R35.0 URINARY FREQUENCY: Primary | ICD-10-CM

## 2022-11-09 PROCEDURE — 99213 OFFICE O/P EST LOW 20 MIN: CPT | Performed by: OBSTETRICS & GYNECOLOGY

## 2022-11-09 NOTE — PROGRESS NOTES
"Subjective   Chief Complaint   Patient presents with   • Post-op     Patient states she is here today for post op from colpocleisis in . Patient reports frequent urination.      Shira King is a 82 y.o. year old  presenting to be seen for her post-operative visit.  She had a Colpocliesis 5 months ago.  The patient feels like she is not emptying her bladder completely, and reports that she constantly feels the need to void.  She denies dysuria and has not had any fevers or felt ill.  The patient's perception is that incomplete emptying got worse after her colpocleisis, but her daughter reports that she has had problems with frequent infections and frequent urination for many years.  I do not think the patient has had any bladder infections since her colpocleisis, likely due to the fact that she is not emptying completely    No Additional Complaints Reported    The following portions of the patient's history were reviewed and updated as appropriate:current medications and allergies    Review of Systems      Objective   /60   Ht 167.6 cm (65.98\")   Wt 73 kg (161 lb)   LMP  (LMP Unknown)   BMI 26.00 kg/m²     General:  well developed; well nourished  no acute distress   Abdomen: Not performed.   Pelvis: Clinical staff was present for exam.  Surgical site healed.  The patient voided into a hat in the bathroom, and the volume was measured at 20 cc of dark urine.  Urine was not cloudy at all.  An In-N-Out catheter was then performed in the exam room and there were only a few drops of urine left in the patient's bladder.          Assessment   1. Pt is 5 months s/p Colpocliesis  2. Site healed.  Patient has no pain  3. Urgency and frequency     Plan   1. Possible UTI.  Urine being sent for culture  2. Discussed overactive bladder as the likely etiology.  Patient instructed to avoid caffeine and artificial sweeteners.  She is currently drinking coffee and sugar-free Powerade every day  3. Patient " given samples of Myrbetriq, but we will not start them until her urine culture has returned.  If antibiotics are indicated she will take those first, to see if there is improvement without starting a new chronic medication.  Patient's daughter will be called with results since she had a mini stroke last year and is somewhat forgetful and easily confused now    No orders of the defined types were placed in this encounter.         This note was electronically signed.    Martina Street MD  November 9, 2022

## 2022-11-11 LAB
APPEARANCE UR: CLEAR
BACTERIA #/AREA URNS HPF: ABNORMAL /HPF
BACTERIA UR CULT: NORMAL
BACTERIA UR CULT: NORMAL
BILIRUB UR QL STRIP: NEGATIVE
CASTS URNS MICRO: ABNORMAL
COLOR UR: ABNORMAL
EPI CELLS #/AREA URNS HPF: ABNORMAL /HPF
GLUCOSE UR QL STRIP: NEGATIVE
HGB UR QL STRIP: NEGATIVE
KETONES UR QL STRIP: ABNORMAL
LEUKOCYTE ESTERASE UR QL STRIP: ABNORMAL
NITRITE UR QL STRIP: NEGATIVE
PH UR STRIP: 7 [PH] (ref 5–8)
PROT UR QL STRIP: ABNORMAL
RBC #/AREA URNS HPF: ABNORMAL /HPF
SP GR UR STRIP: 1.02 (ref 1–1.03)
UROBILINOGEN UR STRIP-MCNC: ABNORMAL MG/DL
WBC #/AREA URNS HPF: ABNORMAL /HPF

## 2022-12-01 DIAGNOSIS — M54.50 LUMBAR PAIN: ICD-10-CM

## 2022-12-01 DIAGNOSIS — M51.36 DDD (DEGENERATIVE DISC DISEASE), LUMBAR: ICD-10-CM

## 2022-12-01 RX ORDER — HYDROCODONE BITARTRATE AND ACETAMINOPHEN 5; 325 MG/1; MG/1
1 TABLET ORAL 3 TIMES DAILY PRN
Qty: 90 TABLET | Refills: 0 | Status: SHIPPED | OUTPATIENT
Start: 2022-12-01 | End: 2022-12-26 | Stop reason: SDUPTHER

## 2022-12-01 NOTE — TELEPHONE ENCOUNTER
Rx Refill Note  Requested Prescriptions     Pending Prescriptions Disp Refills   • HYDROcodone-acetaminophen (NORCO) 5-325 MG per tablet 90 tablet 0     Sig: Take 1 tablet by mouth 3 (Three) Times a Day As Needed for Moderate Pain.      Last office visit with prescribing clinician: 10/28/2022   Last telemedicine visit with prescribing clinician: 1/31/2023   Next office visit with prescribing clinician: 1/31/2023  Requirements are up to date    Quynh Morris MA  12/01/22, 10:50 CST

## 2022-12-05 RX ORDER — OMEPRAZOLE 20 MG/1
CAPSULE, DELAYED RELEASE ORAL
Qty: 90 CAPSULE | Refills: 0 | Status: SHIPPED | OUTPATIENT
Start: 2022-12-05 | End: 2023-01-03

## 2022-12-05 NOTE — TELEPHONE ENCOUNTER
Rx Refill Note  Requested Prescriptions     Pending Prescriptions Disp Refills   • omeprazole (priLOSEC) 20 MG capsule [Pharmacy Med Name: OMEPRAZOLE 20MG CPDR] 90 capsule 0     Sig: TAKE TWO CAPSULES BY MOUTH EVERY DAY (CONSIDER TAKING AT NIGHT SINCE SYMPTOMS OCCUR AFTER SLEEP)      Last office visit with prescribing clinician: 10/28/2022   Last telemedicine visit with prescribing clinician: 1/31/2023   Next office visit with prescribing clinician: 1/31/2023       Quynh Morris MA  12/05/22, 10:27 CST

## 2022-12-08 DIAGNOSIS — F41.9 ANXIETY: ICD-10-CM

## 2022-12-08 RX ORDER — ALPRAZOLAM 0.25 MG/1
TABLET ORAL
Qty: 60 TABLET | Refills: 2 | Status: SHIPPED | OUTPATIENT
Start: 2022-12-08 | End: 2023-01-31 | Stop reason: SDUPTHER

## 2022-12-08 NOTE — TELEPHONE ENCOUNTER
Rx Refill Note  Requested Prescriptions     Pending Prescriptions Disp Refills   • ALPRAZolam (XANAX) 0.25 MG tablet [Pharmacy Med Name: ALPRAZOLAM 0.25MG TABS] 60 tablet 2     Sig: TAKE ONE TABLET BY MOUTH TWICE A DAY AS NEEDED FOR ANXIETY      Last office visit with prescribing clinician: 10/28/2022   Last telemedicine visit with prescribing clinician: 1/31/2023   Next office visit with prescribing clinician: 1/31/2023  Requirements are up to date    Quynh Morris MA  12/08/22, 10:49 CST

## 2022-12-26 DIAGNOSIS — M54.50 LUMBAR PAIN: ICD-10-CM

## 2022-12-26 DIAGNOSIS — M51.36 DDD (DEGENERATIVE DISC DISEASE), LUMBAR: ICD-10-CM

## 2022-12-27 RX ORDER — HYDROCODONE BITARTRATE AND ACETAMINOPHEN 5; 325 MG/1; MG/1
1 TABLET ORAL 3 TIMES DAILY PRN
Qty: 90 TABLET | Refills: 0 | Status: SHIPPED | OUTPATIENT
Start: 2022-12-27 | End: 2023-01-26 | Stop reason: SDUPTHER

## 2022-12-27 NOTE — TELEPHONE ENCOUNTER
Rx Refill Note  Requested Prescriptions     Pending Prescriptions Disp Refills   • HYDROcodone-acetaminophen (NORCO) 5-325 MG per tablet 90 tablet 0     Sig: Take 1 tablet by mouth 3 (Three) Times a Day As Needed for Moderate Pain.      Last office visit with prescribing clinician: 10/28/2022   Last telemedicine visit with prescribing clinician: 1/31/2023   Next office visit with prescribing clinician: 1/31/2023  Requirements are up to date    Quynh Morris MA  12/27/22, 07:59 CST

## 2023-01-03 RX ORDER — OMEPRAZOLE 20 MG/1
CAPSULE, DELAYED RELEASE ORAL
Qty: 90 CAPSULE | Refills: 0 | Status: SHIPPED | OUTPATIENT
Start: 2023-01-03 | End: 2023-03-03

## 2023-01-03 NOTE — TELEPHONE ENCOUNTER
Rx Refill Note  Requested Prescriptions     Pending Prescriptions Disp Refills   • omeprazole (priLOSEC) 20 MG capsule [Pharmacy Med Name: OMEPRAZOLE 20MG CPDR] 90 capsule 0     Sig: TAKE TWO CAPSULES BY MOUTH EVERY DAY (CONSIDER TAKING AT NIGHT SINCE SYMPTOMS OCCUR AFTER SLEEP)      Last office visit with prescribing clinician: 10/28/2022   Last telemedicine visit with prescribing clinician: 1/31/2023   Next office visit with prescribing clinician: 1/31/2023       Quynh Morris MA  01/03/23, 15:27 CST

## 2023-01-05 DIAGNOSIS — Z78.0 POST-MENOPAUSAL: ICD-10-CM

## 2023-01-05 DIAGNOSIS — Z12.31 BREAST CANCER SCREENING BY MAMMOGRAM: ICD-10-CM

## 2023-01-26 DIAGNOSIS — M54.50 LUMBAR PAIN: ICD-10-CM

## 2023-01-26 DIAGNOSIS — M51.36 DDD (DEGENERATIVE DISC DISEASE), LUMBAR: ICD-10-CM

## 2023-01-26 RX ORDER — HYDROCODONE BITARTRATE AND ACETAMINOPHEN 5; 325 MG/1; MG/1
1 TABLET ORAL 3 TIMES DAILY PRN
Qty: 90 TABLET | Refills: 0 | Status: SHIPPED | OUTPATIENT
Start: 2023-01-26 | End: 2023-01-31 | Stop reason: SDUPTHER

## 2023-01-26 NOTE — TELEPHONE ENCOUNTER
Rx Refill Note  Requested Prescriptions     Pending Prescriptions Disp Refills   • HYDROcodone-acetaminophen (NORCO) 5-325 MG per tablet 90 tablet 0     Sig: Take 1 tablet by mouth 3 (Three) Times a Day As Needed for Moderate Pain.      Last office visit with prescribing clinician: 10/28/2022   Last telemedicine visit with prescribing clinician: 1/31/2023   Next office visit with prescribing clinician: 1/31/2023       Controlled- due 1-26-23     Jayda Cordova MA  01/26/23, 10:17 CST

## 2023-01-30 RX ORDER — AMLODIPINE BESYLATE 5 MG/1
TABLET ORAL
Qty: 90 TABLET | Refills: 1 | Status: SHIPPED | OUTPATIENT
Start: 2023-01-30

## 2023-01-30 RX ORDER — FAMOTIDINE 20 MG/1
TABLET, FILM COATED ORAL
Qty: 180 TABLET | Refills: 0 | Status: SHIPPED | OUTPATIENT
Start: 2023-01-30

## 2023-01-30 RX ORDER — LOSARTAN POTASSIUM 100 MG/1
TABLET ORAL
Qty: 90 TABLET | Refills: 1 | Status: SHIPPED | OUTPATIENT
Start: 2023-01-30

## 2023-01-30 NOTE — TELEPHONE ENCOUNTER
Rx Refill Note  Requested Prescriptions     Pending Prescriptions Disp Refills   • losartan (COZAAR) 100 MG tablet [Pharmacy Med Name: LOSARTAN POTASSIUM 100MG TABS] 90 tablet 1     Sig: TAKE ONE TABLET BY MOUTH EVERY DAY   • famotidine (PEPCID) 20 MG tablet [Pharmacy Med Name: FAMOTIDINE 20MG TABS] 180 tablet 0     Sig: TAKE ONE TABLET BY MOUTH TWICE A DAY   • amLODIPine (NORVASC) 5 MG tablet [Pharmacy Med Name: AMLODIPINE BESYLATE 5MG TABS] 90 tablet 1     Sig: TAKE ONE TABLET BY MOUTH EVERY DAY      Last office visit with prescribing clinician: 10/28/2022   Next office visit with prescribing clinician: 1/31/2023             Anna Dunne MA  01/30/23, 15:32 CST

## 2023-01-31 ENCOUNTER — OFFICE VISIT (OUTPATIENT)
Dept: FAMILY MEDICINE CLINIC | Facility: CLINIC | Age: 83
End: 2023-01-31
Payer: MEDICARE

## 2023-01-31 VITALS
TEMPERATURE: 97.9 F | RESPIRATION RATE: 20 BRPM | DIASTOLIC BLOOD PRESSURE: 75 MMHG | OXYGEN SATURATION: 96 % | HEART RATE: 72 BPM | SYSTOLIC BLOOD PRESSURE: 128 MMHG | WEIGHT: 165 LBS | HEIGHT: 66 IN | BODY MASS INDEX: 26.52 KG/M2

## 2023-01-31 DIAGNOSIS — Z79.899 LONG-TERM USE OF HIGH-RISK MEDICATION: Primary | ICD-10-CM

## 2023-01-31 DIAGNOSIS — M54.50 LUMBAR PAIN: ICD-10-CM

## 2023-01-31 DIAGNOSIS — R73.03 PRE-DIABETES: ICD-10-CM

## 2023-01-31 DIAGNOSIS — M51.36 DDD (DEGENERATIVE DISC DISEASE), LUMBAR: ICD-10-CM

## 2023-01-31 DIAGNOSIS — B00.1 FEVER BLISTER: ICD-10-CM

## 2023-01-31 DIAGNOSIS — M85.852 OSTEOPENIA OF LEFT HIP: ICD-10-CM

## 2023-01-31 DIAGNOSIS — F41.9 ANXIETY: ICD-10-CM

## 2023-01-31 PROCEDURE — 99214 OFFICE O/P EST MOD 30 MIN: CPT | Performed by: NURSE PRACTITIONER

## 2023-01-31 RX ORDER — ALPRAZOLAM 0.25 MG/1
0.25 TABLET ORAL 2 TIMES DAILY PRN
Qty: 60 TABLET | Refills: 2 | Status: SHIPPED | OUTPATIENT
Start: 2023-01-31 | End: 2023-04-07 | Stop reason: SDUPTHER

## 2023-01-31 RX ORDER — HYDROCODONE BITARTRATE AND ACETAMINOPHEN 5; 325 MG/1; MG/1
1 TABLET ORAL 3 TIMES DAILY PRN
Qty: 90 TABLET | Refills: 0 | Status: SHIPPED | OUTPATIENT
Start: 2023-01-31 | End: 2023-02-27 | Stop reason: SDUPTHER

## 2023-01-31 RX ORDER — VALACYCLOVIR HYDROCHLORIDE 500 MG/1
500 TABLET, FILM COATED ORAL 3 TIMES DAILY
Qty: 9 TABLET | Refills: 0 | Status: SHIPPED | OUTPATIENT
Start: 2023-01-31

## 2023-01-31 NOTE — PROGRESS NOTES
"Chief Complaint  3 month follow up and medication questions    Subjective        Shira King presents to [facility]  History of Present Illness  MED REFILL:  Patient presents for compliance visit for hydrocodone and alprazolam.  Both are used PRN with continued good effectiveness.  She consistently uses the alprazolam twice daily but the hydrocodone use depends on her pain level for the day.  She notes some days she may only need one or two in the day while other days she could really use a 4th dose.  She has shown neither abuse or misuse of her medication.  UDS and controlled substance agreement are up to date.  MALU is reviewed.  DEXA:  Patient and provider discuss recent bone density results.  She has had no falls.  She does daily weight bearing exercise in the form of walking.  She has a treadmill.  When the weather is nice, she enjoys walking outside with her DIL.    Objective   Vital Signs:  /75 (BP Location: Right arm, Patient Position: Sitting, Cuff Size: Adult)   Pulse 72   Temp 97.9 °F (36.6 °C) (Temporal)   Resp 20   Ht 167.6 cm (65.98\")   Wt 74.8 kg (165 lb)   SpO2 96%   BMI 26.64 kg/m²   Estimated body mass index is 26.64 kg/m² as calculated from the following:    Height as of this encounter: 167.6 cm (65.98\").    Weight as of this encounter: 74.8 kg (165 lb).      Physical Exam  Vitals and nursing note reviewed. Exam conducted with a chaperone present.   Constitutional:       General: She is not in acute distress.     Appearance: Normal appearance. She is not ill-appearing.   HENT:      Head: Normocephalic and atraumatic.   Cardiovascular:      Rate and Rhythm: Normal rate and regular rhythm.      Pulses: Normal pulses.      Heart sounds: Normal heart sounds. No murmur heard.  Pulmonary:      Effort: Pulmonary effort is normal.      Breath sounds: Normal breath sounds.   Musculoskeletal:      Right lower leg: No edema.      Left lower leg: No " edema.   Skin:     General: Skin is warm and dry.   Neurological:      Mental Status: She is alert and oriented to person, place, and time.        Result Review :                Assessment and Plan   Diagnoses and all orders for this visit:    1. Long-term use of high-risk medication (Primary)  -     Compliance Drug Analysis, Ur - Urine, Clean Catch; Future    2. Fever blister  -     valACYclovir (Valtrex) 500 MG tablet; Take 1 tablet by mouth 3 (Three) Times a Day.  Dispense: 9 tablet; Refill: 0    3. Lumbar pain  -     HYDROcodone-acetaminophen (NORCO) 5-325 MG per tablet; Take 1 tablet by mouth 3 (Three) Times a Day As Needed for Moderate Pain.  Dispense: 90 tablet; Refill: 0  -     Compliance Drug Analysis, Ur - Urine, Clean Catch; Future    4. DDD (degenerative disc disease), lumbar  -     HYDROcodone-acetaminophen (NORCO) 5-325 MG per tablet; Take 1 tablet by mouth 3 (Three) Times a Day As Needed for Moderate Pain.  Dispense: 90 tablet; Refill: 0  -     Compliance Drug Analysis, Ur - Urine, Clean Catch; Future    5. Anxiety  -     ALPRAZolam (XANAX) 0.25 MG tablet; Take 1 tablet by mouth 2 (Two) Times a Day As Needed for Anxiety.  Dispense: 60 tablet; Refill: 2  -     Compliance Drug Analysis, Ur - Urine, Clean Catch; Future    6. Osteopenia of left hip        - Patient will continue weight bearing exercise.  No prescriptive treatment will be used at this point for the osteopenia.    7. Pre-diabetes  -     Comprehensive metabolic panel; Future  -     Hemoglobin A1c; Future          [facility] Pain Cream  -  Reorder pain cream previously rx'd from PM.       Follow Up   Return in about 3 months (around 4/30/2023) for Next scheduled follow up (CMP and Hgb A1c prior); will need UDS at visit.  Patient was given instructions and counseling regarding her condition or for health maintenance advice. Please see specific information pulled into the AVS if appropriate.     ROBIN Montenegro  This note is  electronically signed.

## 2023-02-27 DIAGNOSIS — M54.50 LUMBAR PAIN: ICD-10-CM

## 2023-02-27 DIAGNOSIS — M51.36 DDD (DEGENERATIVE DISC DISEASE), LUMBAR: ICD-10-CM

## 2023-02-27 NOTE — TELEPHONE ENCOUNTER
Rx Refill Note  Requested Prescriptions     Pending Prescriptions Disp Refills   • HYDROcodone-acetaminophen (NORCO) 5-325 MG per tablet 90 tablet 0     Sig: Take 1 tablet by mouth 3 (Three) Times a Day As Needed for Moderate Pain.      Last office visit with prescribing clinician: 1/31/2023   Next office visit with prescribing clinician: 5/3/2023           UP TO DATE ON REQUIREMENTS           {Anna Dunne MA  02/27/23, 16:38 CST

## 2023-02-28 RX ORDER — HYDROCODONE BITARTRATE AND ACETAMINOPHEN 5; 325 MG/1; MG/1
1 TABLET ORAL 3 TIMES DAILY PRN
Qty: 90 TABLET | Refills: 0 | Status: SHIPPED | OUTPATIENT
Start: 2023-02-28 | End: 2023-03-26 | Stop reason: SDUPTHER

## 2023-03-03 RX ORDER — OMEPRAZOLE 20 MG/1
CAPSULE, DELAYED RELEASE ORAL
Qty: 180 CAPSULE | Refills: 1 | Status: SHIPPED | OUTPATIENT
Start: 2023-03-03 | End: 2023-04-07

## 2023-03-03 NOTE — TELEPHONE ENCOUNTER
Rx Refill Note  Requested Prescriptions     Pending Prescriptions Disp Refills   • omeprazole (priLOSEC) 20 MG capsule [Pharmacy Med Name: OMEPRAZOLE 20MG CPDR] 90 capsule 0     Sig: TAKE TWO CAPSULES BY MOUTH EVERY DAY (CONSIDER TAKING AT NIGHT SINCE SYMPTOMS OCCUR AFTER SLEEP)      Last office visit with prescribing clinician: 1/31/2023   Next office visit with prescribing clinician: 5/3/2023                         Would you like a call back once the refill request has been completed: [] Yes [x] No    If the office needs to give you a call back, can they leave a voicemail: [x] Yes [] No    Anna Dunne MA  03/03/23, 14:22 CST

## 2023-03-26 DIAGNOSIS — M51.36 DDD (DEGENERATIVE DISC DISEASE), LUMBAR: ICD-10-CM

## 2023-03-26 DIAGNOSIS — M54.50 LUMBAR PAIN: ICD-10-CM

## 2023-03-27 RX ORDER — HYDROCODONE BITARTRATE AND ACETAMINOPHEN 5; 325 MG/1; MG/1
1 TABLET ORAL 3 TIMES DAILY PRN
Qty: 90 TABLET | Refills: 0 | Status: SHIPPED | OUTPATIENT
Start: 2023-03-27 | End: 2023-04-07 | Stop reason: SDUPTHER

## 2023-03-27 NOTE — TELEPHONE ENCOUNTER
Rx Refill Note  Requested Prescriptions     Pending Prescriptions Disp Refills   • HYDROcodone-acetaminophen (NORCO) 5-325 MG per tablet 90 tablet 0     Sig: Take 1 tablet by mouth 3 (Three) Times a Day As Needed for Moderate Pain.      Last office visit with prescribing clinician: 1/31/2023   Last telemedicine visit with prescribing clinician: 5/1/2023   Next office visit with prescribing clinician: 5/3/2023       Last UDS 01-31-23  Last agreement 05-27-22      Jayda Cordova MA  03/27/23, 08:33 CDT

## 2023-04-05 ENCOUNTER — TELEPHONE (OUTPATIENT)
Dept: FAMILY MEDICINE CLINIC | Facility: CLINIC | Age: 83
End: 2023-04-05

## 2023-04-05 DIAGNOSIS — Z29.9 PREVENTIVE MEDICATION THERAPY NEEDED: Primary | ICD-10-CM

## 2023-04-05 RX ORDER — AMOXICILLIN 500 MG/1
CAPSULE ORAL
Qty: 4 CAPSULE | Refills: 0 | Status: SHIPPED | OUTPATIENT
Start: 2023-04-05

## 2023-04-05 NOTE — TELEPHONE ENCOUNTER
Caller: Terrie Junga    Relationship: Emergency Contact    Best call back number: [phone]    What medication are you requesting: ANTIBIOTIC     Have you had these symptoms before:    [x] Yes  [] No    Have you been treated for these symptoms before:   [x] Yes  [] No    If a prescription is needed, what is your preferred pharmacy and phone number: [facility] [place], [place] - [address] [place] [address] [phone] [facility] [phone] FX     Additional notes:    PATIENT'S DAUGHTER STATES PATIENT IS HAVING HER TEETH CLEANED ON 04.06.23. PATIENT'S DAUGHTER STATES PATIENT IS USUALLY PRE-MEDICATED BEFORE DENTAL PROCEDURES.    PATIENT'S DAUGHTER IS WONDERING IF MEDICATION COULD BE PRESCRIBED BEFORE DENTAL APPOINTMENT?    PLEASE CALL PATIENT'S DAUGHTER.

## 2023-04-07 ENCOUNTER — OFFICE VISIT (OUTPATIENT)
Dept: FAMILY MEDICINE CLINIC | Facility: CLINIC | Age: 83
End: 2023-04-07
Payer: MEDICARE

## 2023-04-07 VITALS
OXYGEN SATURATION: 98 % | RESPIRATION RATE: 20 BRPM | BODY MASS INDEX: 26.2 KG/M2 | HEART RATE: 63 BPM | HEIGHT: 66 IN | WEIGHT: 163 LBS | TEMPERATURE: 97.3 F | SYSTOLIC BLOOD PRESSURE: 126 MMHG | DIASTOLIC BLOOD PRESSURE: 68 MMHG

## 2023-04-07 DIAGNOSIS — F41.9 ANXIETY: ICD-10-CM

## 2023-04-07 DIAGNOSIS — N32.81 OAB (OVERACTIVE BLADDER): ICD-10-CM

## 2023-04-07 DIAGNOSIS — M51.36 DDD (DEGENERATIVE DISC DISEASE), LUMBAR: ICD-10-CM

## 2023-04-07 DIAGNOSIS — M54.50 LUMBAR PAIN: ICD-10-CM

## 2023-04-07 DIAGNOSIS — R12 HEARTBURN: ICD-10-CM

## 2023-04-07 DIAGNOSIS — Z79.899 LONG-TERM USE OF HIGH-RISK MEDICATION: Primary | ICD-10-CM

## 2023-04-07 DIAGNOSIS — M15.9 PRIMARY OSTEOARTHRITIS INVOLVING MULTIPLE JOINTS: ICD-10-CM

## 2023-04-07 RX ORDER — ALPRAZOLAM 0.25 MG/1
0.25 TABLET ORAL 2 TIMES DAILY PRN
Qty: 60 TABLET | Refills: 2 | Status: SHIPPED | OUTPATIENT
Start: 2023-04-07

## 2023-04-07 RX ORDER — MAGNESIUM HYDROXIDE/ALUMINUM HYDROXICE/SIMETHICONE 120; 1200; 1200 MG/30ML; MG/30ML; MG/30ML
20 SUSPENSION ORAL EVERY 6 HOURS PRN
Status: CANCELLED | OUTPATIENT
Start: 2023-04-07

## 2023-04-07 RX ORDER — MAGNESIUM HYDROXIDE/ALUMINUM HYDROXICE/SIMETHICONE 120; 1200; 1200 MG/30ML; MG/30ML; MG/30ML
10 SUSPENSION ORAL EVERY 6 HOURS PRN
Qty: 355 ML | Refills: 5 | Status: SHIPPED | OUTPATIENT
Start: 2023-04-07

## 2023-04-07 RX ORDER — HYDROCODONE BITARTRATE AND ACETAMINOPHEN 5; 325 MG/1; MG/1
1 TABLET ORAL 3 TIMES DAILY PRN
Qty: 90 TABLET | Refills: 0 | Status: SHIPPED | OUTPATIENT
Start: 2023-04-07

## 2023-04-07 RX ORDER — TRIAMCINOLONE ACETONIDE 40 MG/ML
40 INJECTION, SUSPENSION INTRA-ARTICULAR; INTRAMUSCULAR ONCE
Status: COMPLETED | OUTPATIENT
Start: 2023-04-07 | End: 2023-04-07

## 2023-04-07 RX ADMIN — TRIAMCINOLONE ACETONIDE 40 MG: 40 INJECTION, SUSPENSION INTRA-ARTICULAR; INTRAMUSCULAR at 07:56

## 2023-04-07 NOTE — PROGRESS NOTES
"Chief Complaint  Joint Swelling and Numbness (In hands and feet during the night she will wake up and have to keep rubbing them she is also waking up with headaches/)    Subjective        Shira King presents to [facility]  History of Present Illness  ARTHRITIS: Patient has history of arthritis of multiple joints.  Recently she has been waking with numbness in her upper extremities, equal bilaterally as well as in increase in morning headaches.  B/P has been within normal limits. She has a history of carpal tunnel surgery and she states this pain and numbness is different.  She has known spinal DDD as well as osteoarthritis of her joints.  HEARTBURN:  Patient brings an older bottle of mylanta that was prescribed for her.  She notes that it works very well when needed on a PRN basis.  She typically uses one dose per week or so.  She is requesting a new prescription as her insurance covers it when prescribed.  OAB:  She again has concerns about OAB.  She notes often going to the bathroom as often as every 15 min during the day and frequently at night.  She has no symptoms related to UTI other than this intermittent frequency.  She expresses interest in trying something to help with that.    Objective   Vital Signs:  /68 (BP Location: Left arm, Patient Position: Sitting, Cuff Size: Adult)   Pulse 63   Temp 97.3 °F (36.3 °C) (Temporal)   Resp 20   Ht 167.6 cm (65.98\")   Wt 73.9 kg (163 lb)   SpO2 98%   BMI 26.32 kg/m²   Estimated body mass index is 26.32 kg/m² as calculated from the following:    Height as of this encounter: 167.6 cm (65.98\").    Weight as of this encounter: 73.9 kg (163 lb).      Physical Exam  Vitals and nursing note reviewed. Exam conducted with a chaperone present (JANELLE).   Constitutional:       General: She is not in acute distress.     Appearance: Normal appearance. She is not ill-appearing.   HENT:      Head: Normocephalic and atraumatic. "   Cardiovascular:      Rate and Rhythm: Normal rate and regular rhythm.      Heart sounds: Normal heart sounds. No murmur heard.  Pulmonary:      Effort: Pulmonary effort is normal.      Breath sounds: Normal breath sounds.   Musculoskeletal:         General: Tenderness present.      Comments: Pain with AROM of spine.  Tenderness of shoulders and knees bilaterally.  Negative exam of hands and wrists.  Negative Tinel's and Phalen's bilaterally.   Skin:     General: Skin is warm and dry.      Capillary Refill: Capillary refill takes less than 2 seconds.   Neurological:      Mental Status: She is alert and oriented to person, place, and time.        Result Review :                Assessment and Plan   Diagnoses and all orders for this visit:    1. Long-term use of high-risk medication (Primary)    2. Heartburn  -     aluminum-magnesium hydroxide-simethicone (MAALOX/MYLANTA) 200-200-20 MG/5ML suspension; Take 10 mL by mouth Every 6 (Six) Hours As Needed for Indigestion or Heartburn.  Dispense: 355 mL; Refill: 5    3. Anxiety  -     ALPRAZolam (XANAX) 0.25 MG tablet; Take 1 tablet by mouth 2 (Two) Times a Day As Needed for Anxiety.  Dispense: 60 tablet; Refill: 2    4. Lumbar pain  -     HYDROcodone-acetaminophen (NORCO) 5-325 MG per tablet; Take 1 tablet by mouth 3 (Three) Times a Day As Needed for Moderate Pain.  Dispense: 90 tablet; Refill: 0    5. DDD (degenerative disc disease), lumbar  -     HYDROcodone-acetaminophen (NORCO) 5-325 MG per tablet; Take 1 tablet by mouth 3 (Three) Times a Day As Needed for Moderate Pain.  Dispense: 90 tablet; Refill: 0    6. Primary osteoarthritis involving multiple joints  -     triamcinolone acetonide (KENALOG-40) injection 40 mg    7. OAB (overactive bladder)  -     Mirabegron ER (Myrbetriq) 25 MG tablet sustained-release 24 hour 24 hr tablet; Take 1 tablet by mouth Daily.  Dispense: 28 tablet; Refill: 0    UDS and controlled substance agreement are up to date.  MALU cristobal  reviewed.         Follow Up   Return in about 3 months (around 7/7/2023) for Next scheduled follow up.  Patient was given instructions and counseling regarding her condition or for health maintenance advice. Please see specific information pulled into the AVS if appropriate.     ROBIN Montenegro  This note is electronically signed.

## 2023-04-26 DIAGNOSIS — M54.50 LUMBAR PAIN: ICD-10-CM

## 2023-04-26 DIAGNOSIS — M51.36 DDD (DEGENERATIVE DISC DISEASE), LUMBAR: ICD-10-CM

## 2023-04-26 RX ORDER — HYDROCODONE BITARTRATE AND ACETAMINOPHEN 5; 325 MG/1; MG/1
1 TABLET ORAL 3 TIMES DAILY PRN
Qty: 90 TABLET | Refills: 0 | Status: SHIPPED | OUTPATIENT
Start: 2023-04-26

## 2023-04-26 NOTE — TELEPHONE ENCOUNTER
Rx Refill Note  Requested Prescriptions     Pending Prescriptions Disp Refills   • HYDROcodone-acetaminophen (NORCO) 5-325 MG per tablet 90 tablet 0     Sig: Take 1 tablet by mouth 3 (Three) Times a Day As Needed for Moderate Pain.      Last office visit with prescribing clinician: 4/7/2023   Last telemedicine visit with prescribing clinician: 5/1/2023   Next office visit with prescribing clinician: 5/9/2023   {      Barb Bowens MA  04/26/23, 13:47 CDT

## 2023-05-08 RX ORDER — CARVEDILOL 6.25 MG/1
TABLET ORAL
Qty: 180 TABLET | Refills: 1 | Status: SHIPPED | OUTPATIENT
Start: 2023-05-08

## 2023-05-08 RX ORDER — PRAVASTATIN SODIUM 20 MG
TABLET ORAL
Qty: 90 TABLET | Refills: 1 | Status: SHIPPED | OUTPATIENT
Start: 2023-05-08

## 2023-05-08 NOTE — TELEPHONE ENCOUNTER
Rx Refill Note  Requested Prescriptions     Pending Prescriptions Disp Refills   • carvedilol (COREG) 6.25 MG tablet [Pharmacy Med Name: CARVEDILOL 6.25MG TABS] 180 tablet 1     Sig: TAKE ONE TABLET BY MOUTH TWICE A DAY WITH MEALS   • pravastatin (PRAVACHOL) 20 MG tablet [Pharmacy Med Name: PRAVASTATIN SODIUM 20MG TABS] 90 tablet 1     Sig: TAKE ONE TABLET BY MOUTH EVERY DAY      Last office visit with prescribing clinician: 4/7/2023   Last telemedicine visit with prescribing clinician: 5/9/2023   Next office visit with prescribing clinician: 5/9/2023     Quynh Morris MA  05/08/23, 09:31 CDT

## 2023-05-09 ENCOUNTER — OFFICE VISIT (OUTPATIENT)
Dept: FAMILY MEDICINE CLINIC | Facility: CLINIC | Age: 83
End: 2023-05-09
Payer: MEDICARE

## 2023-05-09 DIAGNOSIS — Z79.899 LONG-TERM USE OF HIGH-RISK MEDICATION: Primary | ICD-10-CM

## 2023-05-09 DIAGNOSIS — M51.36 DDD (DEGENERATIVE DISC DISEASE), LUMBAR: ICD-10-CM

## 2023-05-09 DIAGNOSIS — M15.9 PRIMARY OSTEOARTHRITIS INVOLVING MULTIPLE JOINTS: ICD-10-CM

## 2023-05-09 NOTE — PROGRESS NOTES
"Chief Complaint  Diabetes (6 month )    Subjective        Shira King presents to [facility]  History of Present Illness  MED REFILL:  Patient presents for compliance visit and follow diabetes.  She is doing well on current treatment plan.  A1c stable at 5.9.  Patient basically eats what she wants in moderation.  Her weight is stable.  She takes hydrocodone for OA and lumbar DDD pain with good success.  She also takes celebrex for arthritis PRN.  UDS and controlled substance agreement are up to date.  MALU is reviewed.    Objective   Vital Signs:  /70 (BP Location: Left arm, Patient Position: Sitting, Cuff Size: Adult) Comment (Cuff Size): manual  Pulse 78   Temp 98.4 °F (36.9 °C)   Ht 168.9 cm (66.5\")   Wt 72.3 kg (159 lb 6.4 oz)   SpO2 98%   BMI 25.34 kg/m²   Estimated body mass index is 25.34 kg/m² as calculated from the following:    Height as of this encounter: 168.9 cm (66.5\").    Weight as of this encounter: 72.3 kg (159 lb 6.4 oz).      Physical Exam  Vitals and nursing note reviewed.   Constitutional:       General: She is not in acute distress.     Appearance: Normal appearance. She is normal weight. She is not ill-appearing.   HENT:      Head: Normocephalic and atraumatic.   Cardiovascular:      Rate and Rhythm: Normal rate and regular rhythm.      Heart sounds: Normal heart sounds. No murmur heard.  Pulmonary:      Effort: Pulmonary effort is normal.      Breath sounds: Normal breath sounds.   Musculoskeletal:      Comments: Mild decrease in spinal ROM due to DDD and OA pain.   Skin:     General: Skin is warm and dry.   Neurological:      Mental Status: She is alert and oriented to person, place, and time.   Psychiatric:         Thought Content: Thought content normal.        Result Review :                Assessment and Plan   Diagnoses and all orders for this visit:    1. Long-term use of high-risk medication (Primary)    2. Primary osteoarthritis " involving multiple joints    3. DDD (degenerative disc disease), lumbar    Continue current treatment plan.  No refills needed at this time.         Follow Up   Return in about 3 months (around 8/9/2023) for Next scheduled follow up.  Patient was given instructions and counseling regarding her condition or for health maintenance advice. Please see specific information pulled into the AVS if appropriate.     ROBIN Montenegro  This note is electronically signed.

## 2023-05-10 VITALS
BODY MASS INDEX: 25.02 KG/M2 | HEART RATE: 78 BPM | SYSTOLIC BLOOD PRESSURE: 132 MMHG | WEIGHT: 159.4 LBS | HEIGHT: 67 IN | OXYGEN SATURATION: 98 % | TEMPERATURE: 98.4 F | DIASTOLIC BLOOD PRESSURE: 70 MMHG

## 2023-05-26 DIAGNOSIS — M51.36 DDD (DEGENERATIVE DISC DISEASE), LUMBAR: ICD-10-CM

## 2023-05-26 DIAGNOSIS — M54.50 LUMBAR PAIN: ICD-10-CM

## 2023-05-26 RX ORDER — HYDROCODONE BITARTRATE AND ACETAMINOPHEN 5; 325 MG/1; MG/1
1 TABLET ORAL 3 TIMES DAILY PRN
Qty: 90 TABLET | Refills: 0 | Status: SHIPPED | OUTPATIENT
Start: 2023-05-26

## 2023-05-26 NOTE — TELEPHONE ENCOUNTER
Rx Refill Note  Requested Prescriptions     Pending Prescriptions Disp Refills    HYDROcodone-acetaminophen (NORCO) 5-325 MG per tablet 90 tablet 0     Sig: Take 1 tablet by mouth 3 (Three) Times a Day As Needed for Moderate Pain.      Last office visit with prescribing clinician: 5/9/2023   Last telemedicine visit with prescribing clinician: Visit date not found   Next office visit with prescribing clinician: 8/9/2023  Requirements are up to date    Quynh Morris MA  05/26/23, 08:44 CDT

## 2023-07-31 RX ORDER — METOCLOPRAMIDE 10 MG/1
TABLET ORAL
Qty: 45 TABLET | Refills: 2 | Status: SHIPPED | OUTPATIENT
Start: 2023-07-31

## 2023-07-31 RX ORDER — AMLODIPINE BESYLATE 5 MG/1
TABLET ORAL
Qty: 90 TABLET | Refills: 1 | Status: SHIPPED | OUTPATIENT
Start: 2023-07-31

## 2023-07-31 RX ORDER — LOSARTAN POTASSIUM 100 MG/1
TABLET ORAL
Qty: 90 TABLET | Refills: 1 | Status: SHIPPED | OUTPATIENT
Start: 2023-07-31

## 2023-08-03 DIAGNOSIS — M54.50 LUMBAR PAIN: ICD-10-CM

## 2023-08-03 DIAGNOSIS — M51.36 DDD (DEGENERATIVE DISC DISEASE), LUMBAR: ICD-10-CM

## 2023-08-03 RX ORDER — HYDROCODONE BITARTRATE AND ACETAMINOPHEN 5; 325 MG/1; MG/1
1 TABLET ORAL 3 TIMES DAILY PRN
Qty: 90 TABLET | Refills: 0 | Status: SHIPPED | OUTPATIENT
Start: 2023-08-03

## 2023-10-03 DIAGNOSIS — F41.9 ANXIETY: ICD-10-CM

## 2023-10-03 RX ORDER — ALPRAZOLAM 0.25 MG/1
TABLET ORAL
Qty: 60 TABLET | Refills: 0 | Status: SHIPPED | OUTPATIENT
Start: 2023-10-03

## 2023-10-03 NOTE — TELEPHONE ENCOUNTER
Rx Refill Note  Requested Prescriptions     Pending Prescriptions Disp Refills    ALPRAZolam (XANAX) 0.25 MG tablet [Pharmacy Med Name: ALPRAZOLAM 0.25MG TABS] 60 tablet 2     Sig: TAKE ONE TABLET BY MOUTH TWICE A DAY AS NEEDED FOR ANXIETY            Barb Bowens MA  10/03/23, 10:36 CDT

## 2023-11-09 ENCOUNTER — TELEPHONE (OUTPATIENT)
Dept: FAMILY MEDICINE CLINIC | Facility: CLINIC | Age: 83
End: 2023-11-09
Payer: MEDICARE

## 2023-11-09 NOTE — TELEPHONE ENCOUNTER
Called and spoke with pt daughter and advised her pt is due for her annual and blood work, she stated she would call back to schedule.

## 2023-11-29 DIAGNOSIS — M15.9 PRIMARY OSTEOARTHRITIS INVOLVING MULTIPLE JOINTS: ICD-10-CM

## 2023-11-29 RX ORDER — CELECOXIB 200 MG/1
200 CAPSULE ORAL DAILY
Qty: 90 CAPSULE | Refills: 3 | Status: SHIPPED | OUTPATIENT
Start: 2023-11-29

## 2025-06-12 NOTE — TELEPHONE ENCOUNTER
Rx Refill Note  Requested Prescriptions     Pending Prescriptions Disp Refills   • ALPRAZolam (XANAX) 0.25 MG tablet [Pharmacy Med Name: ALPRAZOLAM 0.25MG TABS] 30 tablet 2     Sig: TAKE ONE TABLET BY MOUTH TWICE A DAY AS NEEDED FOR ANXIETY      Last office visit with prescribing clinician: 10/25/2021      Next office visit with prescribing clinician: 2/11/22  UDS/CS on 7/6/21  RX last filled on 10/21/21 for #30 with 2 refills   Swapna Mccann MA  02/01/22, 09:35 CST     No

## 2025-06-30 NOTE — TELEPHONE ENCOUNTER
Hand / Upper Extremity Injection/Arthrocentesis: L thumb CMC    Date/Time: 2025 1:04 PM    Performed by: Mahendra Salazar MD  Authorized by: Mahendra Salazar MD    Indications:  Pain  Needle Size:  25 G  Guidance: landmark    Condition: osteoarthritis    Location:  Thumb  Site:  L thumb CMC    Medications:  3 mg betamethasone acet & sod phos 6 (3-3) MG/ML; 0.5 mL lidocaine (PF) 1 %  Outcome:  Tolerated well, no immediate complications  Procedure discussed: discussed risks, benefits, and alternatives    Consent Given by:  Patient  Timeout: timeout called immediately prior to procedure    Prep: patient was prepped and draped in usual sterile fashion        [facility] [address]  [address] [facility] [id number] [phone]  Dept: [phone]  ______________________________________________________________________________    Patient: Misael Daniels   : 1949   MRN: [id number]   2025    INVASIVE PROCEDURE SAFETY CHECKLIST    Date: 2025   Procedure:Left thumb CMC joint steroid injection  Patient Name: Misael Daniels  MRN: [id number]  YOB: 1949    Action: Complete sections as appropriate. Any discrepancy results in a HARD COPY until resolved.     PRE PROCEDURE:  Patient ID verified with 2 identifiers (name and  or MRN): Yes  Procedure and site verified with patient/designee (when able): Yes  Accurate consent documentation in medical record: Yes  H&P (or appropriate assessment) documented in medical record: Yes  H&P must be up to 20 days prior to procedure and updates within 24 hours of procedure as applicable: Yes  Relevant diagnostic and radiology test results appropriately labeled and displayed as applicable: NA  Procedure site(s) marked with provider initials: NA    TIMEOUT:  Time-Out performed immediately prior to starting procedure, including verbal and active participation of all team members addressing the following:Yes  * Correct  Patients daughter called in stating that patient is starting to get a sore throat, she sounds really hoarse, and her ears hurt.     She is requesting something be called in for her.     Pharmacy confirmed.   patient identify  * Confirmed that the correct side and site are marked  * An accurate procedure consent form  * Agreement on the procedure to be done  * Correct patient position  * Relevant images and results are properly labeled and appropriately displayed  * The need to administer antibiotics or fluids for irrigation purposes during the procedure as applicable   * Safety precautions based on patient history or medication use    DURING PROCEDURE: Verification of correct person, site, and procedures any time the responsibility for care of the patient is transferred to another member of the care team.       The following medications were given:         Prior to injection, verified patient identity using patient's name and date of birth.  Due to injection administration, patient instructed to remain in clinic for 15 minutes  afterwards, and to report any adverse reaction to me immediately.    Joint injection was performed.    Medication Name: Betamethasone Sodium Phosphate and Betamethasone Acetate 6mg/mL 3mg used NDC 8686-5119-84  Drug Amount Wasted:  Yes: 27 mg/ml   Vial/Syringe: Single dose vial  Expiration Date:  1/31/26    Medication Name Lidocaine 1% NDC 55221-498-23    Scribed by LEOBARDO BOWEN, ATC for Dr. Salazar on June 30, 2025 at 1:08pm based on the provider's statements to me.     LEOBARDO BOWEN, ATC

## 2025-07-19 ENCOUNTER — APPOINTMENT (OUTPATIENT)
Dept: GENERAL RADIOLOGY | Facility: HOSPITAL | Age: 85
End: 2025-07-19
Payer: MEDICARE

## 2025-07-19 ENCOUNTER — HOSPITAL ENCOUNTER (INPATIENT)
Facility: HOSPITAL | Age: 85
LOS: 4 days | Discharge: NURSING, REHAB OR LONG-TERM CARE | End: 2025-07-23
Attending: STUDENT IN AN ORGANIZED HEALTH CARE EDUCATION/TRAINING PROGRAM | Admitting: STUDENT IN AN ORGANIZED HEALTH CARE EDUCATION/TRAINING PROGRAM
Payer: MEDICARE

## 2025-07-19 ENCOUNTER — APPOINTMENT (OUTPATIENT)
Dept: CT IMAGING | Facility: HOSPITAL | Age: 85
End: 2025-07-19
Payer: MEDICARE

## 2025-07-19 DIAGNOSIS — R13.10 DYSPHAGIA, UNSPECIFIED TYPE: ICD-10-CM

## 2025-07-19 DIAGNOSIS — Z74.09 IMPAIRED MOBILITY: ICD-10-CM

## 2025-07-19 DIAGNOSIS — E87.1 HYPONATREMIA: Primary | ICD-10-CM

## 2025-07-19 DIAGNOSIS — S32.9XXA CLOSED NONDISPLACED FRACTURE OF PELVIS, UNSPECIFIED PART OF PELVIS, INITIAL ENCOUNTER: ICD-10-CM

## 2025-07-19 DIAGNOSIS — F41.9 ANXIETY: ICD-10-CM

## 2025-07-19 DIAGNOSIS — S32.591A CLOSED FRACTURE OF RAMUS OF RIGHT PUBIS, INITIAL ENCOUNTER: ICD-10-CM

## 2025-07-19 LAB
ALBUMIN SERPL-MCNC: 3.7 G/DL (ref 3.5–5.2)
ALBUMIN SERPL-MCNC: 3.8 G/DL (ref 3.5–5.2)
ALBUMIN/GLOB SERPL: 1.7 G/DL
ALBUMIN/GLOB SERPL: 1.9 G/DL
ALP SERPL-CCNC: 84 U/L (ref 39–117)
ALP SERPL-CCNC: 85 U/L (ref 39–117)
ALT SERPL W P-5'-P-CCNC: 18 U/L (ref 1–33)
ALT SERPL W P-5'-P-CCNC: 19 U/L (ref 1–33)
ANION GAP SERPL CALCULATED.3IONS-SCNC: 11 MMOL/L (ref 5–15)
ANION GAP SERPL CALCULATED.3IONS-SCNC: 11 MMOL/L (ref 5–15)
APTT PPP: 29.8 SECONDS (ref 24.5–36)
AST SERPL-CCNC: 53 U/L (ref 1–32)
AST SERPL-CCNC: 55 U/L (ref 1–32)
BASOPHILS # BLD AUTO: 0.02 10*3/MM3 (ref 0–0.2)
BASOPHILS NFR BLD AUTO: 0.2 % (ref 0–1.5)
BILIRUB SERPL-MCNC: 1.2 MG/DL (ref 0–1.2)
BILIRUB SERPL-MCNC: 1.4 MG/DL (ref 0–1.2)
BILIRUB UR QL STRIP: NEGATIVE
BUN SERPL-MCNC: 11.1 MG/DL (ref 8–23)
BUN SERPL-MCNC: 8.9 MG/DL (ref 8–23)
BUN/CREAT SERPL: 14.8 (ref 7–25)
BUN/CREAT SERPL: 15.2 (ref 7–25)
CA-I BLD-MCNC: 4.6 MG/DL (ref 4.6–5.4)
CALCIUM SPEC-SCNC: 8.5 MG/DL (ref 8.6–10.5)
CALCIUM SPEC-SCNC: 8.9 MG/DL (ref 8.6–10.5)
CHLORIDE SERPL-SCNC: 86 MMOL/L (ref 98–107)
CHLORIDE SERPL-SCNC: 88 MMOL/L (ref 98–107)
CLARITY UR: CLEAR
CO2 SERPL-SCNC: 18 MMOL/L (ref 22–29)
CO2 SERPL-SCNC: 20 MMOL/L (ref 22–29)
COLOR UR: YELLOW
CREAT SERPL-MCNC: 0.6 MG/DL (ref 0.57–1)
CREAT SERPL-MCNC: 0.73 MG/DL (ref 0.57–1)
DEPRECATED RDW RBC AUTO: 38 FL (ref 37–54)
EGFRCR SERPLBLD CKD-EPI 2021: 79.7 ML/MIN/1.73
EGFRCR SERPLBLD CKD-EPI 2021: 87 ML/MIN/1.73
EOSINOPHIL # BLD AUTO: 0.04 10*3/MM3 (ref 0–0.4)
EOSINOPHIL NFR BLD AUTO: 0.5 % (ref 0.3–6.2)
ERYTHROCYTE [DISTWIDTH] IN BLOOD BY AUTOMATED COUNT: 11.7 % (ref 12.3–15.4)
GLOBULIN UR ELPH-MCNC: 2 GM/DL
GLOBULIN UR ELPH-MCNC: 2.2 GM/DL
GLUCOSE SERPL-MCNC: 136 MG/DL (ref 65–99)
GLUCOSE SERPL-MCNC: 140 MG/DL (ref 65–99)
GLUCOSE UR STRIP-MCNC: NEGATIVE MG/DL
HCT VFR BLD AUTO: 34.2 % (ref 34–46.6)
HGB BLD-MCNC: 12.3 G/DL (ref 12–15.9)
HGB UR QL STRIP.AUTO: NEGATIVE
HOLD SPECIMEN: NORMAL
IMM GRANULOCYTES # BLD AUTO: 0.06 10*3/MM3 (ref 0–0.05)
IMM GRANULOCYTES NFR BLD AUTO: 0.7 % (ref 0–0.5)
INR PPP: 1 (ref 0.91–1.09)
KETONES UR QL STRIP: ABNORMAL
LEUKOCYTE ESTERASE UR QL STRIP.AUTO: NEGATIVE
LYMPHOCYTES # BLD AUTO: 0.62 10*3/MM3 (ref 0.7–3.1)
LYMPHOCYTES NFR BLD AUTO: 7.6 % (ref 19.6–45.3)
Lab: NORMAL
MAGNESIUM SERPL-MCNC: 1.8 MG/DL (ref 1.6–2.4)
MCH RBC QN AUTO: 32.3 PG (ref 26.6–33)
MCHC RBC AUTO-ENTMCNC: 36 G/DL (ref 31.5–35.7)
MCV RBC AUTO: 89.8 FL (ref 79–97)
MONOCYTES # BLD AUTO: 0.61 10*3/MM3 (ref 0.1–0.9)
MONOCYTES NFR BLD AUTO: 7.5 % (ref 5–12)
NEUTROPHILS NFR BLD AUTO: 6.77 10*3/MM3 (ref 1.7–7)
NEUTROPHILS NFR BLD AUTO: 83.5 % (ref 42.7–76)
NITRITE UR QL STRIP: NEGATIVE
NRBC BLD AUTO-RTO: 0 /100 WBC (ref 0–0.2)
NT-PROBNP SERPL-MCNC: 301 PG/ML (ref 0–1800)
PH UR STRIP.AUTO: 8 [PH] (ref 5–8)
PLATELET # BLD AUTO: 202 10*3/MM3 (ref 140–450)
PMV BLD AUTO: 8.4 FL (ref 6–12)
POTASSIUM SERPL-SCNC: 4.3 MMOL/L (ref 3.5–5.2)
POTASSIUM SERPL-SCNC: 4.6 MMOL/L (ref 3.5–5.2)
PROT SERPL-MCNC: 5.7 G/DL (ref 6–8.5)
PROT SERPL-MCNC: 6 G/DL (ref 6–8.5)
PROT UR QL STRIP: NEGATIVE
PROTHROMBIN TIME: 13.8 SECONDS (ref 11.8–14.8)
RBC # BLD AUTO: 3.81 10*6/MM3 (ref 3.77–5.28)
SODIUM SERPL-SCNC: 115 MMOL/L (ref 136–145)
SODIUM SERPL-SCNC: 116 MMOL/L (ref 136–145)
SODIUM SERPL-SCNC: 116 MMOL/L (ref 136–145)
SODIUM SERPL-SCNC: 119 MMOL/L (ref 136–145)
SODIUM UR-SCNC: 72 MMOL/L
SP GR UR STRIP: 1.01 (ref 1–1.03)
TSH SERPL DL<=0.05 MIU/L-ACNC: 1.03 UIU/ML (ref 0.27–4.2)
UROBILINOGEN UR QL STRIP: ABNORMAL
WBC NRBC COR # BLD AUTO: 8.12 10*3/MM3 (ref 3.4–10.8)
WHOLE BLOOD HOLD COAG: NORMAL
WHOLE BLOOD HOLD SPECIMEN: NORMAL

## 2025-07-19 PROCEDURE — 85025 COMPLETE CBC W/AUTO DIFF WBC: CPT | Performed by: STUDENT IN AN ORGANIZED HEALTH CARE EDUCATION/TRAINING PROGRAM

## 2025-07-19 PROCEDURE — 92610 EVALUATE SWALLOWING FUNCTION: CPT

## 2025-07-19 PROCEDURE — 74176 CT ABD & PELVIS W/O CONTRAST: CPT

## 2025-07-19 PROCEDURE — 70450 CT HEAD/BRAIN W/O DYE: CPT

## 2025-07-19 PROCEDURE — 25810000003 SODIUM CHLORIDE 0.9 % SOLUTION: Performed by: STUDENT IN AN ORGANIZED HEALTH CARE EDUCATION/TRAINING PROGRAM

## 2025-07-19 PROCEDURE — 99285 EMERGENCY DEPT VISIT HI MDM: CPT | Performed by: STUDENT IN AN ORGANIZED HEALTH CARE EDUCATION/TRAINING PROGRAM

## 2025-07-19 PROCEDURE — 83880 ASSAY OF NATRIURETIC PEPTIDE: CPT | Performed by: STUDENT IN AN ORGANIZED HEALTH CARE EDUCATION/TRAINING PROGRAM

## 2025-07-19 PROCEDURE — 84295 ASSAY OF SERUM SODIUM: CPT | Performed by: STUDENT IN AN ORGANIZED HEALTH CARE EDUCATION/TRAINING PROGRAM

## 2025-07-19 PROCEDURE — 25010000002 ENOXAPARIN PER 10 MG: Performed by: STUDENT IN AN ORGANIZED HEALTH CARE EDUCATION/TRAINING PROGRAM

## 2025-07-19 PROCEDURE — 25010000002 FENTANYL CITRATE (PF) 50 MCG/ML SOLUTION: Performed by: STUDENT IN AN ORGANIZED HEALTH CARE EDUCATION/TRAINING PROGRAM

## 2025-07-19 PROCEDURE — 72125 CT NECK SPINE W/O DYE: CPT

## 2025-07-19 PROCEDURE — 85730 THROMBOPLASTIN TIME PARTIAL: CPT | Performed by: STUDENT IN AN ORGANIZED HEALTH CARE EDUCATION/TRAINING PROGRAM

## 2025-07-19 PROCEDURE — 73030 X-RAY EXAM OF SHOULDER: CPT

## 2025-07-19 PROCEDURE — 73552 X-RAY EXAM OF FEMUR 2/>: CPT

## 2025-07-19 PROCEDURE — 80053 COMPREHEN METABOLIC PANEL: CPT | Performed by: STUDENT IN AN ORGANIZED HEALTH CARE EDUCATION/TRAINING PROGRAM

## 2025-07-19 PROCEDURE — 85610 PROTHROMBIN TIME: CPT | Performed by: STUDENT IN AN ORGANIZED HEALTH CARE EDUCATION/TRAINING PROGRAM

## 2025-07-19 PROCEDURE — 82330 ASSAY OF CALCIUM: CPT

## 2025-07-19 PROCEDURE — 84300 ASSAY OF URINE SODIUM: CPT | Performed by: STUDENT IN AN ORGANIZED HEALTH CARE EDUCATION/TRAINING PROGRAM

## 2025-07-19 PROCEDURE — 73502 X-RAY EXAM HIP UNI 2-3 VIEWS: CPT

## 2025-07-19 PROCEDURE — 83735 ASSAY OF MAGNESIUM: CPT | Performed by: STUDENT IN AN ORGANIZED HEALTH CARE EDUCATION/TRAINING PROGRAM

## 2025-07-19 PROCEDURE — 81003 URINALYSIS AUTO W/O SCOPE: CPT | Performed by: STUDENT IN AN ORGANIZED HEALTH CARE EDUCATION/TRAINING PROGRAM

## 2025-07-19 PROCEDURE — 25010000002 HALOPERIDOL LACTATE PER 5 MG: Performed by: STUDENT IN AN ORGANIZED HEALTH CARE EDUCATION/TRAINING PROGRAM

## 2025-07-19 PROCEDURE — 84443 ASSAY THYROID STIM HORMONE: CPT | Performed by: STUDENT IN AN ORGANIZED HEALTH CARE EDUCATION/TRAINING PROGRAM

## 2025-07-19 PROCEDURE — 93005 ELECTROCARDIOGRAM TRACING: CPT | Performed by: STUDENT IN AN ORGANIZED HEALTH CARE EDUCATION/TRAINING PROGRAM

## 2025-07-19 PROCEDURE — 93010 ELECTROCARDIOGRAM REPORT: CPT | Performed by: HOSPITALIST

## 2025-07-19 PROCEDURE — 71045 X-RAY EXAM CHEST 1 VIEW: CPT

## 2025-07-19 RX ORDER — CHLORHEXIDINE GLUCONATE 500 MG/1
1 CLOTH TOPICAL EVERY 24 HOURS
Status: DISCONTINUED | OUTPATIENT
Start: 2025-07-20 | End: 2025-07-21

## 2025-07-19 RX ORDER — OXYBUTYNIN CHLORIDE 5 MG/1
5 TABLET ORAL DAILY
COMMUNITY

## 2025-07-19 RX ORDER — HYDROCODONE BITARTRATE AND ACETAMINOPHEN 7.5; 325 MG/1; MG/1
0.5 TABLET ORAL EVERY 4 HOURS
Status: ON HOLD | COMMUNITY
End: 2025-07-23

## 2025-07-19 RX ORDER — ONDANSETRON 2 MG/ML
4 INJECTION INTRAMUSCULAR; INTRAVENOUS EVERY 6 HOURS PRN
Status: DISCONTINUED | OUTPATIENT
Start: 2025-07-19 | End: 2025-07-23 | Stop reason: HOSPADM

## 2025-07-19 RX ORDER — POLYETHYLENE GLYCOL 3350 17 G/17G
17 POWDER, FOR SOLUTION ORAL DAILY PRN
Status: DISCONTINUED | OUTPATIENT
Start: 2025-07-19 | End: 2025-07-23 | Stop reason: HOSPADM

## 2025-07-19 RX ORDER — NITROGLYCERIN 0.4 MG/1
0.4 TABLET SUBLINGUAL
Status: DISCONTINUED | OUTPATIENT
Start: 2025-07-19 | End: 2025-07-20

## 2025-07-19 RX ORDER — CELECOXIB 200 MG/1
200 CAPSULE ORAL DAILY
COMMUNITY

## 2025-07-19 RX ORDER — FENTANYL CITRATE 50 UG/ML
25 INJECTION, SOLUTION INTRAMUSCULAR; INTRAVENOUS ONCE
Refills: 0 | Status: COMPLETED | OUTPATIENT
Start: 2025-07-19 | End: 2025-07-19

## 2025-07-19 RX ORDER — ASPIRIN 81 MG/1
81 TABLET, CHEWABLE ORAL EVERY OTHER DAY
Status: DISCONTINUED | OUTPATIENT
Start: 2025-07-19 | End: 2025-07-23 | Stop reason: HOSPADM

## 2025-07-19 RX ORDER — ALPRAZOLAM 0.25 MG
0.25 TABLET ORAL ONCE
Status: DISCONTINUED | OUTPATIENT
Start: 2025-07-19 | End: 2025-07-23 | Stop reason: HOSPADM

## 2025-07-19 RX ORDER — FENTANYL CITRATE 50 UG/ML
25 INJECTION, SOLUTION INTRAMUSCULAR; INTRAVENOUS
Refills: 0 | Status: DISCONTINUED | OUTPATIENT
Start: 2025-07-19 | End: 2025-07-19

## 2025-07-19 RX ORDER — METHOCARBAMOL 500 MG/1
750 TABLET, FILM COATED ORAL 4 TIMES DAILY
Status: DISCONTINUED | OUTPATIENT
Start: 2025-07-19 | End: 2025-07-23 | Stop reason: HOSPADM

## 2025-07-19 RX ORDER — BISACODYL 10 MG
10 SUPPOSITORY, RECTAL RECTAL DAILY PRN
Status: DISCONTINUED | OUTPATIENT
Start: 2025-07-19 | End: 2025-07-23 | Stop reason: HOSPADM

## 2025-07-19 RX ORDER — ACETAMINOPHEN 500 MG
250 TABLET ORAL EVERY 4 HOURS
COMMUNITY
End: 2025-07-23 | Stop reason: HOSPADM

## 2025-07-19 RX ORDER — AMOXICILLIN 250 MG
2 CAPSULE ORAL 2 TIMES DAILY
Status: DISCONTINUED | OUTPATIENT
Start: 2025-07-19 | End: 2025-07-23 | Stop reason: HOSPADM

## 2025-07-19 RX ORDER — AMLODIPINE BESYLATE 5 MG/1
5 TABLET ORAL DAILY
COMMUNITY

## 2025-07-19 RX ORDER — ENOXAPARIN SODIUM 100 MG/ML
40 INJECTION SUBCUTANEOUS DAILY
Status: DISCONTINUED | OUTPATIENT
Start: 2025-07-19 | End: 2025-07-23 | Stop reason: HOSPADM

## 2025-07-19 RX ORDER — SODIUM CHLORIDE 0.9 % (FLUSH) 0.9 %
10 SYRINGE (ML) INJECTION EVERY 12 HOURS SCHEDULED
Status: DISCONTINUED | OUTPATIENT
Start: 2025-07-19 | End: 2025-07-23 | Stop reason: HOSPADM

## 2025-07-19 RX ORDER — CELECOXIB 200 MG/1
200 CAPSULE ORAL ONCE
Status: COMPLETED | OUTPATIENT
Start: 2025-07-19 | End: 2025-07-19

## 2025-07-19 RX ORDER — HALOPERIDOL 5 MG/ML
5 INJECTION INTRAMUSCULAR ONCE
Status: COMPLETED | OUTPATIENT
Start: 2025-07-19 | End: 2025-07-19

## 2025-07-19 RX ORDER — HYDROCHLOROTHIAZIDE 12.5 MG/1
12.5 TABLET ORAL DAILY
COMMUNITY
End: 2025-07-23 | Stop reason: HOSPADM

## 2025-07-19 RX ORDER — SODIUM CHLORIDE 9 MG/ML
125 INJECTION, SOLUTION INTRAVENOUS CONTINUOUS
Status: DISPENSED | OUTPATIENT
Start: 2025-07-19 | End: 2025-07-21

## 2025-07-19 RX ORDER — OMEPRAZOLE 20 MG/1
20 CAPSULE, DELAYED RELEASE ORAL 2 TIMES DAILY
COMMUNITY

## 2025-07-19 RX ORDER — QUETIAPINE FUMARATE 25 MG/1
50 TABLET, FILM COATED ORAL NIGHTLY
Status: DISCONTINUED | OUTPATIENT
Start: 2025-07-19 | End: 2025-07-20

## 2025-07-19 RX ORDER — LOSARTAN POTASSIUM 100 MG/1
100 TABLET ORAL DAILY
COMMUNITY
End: 2025-07-23 | Stop reason: HOSPADM

## 2025-07-19 RX ORDER — METOCLOPRAMIDE 10 MG/1
5 TABLET ORAL DAILY
COMMUNITY
End: 2025-07-23 | Stop reason: HOSPADM

## 2025-07-19 RX ORDER — FAMOTIDINE 20 MG/1
20 TABLET, FILM COATED ORAL 2 TIMES DAILY
Status: DISCONTINUED | OUTPATIENT
Start: 2025-07-19 | End: 2025-07-20

## 2025-07-19 RX ORDER — SODIUM CHLORIDE 0.9 % (FLUSH) 0.9 %
10 SYRINGE (ML) INJECTION AS NEEDED
Status: DISCONTINUED | OUTPATIENT
Start: 2025-07-19 | End: 2025-07-23 | Stop reason: HOSPADM

## 2025-07-19 RX ORDER — CHLORHEXIDINE GLUCONATE 500 MG/1
1 CLOTH TOPICAL ONCE
Status: COMPLETED | OUTPATIENT
Start: 2025-07-19 | End: 2025-07-19

## 2025-07-19 RX ORDER — ALPRAZOLAM 0.25 MG
0.25 TABLET ORAL 2 TIMES DAILY PRN
Status: ON HOLD | COMMUNITY
End: 2025-07-23

## 2025-07-19 RX ORDER — ALPRAZOLAM 0.25 MG
0.25 TABLET ORAL 2 TIMES DAILY PRN
Status: DISCONTINUED | OUTPATIENT
Start: 2025-07-19 | End: 2025-07-23 | Stop reason: HOSPADM

## 2025-07-19 RX ORDER — PRAVASTATIN SODIUM 20 MG
20 TABLET ORAL DAILY
COMMUNITY

## 2025-07-19 RX ORDER — SODIUM CHLORIDE 9 MG/ML
40 INJECTION, SOLUTION INTRAVENOUS AS NEEDED
Status: DISCONTINUED | OUTPATIENT
Start: 2025-07-19 | End: 2025-07-23 | Stop reason: HOSPADM

## 2025-07-19 RX ORDER — OXYBUTYNIN CHLORIDE 5 MG/1
5 TABLET, EXTENDED RELEASE ORAL DAILY
Status: ON HOLD | COMMUNITY
End: 2025-07-19

## 2025-07-19 RX ORDER — DEXMEDETOMIDINE HYDROCHLORIDE 4 UG/ML
.2-1.5 INJECTION, SOLUTION INTRAVENOUS
Status: DISCONTINUED | OUTPATIENT
Start: 2025-07-19 | End: 2025-07-21

## 2025-07-19 RX ORDER — HYDROMORPHONE HYDROCHLORIDE 1 MG/ML
0.25 INJECTION, SOLUTION INTRAMUSCULAR; INTRAVENOUS; SUBCUTANEOUS
Status: DISCONTINUED | OUTPATIENT
Start: 2025-07-19 | End: 2025-07-23 | Stop reason: HOSPADM

## 2025-07-19 RX ORDER — OXYCODONE HYDROCHLORIDE 5 MG/1
5 TABLET ORAL EVERY 4 HOURS PRN
Refills: 0 | Status: DISCONTINUED | OUTPATIENT
Start: 2025-07-19 | End: 2025-07-19 | Stop reason: SDUPTHER

## 2025-07-19 RX ORDER — BISACODYL 5 MG/1
5 TABLET, DELAYED RELEASE ORAL DAILY PRN
Status: DISCONTINUED | OUTPATIENT
Start: 2025-07-19 | End: 2025-07-23 | Stop reason: HOSPADM

## 2025-07-19 RX ORDER — CARVEDILOL 6.25 MG/1
6.25 TABLET ORAL 2 TIMES DAILY WITH MEALS
COMMUNITY

## 2025-07-19 RX ORDER — ACETAMINOPHEN 500 MG
1000 TABLET ORAL EVERY 8 HOURS PRN
Status: DISCONTINUED | OUTPATIENT
Start: 2025-07-19 | End: 2025-07-23 | Stop reason: HOSPADM

## 2025-07-19 RX ORDER — ONDANSETRON 4 MG/1
4 TABLET, ORALLY DISINTEGRATING ORAL EVERY 6 HOURS PRN
Status: DISCONTINUED | OUTPATIENT
Start: 2025-07-19 | End: 2025-07-23 | Stop reason: HOSPADM

## 2025-07-19 RX ORDER — PENTOXIFYLLINE 400 MG/1
400 TABLET, EXTENDED RELEASE ORAL DAILY
COMMUNITY

## 2025-07-19 RX ORDER — OXYCODONE HCL 5 MG/5 ML
10 SOLUTION, ORAL ORAL EVERY 4 HOURS PRN
Refills: 0 | Status: DISCONTINUED | OUTPATIENT
Start: 2025-07-19 | End: 2025-07-20

## 2025-07-19 RX ADMIN — ENOXAPARIN SODIUM 40 MG: 100 INJECTION SUBCUTANEOUS at 09:06

## 2025-07-19 RX ADMIN — CHLORHEXIDINE GLUCONATE 1 APPLICATION: 500 CLOTH TOPICAL at 09:07

## 2025-07-19 RX ADMIN — METHOCARBAMOL TABLETS 750 MG: 500 TABLET, COATED ORAL at 13:37

## 2025-07-19 RX ADMIN — DEXMEDETOMIDINE HYDROCHLORIDE 0.2 MCG/KG/HR: 400 INJECTION INTRAVENOUS at 18:45

## 2025-07-19 RX ADMIN — HALOPERIDOL LACTATE 5 MG: 5 INJECTION, SOLUTION INTRAMUSCULAR at 16:22

## 2025-07-19 RX ADMIN — DOCUSATE SODIUM 50 MG AND SENNOSIDES 8.6 MG 2 TABLET: 8.6; 5 TABLET, FILM COATED ORAL at 09:11

## 2025-07-19 RX ADMIN — OXYCODONE HYDROCHLORIDE 5 MG: 5 TABLET ORAL at 10:52

## 2025-07-19 RX ADMIN — FENTANYL CITRATE 25 MCG: 50 INJECTION, SOLUTION INTRAMUSCULAR; INTRAVENOUS at 05:54

## 2025-07-19 RX ADMIN — Medication 10 ML: at 09:07

## 2025-07-19 RX ADMIN — CELECOXIB 200 MG: 200 CAPSULE ORAL at 13:36

## 2025-07-19 RX ADMIN — Medication 1 APPLICATION: at 09:06

## 2025-07-19 RX ADMIN — ALPRAZOLAM 0.25 MG: 0.25 TABLET ORAL at 14:17

## 2025-07-19 RX ADMIN — FENTANYL CITRATE 25 MCG: 50 INJECTION, SOLUTION INTRAMUSCULAR; INTRAVENOUS at 09:06

## 2025-07-19 RX ADMIN — ASPIRIN 81 MG CHEWABLE TABLET 81 MG: 81 TABLET CHEWABLE at 14:18

## 2025-07-19 RX ADMIN — SODIUM CHLORIDE 1000 ML: 900 INJECTION, SOLUTION INTRAVENOUS at 06:01

## 2025-07-19 RX ADMIN — SODIUM CHLORIDE 250 ML/HR: 9 INJECTION, SOLUTION INTRAVENOUS at 17:18

## 2025-07-19 NOTE — PLAN OF CARE
Goal Outcome Evaluation:  Plan of Care Reviewed With: patient, child           Outcome Evaluation: See note    Anticipated Discharge Disposition (SLP): unknown          SLP Swallowing Diagnosis: functional oral phase, R/O pharyngeal dysphagia, suspected esophageal dysphagia (07/19/25 3495)

## 2025-07-19 NOTE — CONSULTS
Christoph Carney M.D.    Orthopedic History and Physical    Pt Name: Shira King  MRN: [id number]  YOB: 1940  Date: 7/19/2025      HPI: 85-year-old female consulted orthopedic service for right pelvic fracture.  Patient has a right inferior and superior pubic rami fracture.  Patient sustained a fall at home last night.  She has been admitted for hyponatremia.  Today she is also complaining of right shoulder pain.  She also states she has had chronic right hip pain and seen an orthopedic surgeon in [facility].  She has a history of bilateral knee replacements.      Past Medical/Surgical History:   Past Medical History:   Diagnosis Date    Cancer     skin    Chronic intractable headache 02/08/2021    Gastroesophageal reflux disease 02/03/2020    Hypertension     Lumbar pain 07/12/2019    Mixed hyperlipidemia     PONV (postoperative nausea and vomiting)     Sinusitis     Stroke     Valvular disease      Past Surgical History:   Procedure Laterality Date    BLADDER REPAIR  2005    BREAST SURGERY      BUNIONECTOMY  2007    CARDIAC CATHETERIZATION      CATARACT EXTRACTION Bilateral 2005    CHOLECYSTECTOMY      COLPOCLEISIS N/A 6/14/2022    Procedure: COLPOCLEISIS;  Surgeon: Martina Street MD;  Location: [facility];  Service: Obstetrics/Gynecology;  Laterality: N/A;    ENDOSCOPY  2005    HERNIA REPAIR  1963    HYSTERECTOMY  1979    KRISTAN BSO for fibroids    NECK SURGERY  1974    VERTEBRA    REPLACEMENT TOTAL KNEE BILATERAL Bilateral     TONSILLECTOMY           Social History:   Social History     Socioeconomic History    Marital status:    Tobacco Use    Smoking status: Never    Smokeless tobacco: Never   Vaping Use    Vaping status: Never Used   Substance and Sexual Activity    Alcohol use: No    Drug use: No    Sexual activity: Not Currently     Partners: Male            Medications:   Current Facility-Administered Medications:     sennosides-docusate (PERICOLACE) 8.6-50 MG per tablet 2 tablet, 2  tablet, Oral, BID, 2 tablet at 07/19/25 0911 **AND** polyethylene glycol (MIRALAX) packet 17 g, 17 g, Oral, Daily PRN **AND** bisacodyl (DULCOLAX) EC tablet 5 mg, 5 mg, Oral, Daily PRN **AND** bisacodyl (DULCOLAX) suppository 10 mg, 10 mg, Rectal, Daily PRN, Ivis Flores DO    [START ON 7/20/2025] Chlorhexidine Gluconate Cloth 2 % pads 1 Application, 1 Application, Topical, Q24H, Maikel Floresa, DO    enoxaparin sodium (LOVENOX) syringe 40 mg, 40 mg, Subcutaneous, Daily, Maikel Floresa DO, 40 mg at 07/19/25 0906    fentaNYL citrate (PF) (SUBLIMAZE) injection 25 mcg, 25 mcg, Intravenous, Q2H PRN, Maikel Floresa, DO, 25 mcg at 07/19/25 0906    mupirocin (BACTROBAN) 2 % nasal ointment 1 Application, 1 Application, Each Nare, BID, Maikel Floresa, DO, 1 Application at 07/19/25 0906    nitroglycerin (NITROSTAT) SL tablet 0.4 mg, 0.4 mg, Sublingual, Q5 Min PRN, Maikel Floresa, DO    ondansetron ODT (ZOFRAN-ODT) disintegrating tablet 4 mg, 4 mg, Oral, Q6H PRN **OR** ondansetron (ZOFRAN) injection 4 mg, 4 mg, Intravenous, Q6H PRN, Priti Floresentina, DO    oxyCODONE (ROXICODONE) immediate release tablet 5 mg, 5 mg, Oral, Q4H PRN, Priti Floresentina, DO, 5 mg at 07/19/25 1052    sodium chloride 0.9 % flush 10 mL, 10 mL, Intravenous, PRN, Sven Laguna MD    sodium chloride 0.9 % flush 10 mL, 10 mL, Intravenous, Q12H, Maikel Floresa DO, 10 mL at 07/19/25 0907    sodium chloride 0.9 % flush 10 mL, 10 mL, Intravenous, PRN, Maksymyuk, Ivis, DO    sodium chloride 0.9 % infusion 40 mL, 40 mL, Intravenous, PRN, Maksymyuk, Ivis, DO    Allergies:   Allergies   Allergen Reactions    Aricept [Donepezil Hcl] Other (See Comments)     Shaking, sweating          Review of Systems       Physical Exam    Ortho Exam:  Right lower extremity demonstrates tenderness to palpation the groin with decreased range of motion.  Distally she is neuro vas intact.    The right shoulder  demonstrates tenderness to palpation superiorly and anteriorly.  She is able to forward flex and abduct with mild discomfort.  There is no ecchymosis deformity or swelling.      Imaging:  Imaging Results (Last 72 Hours)       Procedure Component Value Units Date/Time    CT Abdomen Pelvis Without Contrast [[id number]] Collected: 07/19/25 0705     Updated: 07/19/25 0716    Narrative:      EXAM: CT ABDOMEN PELVIS WO CONTRAST-     INDICATION: Concerns for R hip fracture clinically, s/p fall. PT does  have non tender abd, however mild bruising present on left side of the  abd.       TECHNIQUE: Helically acquired CT images were obtained of the abdomen and  pelvis without intravenous contrast. Coronal and sagittal reformations  were performed.       DOSE LENGTH PRODUCT: 1006.44 mGy.cm mGy cm. Automated exposure control  was also utilized to decrease patient radiation dose.     COMPARISON: Non available.     FINDINGS:     Lower Chest: No acute findings.     Liver: Unremarkable.     Biliary Tree: Prior cholecystectomy.     Spleen: Unremarkable.     Pancreas: Unremarkable.     Adrenal Glands: 1.1 cm right adrenal adenoma.     Kidneys/Ureters/Bladder: Unremarkable.     Reproductive Organs: Prior hysterectomy.     Gastrointestinal Tract: Colonic diverticulosis.     Lymphatics: Unremarkable.     Vasculature: Mild atherosclerosis.     Peritoneum/Retroperitoneum: Unremarkable.     Abdominal Wall/Soft Tissues: A tiny fat-containing umbilical hernia.  Mild soft tissue edema in the left mid abdominal wall subcutaneous fat.     Osseous Structures: Acute appearing essentially nondisplaced right  inferior pubic ramus fracture. There may be an additional subtle  nondisplaced fracture about the right superior pubic ramus near the  pubic symphysis (series 3 image 23).       Impression:         1. Acute appearing essentially nondisplaced right inferior pubic ramus  fracture. There may also be a subtle nondisplaced fracture about the  right  superior pubic ramus near the pubic symphysis.  2. 1.1 cm right adrenal adenoma.  3. Mild left abdominal wall soft tissue edema, likely contusion.        This report was signed and finalized on 7/19/2025 7:13 AM by Helio Parisi.       CT Cervical Spine Without Contrast [[id number]] Collected: 07/19/25 0702     Updated: 07/19/25 0708    Narrative:      Exam: CT CERVICAL SPINE WO CONTRAST-      HISTORY: Neck pain after mechanical fall.     COMPARISON: 9/29/2021 MRI     DOSE LENGTH PRODUCT: 1133.12 mGy.cm mGy cm. Automated exposure control  was also utilized to decrease patient radiation dose.     TECHNIQUE: Serial helical tomographic images of the cervical spine were  obtained without the use of intravenous contrast. Additionally, sagittal  and coronal reformatted images were also provided for review.     FINDINGS:     Straightening of the cervical lordosis. Osseous fusion of C6-C7 with  grade 1 anterolisthesis.     No acute fracture. The vertebral body heights are preserved.     Multilevel degenerative disc disease, facet arthropathy, and  uncovertebral hypertrophy.     No high-grade bony spinal canal narrowing. Variable degrees of  multilevel foraminal narrowing.     Other: Enlarged multinodular thyroid gland.       Impression:         1. No acute fracture or traumatic malalignment.  2. Enlarged multinodular thyroid gland.     These findings are in agreement with the critical and emergent findings  from the [facility] preliminary report.           This report was signed and finalized on 7/19/2025 7:05 AM by Helio Parisi.       XR Femur 2 View Right [[id number]] Collected: 07/19/25 0701     Updated: 07/19/25 0705    Narrative:      EXAM: XR FEMUR 2 VW RIGHT-      INDICATION: R hip tenderness and pain, Leg is rotated externally. S/P  fall      COMPARISON: None.     TECHNIQUE: 2 views right femur.     FINDINGS:     Right inferior pubic ramus fracture as detailed on separate report.  Right knee arthroplasty. No visible  acute fracture or malalignment  within the right femur. Vascular calcifications.       Impression:         No acute osseous finding in the right femur.        This report was signed and finalized on 7/19/2025 7:01 AM by Helio Parisi.       XR Hip With or Without Pelvis 2 - 3 View Right [[id number]] Collected: 07/19/25 0658     Updated: 07/19/25 0704    Narrative:      EXAM: XR HIP W OR WO PELVIS 2-3 VIEW RIGHT-      INDICATION: R hip tenderness and pain, Leg is rotated externally. S/P  fall      COMPARISON: None.     TECHNIQUE: 3 views pelvis and right hip.     FINDINGS:     Minimally displaced right inferior pubic ramus fracture. SI joints are  symmetric with mild degenerative changes. Pubic symphysis is preserved.  Mild bilateral hip joint space loss.       Impression:         Acute appearing very minimally displaced right inferior pubic ramus  fracture.        This report was signed and finalized on 7/19/2025 7:00 AM by Helio Parisi.       XR Chest 1 View [[id number]] Collected: 07/19/25 0657     Updated: 07/19/25 0701    Narrative:      EXAM: XR CHEST 1 VW-      HISTORY: S/p Mechanical fall. Decrease breath sounds bilateral ,  evaluation for pneumothorax.       COMPARISON: 10/23/2022.     TECHNIQUE: Single frontal radiograph of the chest was obtained.     FINDINGS:     Support Devices: None.     Cardiac and Mediastinal Silhouettes: Normal.     Lungs/Pleura: No focal consolidation. No sizable pleural effusion. No  visible pneumothorax.     Osseous structures: No acute osseous finding.     Other: None.       Impression:         No acute cardiopulmonary abnormality.           This report was signed and finalized on 7/19/2025 6:58 AM by Helio Parisi.       CT Head Without Contrast [[id number]] Collected: 07/19/25 0614     Updated: 07/19/25 0619    Narrative:      Exam: CT HEAD WO CONTRAST-      HISTORY: fall, elderly patient, does not recall if she had LOC       DOSE LENGTH PRODUCT: 1133.12 mGy.cm mGy cm.  Automated exposure control  was also utilized to decrease patient radiation dose.     Technique:  Helically acquired CT of the brain without IV contrast was performed.  Sagittal and coronal reformations are also provided for review. Soft  tissue and bone kernels are available for interpretation.     Comparison: 4/26/2022.     Findings:     Ventricles and extra-axial CSF spaces are normal in size.     No intraparenchymal or extra-axial hemorrhage.     Gray-white matter differentiation is preserved.     Orbits are grossly unremarkable. Paranasal sinuses are grossly clear.  Mastoid air cells are grossly clear.     No suspicious calvarial or extracranial soft tissue abnormality.       Impression:      Impression:       No acute intracranial abnormality.     These findings are in agreement with the critical and emergent findings  from the [facility] preliminary report.     This report was signed and finalized on 7/19/2025 6:16 AM by Helio Parisi.               Labs:   Lab Results (last 24 hours)       Procedure Component Value Units Date/Time    Comprehensive Metabolic Panel [[id number]]  (Abnormal) Collected: 07/19/25 1037    Specimen: Blood Updated: 07/19/25 1105     Glucose 140 mg/dL      BUN 8.9 mg/dL      Creatinine 0.60 mg/dL      Sodium 119 mmol/L      Potassium 4.3 mmol/L      Chloride 88 mmol/L      CO2 20.0 mmol/L      Calcium 8.5 mg/dL      Total Protein 5.7 g/dL      Albumin 3.7 g/dL      ALT (SGPT) 18 U/L      AST (SGOT) 53 U/L      Alkaline Phosphatase 84 U/L      Total Bilirubin 1.2 mg/dL      Globulin 2.0 gm/dL      A/G Ratio 1.9 g/dL      BUN/Creatinine Ratio 14.8     Anion Gap 11.0 mmol/L      eGFR 87.0 mL/min/1.73     Narrative:      GFR Categories in Chronic Kidney Disease (CKD)              GFR Category          GFR (mL/min/1.73)    Interpretation  G1                    90 or greater        Normal or high (1)  G2                    60-89                Mild decrease (1)  G3a                   45-59                 Mild to moderate decrease  G3b                   30-44                Moderate to severe decrease  G4                    15-29                Severe decrease  G5                    14 or less           Kidney failure    (1)In the absence of evidence of kidney disease, neither GFR category G1 or G2 fulfill the criteria for CKD.    eGFR calculation 2021 CKD-EPI creatinine equation, which does not include race as a factor    BNP [[id number]]  (Normal) Collected: 07/19/25 0509    Specimen: Blood Updated: 07/19/25 0816     proBNP 301.0 pg/mL     Narrative:      This assay is used as an aid in the diagnosis of individuals suspected of having heart failure. It can be used as an aid in the diagnosis of acute decompensated heart failure (ADHF) in patients presenting with signs and symptoms of ADHF to the emergency department (ED). In addition, NT-proBNP of <300 pg/mL indicates ADHF is not likely.    Age Range Result Interpretation  NT-proBNP Concentration (pg/mL:      <50             Positive            >450                   Gray                 300-450                    Negative             <300    50-75           Positive            >900                  Gray                300-900                  Negative            <300      >75             Positive            >1800                  Gray                300-1800                  Negative            <300    Magnesium [[id number]]  (Normal) Collected: 07/19/25 0509    Specimen: Blood Updated: 07/19/25 0816     Magnesium 1.8 mg/dL     TSH Rfx On Abnormal To Free T4 [[id number]]  (Normal) Collected: 07/19/25 0509    Specimen: Blood Updated: 07/19/25 0816     TSH 1.030 uIU/mL     Sodium, Urine, Random - Urine, Clean Catch [[id number]] Collected: 07/19/25 0605    Specimen: Urine, Clean Catch Updated: 07/19/25 0654     Sodium, Urine 72 mmol/L     Narrative:      Reference intervals for random urine have not been established.  Clinical usage is dependent upon  physician's interpretation in combination with other laboratory tests.       Urinalysis With Culture If Indicated - Urine, Clean Catch [[id number]]  (Abnormal) Collected: 07/19/25 0605    Specimen: Urine, Clean Catch Updated: 07/19/25 0616     Color, UA Yellow     Appearance, UA Clear     pH, UA 8.0     Specific Gravity, UA 1.011     Glucose, UA Negative     Ketones, UA Trace     Bilirubin, UA Negative     Blood, UA Negative     Protein, UA Negative     Leuk Esterase, UA Negative     Nitrite, UA Negative     Urobilinogen, UA 1.0 E.U./dL    Narrative:      In absence of clinical symptoms, the presence of pyuria, bacteria, and/or nitrites on the urinalysis result does not correlate with infection.  Urine microscopic not indicated.    Comprehensive Metabolic Panel [[id number]]  (Abnormal) Collected: 07/19/25 0509    Specimen: Blood Updated: 07/19/25 0550     Glucose 136 mg/dL      BUN 11.1 mg/dL      Creatinine 0.73 mg/dL      Sodium 115 mmol/L      Potassium 4.6 mmol/L      Comment: Slight hemolysis detected by analyzer. Result may be falsely elevated.        Chloride 86 mmol/L      CO2 18.0 mmol/L      Calcium 8.9 mg/dL      Total Protein 6.0 g/dL      Albumin 3.8 g/dL      ALT (SGPT) 19 U/L      AST (SGOT) 55 U/L      Alkaline Phosphatase 85 U/L      Total Bilirubin 1.4 mg/dL      Globulin 2.2 gm/dL      A/G Ratio 1.7 g/dL      BUN/Creatinine Ratio 15.2     Anion Gap 11.0 mmol/L      eGFR 79.7 mL/min/1.73     Narrative:      GFR Categories in Chronic Kidney Disease (CKD)              GFR Category          GFR (mL/min/1.73)    Interpretation  G1                    90 or greater        Normal or high (1)  G2                    60-89                Mild decrease (1)  G3a                   45-59                Mild to moderate decrease  G3b                   30-44                Moderate to severe decrease  G4                    15-29                Severe decrease  G5                    14 or less           Kidney  failure    (1)In the absence of evidence of kidney disease, neither GFR category G1 or G2 fulfill the criteria for CKD.    eGFR calculation 2021 CKD-EPI creatinine equation, which does not include race as a factor    Protime-INR [[id number]]  (Normal) Collected: 07/19/25 0509    Specimen: Blood Updated: 07/19/25 0533     Protime 13.8 Seconds      INR 1.00    aPTT [[id number]]  (Normal) Collected: 07/19/25 0509    Specimen: Blood Updated: 07/19/25 0533     PTT 29.8 seconds     Narrative:      PTT = The equivalent PTT values for the therapeutic range of heparin levels at 0.3 to 0.7 U/ml are 77 - 99 seconds.    [facility] Draw [[id number]] Collected: 07/19/25 0509    Specimen: Blood Updated: 07/19/25 0530    Narrative:      The following orders were created for panel order [facility] Draw.  Procedure                               Abnormality         Status                     ---------                               -----------         ------                     Green Top (Gel)[[id number]]                                  Final result               Lavender Top[[id number]]                                     Final result               Red Top[[id number]]                                          Final result               Bunch Top[[id number]]                                         Final result               Light Blue Top[[id number]]                                   Final result                 Please view results for these tests on the individual orders.    Gray Top [[id number]] Collected: 07/19/25 0509    Specimen: Blood Updated: 07/19/25 0530     Extra Tube Hold for add-ons.     Comment: Auto resulted.       CBC & Differential [[id number]]  (Abnormal) Collected: 07/19/25 0509    Specimen: Blood Updated: 07/19/25 0527    Narrative:      The following orders were created for panel order CBC & Differential.  Procedure                               Abnormality         Status                     ---------                                -----------         ------                     CBC Auto Differential[[id number]]        Abnormal            Final result                 Please view results for these tests on the individual orders.    CBC Auto Differential [[id number]]  (Abnormal) Collected: 07/19/25 0509    Specimen: Blood Updated: 07/19/25 0527     WBC 8.12 10*3/mm3      RBC 3.81 10*6/mm3      Hemoglobin 12.3 g/dL      Hematocrit 34.2 %      MCV 89.8 fL      MCH 32.3 pg      MCHC 36.0 g/dL      RDW 11.7 %      RDW-SD 38.0 fl      MPV 8.4 fL      Platelets 202 10*3/mm3      Neutrophil % 83.5 %      Lymphocyte % 7.6 %      Monocyte % 7.5 %      Eosinophil % 0.5 %      Basophil % 0.2 %      Immature Grans % 0.7 %      Neutrophils, Absolute 6.77 10*3/mm3      Lymphocytes, Absolute 0.62 10*3/mm3      Monocytes, Absolute 0.61 10*3/mm3      Eosinophils, Absolute 0.04 10*3/mm3      Basophils, Absolute 0.02 10*3/mm3      Immature Grans, Absolute 0.06 10*3/mm3      nRBC 0.0 /100 WBC     Green Top (Gel) [[id number]] Collected: 07/19/25 0509    Specimen: Blood Updated: 07/19/25 0515     Extra Tube Hold for add-ons.     Comment: Auto resulted.       Lavender Top [[id number]] Collected: 07/19/25 0509    Specimen: Blood Updated: 07/19/25 0515     Extra Tube hold for add-on     Comment: Auto resulted       Red Top [[id number]] Collected: 07/19/25 0509    Specimen: Blood Updated: 07/19/25 0515     Extra Tube Hold for add-ons.     Comment: Auto resulted.       Light Blue Top [[id number]] Collected: 07/19/25 0509    Specimen: Blood Updated: 07/19/25 0515     Extra Tube Hold for add-ons.     Comment: Auto resulted               Impression: 85-year-old female consult orthopedic service for a right pelvic fracture, right inferior and superior rami fractures.  She also complains of right shoulder pain.      Plan:   1.  Will plan to x-ray the right shoulder.    2.  No surgery required.    3.  Patient may weight-bear as tolerated with a walker.  4.  Patient will require an  extended care facility or nursing home for rehab it will be be difficult for her to ambulate due to discomfort from the pelvic fracture.  5.  Follow-up in 2 weeks at the outpatient clinic for repeat x-rays.      Electronically signed by Christoph Carney MD on 7/19/2025 at 11:09 CDT

## 2025-07-19 NOTE — ED PROVIDER NOTES
Subjective   History of Present Illness  This is an 85-year-old female she has a history of hypertension, stroke, valvular disease, skin cancer, takes 1 baby aspirin daily, no significant anticoagulation, she is in the emergency room for concerns of right hip pain status post fall at 10 PM last night.  As per the daughter that is at the bedside, the patient was using her walker and had a mechanical fall.  Negative for concerns of loss of consciousness, head trauma, neck trauma, chest pain, shortness of breath, abdominal pain, or upper extremity trauma.  They were able to put the patient to bed so she can fall asleep, however when the patient woke up this morning she noted significant amount of pain in the right hip.        Review of Systems   All other systems reviewed and are negative.      Past Medical History:   Diagnosis Date    Cancer     skin    Chronic intractable headache 02/08/2021    Gastroesophageal reflux disease 02/03/2020    Hypertension     Lumbar pain 07/12/2019    Mixed hyperlipidemia     PONV (postoperative nausea and vomiting)     Sinusitis     Stroke     Valvular disease        Allergies   Allergen Reactions    Aricept [Donepezil Hcl] Other (See Comments)     Shaking, sweating       Past Surgical History:   Procedure Laterality Date    BLADDER REPAIR  2005    BREAST SURGERY      BUNIONECTOMY  2007    CARDIAC CATHETERIZATION      CATARACT EXTRACTION Bilateral 2005    CHOLECYSTECTOMY      COLPOCLEISIS N/A 6/14/2022    Procedure: COLPOCLEISIS;  Surgeon: Martina Street MD;  Location: [facility] OR;  Service: Obstetrics/Gynecology;  Laterality: N/A;    ENDOSCOPY  2005    HERNIA REPAIR  1963    HYSTERECTOMY  1979    KRISTAN BSO for fibroids    NECK SURGERY  1974    VERTEBRA    REPLACEMENT TOTAL KNEE BILATERAL Bilateral     TONSILLECTOMY         Family History   Problem Relation Age of Onset    Stroke Mother     Heart disease Mother     Stroke Sister     Heart attack Maternal Grandmother     Stomach cancer  Maternal Uncle        Social History     Socioeconomic History    Marital status:    Tobacco Use    Smoking status: Never    Smokeless tobacco: Never   Vaping Use    Vaping status: Never Used   Substance and Sexual Activity    Alcohol use: No    Drug use: No    Sexual activity: Not Currently     Partners: Male           Objective   Physical Exam  Constitutional:       General: She is not in acute distress.     Appearance: She is not ill-appearing or toxic-appearing.   HENT:      Head: Normocephalic and atraumatic.      Right Ear: Tympanic membrane normal.      Left Ear: Tympanic membrane normal.   Eyes:      Extraocular Movements: Extraocular movements intact.      Pupils: Pupils are equal, round, and reactive to light.   Cardiovascular:      Rate and Rhythm: Normal rate and regular rhythm.      Heart sounds: No murmur heard.  Pulmonary:      Effort: Pulmonary effort is normal. No respiratory distress.      Breath sounds: Normal breath sounds. No wheezing or rales.   Chest:      Chest wall: No tenderness.   Abdominal:      General: Bowel sounds are normal. There is no distension.      Palpations: Abdomen is soft.      Tenderness: There is no abdominal tenderness. There is no right CVA tenderness, left CVA tenderness or guarding.      Comments: There is mild bruising to the L side of the abd   Musculoskeletal:      Cervical back: Normal range of motion. No rigidity or tenderness.      Comments: R hip tenderness. Leg is rotated externally.    Lymphadenopathy:      Cervical: No cervical adenopathy.   Skin:     Comments: There is bruising to the L side of the abdomen, no expanding hematoma.    Neurological:      General: No focal deficit present.      Mental Status: She is alert and oriented to person, place, and time. Mental status is at baseline.      Cranial Nerves: No cranial nerve deficit.   Psychiatric:         Mood and Affect: Mood normal.         Procedures           ED Course  ED Course as of 07/19/25  0730   Sat Jul 19, 2025   0551 CBC reassuring.  She has a sodium of 115 severe hyponatremia.  There is no history of congestive heart failure.  Will give a liter of NS bolus. PT has a sodium deficit of 877.4. PT is not symptomatic. Hypertonic saline is not indicated at this time.  [SG]   0601 Patient takes hydrochlorothiazide, this is likely the cause of her sodium being low.  On reassessment she has no tenderness on the lower back.  She is mainly tender on the right hip. Xr concerning for R ischium fracture. ON AI interpretation.  [SG]   0624 XR might look like pelvic fracture due to urine catheter [SG]   0635 CT head and neck negative for acute findings [SG]   0706 XR concerning for minimal displaced R ramus fracture [SG]   0713 ICU was called for admit for hyponatremia [SG]   0717 PT and daughter aware of pelvic fracture, need for admit, as well as severe hyponatremia.  [SG]   0727 Spoke to Carlos who accepted pt for ICU admit.  [SG]   0730 Icu aware of pelvic fracture [SG]      ED Course User Index  [SG] Sven Laguna MD                                                       Medical Decision Making  This is an 85-year-old female with history of hypertension, takes a baby aspirin daily, no significant intake of anticoagulation such as Coumadin, Xarelto or Eliquis.  She had a mechanical fall yesterday around 10 PM, she was later placed in bed by her  and her son.  She woke up earlier today with concerns of right hip pain.  EMS was called for significant pain, the patient received 50 mcg of fentanyl and arrival.  On physical exam the patient has a GCS of 15, there is no concerns for focal deficits, although mild ptosis of the left eye was noted.  The patient was unsure if this was her baseline.  She is able to move all extremities.  The right leg was noted to be externally rotated.  She has tenderness over the right hip, right hip pain when her leg is moved.  She is neurovascular intact.  She has normal  distal pulses to the right foot.  There is mild foot and ankle swelling, however there is no evidence of bruising, and there is no tenderness to palpation.  On her back she does not have any bruising, there is no midline T-spine or L-spine tenderness.  She reports neck pain however there is no C-spine tenderness to palpation.  On physical exam she has normal breath sounds bilateral.  An x-ray of the chest was ordered for evaluation of pneumothorax.  Pending evaluation with an x-ray of the right hip, right femur, as well as CT of the head, C-spine, abdomen and pelvis noncontrast.  In addition the patient was noted to have left abdomen bruising, there was no concerns for expanding hematoma.  The abdomen is soft and nontender, nondistended.  25 mcg of fentanyl was ordered IV.  The patient will likely need admission for inability to walk due to right hip pain.    Amount and/or Complexity of Data Reviewed  Labs: ordered.  Radiology: ordered.  ECG/medicine tests: ordered.    Risk  Prescription drug management.        Final diagnoses:   Hyponatremia   Closed nondisplaced fracture of pelvis, unspecified part of pelvis, initial encounter       ED Disposition  ED Disposition       ED Disposition   Decision to Admit    Condition   --    Comment   Level of Care: Critical Care [6]   Diagnosis: Hyponatremia [674461]   Admitting Physician: KLEBER MADDEN [[id number]]   Attending Physician: KLEBER MADDEN [[id number]]   Certification: I Certify That Inpatient Hospital Services Are Medically Necessary For Greater Than 2 Midnights                 No follow-up provider specified.       Medication List      No changes were made to your prescriptions during this visit.            Sven Laguna MD  07/19/25 [id number]

## 2025-07-19 NOTE — THERAPY EVALUATION
Acute Care - Speech Language Pathology   Swallow Initial Evaluation [facility]     Patient Name: Shira King  : 1940  MRN: [id number]  Today's Date: 2025               Admit Date: 2025    SPEECH-LANGUAGE PATHOLOGY EVALUATION - SWALLOW  Subjective: The patient was seen on this date for a Clinical Swallow evaluation.  Patient was alert and cooperative.  Significant history: Right pelvic fx, right inferior and superior rami fx, hyponatremia, encephalopathy, essential HTN.   Objective: Textures given included thin liquid, puree consistency, and regular consistency.  Assessment: Difficulties were noted with none of the above consistencies.  Observations:  No overt s/s of aspiration observed. Pt had good mastication of the regular solid. She required a thin liquid wash to clear solid residue due to dry mouth. She and family report she has dry mouth as a side effect of medication and uses OTC dry mouth tablets to help. They also report she complains of large pills getting stuck in the mid esophageal region. They report endoscopy in the past that showed no stricture. Pt states she does best if large pills are halved and she eats crushed ice afterward.   SLP Findings:  Patient presents with functional oral phase, rule out pharyngeal dysphagia, with esophageal component.   Recommendations: Diet Textures: thin liquid, regular consistency food.  Medications should be taken halved with thin liquids followed by ice. If pt still has difficulty, try meds halved with applesauce.   Recommended Strategies: Upright for PO, small bites and sips, and alternate liquids and solids. Oral care before breakfast, after all meals and PRN.  Dysphagia therapy is recommended.   Meena Wagoner CCC-SLP 2025 13:30 CDT     Visit Dx:     ICD-10-CM ICD-9-CM   1. Hyponatremia  E87.1 276.1   2. Closed nondisplaced fracture of pelvis, unspecified part of pelvis, initial encounter  S32.9XXA 808.8   3. Dysphagia, unspecified  type  R13.10 787.20     Patient Active Problem List   Diagnosis    Essential hypertension    RMSF ([place] spotted fever)    Mixed hyperlipidemia    Lumbar pain    Gastroesophageal reflux disease    Insomnia    Obstructive sleep apnea    Periodic limb movement    Snoring    Somnolence, daytime    Chronic intractable headache    Chronic ethmoidal sinusitis    Peripheral vascular disease    Hypertrophy of both inferior nasal turbinates    Nasal vestibulitis    Other acute sinusitis    Allergic rhinitis    AMS (altered mental status)    Memory loss    Advanced age    Dementia without behavioral disturbance    Chest pain    Hyponatremia     Past Medical History:   Diagnosis Date    Cancer     skin    Chronic intractable headache 02/08/2021    Gastroesophageal reflux disease 02/03/2020    Hypertension     Lumbar pain 07/12/2019    Mixed hyperlipidemia     PONV (postoperative nausea and vomiting)     Sinusitis     Stroke     Valvular disease      Past Surgical History:   Procedure Laterality Date    BLADDER REPAIR  2005    BREAST SURGERY      BUNIONECTOMY  2007    CARDIAC CATHETERIZATION      CATARACT EXTRACTION Bilateral 2005    CHOLECYSTECTOMY      COLPOCLEISIS N/A 6/14/2022    Procedure: COLPOCLEISIS;  Surgeon: Martina Street MD;  Location: [facility] OR;  Service: Obstetrics/Gynecology;  Laterality: N/A;    ENDOSCOPY  2005    HERNIA REPAIR  1963    HYSTERECTOMY  1979    KRISTAN BSO for fibroids    NECK SURGERY  1974    VERTEBRA    REPLACEMENT TOTAL KNEE BILATERAL Bilateral     TONSILLECTOMY         SLP Recommendation and Plan  SLP Swallowing Diagnosis: functional oral phase, R/O pharyngeal dysphagia, suspected esophageal dysphagia (07/19/25 1248)  SLP Diet Recommendation: regular textures, thin liquids (07/19/25 1248)  Recommended Precautions and Strategies: upright posture during/after eating, small bites of food and sips of liquid, alternate between small bites of food and sips of liquid, general aspiration  precautions (07/19/25 1248)  SLP Rec. for Method of Medication Administration: with thin liquids, with puree (meds halved) (07/19/25 1248)     Monitor for Signs of Aspiration: yes, cough, gurgly voice, throat clearing, pneumonia (07/19/25 1248)     Swallow Criteria for Skilled Therapeutic Interventions Met: demonstrates skilled criteria (07/19/25 1248)  Anticipated Discharge Disposition (SLP): unknown (07/19/25 1248)  Rehab Potential/Prognosis, Swallowing: good, to achieve stated therapy goals (07/19/25 1248)  Therapy Frequency (Swallow): PRN (07/19/25 1248)  Predicted Duration Therapy Intervention (Days): until discharge (07/19/25 1248)  Oral Care Recommendations: Oral Care BID/PRN (07/19/25 1248)                                        Outcome Evaluation: See note      SWALLOW EVALUATION (Last 72 Hours)       SLP Adult Swallow Evaluation       Row Name 07/19/25 1248                   Rehab Evaluation    Document Type evaluation  -MB        Subjective Information no complaints  -MB        Patient Observations alert;cooperative  -MB        Patient/Family/Caregiver Comments/Observations Daughter present  -MB           General Information    Patient Profile Reviewed yes  -MB        Pertinent History Of Current Problem Right pelvic fx, right inferior and superior rami fx, hyponatremia, encephalopathy, essential HTN.  -MB        Current Method of Nutrition NPO  -MB        Precautions/Limitations, Vision WFL with corrective lenses  -MB        Precautions/Limitations, Hearing WFL;for purposes of eval  -MB        Prior Level of Function-Communication unknown  -MB        Prior Level of Function-Swallowing no diet consistency restrictions  -MB        Plans/Goals Discussed with patient and family  -MB        Barriers to Rehab cognitive status  -MB        Patient's Goals for Discharge patient did not state  -MB        Family Goals for Discharge family did not state  -MB           Pain    Additional Documentation Pain Scale:  FACES Pre/Post-Treatment (Group)  -MB           Pain Scale: FACES Pre/Post-Treatment    Pain: FACES Scale, Pretreatment 2-->hurts little bit  -MB        Pre/Posttreatment Pain Comment When moving HOB  -MB           Oral Motor Structure and Function    Dentition Assessment natural, present and adequate  -MB        Secretion Management WNL/WFL  -MB        Mucosal Quality moist, healthy  -MB           Oral Musculature and Cranial Nerve Assessment    Oral Motor General Assessment WFL  -MB           General Eating/Swallowing Observations    Eating/Swallowing Skills fed by SLP  -MB        Positioning During Eating upright in bed  -MB        Utensils Used spoon;straw  -MB        Consistencies Trialed regular textures;pureed;thin liquids  -MB           Clinical Swallow Eval    Oral Prep Phase WFL  -MB        Oral Transit WFL  -MB        Oral Residue WFL  -MB        Pharyngeal Phase WFL  -MB        Esophageal Phase suspected esophageal impairment  -MB        Clinical Swallow Evaluation Summary See note  -MB           Esophageal Phase Concerns    Esophageal Phase Concerns other (see comments)  Reports of pills getting stuck at midsternal area  -MB           SLP Evaluation Clinical Impression    SLP Swallowing Diagnosis functional oral phase;R/O pharyngeal dysphagia;suspected esophageal dysphagia  -MB        Functional Impact risk of malnutrition;risk of dehydration  -MB        Rehab Potential/Prognosis, Swallowing good, to achieve stated therapy goals  -MB        Swallow Criteria for Skilled Therapeutic Interventions Met demonstrates skilled criteria  -MB           Recommendations    Therapy Frequency (Swallow) PRN  -MB        Predicted Duration Therapy Intervention (Days) until discharge  -MB        SLP Diet Recommendation regular textures;thin liquids  -MB        Recommended Precautions and Strategies upright posture during/after eating;small bites of food and sips of liquid;alternate between small bites of food and sips of  liquid;general aspiration precautions  -MB        Oral Care Recommendations Oral Care BID/PRN  -MB        SLP Rec. for Method of Medication Administration with thin liquids;with puree  meds halved  -MB        Monitor for Signs of Aspiration yes;cough;gurgly voice;throat clearing;pneumonia  -MB        Anticipated Discharge Disposition (SLP) unknown  -MB           Swallow Goals (SLP)    Swallow LTGs Swallow Long Term Goal (free text)  -MB        Swallow STGs diet tolerance goal selection (SLP)  -MB        Diet Tolerance Goal Selection (SLP) Patient will tolerate trials of  -MB           (LTG) Swallow    (LTG) Swallow Pt will tolerate least restrictive diet with no overt s/s of aspiration.  -MB        [place] (Swallow Long Term Goal) independently (over 90% accuracy)  -MB        Time Frame (Swallow Long Term Goal) by discharge  -MB        Barriers (Swallow Long Term Goal) n/a  -MB        Progress/Outcomes (Swallow Long Term Goal) new goal  -MB        Comment (Swallow Long Term Goal) n/a  -MB           (STG) Patient will tolerate trials of    Consistencies Trialed (Tolerate trials) regular textures;thin liquids  -MB        Desired Outcome (Tolerate trials) without signs/symptoms of aspiration;with adequate oral prep/transit/clearance  -MB        [place] (Tolerate trials) independently (over 90% accuracy)  -MB        Time Frame (Tolerate trials) by discharge  -MB        Progress/Outcomes (Tolerate trials) new goal  -MB        Comment (Tolerate trials) n/a  -MB                  User Key  (r) = Recorded By, (t) = Taken By, (c) = Cosigned By      Initials Name Effective Dates    Meena Blakely, CCC-SLP 02/03/23 -                     EDUCATION  The patient has been educated in the following areas:   Dysphagia (Swallowing Impairment).        SLP GOALS       Row Name 07/19/25 1248             (LTG) Swallow    (LTG) Swallow Pt will tolerate least restrictive diet with no overt s/s of aspiration.  -MB       [place] (Swallow Long Term Goal) independently (over 90% accuracy)  -MB      Time Frame (Swallow Long Term Goal) by discharge  -MB      Barriers (Swallow Long Term Goal) n/a  -MB      Progress/Outcomes (Swallow Long Term Goal) new goal  -MB      Comment (Swallow Long Term Goal) n/a  -MB         (STG) Patient will tolerate trials of    Consistencies Trialed (Tolerate trials) regular textures;thin liquids  -MB      Desired Outcome (Tolerate trials) without signs/symptoms of aspiration;with adequate oral prep/transit/clearance  -MB      [place] (Tolerate trials) independently (over 90% accuracy)  -MB      Time Frame (Tolerate trials) by discharge  -MB      Progress/Outcomes (Tolerate trials) new goal  -MB      Comment (Tolerate trials) n/a  -MB                User Key  (r) = Recorded By, (t) = Taken By, (c) = Cosigned By      Initials Name Provider Type    Meena Blakely CCC-SLP Speech and Language Pathologist                         Time Calculation:    Time Calculation- SLP       Row Name 07/19/25 1330             Time Calculation- SLP    SLP Start Time 1248  -MB      SLP Stop Time 1330  -MB      SLP Time Calculation (min) 42 min  -MB      SLP Received On 07/19/25  -MB      SLP Goal Re-Cert Due Date 07/29/25  -MB         Untimed Charges    [id number] Eval Oral Pharyng Swallow Minutes 42  -MB         Total Minutes    Untimed Charges Total Minutes 42  -MB       Total Minutes 42  -MB                User Key  (r) = Recorded By, (t) = Taken By, (c) = Cosigned By      Initials Name Provider Type    Meena Blakely CCC-SLP Speech and Language Pathologist                    Therapy Charges for Today       Code Description Service Date Service Provider Modifiers Qty    [id number]  ST EVAL ORAL PHARYNG SWALLOW 3 7/19/2025 Meena Wagoner CCC-SLP GN 1                 GAUTAM Russell  7/19/2025

## 2025-07-19 NOTE — H&P
[facility] Intensivist Services  HISTORY AND PHYSICAL    Date of Admission: 7/19/2025  Primary Care Physician: Reynaldo Cavazos MD    Subjective   Primary Historian: daughter    Chief Complaint: mechanical fall    Fall      Mrs. Shira King is a 85 years old woman with past medical history of essential hypertension on hydrochlorothiazide initiated recently, baby aspirin who presents to [facility] post mechanical fall.    She experienced fall yesterday at 10 PM and was placed back in the bed by her  and her son.  This morning she experienced right hip pain.  EMS was called.  She was GCS of 15 on arrival but complaining of right hip pain with pain being reproduced on palpation and movement of the right leg, also right leg was noted to be externally rotated.  She was given 50 mcg of fentanyl for pain and transferred to ED.  Lab work was significant for sodium of 115.  And CT scan showed no intracranial abnormality.  C-spine was negative.  CT abdomen pelvis without contrast revealed minimally displaced right inferior pubic ramus fracture.  ICU was consulted for admission due to the critically low sodium.    Daughter is present at the bedside and provided most of the history due the patient being hard of hearing and intermittently confused.    Review of Systems   Otherwise complete ROS reviewed and negative except as mentioned in the HPI.    Past Medical History:   Past Medical History:   Diagnosis Date    Cancer     skin    Chronic intractable headache 02/08/2021    Gastroesophageal reflux disease 02/03/2020    Hypertension     Lumbar pain 07/12/2019    Mixed hyperlipidemia     PONV (postoperative nausea and vomiting)     Sinusitis     Stroke     Valvular disease      Past Surgical History:  Past Surgical History:   Procedure Laterality Date    BLADDER REPAIR  2005    BREAST SURGERY      BUNIONECTOMY  2007    CARDIAC CATHETERIZATION      CATARACT  EXTRACTION Bilateral 2005    CHOLECYSTECTOMY      COLPOCLEISIS N/A 6/14/2022    Procedure: COLPOCLEISIS;  Surgeon: Martina Street MD;  Location: [facility];  Service: Obstetrics/Gynecology;  Laterality: N/A;    ENDOSCOPY  2005    HERNIA REPAIR  1963    HYSTERECTOMY  1979    KRISTAN BSO for fibroids    NECK SURGERY  1974    VERTEBRA    REPLACEMENT TOTAL KNEE BILATERAL Bilateral     TONSILLECTOMY       Social History:  reports that she has never smoked. She has never used smokeless tobacco. She reports that she does not drink alcohol and does not use drugs.    Family History: family history includes Heart attack in her maternal grandmother; Heart disease in her mother; Stomach cancer in her maternal uncle; Stroke in her mother and sister.       Allergies:  Allergies   Allergen Reactions    Aricept [Donepezil Hcl] Other (See Comments)     Shaking, sweating       Medications:  Prior to Admission medications    Medication Sig Start Date End Date Taking? Authorizing Provider   ALPRAZolam (XANAX) 0.25 MG tablet TAKE ONE TABLET BY MOUTH TWICE A DAY AS NEEDED FOR ANXIETY 10/3/23   Bonita Romero APRN   aluminum-magnesium hydroxide-simethicone (MAALOX/MYLANTA) 200-200-20 MG/5ML suspension Take 10 mL by mouth Every 6 (Six) Hours As Needed for Indigestion or Heartburn. 4/7/23   Bonita Romero APRN   amLODIPine (NORVASC) 5 MG tablet TAKE ONE TABLET BY MOUTH EVERY DAY 7/31/23   Bonita Romero APRN   aspirin 81 MG chewable tablet Chew 1 tablet Every Other Day.    Provider, MD Can   B Complex Vitamins (Vitamin B Complex) tablet Take 1 tablet by mouth Daily. 2/1/21   ProviderCan MD   carvedilol (COREG) 6.25 MG tablet TAKE ONE TABLET BY MOUTH TWICE A DAY WITH MEALS 5/8/23   Bonita Romero APRN   celecoxib (CeleBREX) 200 MG capsule TAKE ONE CAPSULE BY MOUTH EVERY DAY 11/29/23   Bonita Romero APRN   famotidine (PEPCID) 20 MG tablet TAKE ONE TABLET BY MOUTH TWICE A DAY 1/30/23   Bonita Romero  "ROBIN Larry   fexofenadine (Allegra Allergy) 180 MG tablet Take 1 tablet by mouth Daily As Needed (Allergies). 6/24/22   Ezequiel Gallego MD   HYDROcodone-acetaminophen (NORCO) 5-325 MG per tablet Take 1 tablet by mouth 3 (Three) Times a Day As Needed for Moderate Pain. 8/3/23   Bonita Romero APRN   losartan (COZAAR) 100 MG tablet TAKE ONE TABLET BY MOUTH EVERY DAY 7/31/23   Bonita Romero APRN   metoclopramide (REGLAN) 10 MG tablet TAKE ONE-HALF TABLET BY MOUTH EVERY DAY 7/31/23   Bonita Romero APRN   pravastatin (PRAVACHOL) 20 MG tablet TAKE ONE TABLET BY MOUTH EVERY DAY 5/8/23   Bonita Romero APRN   valACYclovir (Valtrex) 500 MG tablet Take 1 tablet by mouth 3 (Three) Times a Day. 1/31/23   Bonita Romero APRN     I have utilized all available immediate resources to obtain, update, or review the patient's current medications (including all prescriptions, over-the-counter products, herbals, cannabis/cannabidiol products, and vitamin/mineral/dietary (nutritional) supplements).    Objective     Vital Signs: /73 (BP Location: Left arm, Patient Position: Lying)   Pulse 75   Temp 97.9 °F (36.6 °C) (Oral)   Resp 25   Ht 168.9 cm (66.5\")   Wt 77.6 kg (171 lb 1.6 oz)   LMP  (LMP Unknown)   SpO2 92%   BMI 27.20 kg/m²   Physical Exam  Constitutional:       Appearance: She is normal weight. She is ill-appearing.      Comments: Alert and oriented to her herself and place, not time; intermittently confused, not in distress   HENT:      Head: Normocephalic and atraumatic.      Nose: Nose normal.      Mouth/Throat:      Mouth: Mucous membranes are moist.   Eyes:      Extraocular Movements: Extraocular movements intact.      Conjunctiva/sclera: Conjunctivae normal.      Pupils: Pupils are equal, round, and reactive to light.   Cardiovascular:      Rate and Rhythm: Normal rate.   Pulmonary:      Effort: Pulmonary effort is normal.      Breath sounds: Normal breath sounds.   Abdominal: "      General: Abdomen is flat. Bowel sounds are normal.      Palpations: Abdomen is soft.   Musculoskeletal:         General: Normal range of motion.      Cervical back: Normal range of motion.   Skin:     General: Skin is warm and dry.      Capillary Refill: Capillary refill takes less than 2 seconds.   Neurological:      General: No focal deficit present.      Mental Status: She is disoriented.      Motor: Weakness present.      Comments: Pain to palpation of the right hip, unable to move right hip due to the pain; range of motion or other extremities preserved; distal pulses DP/PT are 2/4, sensation to light touch is preserved    Psychiatric:         Behavior: Behavior normal.            Results Reviewed:  Lab Results (last 24 hours)       Procedure Component Value Units Date/Time    BNP [[id number]]  (Normal) Collected: 07/19/25 0509    Specimen: Blood Updated: 07/19/25 0816     proBNP 301.0 pg/mL     Narrative:      This assay is used as an aid in the diagnosis of individuals suspected of having heart failure. It can be used as an aid in the diagnosis of acute decompensated heart failure (ADHF) in patients presenting with signs and symptoms of ADHF to the emergency department (ED). In addition, NT-proBNP of <300 pg/mL indicates ADHF is not likely.    Age Range Result Interpretation  NT-proBNP Concentration (pg/mL:      <50             Positive            >450                   Gray                 300-450                    Negative             <300    50-75           Positive            >900                  Gray                300-900                  Negative            <300      >75             Positive            >1800                  Gray                300-1800                  Negative            <300    Magnesium [[id number]]  (Normal) Collected: 07/19/25 0509    Specimen: Blood Updated: 07/19/25 0816     Magnesium 1.8 mg/dL     TSH Rfx On Abnormal To Free T4 [[id number]]  (Normal) Collected: 07/19/25  0509    Specimen: Blood Updated: 07/19/25 0816     TSH 1.030 uIU/mL     Sodium, Urine, Random - Urine, Clean Catch [[id number]] Collected: 07/19/25 0605    Specimen: Urine, Clean Catch Updated: 07/19/25 0654     Sodium, Urine 72 mmol/L     Narrative:      Reference intervals for random urine have not been established.  Clinical usage is dependent upon physician's interpretation in combination with other laboratory tests.       Urinalysis With Culture If Indicated - Urine, Clean Catch [[id number]]  (Abnormal) Collected: 07/19/25 0605    Specimen: Urine, Clean Catch Updated: 07/19/25 0616     Color, UA Yellow     Appearance, UA Clear     pH, UA 8.0     Specific Gravity, UA 1.011     Glucose, UA Negative     Ketones, UA Trace     Bilirubin, UA Negative     Blood, UA Negative     Protein, UA Negative     Leuk Esterase, UA Negative     Nitrite, UA Negative     Urobilinogen, UA 1.0 E.U./dL    Narrative:      In absence of clinical symptoms, the presence of pyuria, bacteria, and/or nitrites on the urinalysis result does not correlate with infection.  Urine microscopic not indicated.    Comprehensive Metabolic Panel [[id number]]  (Abnormal) Collected: 07/19/25 0509    Specimen: Blood Updated: 07/19/25 0550     Glucose 136 mg/dL      BUN 11.1 mg/dL      Creatinine 0.73 mg/dL      Sodium 115 mmol/L      Potassium 4.6 mmol/L      Comment: Slight hemolysis detected by analyzer. Result may be falsely elevated.        Chloride 86 mmol/L      CO2 18.0 mmol/L      Calcium 8.9 mg/dL      Total Protein 6.0 g/dL      Albumin 3.8 g/dL      ALT (SGPT) 19 U/L      AST (SGOT) 55 U/L      Alkaline Phosphatase 85 U/L      Total Bilirubin 1.4 mg/dL      Globulin 2.2 gm/dL      A/G Ratio 1.7 g/dL      BUN/Creatinine Ratio 15.2     Anion Gap 11.0 mmol/L      eGFR 79.7 mL/min/1.73     Narrative:      GFR Categories in Chronic Kidney Disease (CKD)              GFR Category          GFR (mL/min/1.73)    Interpretation  G1                    90 or  greater        Normal or high (1)  G2                    60-89                Mild decrease (1)  G3a                   45-59                Mild to moderate decrease  G3b                   30-44                Moderate to severe decrease  G4                    15-29                Severe decrease  G5                    14 or less           Kidney failure    (1)In the absence of evidence of kidney disease, neither GFR category G1 or G2 fulfill the criteria for CKD.    eGFR calculation 2021 CKD-EPI creatinine equation, which does not include race as a factor    Protime-INR [[id number]]  (Normal) Collected: 07/19/25 0509    Specimen: Blood Updated: 07/19/25 0533     Protime 13.8 Seconds      INR 1.00    aPTT [[id number]]  (Normal) Collected: 07/19/25 0509    Specimen: Blood Updated: 07/19/25 0533     PTT 29.8 seconds     Narrative:      PTT = The equivalent PTT values for the therapeutic range of heparin levels at 0.3 to 0.7 U/ml are 77 - 99 seconds.    [place] Draw [[id number]] Collected: 07/19/25 0509    Specimen: Blood Updated: 07/19/25 0530    Narrative:      The following orders were created for panel order [place] Draw.  Procedure                               Abnormality         Status                     ---------                               -----------         ------                     Green Top (Gel)[[id number]]                                  Final result               Lavender Top[[id number]]                                     Final result               Red Top[[id number]]                                          Final result               Bunch Top[[id number]]                                         Final result               Light Blue Top[[id number]]                                   Final result                 Please view results for these tests on the individual orders.    Gray Top [[id number]] Collected: 07/19/25 0509    Specimen: Blood Updated: 07/19/25 0530     Extra Tube Hold for add-ons.     Comment:  Auto resulted.       CBC & Differential [[id number]]  (Abnormal) Collected: 07/19/25 0509    Specimen: Blood Updated: 07/19/25 0527    Narrative:      The following orders were created for panel order CBC & Differential.  Procedure                               Abnormality         Status                     ---------                               -----------         ------                     CBC Auto Differential[[id number]]        Abnormal            Final result                 Please view results for these tests on the individual orders.    CBC Auto Differential [[id number]]  (Abnormal) Collected: 07/19/25 0509    Specimen: Blood Updated: 07/19/25 0527     WBC 8.12 10*3/mm3      RBC 3.81 10*6/mm3      Hemoglobin 12.3 g/dL      Hematocrit 34.2 %      MCV 89.8 fL      MCH 32.3 pg      MCHC 36.0 g/dL      RDW 11.7 %      RDW-SD 38.0 fl      MPV 8.4 fL      Platelets 202 10*3/mm3      Neutrophil % 83.5 %      Lymphocyte % 7.6 %      Monocyte % 7.5 %      Eosinophil % 0.5 %      Basophil % 0.2 %      Immature Grans % 0.7 %      Neutrophils, Absolute 6.77 10*3/mm3      Lymphocytes, Absolute 0.62 10*3/mm3      Monocytes, Absolute 0.61 10*3/mm3      Eosinophils, Absolute 0.04 10*3/mm3      Basophils, Absolute 0.02 10*3/mm3      Immature Grans, Absolute 0.06 10*3/mm3      nRBC 0.0 /100 WBC     Green Top (Gel) [[id number]] Collected: 07/19/25 0509    Specimen: Blood Updated: 07/19/25 0515     Extra Tube Hold for add-ons.     Comment: Auto resulted.       Lavender Top [[id number]] Collected: 07/19/25 0509    Specimen: Blood Updated: 07/19/25 0515     Extra Tube hold for add-on     Comment: Auto resulted       Red Top [[id number]] Collected: 07/19/25 0509    Specimen: Blood Updated: 07/19/25 0515     Extra Tube Hold for add-ons.     Comment: Auto resulted.       Light Blue Top [[id number]] Collected: 07/19/25 0509    Specimen: Blood Updated: 07/19/25 0515     Extra Tube Hold for add-ons.     Comment: Auto resulted                 CT Abdomen Pelvis Without Contrast  Result Date: 7/19/2025   1. Acute appearing essentially nondisplaced right inferior pubic ramus fracture. There may also be a subtle nondisplaced fracture about the right superior pubic ramus near the pubic symphysis. 2. 1.1 cm right adrenal adenoma. 3. Mild left abdominal wall soft tissue edema, likely contusion.   This report was signed and finalized on 7/19/2025 7:13 AM by Helio Parisi.      CT Cervical Spine Without Contrast  Result Date: 7/19/2025   1. No acute fracture or traumatic malalignment. 2. Enlarged multinodular thyroid gland.  These findings are in agreement with the critical and emergent findings from the [facility] preliminary report.    This report was signed and finalized on 7/19/2025 7:05 AM by Helio Parisi.      XR Femur 2 View Right  Result Date: 7/19/2025   No acute osseous finding in the right femur.   This report was signed and finalized on 7/19/2025 7:01 AM by Helio Parisi.      XR Hip With or Without Pelvis 2 - 3 View Right  Result Date: 7/19/2025   Acute appearing very minimally displaced right inferior pubic ramus fracture.   This report was signed and finalized on 7/19/2025 7:00 AM by Helio Parisi.      XR Chest 1 View  Result Date: 7/19/2025   No acute cardiopulmonary abnormality.    This report was signed and finalized on 7/19/2025 6:58 AM by Helio Parisi.      CT Head Without Contrast  Result Date: 7/19/2025  Impression:   No acute intracranial abnormality.  These findings are in agreement with the critical and emergent findings from the [facility] preliminary report.  This report was signed and finalized on 7/19/2025 6:16 AM by Helio Parisi.        Result Review:  I have personally reviewed the results from the time of this admission to 7/19/2025 08:44 CDT and agree with these findings:  [x]  Laboratory list / accordion  [x]  Microbiology  [x]  Radiology  [x]  EKG/Telemetry   [x]  Cardiology/Vascular   [x]  Pathology  [x]  Old  records  []  Other:  Most notable findings include: Na 115 and right minimally displaced inferior pubic ramus fracture      I have personally reviewed and interpreted the radiology studies and ECG obtained at time of admission.     Assessment / Plan   Assessment:   Active Hospital Problems    Diagnosis     **Hyponatremia        Treatment Plan  Mrs. Shira King is a 85 years old woman with past medical history of essential hypertension on hydrochlorothiazide initiated recently, baby aspirin who presents to [facility] post mechanical fall. The patient was admitted to my service here at [facility] ICU with hyponatremia and right inferior pubic ramus fracture.     #Hyponatremia, most likely iatrogenically induced by hydrochlorothiazide  - Na 115, urine Na 72  -Status post 1 L of normal saline in ED  - repeat Na 119  Plan:  - Admit to ICU for close monitoring  -Hold hydrochlorothiazide  - Monitor Na every hours  - since on last check it went up to 119, just hold fluids for now and restart depending on the next check  -Goal of sodium correction 8 mEq in 24 hours    #Encephalopathy, multifactorial including hyponatremia, acute pain and opioid pain medications  Plan:  - Avoid oversedation,, adequate pain control, circadian rhythm adjustment  - Correction of sodium will help with encephalopathy    #Right inferior pubic ramus fracture  #Right hip pain 2/2 above  - Seen on CT scan on admission  Plan:  - Ortho surgery consulted  - Chemical DVT prophylaxis with Lovenox  - Adequate pain control with oxycodone and fentanyl as needed    # Essential hypertension  # Normotensive on admission  Hold home hydrochlorothiazide, amlodipine, carvediolol and losartan   Will initiate other bp oral regiment as needed    Medical Decision Making  Number and Complexity of problems: 3, moderately complex  Differential Diagnosis: hyponatremia due to hydrochlorothiazide and rigth inferior pubic ramus  fracture    Conditions and Status        Condition is unchanged.     [facility] Data  External documents reviewed: n/a  Cardiac tracing (EKG, telemetry) interpretation: meg  Radiology interpretation: yes  Labs reviewed: yes  Any tests that were considered but not ordered: no     Decision rules/scores evaluated (example [id number], Wells, etc): no     Discussed with: nursing staff     Care Planning  Shared decision makin minutes  Code status and discussions: DNR    Disposition  Social Determinants of Health that impact treatment or disposition: daughter helps with decision making   Estimated length of stay is 5.     I confirmed that the patient's advanced care plan is present, code status is documented, and a surrogate decision maker is listed in the patient's medical record.     The patient's surrogate decision maker is daughter.     The patient was seen and examined by me on 2025 at 9:29.    Electronically signed by Ivis Flores DO on 2025 at 08:44 CDT

## 2025-07-20 LAB
QT INTERVAL: 392 MS
QTC INTERVAL: 446 MS
SODIUM SERPL-SCNC: 119 MMOL/L (ref 136–145)
SODIUM SERPL-SCNC: 122 MMOL/L (ref 136–145)
SODIUM SERPL-SCNC: 124 MMOL/L (ref 136–145)
SODIUM SERPL-SCNC: 128 MMOL/L (ref 136–145)

## 2025-07-20 PROCEDURE — 84295 ASSAY OF SERUM SODIUM: CPT | Performed by: STUDENT IN AN ORGANIZED HEALTH CARE EDUCATION/TRAINING PROGRAM

## 2025-07-20 PROCEDURE — 97162 PT EVAL MOD COMPLEX 30 MIN: CPT

## 2025-07-20 PROCEDURE — 25010000002 ENOXAPARIN PER 10 MG: Performed by: STUDENT IN AN ORGANIZED HEALTH CARE EDUCATION/TRAINING PROGRAM

## 2025-07-20 PROCEDURE — 25810000003 SODIUM CHLORIDE 0.9 % SOLUTION: Performed by: STUDENT IN AN ORGANIZED HEALTH CARE EDUCATION/TRAINING PROGRAM

## 2025-07-20 PROCEDURE — 97166 OT EVAL MOD COMPLEX 45 MIN: CPT

## 2025-07-20 RX ORDER — OXYBUTYNIN CHLORIDE 5 MG/1
5 TABLET ORAL DAILY
Status: DISCONTINUED | OUTPATIENT
Start: 2025-07-21 | End: 2025-07-23 | Stop reason: HOSPADM

## 2025-07-20 RX ORDER — CELECOXIB 200 MG/1
200 CAPSULE ORAL DAILY PRN
Status: DISCONTINUED | OUTPATIENT
Start: 2025-07-20 | End: 2025-07-23 | Stop reason: HOSPADM

## 2025-07-20 RX ORDER — CARVEDILOL 6.25 MG/1
6.25 TABLET ORAL 2 TIMES DAILY WITH MEALS
Status: DISCONTINUED | OUTPATIENT
Start: 2025-07-20 | End: 2025-07-23 | Stop reason: HOSPADM

## 2025-07-20 RX ORDER — PANTOPRAZOLE SODIUM 40 MG/1
40 TABLET, DELAYED RELEASE ORAL
Status: DISCONTINUED | OUTPATIENT
Start: 2025-07-21 | End: 2025-07-23 | Stop reason: HOSPADM

## 2025-07-20 RX ORDER — AMLODIPINE BESYLATE 5 MG/1
5 TABLET ORAL DAILY
Status: DISCONTINUED | OUTPATIENT
Start: 2025-07-20 | End: 2025-07-23 | Stop reason: HOSPADM

## 2025-07-20 RX ORDER — PENTOXIFYLLINE 400 MG/1
400 TABLET, EXTENDED RELEASE ORAL DAILY
Status: DISCONTINUED | OUTPATIENT
Start: 2025-07-20 | End: 2025-07-23 | Stop reason: HOSPADM

## 2025-07-20 RX ORDER — OXYCODONE HCL 5 MG/5 ML
7.5 SOLUTION, ORAL ORAL EVERY 4 HOURS PRN
Refills: 0 | Status: DISCONTINUED | OUTPATIENT
Start: 2025-07-20 | End: 2025-07-23 | Stop reason: HOSPADM

## 2025-07-20 RX ORDER — PRAVASTATIN SODIUM 20 MG
20 TABLET ORAL NIGHTLY
Status: DISCONTINUED | OUTPATIENT
Start: 2025-07-20 | End: 2025-07-23 | Stop reason: HOSPADM

## 2025-07-20 RX ORDER — QUETIAPINE FUMARATE 25 MG/1
25 TABLET, FILM COATED ORAL DAILY
Status: DISCONTINUED | OUTPATIENT
Start: 2025-07-20 | End: 2025-07-22

## 2025-07-20 RX ORDER — QUETIAPINE FUMARATE 25 MG/1
100 TABLET, FILM COATED ORAL NIGHTLY
Status: DISCONTINUED | OUTPATIENT
Start: 2025-07-20 | End: 2025-07-22

## 2025-07-20 RX ADMIN — SODIUM CHLORIDE 250 ML/HR: 9 INJECTION, SOLUTION INTRAVENOUS at 09:38

## 2025-07-20 RX ADMIN — SODIUM CHLORIDE 250 ML/HR: 9 INJECTION, SOLUTION INTRAVENOUS at 21:46

## 2025-07-20 RX ADMIN — FAMOTIDINE 20 MG: 20 TABLET, FILM COATED ORAL at 08:56

## 2025-07-20 RX ADMIN — SODIUM CHLORIDE 250 ML/HR: 9 INJECTION, SOLUTION INTRAVENOUS at 13:52

## 2025-07-20 RX ADMIN — QUETIAPINE FUMARATE 25 MG: 25 TABLET ORAL at 11:35

## 2025-07-20 RX ADMIN — CHLORHEXIDINE GLUCONATE 1 APPLICATION: 500 CLOTH TOPICAL at 05:21

## 2025-07-20 RX ADMIN — QUETIAPINE FUMARATE 100 MG: 100 TABLET ORAL at 21:40

## 2025-07-20 RX ADMIN — METHOCARBAMOL TABLETS 750 MG: 500 TABLET, COATED ORAL at 11:35

## 2025-07-20 RX ADMIN — Medication 1 APPLICATION: at 21:40

## 2025-07-20 RX ADMIN — Medication 10 ML: at 08:57

## 2025-07-20 RX ADMIN — METHOCARBAMOL TABLETS 750 MG: 500 TABLET, COATED ORAL at 21:40

## 2025-07-20 RX ADMIN — SODIUM CHLORIDE 250 ML/HR: 9 INJECTION, SOLUTION INTRAVENOUS at 17:56

## 2025-07-20 RX ADMIN — METHOCARBAMOL TABLETS 750 MG: 500 TABLET, COATED ORAL at 08:56

## 2025-07-20 RX ADMIN — DEXMEDETOMIDINE HYDROCHLORIDE 0.6 MCG/KG/HR: 400 INJECTION INTRAVENOUS at 21:40

## 2025-07-20 RX ADMIN — DEXMEDETOMIDINE HYDROCHLORIDE 1 MCG/KG/HR: 400 INJECTION INTRAVENOUS at 14:46

## 2025-07-20 RX ADMIN — Medication 10 ML: at 21:41

## 2025-07-20 RX ADMIN — Medication 1 APPLICATION: at 08:56

## 2025-07-20 RX ADMIN — PRAVASTATIN SODIUM 20 MG: 20 TABLET ORAL at 21:41

## 2025-07-20 RX ADMIN — DEXMEDETOMIDINE HYDROCHLORIDE 0.8 MCG/KG/HR: 400 INJECTION INTRAVENOUS at 05:45

## 2025-07-20 RX ADMIN — SODIUM CHLORIDE 250 ML/HR: 9 INJECTION, SOLUTION INTRAVENOUS at 05:45

## 2025-07-20 RX ADMIN — ENOXAPARIN SODIUM 40 MG: 100 INJECTION SUBCUTANEOUS at 08:56

## 2025-07-20 NOTE — THERAPY EVALUATION
Patient Name: Shira King  : 1940    MRN: [id number]                              Today's Date: 2025       Admit Date: 2025    Visit Dx:     ICD-10-CM ICD-9-CM   1. Hyponatremia  E87.1 276.1   2. Closed nondisplaced fracture of pelvis, unspecified part of pelvis, initial encounter  S32.9XXA 808.8   3. Dysphagia, unspecified type  R13.10 787.20   4. Impaired mobility [Z74.09]  Z74.09 799.89     Patient Active Problem List   Diagnosis    Essential hypertension    RMSF ([place] spotted fever)    Mixed hyperlipidemia    Lumbar pain    Gastroesophageal reflux disease    Insomnia    Obstructive sleep apnea    Periodic limb movement    Snoring    Somnolence, daytime    Chronic intractable headache    Chronic ethmoidal sinusitis    Peripheral vascular disease    Hypertrophy of both inferior nasal turbinates    Nasal vestibulitis    Other acute sinusitis    Allergic rhinitis    AMS (altered mental status)    Memory loss    Advanced age    Dementia without behavioral disturbance    Chest pain    Hyponatremia     Past Medical History:   Diagnosis Date    Cancer     skin    Chronic intractable headache 2021    Gastroesophageal reflux disease 2020    Hypertension     Lumbar pain 2019    Mixed hyperlipidemia     PONV (postoperative nausea and vomiting)     Sinusitis     Stroke     Valvular disease      Past Surgical History:   Procedure Laterality Date    BLADDER REPAIR  2005    BREAST SURGERY      BUNIONECTOMY  2007    CARDIAC CATHETERIZATION      CATARACT EXTRACTION Bilateral     CHOLECYSTECTOMY      COLPOCLEISIS N/A 2022    Procedure: COLPOCLEISIS;  Surgeon: Martina Street MD;  Location: [facility];  Service: Obstetrics/Gynecology;  Laterality: N/A;    ENDOSCOPY      HERNIA REPAIR  1963    HYSTERECTOMY      KRISTAN BSO for fibroids    NECK SURGERY  1974    VERTEBRA    REPLACEMENT TOTAL KNEE BILATERAL Bilateral     TONSILLECTOMY        General Information       Row  Name 07/20/25 0702          Physical Therapy Time and Intention    Document Type evaluation  pt presents s/p fall at home Dx hyponatremia. Pt also dx with R pelvic fx ininferior pubic rami and is WBAT  -AJ     Mode of Treatment physical therapy  -AJ       Row Name 07/20/25 0702          General Information    Patient Profile Reviewed yes  -AJ     Prior Level of Function community mobility;all household mobility;home management;gait;ADL's;min assist:;mod assist:   had a lady come to sit and care as needed for her  -AJ     Existing Precautions/Restrictions fall;other (see comments)  WBAT  -AJ     Barriers to Rehab medically complex;previous functional deficit;physical barrier;cognitive status  -AJ       Row Name 07/20/25 0702          Living Environment    Current Living Arrangements home  -AJ     People in Home alone  son at bedside provides PLOF  -AJ       Row Name 07/20/25 0702          Home Main Entrance    Number of Stairs, Main Entrance two  -AJ       Row Name 07/20/25 0702          Stairs Within Home, Primary    Number of Stairs, Within Home, Primary other (see comments)  ha stairs but does not use  -AJ       Row Name 07/20/25 0702          Cognition    Orientation Status (Cognition) oriented to;person;situation;disoriented to;place;time;verbal cues/prompts needed for orientation  -AJ       Row Name 07/20/25 0702          Safety Issues/Impairments Affecting Functional Mobility    Safety Issues Affecting Function (Mobility) safety precaution awareness;safety precautions follow-through/compliance;awareness of need for assistance;impulsivity;insight into deficits/self-awareness;positioning of assistive device;judgment;friction/shear risk  -AJ     Impairments Affecting Function (Mobility) balance;endurance/activity tolerance;strength;pain;postural/trunk control  -AJ               User Key  (r) = Recorded By, (t) = Taken By, (c) = Cosigned By      Initials Name Provider Type    Helio Briceño, PT DPT Physical  Therapist                   Mobility       Row Name 07/20/25 0702          Bed Mobility    Bed Mobility rolling right;supine-sit  -AJ     Rolling Right [place] (Bed Mobility) moderate assist (50% patient effort);2 person assist  -AJ     Supine-Sit [place] (Bed Mobility) moderate assist (50% patient effort);1 person assist;2 person assist  -     Assistive Device (Bed Mobility) bed rails;head of bed elevated;repositioning sheet  -       Row Name 07/20/25 0702          Bed-Chair Transfer    Bed-Chair [place] (Transfers) minimum assist (75% patient effort);2 person assist  -       Row Name 07/20/25 0702          Sit-Stand Transfer    Sit-Stand [place] (Transfers) minimum assist (75% patient effort);moderate assist (50% patient effort);2 person assist  -     Assistive Device (Sit-Stand Transfers) walker, front-wheeled  -       Row Name 07/20/25 0702          Gait/Stairs (Locomotion)    [place] Level (Gait) moderate assist (50% patient effort);maximum assist (25% patient effort);2 person assist  -     Assistive Device (Gait) walker, front-wheeled  -     Patient was able to Ambulate yes  -AJ     Distance in Feet (Gait) 10  -       Row Name 07/20/25 0702          Mobility    Extremity Weight-bearing Status right lower extremity  -AJ     Right Lower Extremity (Weight-bearing Status) weight-bearing as tolerated (WBAT)  -               User Key  (r) = Recorded By, (t) = Taken By, (c) = Cosigned By      Initials Name Provider Type    Helio Briceño PT DPT Physical Therapist                   Obj/Interventions       Row Name 07/20/25 0702          Range of Motion Comprehensive    General Range of Motion lower extremity range of motion deficits identified  -AJ     Comment, General Range of Motion unable to perform due to pain, likely limited <25%  -       Row Name 07/20/25 0702          Strength Comprehensive (MMT)    General Manual Muscle Testing (MMT) Assessment lower extremity  strength deficits identified  -     Comment, General Manual Muscle Testing (MMT) Assessment no formal assessment due to pain  -       Row Name 07/20/25 0702          Balance    Balance Assessment sitting static balance;standing static balance;sitting dynamic balance;standing dynamic balance  -AJ     Static Sitting Balance contact guard  -AJ     Dynamic Sitting Balance contact guard  -AJ     Position, Sitting Balance unsupported;other (see comments)  bsc  -     Static Standing Balance moderate assist  -     Dynamic Standing Balance moderate assist  -AJ     Position/Device Used, Standing Balance supported;walker, front-wheeled  -     Balance Interventions sitting;standing;sit to stand;supported;static;dynamic  -       Row Name 07/20/25 0702          Sensory Assessment (Somatosensory)    Sensory Assessment (Somatosensory) sensation intact  -               User Key  (r) = Recorded By, (t) = Taken By, (c) = Cosigned By      Initials Name Provider Type    Helio Briceño, PT DPT Physical Therapist                   Goals/Plan       Row Name 07/20/25 0702          Bed Mobility Goal 1 (PT)    Activity/Assistive Device (Bed Mobility Goal 1, PT) rolling to left;rolling to right;sit to supine;supine to sit  -     [place] Level/Cues Needed (Bed Mobility Goal 1, PT) minimum assist (75% or more patient effort)  -AJ     Time Frame (Bed Mobility Goal 1, PT) long term goal (LTG);10 days  -AJ     Progress/Outcomes (Bed Mobility Goal 1, PT) new goal  -       Row Name 07/20/25 0702          Transfer Goal 1 (PT)    Activity/Assistive Device (Transfer Goal 1, PT) sit-to-stand/stand-to-sit;bed-to-chair/chair-to-bed;toilet;wheelchair transfer  -AJ     [place] Level/Cues Needed (Transfer Goal 1, PT) contact guard required  -     Time Frame (Transfer Goal 1, PT) long term goal (LTG);10 days  -AJ     Progress/Outcome (Transfer Goal 1, PT) new goal  -       Row Name 07/20/25 0702          Gait Training Goal 1  (PT)    Activity/Assistive Device (Gait Training Goal 1, PT) gait (walking locomotion);decrease asymmetrical patterns;decrease fall risk;diminish gait deviation;forward stepping;improve balance and speed;increase endurance/gait distance;increase energy conservation;assistive device use;walker, rolling  -AJ     [place] Level (Gait Training Goal 1, PT) minimum assist (75% or more patient effort)  -AJ     Distance (Gait Training Goal 1, PT) 25' with no LOB  -AJ     Time Frame (Gait Training Goal 1, PT) long term goal (LTG);10 days  -AJ     Progress/Outcome (Gait Training Goal 1, PT) new goal  -       Row Name 07/20/25 0702          Problem Specific Goal 1 (PT)    Problem Specific Goal 1 (PT) Pt will report no increase in pain with mobility as needed to return to PLOF  -AJ     Time Frame (Problem Specific Goal 1, PT) long-term goal (LTG);by discharge  -AJ     Progress/Outcome (Problem Specific Goal 1, PT) new goal  -       Row Name 07/20/25 0702          Therapy Assessment/Plan (PT)    Planned Therapy Interventions (PT) balance training;bed mobility training;gait training;home exercise program;patient/family education;transfer training;postural re-education;strengthening;ROM (range of motion);wheelchair management/propulsion training  -AJ               User Key  (r) = Recorded By, (t) = Taken By, (c) = Cosigned By      Initials Name Provider Type    Helio Briceño, PT DPT Physical Therapist                   Clinical Impression       Row Name 07/20/25 0702          Pain    Pain Location hip  -     Pain Side/Orientation right  -     Pain Management Interventions exercise or physical activity utilized;nursing notified  -AJ     Response to Pain Interventions no change per patient report  -AJ     Additional Documentation Pain Scale: FACES Pre/Post-Treatment (Group)  -       Row Name 07/20/25 0702          Pain Scale: FACES Pre/Post-Treatment    Pain: FACES Scale, Pretreatment 2-->hurts little bit  -AJ      Posttreatment Pain Rating 6-->hurts even more  -FRANCISCO       Row Name 07/20/25 0702          Plan of Care Review    Plan of Care Reviewed With patient  -     Outcome Evaluation PT eval complete. Pt in fowlers position, oriented to self only andneeds to use BSC. Pt initially reporting being indep at home, but near session end, pt son present in room and stated she has declined and requried increased assist daily, and has sitters for a few hours daily, and also he lives across the street. Pt today required Mod A for bed mobility, stood and t/f to Bsc for BM. pt required verbal cues for directioning and used FWW for stability. Pt was Dep A for chygeine and t/f to recliner once hygeine complete. Pt left in recliner and will benefit from skilled PT services to improve t/f, decrease fall risk, decrease caregiver burden and return to PLOF. Recommend SNF when stable for d/c  -FRANCISCO       Row Name 07/20/25 0702          Therapy Assessment/Plan (PT)    Patient/Family Therapy Goals Statement (PT) none stated  -     Rehab Potential (PT) fair  -     Criteria for Skilled Interventions Met (PT) yes;meets criteria;skilled treatment is necessary  -     Therapy Frequency (PT) 2 times/day  -AJ     Predicted Duration of Therapy Intervention (PT) until d/c  -FRANCISCO       Row Name 07/20/25 0702          Positioning and Restraints    Pre-Treatment Position in bed  -AJ     Post Treatment Position chair  -AJ     In Chair notified nsg;reclined;call light within reach;encouraged to call for assist;exit alarm on;patient within staff view  -               User Key  (r) = Recorded By, (t) = Taken By, (c) = Cosigned By      Initials Name Provider Type    Helio Briceño, PT DPT Physical Therapist                   Outcome Measures       Row Name 07/20/25 0702          How much help from another person do you currently need...    Turning from your back to your side while in flat bed without using bedrails? 3  -AJ     Moving from lying on back to  sitting on the side of a flat bed without bedrails? 3  -AJ     Moving to and from a bed to a chair (including a wheelchair)? 2  -AJ     Standing up from a chair using your arms (e.g., wheelchair, bedside chair)? 2  -AJ     Climbing 3-5 steps with a railing? 1  -AJ     To walk in hospital room? 2  -AJ     AM-PAC 6 Clicks Score (PT) 13  -AJ     Highest Level of Mobility Goal Move to Chair/Commode-4  -AJ       Row Name 07/20/25 0702          Functional Assessment    Outcome Measure Options AM-PAC 6 Clicks Basic Mobility (PT)  -               User Key  (r) = Recorded By, (t) = Taken By, (c) = Cosigned By      Initials Name Provider Type    Helio Briceño, PT DPT Physical Therapist                                 Physical Therapy Education       Title: PT OT SLP Therapies (Done)       Topic: Physical Therapy (Done)       Point: Mobility training (Done)       Learning Progress Summary            Patient Acceptance, E, VU by FRANCISCO at 7/20/2025 1146    Comment: role of PT, WBAt, t/f with walker, call for assist, high fall risk   Family Acceptance, E, VU by FRANCISCO at 7/20/2025 1146    Comment: role of PT, WBAt, t/f with walker, call for assist, high fall risk                      Point: Home exercise program (Done)       Learning Progress Summary            Patient Acceptance, E, VU by FRANCISCO at 7/20/2025 1146    Comment: role of PT, WBAt, t/f with walker, call for assist, high fall risk   Family Acceptance, E, VU by FRANCISCO at 7/20/2025 1146    Comment: role of PT, WBAt, t/f with walker, call for assist, high fall risk                      Point: Body mechanics (Done)       Learning Progress Summary            Patient Acceptance, E, VU by FRANCISCO at 7/20/2025 1146    Comment: role of PT, WBAt, t/f with walker, call for assist, high fall risk   Family Acceptance, E, VU by FRANCISCO at 7/20/2025 1146    Comment: role of PT, WBAt, t/f with walker, call for assist, high fall risk                      Point: Precautions (Done)       Learning Progress  Summary            Patient Acceptance, E, VU by FRANCISCO at 7/20/2025 1146    Comment: role of PT, WBAt, t/f with walker, call for assist, high fall risk   Family Acceptance, E, VU by FRANCISCO at 7/20/2025 1146    Comment: role of PT, WBAt, t/f with walker, call for assist, high fall risk                                      User Key       Initials Effective Dates Name Provider Type Discipline     08/15/24 -  Helio Levin, PT DPT Physical Therapist PT                  PT Recommendation and Plan  Planned Therapy Interventions (PT): balance training, bed mobility training, gait training, home exercise program, patient/family education, transfer training, postural re-education, strengthening, ROM (range of motion), wheelchair management/propulsion training  Outcome Evaluation: PT eval complete. Pt in fowlers position, oriented to self only andneeds to use BSC. Pt initially reporting being indep at home, but near session end, pt son present in room and stated she has declined and requried increased assist daily, and has sitters for a few hours daily, and also he lives across the street. Pt today required Mod A for bed mobility, stood and t/f to Bsc for BM. pt required verbal cues for directioning and used FWW for stability. Pt was Dep A for chygeine and t/f to recliner once hygeine complete. Pt left in recliner and will benefit from skilled PT services to improve t/f, decrease fall risk, decrease caregiver burden and return to PLOF. Recommend SNF when stable for d/c     Time Calculation:         PT Charges       Row Name 07/20/25 0702             Time Calculation    Start Time 1053  -      Stop Time 1134  +12 for chart revieew, attempted session in am, communciation with family and RN  -FRANCISCO      Time Calculation (min) 41 min  -AJ      PT Received On 07/20/25  -FRANCISCO      PT Goal Re-Cert Due Date 07/30/25  -FRANCISCO         Untimed Charges    PT Eval/Re-eval Minutes 53  -AJ         Total Minutes    Untimed Charges Total Minutes 53  -AJ        Total Minutes 53  -AJ                User Key  (r) = Recorded By, (t) = Taken By, (c) = Cosigned By      Initials Name Provider Type    Helio Briceño, PT DPT Physical Therapist                  Therapy Charges for Today       Code Description Service Date Service Provider Modifiers Qty    [id number] HC PT EVAL MOD COMPLEXITY 4 7/20/2025 Helio Levin PT DPT GP 1            PT G-Codes  Outcome Measure Options: AM-PAC 6 Clicks Basic Mobility (PT)  AM-PAC 6 Clicks Score (PT): 13  PT Discharge Summary  Anticipated Discharge Disposition (PT): skilled nursing facility    Helio Levin PT DPT  7/20/2025

## 2025-07-20 NOTE — THERAPY EVALUATION
Patient Name: Shira King  : 1940    MRN: [id number]                              Today's Date: 2025       Admit Date: 2025    Visit Dx:     ICD-10-CM ICD-9-CM   1. Hyponatremia  E87.1 276.1   2. Closed nondisplaced fracture of pelvis, unspecified part of pelvis, initial encounter  S32.9XXA 808.8   3. Dysphagia, unspecified type  R13.10 787.20   4. Impaired mobility [Z74.09]  Z74.09 799.89     Patient Active Problem List   Diagnosis    Essential hypertension    RMSF ([place] spotted fever)    Mixed hyperlipidemia    Lumbar pain    Gastroesophageal reflux disease    Insomnia    Obstructive sleep apnea    Periodic limb movement    Snoring    Somnolence, daytime    Chronic intractable headache    Chronic ethmoidal sinusitis    Peripheral vascular disease    Hypertrophy of both inferior nasal turbinates    Nasal vestibulitis    Other acute sinusitis    Allergic rhinitis    AMS (altered mental status)    Memory loss    Advanced age    Dementia without behavioral disturbance    Chest pain    Hyponatremia     Past Medical History:   Diagnosis Date    Cancer     skin    Chronic intractable headache 2021    Gastroesophageal reflux disease 2020    Hypertension     Lumbar pain 2019    Mixed hyperlipidemia     PONV (postoperative nausea and vomiting)     Sinusitis     Stroke     Valvular disease      Past Surgical History:   Procedure Laterality Date    BLADDER REPAIR  2005    BREAST SURGERY      BUNIONECTOMY  2007    CARDIAC CATHETERIZATION      CATARACT EXTRACTION Bilateral     CHOLECYSTECTOMY      COLPOCLEISIS N/A 2022    Procedure: COLPOCLEISIS;  Surgeon: Martina Street MD;  Location: [facility];  Service: Obstetrics/Gynecology;  Laterality: N/A;    ENDOSCOPY      HERNIA REPAIR  1963    HYSTERECTOMY      KRISTAN BSO for fibroids    NECK SURGERY  1974    VERTEBRA    REPLACEMENT TOTAL KNEE BILATERAL Bilateral     TONSILLECTOMY        General Information       Row  Name 07/20/25 1100          OT Time and Intention    Subjective Information complains of;pain  -     Document Type evaluation  pt presents s/p fall at home Dx hyponatremia. Pt also dx with R pelvic fx ininferior pubic rami and is WBAT  -     Mode of Treatment occupational therapy  -     Patient Effort adequate  -       Row Name 07/20/25 1100          General Information    Patient Profile Reviewed yes  -     Prior Level of Function min assist:;mod assist:;all household mobility;community mobility;transfer;bed mobility;ADL's  used FWW, had caregivers 1pm-bedtime  -     Existing Precautions/Restrictions fall;other (see comments)  WBAT  -     Barriers to Rehab medically complex;previous functional deficit;cognitive status  -       Row Name 07/20/25 1100          Living Environment    Current Living Arrangements home  -     People in Home alone  -       Row Name 07/20/25 1100          Home Main Entrance    Number of Stairs, Main Entrance two  -       Row Name 07/20/25 1100          Stairs Within Home, Primary    Stairs, Within Home, Primary stairs but does not use  -       Row Name 07/20/25 1100          Cognition    Orientation Status (Cognition) oriented to;person;disoriented to;place;situation;time  -       Row Name 07/20/25 1100          Safety Issues/Impairments Affecting Functional Mobility    Safety Issues Affecting Function (Mobility) ability to follow commands;awareness of need for assistance;friction/shear risk;insight into deficits/self-awareness;judgment;impulsivity;positioning of assistive device;problem-solving;safety precaution awareness;safety precautions follow-through/compliance  -     Impairments Affecting Function (Mobility) balance;endurance/activity tolerance;strength;pain;postural/trunk control;cognition  -     Cognitive Impairments, Mobility Safety/Performance attention;awareness, need for assistance;impulsivity;insight into  deficits/self-awareness;judgment;problem-solving/reasoning;safety precaution awareness;safety precaution follow-through  -               User Key  (r) = Recorded By, (t) = Taken By, (c) = Cosigned By      Initials Name Provider Type    Cintia Kim OTR/L Occupational Therapist                     Mobility/ADL's       Row Name 07/20/25 1143 07/20/25 1100       Bed Mobility    Bed Mobility --  -KP supine-sit  -KP    Supine-Sit [place] (Bed Mobility) --  -KP moderate assist (50% patient effort);1 person assist;2 person assist  -KP    Assistive Device (Bed Mobility) --  -KP bed rails;head of bed elevated;repositioning sheet  -      Row Name 07/20/25 1143 07/20/25 1100       Transfers    Transfers --  -KP sit-stand transfer;stand-sit transfer;toilet transfer;bed-chair transfer  -      Row Name 07/20/25 1143 07/20/25 1100       Bed-Chair Transfer    Bed-Chair [place] (Transfers) --  -KP minimum assist (75% patient effort);2 person assist  -KP    Assistive Device (Bed-Chair Transfers) --  -KP walker, front-wheeled  -KP      Row Name 07/20/25 1143 07/20/25 1100       Sit-Stand Transfer    Sit-Stand [place] (Transfers) --  -KP minimum assist (75% patient effort);moderate assist (50% patient effort);2 person assist  -KP    Assistive Device (Sit-Stand Transfers) --  -KP walker, front-wheeled  -KP      Row Name 07/20/25 1143 07/20/25 1100       Stand-Sit Transfer    Stand-Sit [place] (Transfers) --  -KP minimum assist (75% patient effort);verbal cues  -KP    Assistive Device (Stand-Sit Transfers) --  -KP walker, front-wheeled  -KP      Row Name 07/20/25 1143 07/20/25 1100       Toilet Transfer    Type (Toilet Transfer) --  -KP stand pivot/stand step  -KP    [place] Level (Toilet Transfer) --  -KP minimum assist (75% patient effort);verbal cues  -KP    Assistive Device (Toilet Transfer) --  -KP commode, bedside without drop arms  -      Row Name 07/20/25 1143 07/20/25 1100       Functional  Mobility    Functional Mobility- Ind. Level --  -KP contact guard assist;minimum assist (75% patient effort);verbal cues required  -    Patient was able to Ambulate --  -KP yes  -[facility] Name 07/20/25 1143 07/20/25 1100       Activities of Daily Living    BADL Assessment/Intervention --  -KP lower body dressing;toileting  -[facility] Name 07/20/25 1143 07/20/25 1100       Mobility    Extremity Weight-bearing Status --  -KP right lower extremity  -    Right Lower Extremity (Weight-bearing Status) --  -KP weight-bearing as tolerated (WBAT)  -[facility] Name 07/20/25 1143 07/20/25 1100       Lower Body Dressing Assessment/Training    [place] Level (Lower Body Dressing) --  -KP don;socks;dependent (less than 25% patient effort)  -KP      Row Name 07/20/25 1143 07/20/25 1100       Toileting Assessment/Training    [place] Level (Toileting) --  - toileting skills;perform perineal hygiene;dependent (less than 25% patient effort)  -    Assistive Devices (Toileting) --  - commode, bedside without drop arms  -              User Key  (r) = Recorded By, (t) = Taken By, (c) = Cosigned By      Initials Name Provider Type     Cintia Cadena OTR/L Occupational Therapist                   Obj/Interventions       Row Name 07/20/25 1100          Sensory Assessment (Somatosensory)    Sensory Assessment (Somatosensory) UE sensation intact  -KP       Row Name 07/20/25 1100          Vision Assessment/Intervention    Visual Impairment/Limitations WNL  -KP       Row Name 07/20/25 1100          Range of Motion Comprehensive    General Range of Motion bilateral upper extremity ROM WFL  -KP       Row Name 07/20/25 1100          Strength Comprehensive (MMT)    Comment, General Manual Muscle Testing (MMT) Assessment nor formal assessment due to pain  -KP       Row Name 07/20/25 1100          Balance    Balance Assessment sitting static balance;sitting dynamic balance;standing static balance;standing dynamic balance   -KP     Static Sitting Balance verbal cues;contact guard  -KP     Dynamic Sitting Balance verbal cues;contact guard  -KP     Position, Sitting Balance unsupported;sitting edge of bed;other (see comments)  BSC  -KP     Static Standing Balance minimal assist;moderate assist  -KP     Dynamic Standing Balance moderate assist  -KP     Position/Device Used, Standing Balance supported;walker, front-wheeled  -KP               User Key  (r) = Recorded By, (t) = Taken By, (c) = Cosigned By      Initials Name Provider Type    Cintia Kim, OTR/L Occupational Therapist                   Goals/Plan       Row Name 07/20/25 1100          Transfer Goal 1 (OT)    Activity/Assistive Device (Transfer Goal 1, OT) bed-to-chair/chair-to-bed;commode, bedside without drop arms  -KP     [place] Level/Cues Needed (Transfer Goal 1, OT) standby assist  -KP     Time Frame (Transfer Goal 1, OT) long term goal (LTG);10 days  -KP     Progress/Outcome (Transfer Goal 1, OT) new goal  -       Row Name 07/20/25 1100          Dressing Goal 1 (OT)    Activity/Device (Dressing Goal 1, OT) upper body dressing  -KP     [place]/Cues Needed (Dressing Goal 1, OT) standby assist  -KP     Time Frame (Dressing Goal 1, OT) long term goal (LTG);10 days  -KP     Progress/Outcome (Dressing Goal 1, OT) new goal  -       Row Name 07/20/25 1100          Toileting Goal 1 (OT)    Activity/Device (Toileting Goal 1, OT) toileting skills, all  -KP     [place] Level/Cues Needed (Toileting Goal 1, OT) minimum assist (75% or more patient effort)  -KP     Time Frame (Toileting Goal 1, OT) long term goal (LTG);10 days  -KP     Progress/Outcome (Toileting Goal 1, OT) new goal  -       Row Name 07/20/25 1100          Therapy Assessment/Plan (OT)    Planned Therapy Interventions (OT) activity tolerance training;BADL retraining;functional balance retraining;occupation/activity based interventions;patient/caregiver education/training;ROM/therapeutic  exercise;strengthening exercise;transfer/mobility retraining  -               User Key  (r) = Recorded By, (t) = Taken By, (c) = Cosigned By      Initials Name Provider Type    Cintia Kim, OTR/L Occupational Therapist                   Clinical Impression       Row Name 07/20/25 1100          Pain Assessment    Pain Location hip  -     Pain Side/Orientation right  -     Pain Management Interventions exercise or physical activity utilized;positioning techniques utilized  -     Response to Pain Interventions activity participation with increased pain  -       Row Name 07/20/25 1100          Pain Scale: FACES Pre/Post-Treatment    Pain: FACES Scale, Pretreatment 2-->hurts little bit  -     Posttreatment Pain Rating 6-->hurts even more  -       Row Name 07/20/25 1100          Plan of Care Review    Plan of Care Reviewed With patient;son  -     Outcome Evaluation OT evaluation complete. Pt oriented to self only, very confused. Pt is poor historian, but pt son arrived near end of eval and was able to assist w/ PLOF, which included pt living alone but had CGs and required min-mod a w/ adls, used fww to complete fxl mob. today, pt c/o pf her back hurting. completes bed mobility w/ mod A, t/f to BSC w/ min A and use off FWW. Required max A-dep for toileting and then t/f to recliner w/ min A. BUE ROM WNL, strength functional. OT to cont to see pt for deficits, left in recliner w/ call light and son nearby. D/C rec SNF.  -       Row Name 07/20/25 1100          Therapy Assessment/Plan (OT)    Rehab Potential (OT) fair  -     Criteria for Skilled Therapeutic Interventions Met (OT) yes;meets criteria  -     Therapy Frequency (OT) 5 times/wk  -     Predicted Duration of Therapy Intervention (OT) until hospital d/c  -       Row Name 07/20/25 1100          Therapy Plan Review/Discharge Plan (OT)    Anticipated Discharge Disposition (OT) skilled nursing facility  -       Row Name 07/20/25 1100           Positioning and Restraints    Pre-Treatment Position in bed  -KP     Post Treatment Position chair  -KP     In Chair notified nsg;reclined;call light within reach;encouraged to call for assist;with family/caregiver;patient within staff view  -KP               User Key  (r) = Recorded By, (t) = Taken By, (c) = Cosigned By      Initials Name Provider Type    Cintia Kim OTR/L Occupational Therapist                   Outcome Measures       Row Name 07/20/25 1100          How much help from another is currently needed...    Putting on and taking off regular lower body clothing? 2  -KP     Bathing (including washing, rinsing, and drying) 2  -KP     Toileting (which includes using toilet bed pan or urinal) 2  -KP     Putting on and taking off regular upper body clothing 2  -KP     Taking care of personal grooming (such as brushing teeth) 3  -KP     Eating meals 3  -KP     AM-PAC 6 Clicks Score (OT) 14  -KP       Row Name 07/20/25 0702          How much help from another person do you currently need...    Turning from your back to your side while in flat bed without using bedrails? 3  -AJ     Moving from lying on back to sitting on the side of a flat bed without bedrails? 3  -AJ     Moving to and from a bed to a chair (including a wheelchair)? 2  -AJ     Standing up from a chair using your arms (e.g., wheelchair, bedside chair)? 2  -AJ     Climbing 3-5 steps with a railing? 1  -AJ     To walk in hospital room? 2  -AJ     AM-PAC 6 Clicks Score (PT) 13  -AJ     Highest Level of Mobility Goal Move to Chair/Commode-4  -AJ       Row Name 07/20/25 1100 07/20/25 0702       Functional Assessment    Outcome Measure Options AM-PAC 6 Clicks Daily Activity (OT)  -KP AM-PAC 6 Clicks Basic Mobility (PT)  -              User Key  (r) = Recorded By, (t) = Taken By, (c) = Cosigned By      Initials Name Provider Type    Helio Briceño, PT DPT Physical Therapist    Cintia Kim OTR/L Occupational Therapist                     Occupational Therapy Education       Title: PT OT SLP Therapies (Done)       Topic: Occupational Therapy (Done)       Point: ADL training (Done)       Learning Progress Summary            Patient Acceptance, E,D, VU,DU,NL by  at 7/20/2025 1336                      Point: Precautions (Done)       Learning Progress Summary            Patient Acceptance, E,D, VU,DU,NL by  at 7/20/2025 1336                      Point: Body mechanics (Done)       Learning Progress Summary            Patient Acceptance, E,D, VU,DU,NL by  at 7/20/2025 1336                                      User Key       Initials Effective Dates Name Provider Type Discipline     10/08/24 -  Cintia Cadena, OTR/L Occupational Therapist OT                  OT Recommendation and Plan  Planned Therapy Interventions (OT): activity tolerance training, BADL retraining, functional balance retraining, occupation/activity based interventions, patient/caregiver education/training, ROM/therapeutic exercise, strengthening exercise, transfer/mobility retraining  Therapy Frequency (OT): 5 times/wk  Plan of Care Review  Plan of Care Reviewed With: patient, son  Outcome Evaluation: OT evaluation complete. Pt oriented to self only, very confused. Pt is poor historian, but pt son arrived near end of eval and was able to assist w/ PLOF, which included pt living alone but had CGs and required min-mod a w/ adls, used fww to complete fxl mob. today, pt c/o pf her back hurting. completes bed mobility w/ mod A, t/f to BSC w/ min A and use off FWW. Required max A-dep for toileting and then t/f to recliner w/ min A. BUE ROM WNL, strength functional. OT to cont to see pt for deficits, left in recliner w/ call light and son nearby. D/C rec SNF.     Time Calculation:         Time Calculation- OT       Row Name 07/20/25 1100             Time Calculation- OT    OT Start Time 1050  +10 for CR  -KP      OT Stop Time 1134  -      OT Time Calculation (min) 44 min  -KP       OT Received On 07/20/25  -      OT Goal Re-Cert Due Date 07/30/25  -KP         Untimed Charges    OT Eval/Re-eval Minutes 54  -KP         Total Minutes    Untimed Charges Total Minutes 54  -KP       Total Minutes 54  -KP                User Key  (r) = Recorded By, (t) = Taken By, (c) = Cosigned By      Initials Name Provider Type     Cintia Cadena, OTR/L Occupational Therapist                  Therapy Charges for Today       Code Description Service Date Service Provider Modifiers Qty    67025622700 HC OT EVAL MOD COMPLEXITY 4 7/20/2025 Cintia Cadena OTR/L GO 1                 Cinita Cadena OTR/YON  7/20/2025

## 2025-07-20 NOTE — PLAN OF CARE
Goal Outcome Evaluation:  Plan of Care Reviewed With: patient, son           Outcome Evaluation: OT evaluation complete. Pt oriented to self only, very confused. Pt is poor historian, but pt son arrived near end of eval and was able to assist w/ PLOF, which included pt living alone but had CGs and required min-mod a w/ adls, used fww to complete fxl mob. today, pt c/o pf her back hurting. completes bed mobility w/ mod A, t/f to BSC w/ min A and use off FWW. Required max A-dep for toileting and then t/f to recliner w/ min A. BUE ROM WNL, strength functional. OT to cont to see pt for deficits, left in recliner w/ call light and son nearby. D/C rec SNF.    Anticipated Discharge Disposition (OT): skilled nursing facility

## 2025-07-20 NOTE — PLAN OF CARE
Goal Outcome Evaluation:  Plan of Care Reviewed With: patient           Outcome Evaluation: PT eval complete. Pt in fowlers position, oriented to self only andneeds to use BSC. Pt initially reporting being indep at home, but near session end, pt son present in room and stated she has declined and requried increased assist daily, and has sitters for a few hours daily, and also he lives across the street. Pt today required Mod A for bed mobility, stood and t/f to Bsc for BM. pt required verbal cues for directioning and used FWW for stability. Pt was Dep A for chygeine and t/f to recliner once hygeine complete. Pt left in recliner and will benefit from skilled PT services to improve t/f, decrease fall risk, decrease caregiver burden and return to PLOF. Recommend SNF when stable for d/c    Anticipated Discharge Disposition (PT): skilled nursing facility

## 2025-07-20 NOTE — PLAN OF CARE
Goal Outcome Evaluation:      Pt A&Ox2-3, intermittently confused to place and situation. Unable to re orientate pt. NSR 70s-90s when agitated. BP stable, soft at times. Tolerating RA. UOP adequate, multiple bowel movements this shift. Pt remains in restraints. Precedex currently at 0.8. Na+ 122 this AM continuing NS at 250 per provider. Safety maintained.

## 2025-07-20 NOTE — PROGRESS NOTES
[facility] Intensivist Services  INPATIENT PROGRESS NOTE    Patient Name: Shira King  Date of Admission: 7/19/2025  Today's Date: 07/20/25  Length of Stay:  LOS: 1 day   Primary Care Physician: Reynaldo Cavazos MD  Next of Kin: Primary Emergency Contact: Montse Jung, [place] Phone: [phone]      Subjective   Chief Complaint: Fall, hyponatremia    Fall       85-year-old female with a past medical history of essential hypertension on HCTZ that was recently initiated, dementia, prior stroke, and hyperlipidemia admitted on 7/19/2025 after presenting to [facility] post fall.  She was admitted to the intensivist team as she was found to be hyponatremic.  She was also noted to have a right inferior pubic ramus fracture.    Interval history:  7/20: Latest sodium up to 124 this afternoon.  Patient remains intermittently confused and agitated.  She is redirectable at times.  However, she is having more confusion lately.  As of this, patient is being started back on Precedex.      Review of Systems   All pertinent negatives and positives are as above. All other systems have been reviewed and are negative unless otherwise stated.     Past Medical and Past Surgical History   Active and Resolved Problems  Active Hospital Problems    Diagnosis  POA    **Hyponatremia [E87.1]  Yes      Resolved Hospital Problems   No resolved problems to display.       Past Medical History:   Past Medical History:   Diagnosis Date    Cancer     skin    Chronic intractable headache 02/08/2021    Gastroesophageal reflux disease 02/03/2020    Hypertension     Lumbar pain 07/12/2019    Mixed hyperlipidemia     PONV (postoperative nausea and vomiting)     Sinusitis     Stroke     Valvular disease      Past Surgical History:   Past Surgical History:   Procedure Laterality Date    BLADDER REPAIR  2005    BREAST SURGERY      BUNIONECTOMY  2007    CARDIAC CATHETERIZATION      CATARACT  EXTRACTION Bilateral 2005    CHOLECYSTECTOMY      COLPOCLEISIS N/A 6/14/2022    Procedure: COLPOCLEISIS;  Surgeon: Martina Street MD;  Location: [facility];  Service: Obstetrics/Gynecology;  Laterality: N/A;    ENDOSCOPY  2005    HERNIA REPAIR  1963    HYSTERECTOMY  1979    KRISTAN BSO for fibroids    NECK SURGERY  1974    VERTEBRA    REPLACEMENT TOTAL KNEE BILATERAL Bilateral     TONSILLECTOMY       Social and Family History   Family History:  family history includes Heart attack in her maternal grandmother; Heart disease in her mother; Stomach cancer in her maternal uncle; Stroke in her mother and sister.    Tobacco/Social History:  reports that she has never smoked. She has never used smokeless tobacco. She reports that she does not drink alcohol and does not use drugs.    Allergies   Allergies:   She is allergic to aricept [donepezil hcl].    Objective    Temp:  [96.7 °F (35.9 °C)-98 °F (36.7 °C)] 97.5 °F (36.4 °C)  Heart Rate:  [] 96  Resp:  [16-38] 17  BP: ()/() 129/114  Physical Exam  Vitals reviewed.   Constitutional:       General: She is awake. She is not in acute distress.     Appearance: She is ill-appearing. She is not diaphoretic.   HENT:      Head: Normocephalic.      Nose: Nose normal.      Mouth/Throat:      Mouth: Mucous membranes are moist.   Eyes:      Extraocular Movements: Extraocular movements intact.   Cardiovascular:      Rate and Rhythm: Normal rate and regular rhythm.      Pulses: Normal pulses.   Pulmonary:      Effort: Pulmonary effort is normal. No respiratory distress.   Abdominal:      Palpations: Abdomen is soft.   Skin:     General: Skin is warm.      Capillary Refill: Capillary refill takes less than 2 seconds.   Neurological:      General: No focal deficit present.      Mental Status: She is alert.      Comments: Intermittently disoriented and confused         Inpatient Medications   Medications: Scheduled Meds:ALPRAZolam, 0.25 mg, Oral, Once  aspirin, 81 mg, Oral,  Every Other Day  Chlorhexidine Gluconate Cloth, 1 Application, Topical, Q24H  enoxaparin sodium, 40 mg, Subcutaneous, Daily  famotidine, 20 mg, Oral, BID  methocarbamol, 750 mg, Oral, 4x Daily  mupirocin, 1 Application, Each Nare, BID  QUEtiapine, 100 mg, Oral, Nightly  QUEtiapine, 25 mg, Oral, Daily  senna-docusate sodium, 2 tablet, Oral, BID  sodium chloride, 10 mL, Intravenous, Q12H      Continuous Infusions:dexmedetomidine, 0.2-1.5 mcg/kg/hr, Last Rate: 1 mcg/kg/hr (07/20/25 1446)  sodium chloride, 250 mL/hr, Last Rate: 250 mL/hr (07/20/25 1352)      PRN Meds:.  acetaminophen    ALPRAZolam    senna-docusate sodium **AND** polyethylene glycol **AND** bisacodyl **AND** bisacodyl    celecoxib    HYDROmorphone    ondansetron ODT **OR** ondansetron    oxyCODONE    sodium chloride    sodium chloride    sodium chloride    I have reviewed the patient's current medications.   Outpatient Medications     Current Outpatient Medications   Medication Instructions    acetaminophen (TYLENOL) 250 mg, Every 4 Hours    ALPRAZolam (XANAX) 0.25 mg, Oral, 2 Times Daily PRN    aluminum-magnesium hydroxide-simethicone (MAALOX/MYLANTA) 200-200-20 MG/5ML suspension 10 mL, Oral, Every 6 Hours PRN    amLODIPine (NORVASC) 5 mg, Daily    aspirin 81 mg, Every Other Day    B Complex Vitamins (Vitamin B Complex) tablet 1 tablet, Daily    carvedilol (COREG) 6.25 mg, 2 Times Daily With Meals    celecoxib (CELEBREX) 200 mg, Daily    hydroCHLOROthiazide 12.5 mg, Daily    HYDROcodone-acetaminophen (NORCO) 7.5-325 MG per tablet 0.5 tablets, Every 4 Hours    losartan (COZAAR) 100 mg, Daily    metoclopramide (REGLAN) 5 mg, Daily    omeprazole (PRILOSEC) 20 mg, 2 Times Daily    oxybutynin (DITROPAN) 5 mg, Oral, Daily    pentoxifylline (TRENTAL) 400 mg, Daily    pravastatin (PRAVACHOL) 20 mg, Daily       Current Antibiotics   This patient does not have an active medication from one of the medication groupers.    Results:   CBC:      Lab 07/19/25  9878    WBC 8.12   HEMOGLOBIN 12.3   HEMATOCRIT 34.2   PLATELETS 202   NEUTROS ABS 6.77   IMMATURE GRANS (ABS) 0.06*   LYMPHS ABS 0.62*   MONOS ABS 0.61   EOS ABS 0.04   MCV 89.8     LIVER FUNCTION TESTS:      Lab 07/19/25  1037 07/19/25  0509   TOTAL PROTEIN 5.7* 6.0   ALBUMIN 3.7 3.8   GLOBULIN 2.0 2.2   ALT (SGPT) 18 19   AST (SGOT) 53* 55*   BILIRUBIN 1.2 1.4*   ALK PHOS 84 85     ABG:      Lab 07/19/25  1037 07/19/25  0509   ANION GAP 11.0 11.0     BMP/MG/PHOS:      Lab 07/20/25  1207 07/20/25  0556 07/19/25  2338 07/19/25  1821 07/19/25  1604 07/19/25  1037 07/19/25  0509   SODIUM 124* 122* 119* 116* 116* 119* 115*   POTASSIUM  --   --   --   --   --  4.3 4.6   CHLORIDE  --   --   --   --   --  88* 86*   BUN  --   --   --   --   --  8.9 11.1   CREATININE  --   --   --   --   --  0.60 0.73   CALCIUM  --   --   --   --   --  8.5* 8.9   MAGNESIUM  --   --   --   --   --   --  1.8     IMAGING STUDIES:  XR Shoulder 2+ View Right  Result Date: 7/19/2025   No acute osseous finding.   This report was signed and finalized on 7/19/2025 3:09 PM by Helio Parisi.      CT Abdomen Pelvis Without Contrast  Result Date: 7/19/2025   1. Acute appearing essentially nondisplaced right inferior pubic ramus fracture. There may also be a subtle nondisplaced fracture about the right superior pubic ramus near the pubic symphysis. 2. 1.1 cm right adrenal adenoma. 3. Mild left abdominal wall soft tissue edema, likely contusion.   This report was signed and finalized on 7/19/2025 7:13 AM by Helio Parisi.      CT Cervical Spine Without Contrast  Result Date: 7/19/2025   1. No acute fracture or traumatic malalignment. 2. Enlarged multinodular thyroid gland.  These findings are in agreement with the critical and emergent findings from the [facility] preliminary report.    This report was signed and finalized on 7/19/2025 7:05 AM by Helio Parisi.      XR Femur 2 View Right  Result Date: 7/19/2025   No acute osseous finding in the right femur.    This report was signed and finalized on 7/19/2025 7:01 AM by Helio Parisi.      XR Hip With or Without Pelvis 2 - 3 View Right  Result Date: 7/19/2025   Acute appearing very minimally displaced right inferior pubic ramus fracture.   This report was signed and finalized on 7/19/2025 7:00 AM by Helio Parisi.      XR Chest 1 View  Result Date: 7/19/2025   No acute cardiopulmonary abnormality.    This report was signed and finalized on 7/19/2025 6:58 AM by Helio Parisi.      CT Head Without Contrast  Result Date: 7/19/2025  Impression:   No acute intracranial abnormality.  These findings are in agreement with the critical and emergent findings from the [facility] preliminary report.  This report was signed and finalized on 7/19/2025 6:16 AM by Helio Parisi.         Assessment/Plan   85-year-old female with past medical history of dementia, hyperlipidemia, and hypertension who was recently started on HCTZ admitted after presenting to [facility] on 7/19/2025 with complaints of fall.  She was also noted to be hyponatremic with an initial sodium of 115.  She was admitted to the intensivist team for further workup and management.    Hyponatremia  -Sodium now up to 124 this afternoon  - Likely secondary to recently starting HCTZ  - Continue to hold HCTZ  -Continue to trend serial sodium levels every 6 hours  -Goal of sodium correction 8 mEq in 24 hours     Encephalopathy  - Multifactorial in setting of hyponatremia, acute pain, and opioid pain medications  - Continue to correct sodium levels as above  - Avoid oversedation, adequate pain control  - Reorient as needed  -Continue seroquel 25 mg daily  -Precedex to be restarted if patient becomes more agitated    Right inferior pubic ramus fracture with right hip pain  - Found on CT scan on admission  - No surgery required per orthopedics.  Patient will be weightbearing as tolerated with a walker and follow-up with them as an outpatient.  -Continue adequate pain control  with oxycodone and fentanyl as needed    Essential hypertension  -Holding HCTZ  -Resuming Norvasc and Coreg per home schedule now      CODE STATUS: DNR/Full support  VTE prophylaxis: lovenox  Nutrition: Regular diet  Bowel: prn bowel regimen  GI prophylaxis: Protonix  Antibiotics: N/A     Disposition: Critical care management    Total critical care time: 36 minutes    Due to a high probability of clinically significant, life threatening deterioration, the patient required my highest level of preparedness to intervene emergently and I personally spent this critical care time directly and personally managing the patient.     This critical care time included obtaining a history; examining the patient; pulse oximetry; ordering and review of studies; arranging urgent treatment with development of a management plan; evaluation of patient's response to treatment; frequent reassessment; and, discussions with other providers.    This critical care time was performed to assess and manage the high probability of imminent, life-threatening deterioration that could result in multi-organ failure. It was exclusive of separately billable procedures and treating other patients and teaching time.    Please see MDM section and the rest of the note for further information on patient assessment and treatment.    Part of this note may be an electronic transcription/translation of spoken language to printed text using the Dragon Dictation System    Electronically signed by Kd Cowart PA-C on 7/20/2025 at 14:56 CDT

## 2025-07-21 LAB
ALBUMIN SERPL-MCNC: 3.3 G/DL (ref 3.5–5.2)
ALBUMIN/GLOB SERPL: 1.7 G/DL
ALP SERPL-CCNC: 66 U/L (ref 39–117)
ALT SERPL W P-5'-P-CCNC: 15 U/L (ref 1–33)
ANION GAP SERPL CALCULATED.3IONS-SCNC: 11 MMOL/L (ref 5–15)
AST SERPL-CCNC: 55 U/L (ref 1–32)
BASOPHILS # BLD AUTO: 0.03 10*3/MM3 (ref 0–0.2)
BASOPHILS NFR BLD AUTO: 0.5 % (ref 0–1.5)
BILIRUB SERPL-MCNC: 0.9 MG/DL (ref 0–1.2)
BUN SERPL-MCNC: 2.6 MG/DL (ref 8–23)
BUN/CREAT SERPL: 7.9 (ref 7–25)
CALCIUM SPEC-SCNC: 8.1 MG/DL (ref 8.6–10.5)
CHLORIDE SERPL-SCNC: 95 MMOL/L (ref 98–107)
CO2 SERPL-SCNC: 22 MMOL/L (ref 22–29)
CREAT SERPL-MCNC: 0.33 MG/DL (ref 0.57–1)
DEPRECATED RDW RBC AUTO: 40.1 FL (ref 37–54)
EGFRCR SERPLBLD CKD-EPI 2021: 100.5 ML/MIN/1.73
EOSINOPHIL # BLD AUTO: 0.12 10*3/MM3 (ref 0–0.4)
EOSINOPHIL NFR BLD AUTO: 1.9 % (ref 0.3–6.2)
ERYTHROCYTE [DISTWIDTH] IN BLOOD BY AUTOMATED COUNT: 11.9 % (ref 12.3–15.4)
GLOBULIN UR ELPH-MCNC: 2 GM/DL
GLUCOSE SERPL-MCNC: 122 MG/DL (ref 65–99)
HCT VFR BLD AUTO: 33.6 % (ref 34–46.6)
HGB BLD-MCNC: 11.7 G/DL (ref 12–15.9)
IMM GRANULOCYTES # BLD AUTO: 0.02 10*3/MM3 (ref 0–0.05)
IMM GRANULOCYTES NFR BLD AUTO: 0.3 % (ref 0–0.5)
LYMPHOCYTES # BLD AUTO: 0.62 10*3/MM3 (ref 0.7–3.1)
LYMPHOCYTES NFR BLD AUTO: 9.6 % (ref 19.6–45.3)
MAGNESIUM SERPL-MCNC: 1.4 MG/DL (ref 1.6–2.4)
MCH RBC QN AUTO: 31.8 PG (ref 26.6–33)
MCHC RBC AUTO-ENTMCNC: 34.8 G/DL (ref 31.5–35.7)
MCV RBC AUTO: 91.3 FL (ref 79–97)
MONOCYTES # BLD AUTO: 0.57 10*3/MM3 (ref 0.1–0.9)
MONOCYTES NFR BLD AUTO: 8.9 % (ref 5–12)
NEUTROPHILS NFR BLD AUTO: 5.08 10*3/MM3 (ref 1.7–7)
NEUTROPHILS NFR BLD AUTO: 78.8 % (ref 42.7–76)
NRBC BLD AUTO-RTO: 0 /100 WBC (ref 0–0.2)
PHOSPHATE SERPL-MCNC: 1.6 MG/DL (ref 2.5–4.5)
PLATELET # BLD AUTO: 135 10*3/MM3 (ref 140–450)
PMV BLD AUTO: 8.1 FL (ref 6–12)
POTASSIUM SERPL-SCNC: 2.7 MMOL/L (ref 3.5–5.2)
PROT SERPL-MCNC: 5.3 G/DL (ref 6–8.5)
RBC # BLD AUTO: 3.68 10*6/MM3 (ref 3.77–5.28)
SODIUM SERPL-SCNC: 126 MMOL/L (ref 136–145)
SODIUM SERPL-SCNC: 128 MMOL/L (ref 136–145)
SODIUM SERPL-SCNC: 128 MMOL/L (ref 136–145)
SODIUM SERPL-SCNC: 129 MMOL/L (ref 136–145)
WBC NRBC COR # BLD AUTO: 6.44 10*3/MM3 (ref 3.4–10.8)

## 2025-07-21 PROCEDURE — 25010000002 ENOXAPARIN PER 10 MG: Performed by: STUDENT IN AN ORGANIZED HEALTH CARE EDUCATION/TRAINING PROGRAM

## 2025-07-21 PROCEDURE — 80053 COMPREHEN METABOLIC PANEL: CPT | Performed by: PHYSICIAN ASSISTANT

## 2025-07-21 PROCEDURE — 84100 ASSAY OF PHOSPHORUS: CPT | Performed by: PHYSICIAN ASSISTANT

## 2025-07-21 PROCEDURE — 97535 SELF CARE MNGMENT TRAINING: CPT | Performed by: OCCUPATIONAL THERAPIST

## 2025-07-21 PROCEDURE — 84295 ASSAY OF SERUM SODIUM: CPT | Performed by: STUDENT IN AN ORGANIZED HEALTH CARE EDUCATION/TRAINING PROGRAM

## 2025-07-21 PROCEDURE — 25010000002 POTASSIUM CHLORIDE 10 MEQ/100ML SOLUTION: Performed by: PHYSICIAN ASSISTANT

## 2025-07-21 PROCEDURE — 25810000003 SODIUM CHLORIDE 0.9 % SOLUTION: Performed by: PHYSICIAN ASSISTANT

## 2025-07-21 PROCEDURE — 83735 ASSAY OF MAGNESIUM: CPT | Performed by: PHYSICIAN ASSISTANT

## 2025-07-21 PROCEDURE — 25010000002 MAGNESIUM SULFATE 2 GM/50ML SOLUTION: Performed by: STUDENT IN AN ORGANIZED HEALTH CARE EDUCATION/TRAINING PROGRAM

## 2025-07-21 PROCEDURE — 92526 ORAL FUNCTION THERAPY: CPT | Performed by: SPEECH-LANGUAGE PATHOLOGIST

## 2025-07-21 PROCEDURE — 25010000002 HALOPERIDOL LACTATE PER 5 MG: Performed by: PHYSICIAN ASSISTANT

## 2025-07-21 PROCEDURE — 85025 COMPLETE CBC W/AUTO DIFF WBC: CPT | Performed by: PHYSICIAN ASSISTANT

## 2025-07-21 PROCEDURE — 97530 THERAPEUTIC ACTIVITIES: CPT

## 2025-07-21 RX ORDER — POTASSIUM CHLORIDE 1500 MG/1
40 TABLET, EXTENDED RELEASE ORAL ONCE
Status: COMPLETED | OUTPATIENT
Start: 2025-07-21 | End: 2025-07-21

## 2025-07-21 RX ORDER — POTASSIUM CHLORIDE 7.45 MG/ML
10 INJECTION INTRAVENOUS
Status: COMPLETED | OUTPATIENT
Start: 2025-07-21 | End: 2025-07-21

## 2025-07-21 RX ORDER — MAGNESIUM SULFATE HEPTAHYDRATE 40 MG/ML
2 INJECTION, SOLUTION INTRAVENOUS
Status: COMPLETED | OUTPATIENT
Start: 2025-07-21 | End: 2025-07-21

## 2025-07-21 RX ORDER — HALOPERIDOL 5 MG/ML
1 INJECTION INTRAMUSCULAR EVERY 6 HOURS PRN
Status: DISCONTINUED | OUTPATIENT
Start: 2025-07-21 | End: 2025-07-22

## 2025-07-21 RX ORDER — FENTANYL/ROPIVACAINE/NS/PF 2-625MCG/1
15 PLASTIC BAG, INJECTION (ML) EPIDURAL
Status: COMPLETED | OUTPATIENT
Start: 2025-07-21 | End: 2025-07-21

## 2025-07-21 RX ADMIN — DEXMEDETOMIDINE HYDROCHLORIDE 0.4 MCG/KG/HR: 400 INJECTION INTRAVENOUS at 06:17

## 2025-07-21 RX ADMIN — METHOCARBAMOL TABLETS 750 MG: 500 TABLET, COATED ORAL at 21:02

## 2025-07-21 RX ADMIN — ALPRAZOLAM 0.25 MG: 0.25 TABLET ORAL at 21:01

## 2025-07-21 RX ADMIN — Medication 10 ML: at 21:02

## 2025-07-21 RX ADMIN — HALOPERIDOL LACTATE 1 MG: 5 INJECTION, SOLUTION INTRAMUSCULAR at 23:18

## 2025-07-21 RX ADMIN — ACETAMINOPHEN 1000 MG: 500 TABLET, FILM COATED ORAL at 17:09

## 2025-07-21 RX ADMIN — QUETIAPINE FUMARATE 25 MG: 25 TABLET ORAL at 09:56

## 2025-07-21 RX ADMIN — OXYBUTYNIN CHLORIDE 5 MG: 5 TABLET ORAL at 09:55

## 2025-07-21 RX ADMIN — POTASSIUM CHLORIDE 10 MEQ: 7.46 INJECTION, SOLUTION INTRAVENOUS at 15:10

## 2025-07-21 RX ADMIN — METHOCARBAMOL TABLETS 750 MG: 500 TABLET, COATED ORAL at 17:09

## 2025-07-21 RX ADMIN — SODIUM CHLORIDE 15 MMOL: 9 INJECTION, SOLUTION INTRAVENOUS at 15:12

## 2025-07-21 RX ADMIN — CARVEDILOL 6.25 MG: 6.25 TABLET, FILM COATED ORAL at 09:57

## 2025-07-21 RX ADMIN — PENTOXIFYLLINE 400 MG: 400 TABLET, EXTENDED RELEASE ORAL at 09:57

## 2025-07-21 RX ADMIN — ENOXAPARIN SODIUM 40 MG: 100 INJECTION SUBCUTANEOUS at 09:58

## 2025-07-21 RX ADMIN — Medication 10 ML: at 09:58

## 2025-07-21 RX ADMIN — MAGNESIUM SULFATE HEPTAHYDRATE 2 G: 40 INJECTION, SOLUTION INTRAVENOUS at 15:11

## 2025-07-21 RX ADMIN — ACETAMINOPHEN 1000 MG: 500 TABLET, FILM COATED ORAL at 09:55

## 2025-07-21 RX ADMIN — QUETIAPINE FUMARATE 100 MG: 100 TABLET ORAL at 20:58

## 2025-07-21 RX ADMIN — Medication 1 APPLICATION: at 09:55

## 2025-07-21 RX ADMIN — CHLORHEXIDINE GLUCONATE 1 APPLICATION: 500 CLOTH TOPICAL at 06:17

## 2025-07-21 RX ADMIN — Medication 1 APPLICATION: at 20:58

## 2025-07-21 RX ADMIN — PRAVASTATIN SODIUM 20 MG: 20 TABLET ORAL at 21:01

## 2025-07-21 RX ADMIN — PANTOPRAZOLE SODIUM 40 MG: 40 TABLET, DELAYED RELEASE ORAL at 06:17

## 2025-07-21 RX ADMIN — POTASSIUM CHLORIDE 10 MEQ: 7.46 INJECTION, SOLUTION INTRAVENOUS at 12:44

## 2025-07-21 RX ADMIN — OXYCODONE HYDROCHLORIDE 7.5 MG: 5 SOLUTION ORAL at 22:56

## 2025-07-21 RX ADMIN — POTASSIUM CHLORIDE 10 MEQ: 7.46 INJECTION, SOLUTION INTRAVENOUS at 11:28

## 2025-07-21 RX ADMIN — AMLODIPINE BESYLATE 5 MG: 5 TABLET ORAL at 09:57

## 2025-07-21 RX ADMIN — POTASSIUM CHLORIDE 40 MEQ: 1500 TABLET, EXTENDED RELEASE ORAL at 11:28

## 2025-07-21 RX ADMIN — POTASSIUM CHLORIDE 10 MEQ: 7.46 INJECTION, SOLUTION INTRAVENOUS at 13:52

## 2025-07-21 RX ADMIN — METHOCARBAMOL TABLETS 750 MG: 500 TABLET, COATED ORAL at 09:56

## 2025-07-21 RX ADMIN — DOCUSATE SODIUM 50 MG AND SENNOSIDES 8.6 MG 2 TABLET: 8.6; 5 TABLET, FILM COATED ORAL at 09:55

## 2025-07-21 RX ADMIN — MAGNESIUM SULFATE HEPTAHYDRATE 2 G: 40 INJECTION, SOLUTION INTRAVENOUS at 14:08

## 2025-07-21 RX ADMIN — MAGNESIUM SULFATE HEPTAHYDRATE 2 G: 40 INJECTION, SOLUTION INTRAVENOUS at 11:44

## 2025-07-21 RX ADMIN — METHOCARBAMOL TABLETS 750 MG: 500 TABLET, COATED ORAL at 12:54

## 2025-07-21 RX ADMIN — CARVEDILOL 6.25 MG: 6.25 TABLET, FILM COATED ORAL at 17:09

## 2025-07-21 RX ADMIN — SODIUM CHLORIDE 15 MMOL: 9 INJECTION, SOLUTION INTRAVENOUS at 12:45

## 2025-07-21 RX ADMIN — ASPIRIN 81 MG CHEWABLE TABLET 81 MG: 81 TABLET CHEWABLE at 09:57

## 2025-07-21 RX ADMIN — SODIUM CHLORIDE 125 ML/HR: 9 INJECTION, SOLUTION INTRAVENOUS at 11:28

## 2025-07-21 NOTE — PROGRESS NOTES
Christoph Carney MD   Orthopaedic Surgery Progress Note      7/21/2025   18:18 CDT    Name:  Shira King  MRN:    [id number]     Acct:     [id number]   Room:  [facility] Day: 2  POD:    * No surgery found *  Procedure: * Surgery not found *     Admit Date: 7/19/2025  PCP: Reynaldo Cavazos MD        Subjective:     Interval: Patient still complains of right shoulder pain and pelvic pain.  X-rays of the right shoulder were negative.      Medications:     Allergies:   Allergies   Allergen Reactions    Aricept [Donepezil Hcl] Other (See Comments)     Shaking, sweating       Current Meds:   Current Facility-Administered Medications   Medication Dose Route Frequency Provider Last Rate Last Admin    acetaminophen (TYLENOL) tablet 1,000 mg  1,000 mg Oral Q8H PRN Sandra, Ivis, DO   1,000 mg at 07/21/25 1709    ALPRAZolam (XANAX) tablet 0.25 mg  0.25 mg Oral BID PRN Gaetano Ramirez APRN   0.25 mg at 07/19/25 1417    ALPRAZolam (XANAX) tablet 0.25 mg  0.25 mg Oral Once Maksymy, Ivis, DO        amLODIPine (NORVASC) tablet 5 mg  5 mg Oral Daily Kd Cowart PA-C   5 mg at 07/21/25 0957    aspirin chewable tablet 81 mg  81 mg Oral Every Other Day Gaetano Ramirez APRN   81 mg at 07/21/25 0957    sennosides-docusate (PERICOLACE) 8.6-50 MG per tablet 2 tablet  2 tablet Oral BID Maksymyuk, Ivis, DO   2 tablet at 07/21/25 0955    And    polyethylene glycol (MIRALAX) packet 17 g  17 g Oral Daily PRN Maksymyuk, Ivis, DO        And    bisacodyl (DULCOLAX) EC tablet 5 mg  5 mg Oral Daily PRN Maksymyuk, Ivis, DO        And    bisacodyl (DULCOLAX) suppository 10 mg  10 mg Rectal Daily PRN Maksymy, Ivis, DO        Calcium Replacement - Follow Nurse / BPA Driven Protocol   Not Applicable PRN Kd Cowart PA-C        carvedilol (COREG) tablet 6.25 mg  6.25 mg Oral BID With Meals Kd Cowart, LISA   6.25 mg at 07/21/25 9499    celecoxib (CeleBREX) capsule 200 mg  200 mg Oral Daily  PRN Kd Cowart PA-C        Chlorhexidine Gluconate Cloth 2 % pads 1 Application  1 Application Topical Q24H Maikel Floresa, DO   1 Application at 07/21/25 0617    enoxaparin sodium (LOVENOX) syringe 40 mg  40 mg Subcutaneous Daily Maikel Floresa, DO   40 mg at 07/21/25 0958    haloperidol lactate (HALDOL) injection 1 mg  1 mg Intramuscular Q6H PRN Kd Cowart PA-C        HYDROmorphone (DILAUDID) injection 0.25 mg  0.25 mg Intravenous Q2H PRN Maikel Floresa, DO        Magnesium Standard Dose Replacement - Follow Nurse / BPA Driven Protocol   Not Applicable PRN Kd Cowart PA-C        methocarbamol (ROBAXIN) tablet 750 mg  750 mg Oral 4x Daily Maikel Floresa, DO   750 mg at 07/21/25 1709    mupirocin (BACTROBAN) 2 % nasal ointment 1 Application  1 Application Each Nare BID Maikel Floresa, DO   1 Application at 07/21/25 0955    ondansetron ODT (ZOFRAN-ODT) disintegrating tablet 4 mg  4 mg Oral Q6H PRN Sandra, Ivis, DO        Or    ondansetron (ZOFRAN) injection 4 mg  4 mg Intravenous Q6H PRN Ebony, Ivis, DO        oxybutynin (DITROPAN) tablet 5 mg  5 mg Oral Daily dK Cowart PA-C   5 mg at 07/21/25 0955    oxyCODONE (ROXICODONE) 5 MG/5ML solution 7.5 mg  7.5 mg Oral Q4H PRN Ebony, Ivis, DO        pantoprazole (PROTONIX) EC tablet 40 mg  40 mg Oral Q AM Kd Cowart PA-C   40 mg at 07/21/25 0617    pentoxifylline (TRENtal) CR tablet 400 mg  400 mg Oral Daily Kd Cowart PA-C   400 mg at 07/21/25 0957    Phosphorus Replacement - Follow Nurse / BPA Driven Protocol   Not Applicable PRKd Ayala PA-C        Potassium Replacement - Follow Nurse / BPA Driven Protocol   Not Applicable PRN Kd Cowart PA-C        pravastatin (PRAVACHOL) tablet 20 mg  20 mg Oral Nightly Kd Cowart PA-C   20 mg at 07/20/25 2141    QUEtiapine (SEROquel) tablet 100 mg  100 mg Oral Nightly Ivis Flores DO   100 mg at 07/20/25 2140     "QUEtiapine (SEROquel) tablet 25 mg  25 mg Oral Daily Maksymyuk, Ivis, DO   25 mg at 25 0956    sodium chloride 0.9 % flush 10 mL  10 mL Intravenous PRN Sven Laguna MD        sodium chloride 0.9 % flush 10 mL  10 mL Intravenous Q12H Maksymyuk, Ivis, DO   10 mL at 25 0958    sodium chloride 0.9 % flush 10 mL  10 mL Intravenous PRN Maksymyuk, Ivis, DO        sodium chloride 0.9 % infusion 40 mL  40 mL Intravenous PRN Maksymyuk, Ivis, DO             Data:     Vitals:  /55 (BP Location: Left arm, Patient Position: Lying)   Pulse 73   Temp 98.5 °F (36.9 °C) (Oral)   Resp 17   Ht 168.9 cm (66.5\")   Wt 77.6 kg (171 lb 1.2 oz)   LMP  (LMP Unknown)   SpO2 97%   BMI 27.20 kg/m²   Temp (24hrs), Av.8 °F (36.6 °C), Min:97.4 °F (36.3 °C), Max:98.5 °F (36.9 °C)      I/O (24Hr):    Intake/Output Summary (Last 24 hours) at 2025  Last data filed at 2025 1806  Gross per 24 hour   Intake 2559.09 ml   Output 4300 ml   Net -1740.91 ml       Labs:  Lab Results (last 72 hours)       Procedure Component Value Units Date/Time    Sodium [[id number]] Collected: 25 1754    Specimen: Blood Updated: 25 1810    Sodium [[id number]]  (Abnormal) Collected: 25 1205    Specimen: Blood Updated: 25 1228     Sodium 128 mmol/L     Phosphorus [[id number]]  (Abnormal) Collected: 25    Specimen: Blood Updated: 25 0949     Phosphorus 1.6 mg/dL     Magnesium [[id number]]  (Abnormal) Collected: 25    Specimen: Blood Updated: 25 0925     Magnesium 1.4 mg/dL     Comprehensive Metabolic Panel [[id number]]  (Abnormal) Collected: 25    Specimen: Blood Updated: 25     Glucose 122 mg/dL      BUN 2.6 mg/dL      Creatinine 0.33 mg/dL      Sodium 128 mmol/L      Potassium 2.7 mmol/L      Chloride 95 mmol/L      CO2 22.0 mmol/L      Calcium 8.1 mg/dL      Total Protein 5.3 g/dL      Albumin 3.3 g/dL      ALT (SGPT) 15 U/L      AST " (SGOT) 55 U/L      Alkaline Phosphatase 66 U/L      Total Bilirubin 0.9 mg/dL      Globulin 2.0 gm/dL      A/G Ratio 1.7 g/dL      BUN/Creatinine Ratio 7.9     Anion Gap 11.0 mmol/L      eGFR 100.5 mL/min/1.73     Narrative:      GFR Categories in Chronic Kidney Disease (CKD)              GFR Category          GFR (mL/min/1.73)    Interpretation  G1                    90 or greater        Normal or high (1)  G2                    60-89                Mild decrease (1)  G3a                   45-59                Mild to moderate decrease  G3b                   30-44                Moderate to severe decrease  G4                    15-29                Severe decrease  G5                    14 or less           Kidney failure    (1)In the absence of evidence of kidney disease, neither GFR category G1 or G2 fulfill the criteria for CKD.    eGFR calculation 2021 CKD-EPI creatinine equation, which does not include race as a factor    CBC & Differential [[id number]]  (Abnormal) Collected: 07/21/25 0627    Specimen: Blood Updated: 07/21/25 0635    Narrative:      The following orders were created for panel order CBC & Differential.  Procedure                               Abnormality         Status                     ---------                               -----------         ------                     CBC Auto Differential[[id number]]        Abnormal            Final result                 Please view results for these tests on the individual orders.    CBC Auto Differential [[id number]]  (Abnormal) Collected: 07/21/25 0627    Specimen: Blood Updated: 07/21/25 0635     WBC 6.44 10*3/mm3      RBC 3.68 10*6/mm3      Hemoglobin 11.7 g/dL      Hematocrit 33.6 %      MCV 91.3 fL      MCH 31.8 pg      MCHC 34.8 g/dL      RDW 11.9 %      RDW-SD 40.1 fl      MPV 8.1 fL      Platelets 135 10*3/mm3      Neutrophil % 78.8 %      Lymphocyte % 9.6 %      Monocyte % 8.9 %      Eosinophil % 1.9 %      Basophil % 0.5 %      Immature Grans  % 0.3 %      Neutrophils, Absolute 5.08 10*3/mm3      Lymphocytes, Absolute 0.62 10*3/mm3      Monocytes, Absolute 0.57 10*3/mm3      Eosinophils, Absolute 0.12 10*3/mm3      Basophils, Absolute 0.03 10*3/mm3      Immature Grans, Absolute 0.02 10*3/mm3      nRBC 0.0 /100 WBC     Sodium [[id number]]  (Abnormal) Collected: 07/20/25 2325    Specimen: Blood Updated: 07/21/25 0014     Sodium 129 mmol/L     Sodium [[id number]]  (Abnormal) Collected: 07/20/25 1810    Specimen: Blood Updated: 07/20/25 1832     Sodium 128 mmol/L     Sodium [[id number]]  (Abnormal) Collected: 07/20/25 1207    Specimen: Blood Updated: 07/20/25 1227     Sodium 124 mmol/L     Sodium [[id number]]  (Abnormal) Collected: 07/20/25 0556    Specimen: Blood Updated: 07/20/25 0625     Sodium 122 mmol/L     Sodium [[id number]]  (Abnormal) Collected: 07/19/25 2338    Specimen: Blood Updated: 07/20/25 0007     Sodium 119 mmol/L     Sodium [[id number]]  (Abnormal) Collected: 07/19/25 1821    Specimen: Blood Updated: 07/19/25 1901     Sodium 116 mmol/L     Sodium [[id number]]  (Abnormal) Collected: 07/19/25 1604    Specimen: Blood Updated: 07/19/25 1631     Sodium 116 mmol/L     Calcium, Ionized [[id number]] Collected: 07/19/25 1200    Specimen: Blood Updated: 07/19/25 1201     Ionized Calcium 4.60 mg/dL      Collected by [id number]     Comment: Meter: [id number]     :  Brittany Hathaway CRT       Comprehensive Metabolic Panel [[id number]]  (Abnormal) Collected: 07/19/25 1037    Specimen: Blood Updated: 07/19/25 1105     Glucose 140 mg/dL      BUN 8.9 mg/dL      Creatinine 0.60 mg/dL      Sodium 119 mmol/L      Potassium 4.3 mmol/L      Chloride 88 mmol/L      CO2 20.0 mmol/L      Calcium 8.5 mg/dL      Total Protein 5.7 g/dL      Albumin 3.7 g/dL      ALT (SGPT) 18 U/L      AST (SGOT) 53 U/L      Alkaline Phosphatase 84 U/L      Total Bilirubin 1.2 mg/dL      Globulin 2.0 gm/dL      A/G Ratio 1.9 g/dL      BUN/Creatinine Ratio 14.8     Anion Gap 11.0  mmol/L      eGFR 87.0 mL/min/1.73     Narrative:      GFR Categories in Chronic Kidney Disease (CKD)              GFR Category          GFR (mL/min/1.73)    Interpretation  G1                    90 or greater        Normal or high (1)  G2                    60-89                Mild decrease (1)  G3a                   45-59                Mild to moderate decrease  G3b                   30-44                Moderate to severe decrease  G4                    15-29                Severe decrease  G5                    14 or less           Kidney failure    (1)In the absence of evidence of kidney disease, neither GFR category G1 or G2 fulfill the criteria for CKD.    eGFR calculation 2021 CKD-EPI creatinine equation, which does not include race as a factor    BNP [[id number]]  (Normal) Collected: 07/19/25 0509    Specimen: Blood Updated: 07/19/25 0816     proBNP 301.0 pg/mL     Narrative:      This assay is used as an aid in the diagnosis of individuals suspected of having heart failure. It can be used as an aid in the diagnosis of acute decompensated heart failure (ADHF) in patients presenting with signs and symptoms of ADHF to the emergency department (ED). In addition, NT-proBNP of <300 pg/mL indicates ADHF is not likely.    Age Range Result Interpretation  NT-proBNP Concentration (pg/mL:      <50             Positive            >450                   Gray                 300-450                    Negative             <300    50-75           Positive            >900                  Gray                300-900                  Negative            <300      >75             Positive            >1800                  Gray                300-1800                  Negative            <300    Magnesium [[id number]]  (Normal) Collected: 07/19/25 0509    Specimen: Blood Updated: 07/19/25 0816     Magnesium 1.8 mg/dL     TSH Rfx On Abnormal To Free T4 [[id number]]  (Normal) Collected: 07/19/25 0509    Specimen: Blood Updated:  07/19/25 0816     TSH 1.030 uIU/mL     Sodium, Urine, Random - Urine, Clean Catch [[id number]] Collected: 07/19/25 0605    Specimen: Urine, Clean Catch Updated: 07/19/25 0654     Sodium, Urine 72 mmol/L     Narrative:      Reference intervals for random urine have not been established.  Clinical usage is dependent upon physician's interpretation in combination with other laboratory tests.       Urinalysis With Culture If Indicated - Urine, Clean Catch [[id number]]  (Abnormal) Collected: 07/19/25 0605    Specimen: Urine, Clean Catch Updated: 07/19/25 0616     Color, UA Yellow     Appearance, UA Clear     pH, UA 8.0     Specific Gravity, UA 1.011     Glucose, UA Negative     Ketones, UA Trace     Bilirubin, UA Negative     Blood, UA Negative     Protein, UA Negative     Leuk Esterase, UA Negative     Nitrite, UA Negative     Urobilinogen, UA 1.0 E.U./dL    Narrative:      In absence of clinical symptoms, the presence of pyuria, bacteria, and/or nitrites on the urinalysis result does not correlate with infection.  Urine microscopic not indicated.    Comprehensive Metabolic Panel [[id number]]  (Abnormal) Collected: 07/19/25 0509    Specimen: Blood Updated: 07/19/25 0550     Glucose 136 mg/dL      BUN 11.1 mg/dL      Creatinine 0.73 mg/dL      Sodium 115 mmol/L      Potassium 4.6 mmol/L      Comment: Slight hemolysis detected by analyzer. Result may be falsely elevated.        Chloride 86 mmol/L      CO2 18.0 mmol/L      Calcium 8.9 mg/dL      Total Protein 6.0 g/dL      Albumin 3.8 g/dL      ALT (SGPT) 19 U/L      AST (SGOT) 55 U/L      Alkaline Phosphatase 85 U/L      Total Bilirubin 1.4 mg/dL      Globulin 2.2 gm/dL      A/G Ratio 1.7 g/dL      BUN/Creatinine Ratio 15.2     Anion Gap 11.0 mmol/L      eGFR 79.7 mL/min/1.73     Narrative:      GFR Categories in Chronic Kidney Disease (CKD)              GFR Category          GFR (mL/min/1.73)    Interpretation  G1                    90 or greater        Normal or high  (1)  G2                    60-89                Mild decrease (1)  G3a                   45-59                Mild to moderate decrease  G3b                   30-44                Moderate to severe decrease  G4                    15-29                Severe decrease  G5                    14 or less           Kidney failure    (1)In the absence of evidence of kidney disease, neither GFR category G1 or G2 fulfill the criteria for CKD.    eGFR calculation 2021 CKD-EPI creatinine equation, which does not include race as a factor    Protime-INR [[id number]]  (Normal) Collected: 07/19/25 0509    Specimen: Blood Updated: 07/19/25 0533     Protime 13.8 Seconds      INR 1.00    aPTT [[id number]]  (Normal) Collected: 07/19/25 0509    Specimen: Blood Updated: 07/19/25 0533     PTT 29.8 seconds     Narrative:      PTT = The equivalent PTT values for the therapeutic range of heparin levels at 0.3 to 0.7 U/ml are 77 - 99 seconds.    [place] Draw [[id number]] Collected: 07/19/25 0509    Specimen: Blood Updated: 07/19/25 0530    Narrative:      The following orders were created for panel order [place] Draw.  Procedure                               Abnormality         Status                     ---------                               -----------         ------                     Green Top (Gel)[[id number]]                                  Final result               Lavender Top[[id number]]                                     Final result               Red Top[[id number]]                                          Final result               Bunch Top[[id number]]                                         Final result               Light Blue Top[[id number]]                                   Final result                 Please view results for these tests on the individual orders.    Gray Top [[id number]] Collected: 07/19/25 0509    Specimen: Blood Updated: 07/19/25 0530     Extra Tube Hold for add-ons.     Comment: Auto resulted.       CBC &  Differential [[id number]]  (Abnormal) Collected: 07/19/25 0509    Specimen: Blood Updated: 07/19/25 0527    Narrative:      The following orders were created for panel order CBC & Differential.  Procedure                               Abnormality         Status                     ---------                               -----------         ------                     CBC Auto Differential[[id number]]        Abnormal            Final result                 Please view results for these tests on the individual orders.    CBC Auto Differential [[id number]]  (Abnormal) Collected: 07/19/25 0509    Specimen: Blood Updated: 07/19/25 0527     WBC 8.12 10*3/mm3      RBC 3.81 10*6/mm3      Hemoglobin 12.3 g/dL      Hematocrit 34.2 %      MCV 89.8 fL      MCH 32.3 pg      MCHC 36.0 g/dL      RDW 11.7 %      RDW-SD 38.0 fl      MPV 8.4 fL      Platelets 202 10*3/mm3      Neutrophil % 83.5 %      Lymphocyte % 7.6 %      Monocyte % 7.5 %      Eosinophil % 0.5 %      Basophil % 0.2 %      Immature Grans % 0.7 %      Neutrophils, Absolute 6.77 10*3/mm3      Lymphocytes, Absolute 0.62 10*3/mm3      Monocytes, Absolute 0.61 10*3/mm3      Eosinophils, Absolute 0.04 10*3/mm3      Basophils, Absolute 0.02 10*3/mm3      Immature Grans, Absolute 0.06 10*3/mm3      nRBC 0.0 /100 WBC     Green Top (Gel) [[id number]] Collected: 07/19/25 0509    Specimen: Blood Updated: 07/19/25 0515     Extra Tube Hold for add-ons.     Comment: Auto resulted.       Lavender Top [[id number]] Collected: 07/19/25 0509    Specimen: Blood Updated: 07/19/25 0515     Extra Tube hold for add-on     Comment: Auto resulted       Red Top [[id number]] Collected: 07/19/25 0509    Specimen: Blood Updated: 07/19/25 0515     Extra Tube Hold for add-ons.     Comment: Auto resulted.       Light Blue Top [[id number]] Collected: 07/19/25 0509    Specimen: Blood Updated: 07/19/25 0515     Extra Tube Hold for add-ons.     Comment: Auto resulted                 Physical Exam:     Right  shoulder demonstrates no significant swelling she  to palpation anteriorly.      Assessment:     85-year-old female with a right pelvic fracture of the right inferior and superior pubic rami.  She also has right shoulder pain with a negative x-ray.         Plan:     She may weight-bear as tolerated on the right lower extremity and right upper extremity.  2.   She can follow-up at the orthopedic outpatient clinic in 2 to 3 weeks for repeat x-rays.  3.   Call if needed.    Electronically signed by Christoph Carney MD on 7/21/2025 at 18:18 CDT

## 2025-07-21 NOTE — THERAPY TREATMENT NOTE
Acute Care - Physical Therapy Treatment Note  [facility]     Patient Name: Shira King  : 1940  MRN: [id number]  Today's Date: 2025      Visit Dx:     ICD-10-CM ICD-9-CM   1. Hyponatremia  E87.1 276.1   2. Closed nondisplaced fracture of pelvis, unspecified part of pelvis, initial encounter  S32.9XXA 808.8   3. Dysphagia, unspecified type  R13.10 787.20   4. Impaired mobility [Z74.09]  Z74.09 799.89     Patient Active Problem List   Diagnosis    Essential hypertension    RMSF ([place] spotted fever)    Mixed hyperlipidemia    Lumbar pain    Gastroesophageal reflux disease    Insomnia    Obstructive sleep apnea    Periodic limb movement    Snoring    Somnolence, daytime    Chronic intractable headache    Chronic ethmoidal sinusitis    Peripheral vascular disease    Hypertrophy of both inferior nasal turbinates    Nasal vestibulitis    Other acute sinusitis    Allergic rhinitis    AMS (altered mental status)    Memory loss    Advanced age    Dementia without behavioral disturbance    Chest pain    Hyponatremia     Past Medical History:   Diagnosis Date    Cancer     skin    Chronic intractable headache 2021    Gastroesophageal reflux disease 2020    Hypertension     Lumbar pain 2019    Mixed hyperlipidemia     PONV (postoperative nausea and vomiting)     Sinusitis     Stroke     Valvular disease      Past Surgical History:   Procedure Laterality Date    BLADDER REPAIR  2005    BREAST SURGERY      BUNIONECTOMY  2007    CARDIAC CATHETERIZATION      CATARACT EXTRACTION Bilateral     CHOLECYSTECTOMY      COLPOCLEISIS N/A 2022    Procedure: COLPOCLEISIS;  Surgeon: Martina Street MD;  Location: [facility];  Service: Obstetrics/Gynecology;  Laterality: N/A;    ENDOSCOPY      HERNIA REPAIR  1963    HYSTERECTOMY      KRISTAN BSO for fibroids    NECK SURGERY  1974    VERTEBRA    REPLACEMENT TOTAL KNEE BILATERAL Bilateral     TONSILLECTOMY       PT Assessment (Last 12  Hours)       PT Evaluation and Treatment       Row Name 07/21/25 1323 07/21/25 0836       Physical Therapy Time and Intention    Subjective Information complains of;pain  - complains of;pain  -    Document Type therapy note (daily note)  - therapy note (daily note)  -    Mode of Treatment physical therapy  - physical therapy  -    Comment more confused this afternoon. Constant mumbling, lot of cues to keep eyes open.  - pt oriented to name only. COnfused otherwise.  -      Row Name 07/21/25 1323 07/21/25 0836       General Information    Existing Precautions/Restrictions fall  WBAT  - fall  WBAT  -    Limitations/Impairments safety/cognitive  confusion  - safety/cognitive  -      Row Name 07/21/25 1323 07/21/25 0836       Pain Scale: FACES Pre/Post-Treatment    Pain: FACES Scale, Pretreatment 0-->no hurt  - 0-->no hurt  -    Posttreatment Pain Rating 4-->hurts little more  - 4-->hurts little more  -    Pre/Posttreatment Pain Comment R LE more painful  - bilateral B LE  -      Row Name 07/21/25 1323 07/21/25 0836       Bed Mobility    Scooting/Bridging [place] (Bed Mobility) dependent (less than 25% patient effort)  - --    Supine-Sit [place] (Bed Mobility) -- verbal cues;moderate assist (50% patient effort);maximum assist (25% patient effort)  -    Sit-Supine [place] (Bed Mobility) verbal cues;moderate assist (50% patient effort);maximum assist (25% patient effort)  - --    Assistive Device (Bed Mobility) -- head of bed elevated;repositioning sheet  -      Row Name 07/21/25 0836          Transfers    Comment, (Transfers) stood x 4 total  -       Row Name 07/21/25 1323          Chair-Bed Transfer    Chair-Bed [place] (Transfers) verbal cues;minimum assist (75% patient effort)  -     Assistive Device (Chair-Bed Transfers) walker, front-wheeled  -     Comment, (Chair-Bed Transfer) small scooting steps  -       Row Name 07/21/25 1323 07/21/25 0836        Sit-Stand Transfer    Sit-Stand [place] (Transfers) verbal cues;minimum assist (75% patient effort)  -MF verbal cues;minimum assist (75% patient effort);moderate assist (50% patient effort)  -MF    Assistive Device (Sit-Stand Transfers) walker, front-wheeled  -MF --      Row Name 07/21/25 1323 07/21/25 0836       Stand-Sit Transfer    Stand-Sit [place] (Transfers) verbal cues;minimum assist (75% patient effort)  -MF verbal cues;minimum assist (75% patient effort)  -MF      Row Name 07/21/25 0836          Stand Pivot/Stand Step Transfer    Stand Pivot/Stand Step [place] (Transfers) verbal cues;minimum assist (75% patient effort)  -     Assistive Device (Stand Pivot Stand Step Transfer) walker, front-wheeled  -MF     Comment, (Stand Pivot Transfer) side steps from BSC to commode  -       Row Name 07/21/25 0836          Toilet Transfer    Type (Toilet Transfer) stand pivot/stand step  -     [place] Level (Toilet Transfer) verbal cues;minimum assist (75% patient effort)  -     Assistive Device (Toilet Transfer) commode, bedside without drop arms;walker, front-wheeled  -MF       Row Name 07/21/25 0836          Balance    Comment, Balance SBA for sitting balance while on EOB  -       Row Name 07/21/25 0836          Motor Skills    Comments, Therapeutic Exercise active APS x 10, AAROM LAQ due to pain  -       Row Name             [REMOVED] Wound 06/14/22 0906 vagina Incision    Wound - Properties Group Placement Date: 06/14/22  -RB Placement Time: 0906  -RB Present on Original Admission: N  -RB Location: vagina  -RB Primary Wound Type: Incision  -RB Removal Date: 07/21/25  -MJ (r) MW (t) Removal Time: 1307  -MJ (r) MW (t)    Retired Wound - Properties Group Placement Date: 06/14/22  -RB Placement Time: 0906  -RB Present on Original Admission: N  -RB Location: vagina  -RB Primary Wound Type: Incision  -RB Removal Date: 07/21/25  -MJ (r) MW (t) Removal Time: 1307  -MJ (r) MW (t)     Retired Wound - Properties Group Placement Date: 06/14/22  -RB Placement Time: 0906  -RB Present on Original Admission: N  -RB Location: vagina  -RB Primary Wound Type: Incision  -RB Removal Date: 07/21/25  -MJ (r) MW (t) Removal Time: 1307  -MJ (r) MW (t)    Retired Wound - Properties Group Date first assessed: 06/14/22  -RB Time first assessed: 0906  -RB Present on Original Admission: N  -RB Location: vagina  -RB Primary Wound Type: Incision  -RB Resolution Date: 07/21/25  -MJ (r) MW (t) Resolution Time: 1307  -MJ (r) MW (t)      Row Name 07/21/25 0836          Plan of Care Review    Plan of Care Reviewed With patient  -MF     Progress improving  -     Outcome Evaluation PT tx completed. Pt. was oriented to person, but otherwise pleasantly confused. Pt. required Mod-max assist to get to EOB. She participated with a few LE exercises, but was limited by pain. Pt. was able to stand while using RWX with MIN assist. She was able to take scooting steps to BSC and then to chair. Stood a total of 4 times. Unable to clear the floor due weakness and pain. Will continue to work with pt on strengthening and mobility.  -       Row Name 07/21/25 1323 07/21/25 0836       Positioning and Restraints    Pre-Treatment Position sitting in chair/recliner  - in bed  -    Post Treatment Position bed  - chair  -    In Bed notified nsg;fowlers;encouraged to call for assist;call light within reach;patient within staff view;side rails up x2  - --    In Chair -- reclined;call light within reach;encouraged to call for assist;notified nsg;RUE elevated;LUE elevated  -              User Key  (r) = Recorded By, (t) = Taken By, (c) = Cosigned By      Initials Name Provider Type    Merlene Hunter, RN Registered Nurse    Selma Berrios, PTA Physical Therapist Assistant    RB Bickerstaff, Rachael R, RN Registered Nurse                    Physical Therapy Education       Title: PT OT SLP Therapies (In Progress)       Topic:  Physical Therapy (Done)       Point: Mobility training (Done)       Learning Progress Summary            Patient Acceptance, E, VU by FRANCISCO at 7/20/2025 1146    Comment: role of PT, WBAt, t/f with walker, call for assist, high fall risk   Family Acceptance, E, VU by FRANCISCO at 7/20/2025 1146    Comment: role of PT, WBAt, t/f with walker, call for assist, high fall risk                      Point: Home exercise program (Done)       Learning Progress Summary            Patient Acceptance, E, VU by FRANCISCO at 7/20/2025 1146    Comment: role of PT, WBAt, t/f with walker, call for assist, high fall risk   Family Acceptance, E, VU by FRANCISCO at 7/20/2025 1146    Comment: role of PT, WBAt, t/f with walker, call for assist, high fall risk                      Point: Body mechanics (Done)       Learning Progress Summary            Patient Acceptance, E, VU by FRANCISCO at 7/20/2025 1146    Comment: role of PT, WBAt, t/f with walker, call for assist, high fall risk   Family Acceptance, E, VU by FRANCISCO at 7/20/2025 1146    Comment: role of PT, WBAt, t/f with walker, call for assist, high fall risk                      Point: Precautions (Done)       Learning Progress Summary            Patient Acceptance, E, VU by FRANCISCO at 7/20/2025 1146    Comment: role of PT, WBAt, t/f with walker, call for assist, high fall risk   Family Acceptance, E, VU by FRANCISCO at 7/20/2025 1146    Comment: role of PT, WBAt, t/f with walker, call for assist, high fall risk                                      User Key       Initials Effective Dates Name Provider Type Discipline    FRANCISCO 08/15/24 -  Helio Levin, PT DPT Physical Therapist PT                  PT Recommendation and Plan     Plan of Care Reviewed With: patient  Progress: improving  Outcome Evaluation: PT tx completed. Pt. was oriented to person, but otherwise pleasantly confused. Pt. required Mod-max assist to get to EOB. She participated with a few LE exercises, but was limited by pain. Pt. was able to stand while using RWX  with MIN assist. She was able to take scooting steps to BSC and then to chair. Stood a total of 4 times. Unable to clear the floor due weakness and pain. Will continue to work with pt on strengthening and mobility.   Outcome Measures       Row Name 07/21/25 0836             How much help from another person do you currently need...    Turning from your back to your side while in flat bed without using bedrails? 2  -MF      Moving from lying on back to sitting on the side of a flat bed without bedrails? 2  -MF      Moving to and from a bed to a chair (including a wheelchair)? 3  -MF      Standing up from a chair using your arms (e.g., wheelchair, bedside chair)? 3  -MF      Climbing 3-5 steps with a railing? 1  -MF      To walk in hospital room? 2  -MF      AM-PAC 6 Clicks Score (PT) 13  -MF         Functional Assessment    Outcome Measure Options AM-PAC 6 Clicks Basic Mobility (PT)  -MF                User Key  (r) = Recorded By, (t) = Taken By, (c) = Cosigned By      Initials Name Provider Type    Selma Berrios PTA Physical Therapist Assistant                     Time Calculation:    PT Charges       Row Name 07/21/25 1351 07/21/25 1323          Time Calculation    Start Time 1323  -MF 0836  -MF     Stop Time 1353  -MF 0930  -MF     Time Calculation (min) 30 min  -MF 54 min  -MF     PT Received On -- 07/21/25  -MF        Time Calculation- PT    Total Timed Code Minutes- PT 30 minute(s)  -MF 54 minute(s)  -MF        Timed Charges    39786 - PT Therapeutic Activity Minutes 30  -MF 54  -MF        Total Minutes    Timed Charges Total Minutes 30  -MF 54  -MF      Total Minutes 30  -MF 54  -MF               User Key  (r) = Recorded By, (t) = Taken By, (c) = Cosigned By      Initials Name Provider Type    Selma Berrios PTA Physical Therapist Assistant                  Therapy Charges for Today       Code Description Service Date Service Provider Modifiers Qty    98117299343  PT THERAPEUTIC ACT EA 15  MIN 7/21/2025 Selma Pineda PTA GP 4    85805350210 HC PT THERAPEUTIC ACT EA 15 MIN 7/21/2025 Selma Pineda PTA GP 2            PT G-Codes  Outcome Measure Options: AM-PAC 6 Clicks Daily Activity (OT)  AM-PAC 6 Clicks Score (PT): 13  AM-PAC 6 Clicks Score (OT): 12    Selma Pineda PTA  7/21/2025

## 2025-07-21 NOTE — PLAN OF CARE
Goal Outcome Evaluation:  Plan of Care Reviewed With: patient        Progress: no change  Outcome Evaluation: OT tx completed. Pt presented sitting up in chair upon entering and demonstrated to be very confused. Pt was A&O x2; disoriented to situation and time. Pt demonstrated non-sensical speech throughout session as well as decreased alertness. Pt speech was very hard to understand. She required mutliple vcs to keep eyes open during session. Due to decreased cognitive level and safety purposes, tx session was performed in sitting. Pt refused to perform UE exercises this date. When pt was handed a wet wash cloth she was able to wash face independently with setup and supervision for safety. Pt was unable to appropriately use comb to brush hair due to cognitive level. She required Max A to don socks. Nsg notified on session. Ms. Romero would benefit from continued skilled OT per POC. Recommend d/c to SNF.    Anticipated Discharge Disposition (OT): skilled nursing facility

## 2025-07-21 NOTE — PLAN OF CARE
Goal Outcome Evaluation:  Plan of Care Reviewed With: patient        Progress: improving  Outcome Evaluation: PT tx completed. Pt. was oriented to person, but otherwise pleasantly confused. Pt. required Mod-max assist to get to EOB. She participated with a few LE exercises, but was limited by pain. Pt. was able to stand while using RWX with MIN assist. She was able to take scooting steps to BSC and then to chair. Stood a total of 4 times. Unable to clear the floor due weakness and pain. Will continue to work with pt on strengthening and mobility.

## 2025-07-21 NOTE — CASE MANAGEMENT/SOCIAL WORK
Therapy is currently working with pt. SW will try to follow up later today regarding dc plans/needs.

## 2025-07-21 NOTE — THERAPY TREATMENT NOTE
Patient Name: Shira King  : 1940    MRN: [id number]                              Today's Date: 2025       Admit Date: 2025    Visit Dx:     ICD-10-CM ICD-9-CM   1. Hyponatremia  E87.1 276.1   2. Closed nondisplaced fracture of pelvis, unspecified part of pelvis, initial encounter  S32.9XXA 808.8   3. Dysphagia, unspecified type  R13.10 787.20   4. Impaired mobility [Z74.09]  Z74.09 799.89     Patient Active Problem List   Diagnosis    Essential hypertension    RMSF (Khari Mountain spotted fever)    Mixed hyperlipidemia    Lumbar pain    Gastroesophageal reflux disease    Insomnia    Obstructive sleep apnea    Periodic limb movement    Snoring    Somnolence, daytime    Chronic intractable headache    Chronic ethmoidal sinusitis    Peripheral vascular disease    Hypertrophy of both inferior nasal turbinates    Nasal vestibulitis    Other acute sinusitis    Allergic rhinitis    AMS (altered mental status)    Memory loss    Advanced age    Dementia without behavioral disturbance    Chest pain    Hyponatremia     Past Medical History:   Diagnosis Date    Cancer     skin    Chronic intractable headache 2021    Gastroesophageal reflux disease 2020    Hypertension     Lumbar pain 2019    Mixed hyperlipidemia     PONV (postoperative nausea and vomiting)     Sinusitis     Stroke     Valvular disease      Past Surgical History:   Procedure Laterality Date    BLADDER REPAIR  2005    BREAST SURGERY      BUNIONECTOMY  2007    CARDIAC CATHETERIZATION      CATARACT EXTRACTION Bilateral     CHOLECYSTECTOMY      COLPOCLEISIS N/A 2022    Procedure: COLPOCLEISIS;  Surgeon: Martina Street MD;  Location: [facility];  Service: Obstetrics/Gynecology;  Laterality: N/A;    ENDOSCOPY      HERNIA REPAIR  1963    HYSTERECTOMY      KRISTAN BSO for fibroids    NECK SURGERY  1974    VERTEBRA    REPLACEMENT TOTAL KNEE BILATERAL Bilateral     TONSILLECTOMY        General Information       Row  Name 07/21/25 1050          OT Time and Intention    Subjective Information no complaints  -DIANE (r) SUDARSHAN (t) DIANE (c)     Document Type therapy note (daily note)  -DIANE (r) SUDARSHAN (t) DIANE (c)     Mode of Treatment occupational therapy  -DIANE (r) SUDARSHAN (t) DIANE (c)     Patient Effort poor  -DIANE (r) SUDARSHAN (t) DIANE (c)     Symptoms Noted During/After Treatment fatigue  -DIANE (r) SUDARSHAN (t) DIANE (c)       Row Name 07/21/25 1050          General Information    Existing Precautions/Restrictions fall  -JW (r) BS (t) JW (c)       Row Name 07/21/25 1050          Cognition    Orientation Status (Cognition) oriented to;person;place;disoriented to;situation;time  Was able to report she was at the hospital; unable to report which one  -DIANE (r) SUDARSHAN (t) JW (c)       Row Name 07/21/25 1050          Safety Issues/Impairments Affecting Functional Mobility    Impairments Affecting Function (Mobility) balance;endurance/activity tolerance;strength;pain;postural/trunk control;cognition  -DIANE (r) SUDARSHAN (t) JW (c)     Cognitive Impairments, Mobility Safety/Performance attention;awareness, need for assistance;insight into deficits/self-awareness;judgment;problem-solving/reasoning;safety precaution awareness;safety precaution follow-through;sequencing abilities  -DIANE (r) SUDARSHAN (t) JW (c)               User Key  (r) = Recorded By, (t) = Taken By, (c) = Cosigned By      Initials Name Provider Type     Millie Rivera, OTR/L, CSRS Occupational Therapist    Kayla Poon, OT Student OT Student                     Mobility/ADL's       Row Name 07/21/25 1050          Bed Mobility    Comment, (Bed Mobility) Up in chair upon entering  -DIANE (r) SUDARSHAN (t) JW (c)       Row Name 07/21/25 1050          Transfers    Transfers --  Not appropriate this session due to cognitive level and decreased alertness.  -DIANE (r) SUDARSHAN (t) JW (c)       Row Name 07/21/25 1050          Activities of Daily Living    BADL Assessment/Intervention lower body dressing;grooming  -DIANE (r) SUDARSHAN (t) DIANE (c)       Row Name  07/21/25 1050          Lower Body Dressing Assessment/Training    [place] Level (Lower Body Dressing) don;socks;maximum assist (25% patient effort)  -JW (r) BS (t) JW (c)       Row Name 07/21/25 1050          Grooming Assessment/Training    [place] Level (Grooming) wash face, hands;set up;supervision;hair care, combing/brushing;maximum assist (25% patient effort)  -JW (r) BS (t) JW (c)     Position (Grooming) supported sitting  sitting up in chair  -JW (r) BS (t) JW (c)               User Key  (r) = Recorded By, (t) = Taken By, (c) = Cosigned By      Initials Name Provider Type    Millie Diggs OTR/L, VANESSAS Occupational Therapist    Kayla Poon, OT Student OT Student                   Obj/Interventions       Row Name 07/21/25 1050          Balance    Balance Assessment sitting static balance;sitting dynamic balance  -JW (r) BS (t) JW (c)     Static Sitting Balance supervision  -JW (r) BS (t) JW (c)     Dynamic Sitting Balance supervision  -JW (r) BS (t) JW (c)     Position, Sitting Balance supported;sitting in chair  -JW (r) BS (t) JW (c)     Balance Interventions sitting;supported;static;dynamic;occupation based/functional task  -JW (r) BS (t) JW (c)               User Key  (r) = Recorded By, (t) = Taken By, (c) = Cosigned By      Initials Name Provider Type    Millie Diggs OTR/L, LUANA Occupational Therapist    Kayla Poon, OT Student OT Student                   Goals/Plan    No documentation.                  Clinical Impression       Row Name 07/21/25 1050          Pain Assessment    Pretreatment Pain Rating 0/10 - no pain  -JW (r) BS (t) JW (c)     Posttreatment Pain Rating 0/10 - no pain  -JW (r) BS (t) JW (c)       Row Name 07/21/25 1050          Plan of Care Review    Plan of Care Reviewed With patient  -JW (r) BS (t) JW (c)     Progress no change  -JW (r) BS (t) JW (c)     Outcome Evaluation OT tx completed. Pt presented sitting up in chair upon entering and  demonstrated to be very confused. Pt was A&O x2; disoriented to situation and time. Pt demonstrated non-sensical speech throughout session as well as decreased alertness. Pt speech was very hard to understand. She required mutliple vcs to keep eyes open during session. Due to decreased cognitive level and safety purposes, tx session was performed in sitting. Pt refused to perform UE exercises this date. When pt was handed a wet wash cloth she was able to wash face independently with setup and supervision for safety. Pt was unable to appropriately use comb to brush hair due to cognitive level. She required Max A to don socks. Nsg notified on session. Ms. Romero would benefit from continued skilled OT per POC. Recommend d/c to SNF.  -DIANE (r) SUDARSHAN (t) DIANE (c)       Row Name 07/21/25 1050          Therapy Assessment/Plan (OT)    Rehab Potential (OT) fair  -DIANE (r) SUDARSHAN (t) DIANE (c)     Criteria for Skilled Therapeutic Interventions Met (OT) --  -DIANE (r) SUDARSHAN (t) JW (c)     Therapy Frequency (OT) --  -JW (r) BS (t) JW (c)     Predicted Duration of Therapy Intervention (OT) --  -JW (r) BS (t) JW (c)       Row Name 07/21/25 1050          Therapy Plan Review/Discharge Plan (OT)    Anticipated Discharge Disposition (OT) skilled nursing facility  -DIANE (r) BS (t) JW (c)       Row Name 07/21/25 1050          Vital Signs    O2 Delivery Pre Treatment room air  -JW (r) BS (t) JW (c)     O2 Delivery Intra Treatment room air  -JW (r) BS (t) JW (c)     O2 Delivery Post Treatment room air  -JW (r) BS (t) JW (c)     Pre Patient Position Sitting  -JW (r) BS (t) JW (c)     Intra Patient Position Sitting  -JW (r) BS (t) JW (c)     Post Patient Position Sitting  -JW (r) BS (t) JW (c)       Row Name 07/21/25 1050          Positioning and Restraints    Pre-Treatment Position sitting in chair/recliner  -JW (r) BS (t) JW (c)     Post Treatment Position chair  -JW (r) BS (t) JW (c)     In Chair notified nsg;reclined;call light within reach;encouraged to  call for assist;with nsg;patient within staff view  -JW (r) BS (t) JW (c)               User Key  (r) = Recorded By, (t) = Taken By, (c) = Cosigned By      Initials Name Provider Type    Millie Diggs, OTR/L, CSRS Occupational Therapist    Kayla Poon, OT Student OT Student                   Outcome Measures       Row Name 07/21/25 1050          How much help from another is currently needed...    Putting on and taking off regular lower body clothing? 2  -JW (r) BS (t) JW (c)     Bathing (including washing, rinsing, and drying) 2  -JW (r) BS (t) JW (c)     Toileting (which includes using toilet bed pan or urinal) 2  -JW (r) BS (t) JW (c)     Putting on and taking off regular upper body clothing 2  -JW (r) BS (t) JW (c)     Taking care of personal grooming (such as brushing teeth) 2  -JW (r) BS (t) JW (c)     Eating meals 2  due to cognitive level and alertness this date.  -JW (r) BS (t) JW (c)     AM-PAC 6 Clicks Score (OT) 12  -JW (r) BS (t)       Row Name 07/21/25 0836          How much help from another person do you currently need...    Turning from your back to your side while in flat bed without using bedrails? 2  -MF     Moving from lying on back to sitting on the side of a flat bed without bedrails? 2  -MF     Moving to and from a bed to a chair (including a wheelchair)? 3  -MF     Standing up from a chair using your arms (e.g., wheelchair, bedside chair)? 3  -MF     Climbing 3-5 steps with a railing? 1  -MF     To walk in hospital room? 2  -MF     AM-PAC 6 Clicks Score (PT) 13  -MF     Highest Level of Mobility Goal Move to Chair/Commode-4  -MF       Row Name 07/21/25 1050 07/21/25 0836       Functional Assessment    Outcome Measure Options AM-PAC 6 Clicks Daily Activity (OT)  -JW (r) BS (t) JW (c) AM-PAC 6 Clicks Basic Mobility (PT)  -MF              User Key  (r) = Recorded By, (t) = Taken By, (c) = Cosigned By      Initials Name Provider Type    Selma Berrios, PTA Physical  Therapist Assistant    Millie Diggs, OTR/L, CSRS Occupational Therapist    Kayla Poon, OT Student OT Student                    Occupational Therapy Education       Title: PT OT SLP Therapies (In Progress)       Topic: Occupational Therapy (In Progress)       Point: ADL training (In Progress)       Learning Progress Summary            Patient Acceptance, E, NR,NL by BS at 7/21/2025 1050    Acceptance, E,D, VU,DU,NL by  at 7/20/2025 1336                      Point: Precautions (In Progress)       Learning Progress Summary            Patient Acceptance, E, NR,NL by  at 7/21/2025 1050    Acceptance, E,D, VU,DU,NL by  at 7/20/2025 1336                      Point: Body mechanics (In Progress)       Learning Progress Summary            Patient Acceptance, E, NR,NL by  at 7/21/2025 1050    Acceptance, E,D, VU,DU,NL by  at 7/20/2025 1336                                      User Key       Initials Effective Dates Name Provider Type Discipline     10/08/24 -  Cintia Cadena OTR/L Occupational Therapist OT    SUDARSHAN 05/12/25 -  Kayla Arango, OT Student OT Student OT                  OT Recommendation and Plan     Plan of Care Review  Plan of Care Reviewed With: patient  Progress: no change  Outcome Evaluation: OT tx completed. Pt presented sitting up in chair upon entering and demonstrated to be very confused. Pt was A&O x2; disoriented to situation and time. Pt demonstrated non-sensical speech throughout session as well as decreased alertness. Pt speech was very hard to understand. She required mutliple vcs to keep eyes open during session. Due to decreased cognitive level and safety purposes, tx session was performed in sitting. Pt refused to perform UE exercises this date. When pt was handed a wet wash cloth she was able to wash face independently with setup and supervision for safety. Pt was unable to appropriately use comb to brush hair due to cognitive level. She required Max A to don socks.  Nsg notified on session. Ms. Romero would benefit from continued skilled OT per POC. Recommend d/c to SNF.     Time Calculation:         Time Calculation- OT       Row Name 07/21/25 1050             Time Calculation- OT    OT Start Time 1050  -JW (r) BS (t) JW (c)      OT Stop Time 1114  -JW (r) BS (t) JW (c)      OT Time Calculation (min) 24 min  -JW (r) BS (t)      Total Timed Code Minutes- OT 24 minute(s)  -JW (r) BS (t) JW (c)      OT Received On 07/21/25  -JW (r) BS (t) DIANE (c)         Timed Charges    69005 - OT Self Care/Mgmt Minutes 24  -JW (r) BS (t) JW (c)         Total Minutes    Timed Charges Total Minutes 24  -JW (r) BS (t)       Total Minutes 24  -JW (r) BS (t)                User Key  (r) = Recorded By, (t) = Taken By, (c) = Cosigned By      Initials Name Provider Type    Millie Diggs OTR/L, CSRS Occupational Therapist    Kayla Poon, OT Student OT Student                           Kayla Arango OT Student  7/21/2025

## 2025-07-21 NOTE — PLAN OF CARE
Goal Outcome Evaluation:  Plan of Care Reviewed With: patient, caregiver (DESTINY Frye)        Progress: improving                     Treatment Assessment (SLP): continued (07/21/25 0943)  Treatment Assessment Comments (SLP): See note (07/21/25 0943)  Plan for Continued Treatment (SLP): treatment no longer indicated as all goals met (07/21/25 0943)    Swallow follow up completed. Patient up in chair resting with breakfast tray present when I entered the room. Fatigue noted but she is able to wake and participate. She completed bites of her meal tray with slightly increased prep time due to fatigue but functional chew. Thin liquids with no overt s/s of aspiration.     Patient OK to continue regular diet with thin liquids. DESTINY Frye was made aware of prior preference for medication administration; patient prefers larger pills cut if able to be cut.     SLP signing off.     Please re-consult with any acute changes or concerns.     Mary Jane Pickett MS CCC-SLP 7/21/2025 10:10 CDT

## 2025-07-21 NOTE — PLAN OF CARE
Goal Outcome Evaluation:   Pt A&O to self only, hallucinating more throughout the night. NSR, BP stable. No BM this shift, 3L UOP via external cath. Remains on NS at 250ml/hr. Last sodium at 0000 was 129, awaiting 0600 results. Tolerating RA. Pt  remains out of restraints, fairly redirectable. Precedex down to 0.4 at this time. Bed alarm set, safety maintained.

## 2025-07-21 NOTE — PROGRESS NOTES
[facility] Intensivist Services  INPATIENT PROGRESS NOTE    Patient Name: Shira King  Date of Admission: 7/19/2025  Today's Date: 07/21/25  Length of Stay:  LOS: 2 days   Primary Care Physician: Reynaldo Cavazos MD  Next of Kin: Primary Emergency Contact: Montse Jung, [place] Phone: [phone]      Subjective   Chief Complaint: Fall, hyponatremia    Fall       85-year-old female with a past medical history of essential hypertension on HCTZ that was recently initiated, dementia, prior stroke, and hyperlipidemia admitted on 7/19/2025 after presenting to [facility] post fall.  She was admitted to the intensivist team as she was found to be hyponatremic.  She was also noted to have a right inferior pubic ramus fracture.    Interval history:  7/20: Latest sodium up to 124 this afternoon.  Patient remains intermittently confused and agitated.  She is redirectable at times.  However, she is having more confusion lately.  As of this, patient is being started back on Precedex.    7/21: Patient's sodium is up to 128 on latest labs. She continues to have intermittent confusion and/or agitation, but this has improved. She is now off precedex gtt. She remains on scheduled seroquel with prn haldol. Additionally, house supervisor is arranging for patient to have sitter. Potassium, magnesium, and phosphorus were all low, and are all being replaced. No acute events overnight.     Review of Systems   All pertinent negatives and positives are as above. All other systems have been reviewed and are negative unless otherwise stated.     Past Medical and Past Surgical History   Active and Resolved Problems  Active Hospital Problems    Diagnosis  POA    **Hyponatremia [E87.1]  Yes      Resolved Hospital Problems   No resolved problems to display.       Past Medical History:   Past Medical History:   Diagnosis Date    Cancer     skin    Chronic intractable headache 02/08/2021     Gastroesophageal reflux disease 02/03/2020    Hypertension     Lumbar pain 07/12/2019    Mixed hyperlipidemia     PONV (postoperative nausea and vomiting)     Sinusitis     Stroke     Valvular disease      Past Surgical History:   Past Surgical History:   Procedure Laterality Date    BLADDER REPAIR  2005    BREAST SURGERY      BUNIONECTOMY  2007    CARDIAC CATHETERIZATION      CATARACT EXTRACTION Bilateral 2005    CHOLECYSTECTOMY      COLPOCLEISIS N/A 6/14/2022    Procedure: COLPOCLEISIS;  Surgeon: Martina Street MD;  Location: [facility] OR;  Service: Obstetrics/Gynecology;  Laterality: N/A;    ENDOSCOPY  2005    HERNIA REPAIR  1963    HYSTERECTOMY  1979    KRISTAN BSO for fibroids    NECK SURGERY  1974    VERTEBRA    REPLACEMENT TOTAL KNEE BILATERAL Bilateral     TONSILLECTOMY       Social and Family History   Family History:  family history includes Heart attack in her maternal grandmother; Heart disease in her mother; Stomach cancer in her maternal uncle; Stroke in her mother and sister.    Tobacco/Social History:  reports that she has never smoked. She has never used smokeless tobacco. She reports that she does not drink alcohol and does not use drugs.    Allergies   Allergies:   She is allergic to aricept [donepezil hcl].    Objective    Temp:  [97.4 °F (36.3 °C)-97.8 °F (36.6 °C)] 97.4 °F (36.3 °C)  Heart Rate:  [] 77  Resp:  [14-17] 14  BP: ()/() 148/71  Physical Exam  Vitals reviewed.   Constitutional:       General: She is awake. She is not in acute distress.     Appearance: She is ill-appearing. She is not diaphoretic.   HENT:      Head: Normocephalic.      Nose: Nose normal.      Mouth/Throat:      Mouth: Mucous membranes are moist.   Eyes:      Extraocular Movements: Extraocular movements intact.   Cardiovascular:      Rate and Rhythm: Normal rate and regular rhythm.      Pulses: Normal pulses.   Pulmonary:      Effort: Pulmonary effort is normal. No respiratory distress.   Abdominal:       Palpations: Abdomen is soft.   Skin:     General: Skin is warm.      Capillary Refill: Capillary refill takes less than 2 seconds.   Neurological:      General: No focal deficit present.      Mental Status: She is alert.      Comments: Intermittently disoriented and confused         Inpatient Medications   Medications: Scheduled Meds:ALPRAZolam, 0.25 mg, Oral, Once  amLODIPine, 5 mg, Oral, Daily  aspirin, 81 mg, Oral, Every Other Day  carvedilol, 6.25 mg, Oral, BID With Meals  Chlorhexidine Gluconate Cloth, 1 Application, Topical, Q24H  enoxaparin sodium, 40 mg, Subcutaneous, Daily  magnesium sulfate, 2 g, Intravenous, Q2H  methocarbamol, 750 mg, Oral, 4x Daily  mupirocin, 1 Application, Each Nare, BID  oxybutynin, 5 mg, Oral, Daily  pantoprazole, 40 mg, Oral, Q AM  pentoxifylline, 400 mg, Oral, Daily  potassium chloride, 40 mEq, Oral, Once  potassium chloride, 10 mEq, Intravenous, Q1H  potassium phosphate, 15 mmol, Intravenous, Q3H  pravastatin, 20 mg, Oral, Nightly  QUEtiapine, 100 mg, Oral, Nightly  QUEtiapine, 25 mg, Oral, Daily  senna-docusate sodium, 2 tablet, Oral, BID  sodium chloride, 10 mL, Intravenous, Q12H      Continuous Infusions:sodium chloride, 125 mL/hr, Last Rate: 125 mL/hr (07/21/25 0806)      PRN Meds:.  acetaminophen    ALPRAZolam    senna-docusate sodium **AND** polyethylene glycol **AND** bisacodyl **AND** bisacodyl    Calcium Replacement - Follow Nurse / BPA Driven Protocol    celecoxib    haloperidol lactate    HYDROmorphone    Magnesium Standard Dose Replacement - Follow Nurse / BPA Driven Protocol    ondansetron ODT **OR** ondansetron    oxyCODONE    Phosphorus Replacement - Follow Nurse / BPA Driven Protocol    Potassium Replacement - Follow Nurse / BPA Driven Protocol    sodium chloride    sodium chloride    sodium chloride    I have reviewed the patient's current medications.   Outpatient Medications     Current Outpatient Medications   Medication Instructions    acetaminophen (TYLENOL)  250 mg, Every 4 Hours    ALPRAZolam (XANAX) 0.25 mg, Oral, 2 Times Daily PRN    aluminum-magnesium hydroxide-simethicone (MAALOX/MYLANTA) 200-200-20 MG/5ML suspension 10 mL, Oral, Every 6 Hours PRN    amLODIPine (NORVASC) 5 mg, Daily    aspirin 81 mg, Every Other Day    B Complex Vitamins (Vitamin B Complex) tablet 1 tablet, Daily    carvedilol (COREG) 6.25 mg, 2 Times Daily With Meals    celecoxib (CELEBREX) 200 mg, Daily    hydroCHLOROthiazide 12.5 mg, Daily    HYDROcodone-acetaminophen (NORCO) 7.5-325 MG per tablet 0.5 tablets, Every 4 Hours    losartan (COZAAR) 100 mg, Daily    metoclopramide (REGLAN) 5 mg, Daily    omeprazole (PRILOSEC) 20 mg, 2 Times Daily    oxybutynin (DITROPAN) 5 mg, Oral, Daily    pentoxifylline (TRENTAL) 400 mg, Daily    pravastatin (PRAVACHOL) 20 mg, Daily       Current Antibiotics   This patient does not have an active medication from one of the medication groupers.    Results:   CBC:      Lab 07/21/25  0627 07/19/25  0509   WBC 6.44 8.12   HEMOGLOBIN 11.7* 12.3   HEMATOCRIT 33.6* 34.2   PLATELETS 135* 202   NEUTROS ABS 5.08 6.77   IMMATURE GRANS (ABS) 0.02 0.06*   LYMPHS ABS 0.62* 0.62*   MONOS ABS 0.57 0.61   EOS ABS 0.12 0.04   MCV 91.3 89.8     LIVER FUNCTION TESTS:      Lab 07/21/25  0819 07/19/25  1037 07/19/25  0509   TOTAL PROTEIN 5.3* 5.7* 6.0   ALBUMIN 3.3* 3.7 3.8   GLOBULIN 2.0 2.0 2.2   ALT (SGPT) 15 18 19   AST (SGOT) 55* 53* 55*   BILIRUBIN 0.9 1.2 1.4*   ALK PHOS 66 84 85     ABG:      Lab 07/21/25  0819 07/19/25  1037 07/19/25  0509   ANION GAP 11.0 11.0 11.0     BMP/MG/PHOS:      Lab 07/21/25  0819 07/20/25  2325 07/20/25  1810 07/20/25  1207 07/20/25  0556 07/19/25  1604 07/19/25  1037 07/19/25  0509   SODIUM 128* 129* 128* 124* 122*   < > 119* 115*   POTASSIUM 2.7*  --   --   --   --   --  4.3 4.6   CHLORIDE 95*  --   --   --   --   --  88* 86*   BUN 2.6*  --   --   --   --   --  8.9 11.1   CREATININE 0.33*  --   --   --   --   --  0.60 0.73   CALCIUM 8.1*  --   --    --   --   --  8.5* 8.9   MAGNESIUM 1.4*  --   --   --   --   --   --  1.8   PHOSPHORUS 1.6*  --   --   --   --   --   --   --     < > = values in this interval not displayed.     IMAGING STUDIES:  XR Shoulder 2+ View Right  Result Date: 7/19/2025   No acute osseous finding.   This report was signed and finalized on 7/19/2025 3:09 PM by Helio Parisi.         Assessment/Plan   85-year-old female with past medical history of dementia, hyperlipidemia, and hypertension who was recently started on HCTZ admitted after presenting to [facility] on 7/19/2025 with complaints of fall.  She was also noted to be hyponatremic with an initial sodium of 115.  She was admitted to the intensivist team for further workup and management.    Hyponatremia  -Sodium now up to 128 on latest labs  - Likely secondary to recently starting HCTZ  - Discontinue HCTZ  -Continue to trend serial sodium levels every 6 hours  -Goal of sodium correction 8 mEq in 24 hours     Encephalopathy  - Multifactorial in setting of hyponatremia, acute pain, and opioid pain medications  -Improving overall  - Continue to correct sodium levels as above  - Avoid oversedation, adequate pain control  - Reorient as needed  -Continue seroquel as scheduled-25 mg daily. PRN haldol also available.  -Precedex gtt has been stopped.     Right inferior pubic ramus fracture with right hip pain  - Found on CT scan on admission  - No surgery required per orthopedics.  Patient will be weightbearing as tolerated with a walker and follow-up with them as an outpatient.  -Continue adequate pain control with oxycodone and fentanyl as needed    Essential hypertension  -Stopped HCTZ  -Continue  Norvasc and Coreg per home schedule       CODE STATUS: DNR/Full support  VTE prophylaxis: lovenox  Nutrition: Regular diet  Bowel: prn bowel regimen  GI prophylaxis: Protonix  Antibiotics: N/A     Disposition: Patient suitable for transfer to medical floor.    A minimum of 35 minutes spent on  patient care, medium MDM.    This time included obtaining a history; examining the patient; pulse oximetry; ordering and review of studies; arranging urgent treatment with development of a management plan; evaluation of patient's response to treatment; frequent reassessment; and, discussions with other providers.    Please see MDM section and the rest of the note for further information on patient assessment and treatment.    Part of this note may be an electronic transcription/translation of spoken language to printed text using the Dragon Dictation System    Electronically signed by Kd Cowart PA-C on 7/21/2025 at 10:53 CDT

## 2025-07-21 NOTE — PROGRESS NOTES
Nutrition Services    Patient Name: Shira King  YOB: 1940  MRN: [id number]  Admission date: 7/19/2025  Reason for Encounter: Follow-up/Progress Note      [facility] Clinical Nutrition Progress Note       Nutrition Intervention Updates: Starting Boost Original with all meals.       Subjective: Pt is ordered a regular diet. She has a poor appetite, only consuming 25% or < of meals thus far. Discussed starting an oral nutrition supplement during IDT rounds. She does like Boost supplements and is willing to try to drink with meals to supplement her intake. Will send vanilla with breakfast and dinner, chocolate with lunch. Per review of weight hx, no sig weight changes noted. Will follow per LOS protocol/prn.       PO Diet: Diet: Regular/House; Texture: Regular (IDDSI 7); Fluid Consistency: Thin (IDDSI 0)   PO Supplements: Boost Original TID (Provides 720 kcals, 30 g protein if consumed)     PO Intake:  25% or <       Current nutrition support:    Nutrition support review:        Labs: Na+, K+        GI Function:  Last BM 7/20        Brief Weight Review:    Wt Readings from Last 1 Encounters:   07/21/25 0600 78.7 kg (173 lb 8 oz)   07/19/25 0815 76.4 kg (168 lb 6.9 oz)   07/19/25 0503 77.6 kg (171 lb 1.6 oz)        Results from last 7 days   Lab Units 07/21/25  0819 07/20/25  2325 07/20/25  1810 07/19/25  1604 07/19/25  1037 07/19/25  0509   SODIUM mmol/L 128* 129* 128*   < > 119* 115*   POTASSIUM mmol/L 2.7*  --   --   --  4.3 4.6   CHLORIDE mmol/L 95*  --   --   --  88* 86*   CO2 mmol/L 22.0  --   --   --  20.0* 18.0*   BUN mg/dL 2.6*  --   --   --  8.9 11.1   CREATININE mg/dL 0.33*  --   --   --  0.60 0.73   CALCIUM mg/dL 8.1*  --   --   --  8.5* 8.9   BILIRUBIN mg/dL 0.9  --   --   --  1.2 1.4*   ALK PHOS U/L 66  --   --   --  84 85   ALT (SGPT) U/L 15  --   --   --  18 19   AST (SGOT) U/L 55*  --   --   --  53* 55*   GLUCOSE mg/dL 122*  --   --   --  140* 136*    < > = values in this  interval not displayed.     Results from last 7 days   Lab Units 07/21/25  0819 07/21/25  0627 07/19/25  0509   MAGNESIUM mg/dL 1.4*  --  1.8   PHOSPHORUS mg/dL 1.6*  --   --    PLATELETS 10*3/mm3  --  135* 202   HEMOGLOBIN g/dL  --  11.7* 12.3   HEMATOCRIT %  --  33.6* 34.2     Lab Results   Component Value Date    HGBA1C 5.9 (H) 05/01/2023       Electronically signed by:  Jesusita You RDN, [place]  07/21/25 12:03 CDT

## 2025-07-21 NOTE — THERAPY TREATMENT NOTE
Acute Care - Physical Therapy Treatment Note  [facility]     Patient Name: Shira King  : 1940  MRN: [id number]  Today's Date: 2025      Visit Dx:     ICD-10-CM ICD-9-CM   1. Hyponatremia  E87.1 276.1   2. Closed nondisplaced fracture of pelvis, unspecified part of pelvis, initial encounter  S32.9XXA 808.8   3. Dysphagia, unspecified type  R13.10 787.20   4. Impaired mobility [Z74.09]  Z74.09 799.89     Patient Active Problem List   Diagnosis    Essential hypertension    RMSF (Khari Mountain spotted fever)    Mixed hyperlipidemia    Lumbar pain    Gastroesophageal reflux disease    Insomnia    Obstructive sleep apnea    Periodic limb movement    Snoring    Somnolence, daytime    Chronic intractable headache    Chronic ethmoidal sinusitis    Peripheral vascular disease    Hypertrophy of both inferior nasal turbinates    Nasal vestibulitis    Other acute sinusitis    Allergic rhinitis    AMS (altered mental status)    Memory loss    Advanced age    Dementia without behavioral disturbance    Chest pain    Hyponatremia     Past Medical History:   Diagnosis Date    Cancer     skin    Chronic intractable headache 2021    Gastroesophageal reflux disease 2020    Hypertension     Lumbar pain 2019    Mixed hyperlipidemia     PONV (postoperative nausea and vomiting)     Sinusitis     Stroke     Valvular disease      Past Surgical History:   Procedure Laterality Date    BLADDER REPAIR  2005    BREAST SURGERY      BUNIONECTOMY  2007    CARDIAC CATHETERIZATION      CATARACT EXTRACTION Bilateral     CHOLECYSTECTOMY      COLPOCLEISIS N/A 2022    Procedure: COLPOCLEISIS;  Surgeon: Martina Street MD;  Location: [facility];  Service: Obstetrics/Gynecology;  Laterality: N/A;    ENDOSCOPY      HERNIA REPAIR  1963    HYSTERECTOMY      KRISTAN BSO for fibroids    NECK SURGERY  1974    VERTEBRA    REPLACEMENT TOTAL KNEE BILATERAL Bilateral     TONSILLECTOMY       PT Assessment (Last 12  Hours)       PT Evaluation and Treatment       Row Name 07/21/25 0836          Physical Therapy Time and Intention    Subjective Information complains of;pain  -     Document Type therapy note (daily note)  -     Mode of Treatment physical therapy  -     Comment pt oriented to name only. COnfused otherwise.  -       Row Name 07/21/25 0836          General Information    Existing Precautions/Restrictions fall  WBAT  -     Limitations/Impairments safety/cognitive  -       Row Name 07/21/25 0836          Pain Scale: FACES Pre/Post-Treatment    Pain: FACES Scale, Pretreatment 0-->no hurt  -     Posttreatment Pain Rating 4-->hurts little more  -     Pre/Posttreatment Pain Comment bilateral B LE  -       Row Name 07/21/25 0836          Bed Mobility    Supine-Sit Maverick (Bed Mobility) verbal cues;moderate assist (50% patient effort);maximum assist (25% patient effort)  -     Assistive Device (Bed Mobility) head of bed elevated;repositioning sheet  -       Row Name 07/21/25 0836          Transfers    Comment, (Transfers) stood x 4 total  -       Row Name 07/21/25 0836          Sit-Stand Transfer    Sit-Stand Maverick (Transfers) verbal cues;minimum assist (75% patient effort);moderate assist (50% patient effort)  -       Row Name 07/21/25 0836          Stand-Sit Transfer    Stand-Sit Maverick (Transfers) verbal cues;minimum assist (75% patient effort)  -       Row Name 07/21/25 0836          Stand Pivot/Stand Step Transfer    Stand Pivot/Stand Step Maverick (Transfers) verbal cues;minimum assist (75% patient effort)  -     Assistive Device (Stand Pivot Stand Step Transfer) walker, front-wheeled  -     Comment, (Stand Pivot Transfer) side steps from BSC to commode  -       Row Name 07/21/25 0836          Toilet Transfer    Type (Toilet Transfer) stand pivot/stand step  -     [place] Level (Toilet Transfer) verbal cues;minimum assist (75% patient effort)  -      Assistive Device (Toilet Transfer) commode, bedside without drop arms;walker, front-wheeled  -       Row Name 07/21/25 0836          Balance    Comment, Balance SBA for sitting balance while on EOB  -       Row Name 07/21/25 0836          Motor Skills    Comments, Therapeutic Exercise active APS x 10, ANNA LAQ due to pain  -       Row Name             [REMOVED] Wound 06/14/22 0906 vagina Incision    Wound - Properties Group Placement Date: 06/14/22  -RB Placement Time: 0906  -RB Present on Original Admission: N  -RB Location: vagina  -RB Primary Wound Type: Incision  -RB Removal Date: 07/21/25  -MJ (r) MW (t) Removal Time: 1307  -MJ (r) MW (t)    Retired Wound - Properties Group Placement Date: 06/14/22  -RB Placement Time: 0906  -RB Present on Original Admission: N  -RB Location: vagina  -RB Primary Wound Type: Incision  -RB Removal Date: 07/21/25  -MJ (r) MW (t) Removal Time: 1307  -MJ (r) MW (t)    Retired Wound - Properties Group Placement Date: 06/14/22  -RB Placement Time: 0906  -RB Present on Original Admission: N  -RB Location: vagina  -RB Primary Wound Type: Incision  -RB Removal Date: 07/21/25  -MJ (r) MW (t) Removal Time: 1307  -MJ (r) MW (t)    Retired Wound - Properties Group Date first assessed: 06/14/22  -RB Time first assessed: 0906  -RB Present on Original Admission: N  -RB Location: vagina  -RB Primary Wound Type: Incision  -RB Resolution Date: 07/21/25  -MJ (r) MW (t) Resolution Time: 1307  -MJ (r) MW (t)      Row Name 07/21/25 0836          Plan of Care Review    Plan of Care Reviewed With patient  -     Progress improving  -     Outcome Evaluation PT tx completed. Pt. was oriented to person, but otherwise pleasantly confused. Pt. required Mod-max assist to get to EOB. She participated with a few LE exercises, but was limited by pain. Pt. was able to stand while using RWX with MIN assist. She was able to take scooting steps to BSC and then to chair. Stood a total of 4 times. Unable to  clear the floor due weakness and pain. Will continue to work with pt on strengthening and mobility.  -       Row Name 07/21/25 0836          Positioning and Restraints    Pre-Treatment Position in bed  -     Post Treatment Position chair  -MF     In Chair reclined;call light within reach;encouraged to call for assist;notified nsg;RUE elevated;LUE elevated  -               User Key  (r) = Recorded By, (t) = Taken By, (c) = Cosigned By      Initials Name Provider Type    Merlene Hunter, RN Registered Nurse    Selma Berrios, PTA Physical Therapist Assistant    RB Bickerstaff, Rachael R, RN Registered Nurse                    Physical Therapy Education       Title: PT OT SLP Therapies (In Progress)       Topic: Physical Therapy (Done)       Point: Mobility training (Done)       Learning Progress Summary            Patient Acceptance, E, VU by FRANCISCO at 7/20/2025 1146    Comment: role of PT, WBAt, t/f with walker, call for assist, high fall risk   Family Acceptance, E, VU by AJ at 7/20/2025 1146    Comment: role of PT, WBAt, t/f with walker, call for assist, high fall risk                      Point: Home exercise program (Done)       Learning Progress Summary            Patient Acceptance, E, VU by FRANCISCO at 7/20/2025 1146    Comment: role of PT, WBAt, t/f with walker, call for assist, high fall risk   Family Acceptance, E, VU by AJ at 7/20/2025 1146    Comment: role of PT, WBAt, t/f with walker, call for assist, high fall risk                      Point: Body mechanics (Done)       Learning Progress Summary            Patient Acceptance, E, VU by FRANCISCO at 7/20/2025 1146    Comment: role of PT, WBAt, t/f with walker, call for assist, high fall risk   Family Acceptance, E, VU by AJ at 7/20/2025 1146    Comment: role of PT, WBAt, t/f with walker, call for assist, high fall risk                      Point: Precautions (Done)       Learning Progress Summary            Patient Acceptance, E, VU by FRANCISCO at 7/20/2025  1146    Comment: role of PT, WBAt, t/f with walker, call for assist, high fall risk   Family Acceptance, E, VU by FRANCISCO at 7/20/2025 1146    Comment: role of PT, WBAt, t/f with walker, call for assist, high fall risk                                      User Key       Initials Effective Dates Name Provider Type Discipline    FRANCISCO 08/15/24 -  Helio Levin, PT DPT Physical Therapist PT                  PT Recommendation and Plan     Plan of Care Reviewed With: patient  Progress: improving  Outcome Evaluation: PT tx completed. Pt. was oriented to person, but otherwise pleasantly confused. Pt. required Mod-max assist to get to EOB. She participated with a few LE exercises, but was limited by pain. Pt. was able to stand while using RWX with MIN assist. She was able to take scooting steps to BSC and then to chair. Stood a total of 4 times. Unable to clear the floor due weakness and pain. Will continue to work with pt on strengthening and mobility.   Outcome Measures       Row Name 07/21/25 0836             How much help from another person do you currently need...    Turning from your back to your side while in flat bed without using bedrails? 2  -MF      Moving from lying on back to sitting on the side of a flat bed without bedrails? 2  -MF      Moving to and from a bed to a chair (including a wheelchair)? 3  -MF      Standing up from a chair using your arms (e.g., wheelchair, bedside chair)? 3  -MF      Climbing 3-5 steps with a railing? 1  -MF      To walk in hospital room? 2  -MF      AM-PAC 6 Clicks Score (PT) 13  -MF         Functional Assessment    Outcome Measure Options AM-PAC 6 Clicks Basic Mobility (PT)  -MF                User Key  (r) = Recorded By, (t) = Taken By, (c) = Cosigned By      Initials Name Provider Type    Selma Berrios PTA Physical Therapist Assistant                     Time Calculation:    PT Charges       Row Name 07/21/25 7752             Time Calculation    Start Time 0836  -       Stop Time 0930  -MF      Time Calculation (min) 54 min  -MF      PT Received On 07/21/25  -MF         Time Calculation- PT    Total Timed Code Minutes- PT 54 minute(s)  -MF         Timed Charges    65325 - PT Therapeutic Activity Minutes 54  -MF         Total Minutes    Timed Charges Total Minutes 54  -MF       Total Minutes 54  -MF                User Key  (r) = Recorded By, (t) = Taken By, (c) = Cosigned By      Initials Name Provider Type    Selma Berrios PTA Physical Therapist Assistant                  Therapy Charges for Today       Code Description Service Date Service Provider Modifiers Qty    [id number]  PT THERAPEUTIC ACT EA 15 MIN 7/21/2025 Selma Pineda PTA GP 4            PT G-Codes  Outcome Measure Options: AM-PAC 6 Clicks Daily Activity (OT)  AM-PAC 6 Clicks Score (PT): 13  AM-PAC 6 Clicks Score (OT): 12    Selma Pineda PTA  7/21/2025

## 2025-07-21 NOTE — THERAPY DISCHARGE NOTE
Acute Care - Speech Language Pathology   Swallow Treatment Note/Discharge  [place]     Patient Name: Shira King  : 1940  MRN: [id number]  Today's Date: 2025               Admit Date: 2025  Swallow follow up completed. Patient up in chair resting with breakfast tray present when I entered the room. Fatigue noted but she is able to wake and participate. She completed bites of her meal tray with slightly increased prep time due to fatigue but functional chew. Thin liquids with no overt s/s of aspiration.     Patient OK to continue regular diet with thin liquids. DESTINY Frye was made aware of prior preference for medication administration; patient prefers larger pills cut if able to be cut.     SLP signing off.     Please re-consult with any acute changes or concerns.   Mary Jane Pickett, MS CCC-SLP 2025 10:11 CDT    Visit Dx:    ICD-10-CM ICD-9-CM   1. Hyponatremia  E87.1 276.1   2. Closed nondisplaced fracture of pelvis, unspecified part of pelvis, initial encounter  S32.9XXA 808.8   3. Dysphagia, unspecified type  R13.10 787.20   4. Impaired mobility [Z74.09]  Z74.09 799.89     Patient Active Problem List   Diagnosis    Essential hypertension    RMSF ([place] spotted fever)    Mixed hyperlipidemia    Lumbar pain    Gastroesophageal reflux disease    Insomnia    Obstructive sleep apnea    Periodic limb movement    Snoring    Somnolence, daytime    Chronic intractable headache    Chronic ethmoidal sinusitis    Peripheral vascular disease    Hypertrophy of both inferior nasal turbinates    Nasal vestibulitis    Other acute sinusitis    Allergic rhinitis    AMS (altered mental status)    Memory loss    Advanced age    Dementia without behavioral disturbance    Chest pain    Hyponatremia     Past Medical History:   Diagnosis Date    Cancer     skin    Chronic intractable headache 2021    Gastroesophageal reflux disease 2020    Hypertension     Lumbar pain 2019     Mixed hyperlipidemia     PONV (postoperative nausea and vomiting)     Sinusitis     Stroke     Valvular disease      Past Surgical History:   Procedure Laterality Date    BLADDER REPAIR  2005    BREAST SURGERY      BUNIONECTOMY  2007    CARDIAC CATHETERIZATION      CATARACT EXTRACTION Bilateral 2005    CHOLECYSTECTOMY      COLPOCLEISIS N/A 6/14/2022    Procedure: COLPOCLEISIS;  Surgeon: Martina Street MD;  Location: [facility];  Service: Obstetrics/Gynecology;  Laterality: N/A;    ENDOSCOPY  2005    HERNIA REPAIR  1963    HYSTERECTOMY  1979    KRISTAN BSO for fibroids    NECK SURGERY  1974    VERTEBRA    REPLACEMENT TOTAL KNEE BILATERAL Bilateral     TONSILLECTOMY         SLP Recommendation and Plan                    Anticipated Discharge Disposition (SLP): unknown (07/21/25 1010)              Daily Summary of Progress (SLP): progress toward functional goals as expected (07/21/25 0943)     Anticipated Discharge Disposition (SLP): unknown (07/21/25 1010)           Reason for Discharge: all goals and outcomes met, no further needs identified (07/21/25 1010)     Treatment Assessment (SLP): continued (07/21/25 0943)  Treatment Assessment Comments (SLP): See note (07/21/25 0943)  Plan for Continued Treatment (SLP): treatment no longer indicated as all goals met (07/21/25 0943)    Progress: improving (07/21/25 1007)    SWALLOW EVALUATION (Last 72 Hours)       SLP Adult Swallow Evaluation       Row Name 07/21/25 0943 07/21/25 0840 07/19/25 1248             Rehab Evaluation    Document Type therapy note (daily note)  -MG -- evaluation  -MB      Subjective Information no complaints  -MG -- no complaints  -MB      Patient Observations alert;cooperative;agree to therapy  -MG -- alert;cooperative  -MB      Patient/Family/Caregiver Comments/Observations No family present  -MG -- Daughter present  -MB      Session Not Performed -- patient unavailable for treatment  -MG --      Patient Effort adequate  -MG -- --      Comment -- with  PTA  -MG --      Symptoms Noted During/After Treatment none  -MG -- --         General Information    Patient Profile Reviewed -- -- yes  -MB      Pertinent History Of Current Problem -- -- Right pelvic fx, right inferior and superior rami fx, hyponatremia, encephalopathy, essential HTN.  -MB      Current Method of Nutrition -- -- NPO  -MB      Precautions/Limitations, Vision -- -- WFL with corrective lenses  -MB      Precautions/Limitations, Hearing -- -- WFL;for purposes of eval  -MB      Prior Level of Function-Communication -- -- unknown  -MB      Prior Level of Function-Swallowing -- -- no diet consistency restrictions  -MB      Plans/Goals Discussed with -- -- patient and family  -MB      Barriers to Rehab -- -- cognitive status  -MB      Patient's Goals for Discharge -- -- patient did not state  -MB      Family Goals for Discharge -- -- family did not state  -MB         Pain    Additional Documentation Pain Scale: FACES Pre/Post-Treatment (Group)  -MG -- Pain Scale: FACES Pre/Post-Treatment (Group)  -MB         Pain Scale: FACES Pre/Post-Treatment    Pain: FACES Scale, Pretreatment 0-->no hurt  -MG -- 2-->hurts little bit  -MB      Posttreatment Pain Rating 0-->no hurt  -MG -- --      Pre/Posttreatment Pain Comment -- -- When moving HOB  -MB         Oral Motor Structure and Function    Dentition Assessment -- -- natural, present and adequate  -MB      Secretion Management -- -- WNL/[facility]  -MB      Mucosal Quality -- -- moist, healthy  -MB         Oral Musculature and Cranial Nerve Assessment    Oral Motor General Assessment -- -- WF  -MB         General Eating/Swallowing Observations    Eating/Swallowing Skills -- -- fed by SLP  -MB      Positioning During Eating -- -- upright in bed  -MB      Utensils Used -- -- spoon;straw  -MB      Consistencies Trialed -- -- regular textures;pureed;thin liquids  -MB         Clinical Swallow Eval    Oral Prep Phase -- -- WFL  -MB      Oral Transit -- -- L  -MB      Oral  Residue -- -- WFL  -MB      Pharyngeal Phase -- -- WFL  -MB      Esophageal Phase -- -- suspected esophageal impairment  -MB      Clinical Swallow Evaluation Summary -- -- See note  -MB         Esophageal Phase Concerns    Esophageal Phase Concerns -- -- other (see comments)  Reports of pills getting stuck at midsternal area  -MB         SLP Evaluation Clinical Impression    SLP Swallowing Diagnosis -- -- functional oral phase;R/O pharyngeal dysphagia;suspected esophageal dysphagia  -MB      Functional Impact -- -- risk of malnutrition;risk of dehydration  -MB      Rehab Potential/Prognosis, Swallowing -- -- good, to achieve stated therapy goals  -MB      Swallow Criteria for Skilled Therapeutic Interventions Met -- -- demonstrates skilled criteria  -MB         SLP Treatment Clinical Impressions    Treatment Assessment (SLP) continued  -MG -- --      Treatment Assessment Comments (SLP) See note  -MG -- --      Daily Summary of Progress (SLP) progress toward functional goals as expected  -MG -- --      Plan for Continued Treatment (SLP) treatment no longer indicated as all goals met  - -- --      Care Plan Review care plan/treatment goals reviewed  - -- --      Care Plan Review, Other Participant(s) caregiver  DESTINY Frye  - -- --         Recommendations    Therapy Frequency (Swallow) -- -- PRN  -MB      Predicted Duration Therapy Intervention (Days) -- -- until discharge  -MB      SLP Diet Recommendation -- -- regular textures;thin liquids  -MB      Recommended Precautions and Strategies -- -- upright posture during/after eating;small bites of food and sips of liquid;alternate between small bites of food and sips of liquid;general aspiration precautions  -MB      Oral Care Recommendations -- -- Oral Care BID/PRN  -MB      SLP Rec. for Method of Medication Administration -- -- with thin liquids;with puree  meds halved  -MB      Monitor for Signs of Aspiration -- -- yes;cough;gurgly voice;throat  clearing;pneumonia  -MB      Anticipated Discharge Disposition (SLP) -- -- unknown  -MB         Swallow Goals (SLP)    Swallow LTGs Swallow Long Term Goal (free text)  -MG -- Swallow Long Term Goal (free text)  -MB      Swallow STGs diet tolerance goal selection (SLP)  -MG -- diet tolerance goal selection (SLP)  -MB      Diet Tolerance Goal Selection (SLP) Patient will tolerate trials of  -MG -- Patient will tolerate trials of  -MB         (LTG) Swallow    (LTG) Swallow Pt will tolerate least restrictive diet with no overt s/s of aspiration.  -MG -- Pt will tolerate least restrictive diet with no overt s/s of aspiration.  -MB      [place] (Swallow Long Term Goal) independently (over 90% accuracy)  -MG -- independently (over 90% accuracy)  -MB      Time Frame (Swallow Long Term Goal) by discharge  -MG -- by discharge  -MB      Barriers (Swallow Long Term Goal) n/a  -MG -- n/a  -MB      Progress/Outcomes (Swallow Long Term Goal) goal met  -MG -- new goal  -MB      Comment (Swallow Long Term Goal) n/a  -MG -- n/a  -MB         (STG) Patient will tolerate trials of    Consistencies Trialed (Tolerate trials) regular textures;thin liquids  -MG -- regular textures;thin liquids  -MB      Desired Outcome (Tolerate trials) without signs/symptoms of aspiration;with adequate oral prep/transit/clearance  -MG -- without signs/symptoms of aspiration;with adequate oral prep/transit/clearance  -MB      [place] (Tolerate trials) independently (over 90% accuracy)  -MG -- independently (over 90% accuracy)  -MB      Time Frame (Tolerate trials) by discharge  -MG -- by discharge  -MB      Progress/Outcomes (Tolerate trials) goal met  -MG -- new goal  -MB      Comment (Tolerate trials) n/a  -MG -- n/a  -MB                User Key  (r) = Recorded By, (t) = Taken By, (c) = Cosigned By      Initials Name Effective Dates    Meena Blakely, CCC-[facility] 02/03/23 -     MG Mary Jane Pickett, MS [facility]-SLP 07/11/23 -                      EDUCATION  The patient has been educated in the following areas:   Dysphagia (Swallowing Impairment) Oral Care/Hydration.         SLP GOALS       Row Name 07/21/25 0943 07/19/25 1248          (LTG) Swallow    (LTG) Swallow Pt will tolerate least restrictive diet with no overt s/s of aspiration.  -MG Pt will tolerate least restrictive diet with no overt s/s of aspiration.  -MB     [place] (Swallow Long Term Goal) independently (over 90% accuracy)  -MG independently (over 90% accuracy)  -MB     Time Frame (Swallow Long Term Goal) by discharge  -MG by discharge  -MB     Barriers (Swallow Long Term Goal) n/a  -MG n/a  -MB     Progress/Outcomes (Swallow Long Term Goal) goal met  -MG new goal  -MB     Comment (Swallow Long Term Goal) n/a  -MG n/a  -MB        (STG) Patient will tolerate trials of    Consistencies Trialed (Tolerate trials) regular textures;thin liquids  -MG regular textures;thin liquids  -MB     Desired Outcome (Tolerate trials) without signs/symptoms of aspiration;with adequate oral prep/transit/clearance  -MG without signs/symptoms of aspiration;with adequate oral prep/transit/clearance  -MB     [place] (Tolerate trials) independently (over 90% accuracy)  -MG independently (over 90% accuracy)  -MB     Time Frame (Tolerate trials) by discharge  -MG by discharge  -MB     Progress/Outcomes (Tolerate trials) goal met  -MG new goal  -MB     Comment (Tolerate trials) n/a  -MG n/a  -MB               User Key  (r) = Recorded By, (t) = Taken By, (c) = Cosigned By      Initials Name Provider Type    Meena Blakely, CCC-SLP Speech and Language Pathologist    Mary Jane Lovell, MS CCC-SLP Speech and Language Pathologist                           Time Calculation:    Time Calculation- SLP       Row Name 07/21/25 1010             Time Calculation- SLP    SLP Start Time 0943  -MG      SLP Stop Time 1011  -MG      SLP Time Calculation (min) 28 min  -MG      SLP Received On 07/21/25  -MG          Untimed Charges    05314-[facility] Treatment Swallow Minutes 28  -MG         Total Minutes    Untimed Charges Total Minutes 28  -MG       Total Minutes 28  -MG                User Key  (r) = Recorded By, (t) = Taken By, (c) = Cosigned By      Initials Name Provider Type    Mary Jane Lovell MS CCC-SLP Speech and Language Pathologist                    Therapy Charges for Today       Code Description Service Date Service Provider Modifiers Qty    [id number]  ST TREATMENT SWALLOW 2 7/21/2025 Mary Jane Pickett MS CCC-SLP GN 1                 SLP Discharge Summary  Anticipated Discharge Disposition (SLP): unknown  Reason for Discharge: all goals and outcomes met, no further needs identified  Progress Toward Achieving Short/long Term Goals: all goals met within established timelines  Discharge Destination: other (see comments) (Remains in acute care)    Mary Jane Pickett MS CCC-SLP  7/21/2025

## 2025-07-22 LAB
ALBUMIN SERPL-MCNC: 3.3 G/DL (ref 3.5–5.2)
ALBUMIN/GLOB SERPL: 1.5 G/DL
ALP SERPL-CCNC: 70 U/L (ref 39–117)
ALT SERPL W P-5'-P-CCNC: 21 U/L (ref 1–33)
ANION GAP SERPL CALCULATED.3IONS-SCNC: 10 MMOL/L (ref 5–15)
AST SERPL-CCNC: 63 U/L (ref 1–32)
BASOPHILS # BLD AUTO: 0.03 10*3/MM3 (ref 0–0.2)
BASOPHILS NFR BLD AUTO: 0.4 % (ref 0–1.5)
BILIRUB SERPL-MCNC: 0.8 MG/DL (ref 0–1.2)
BUN SERPL-MCNC: 2.8 MG/DL (ref 8–23)
BUN/CREAT SERPL: 7.6 (ref 7–25)
CALCIUM SPEC-SCNC: 8.2 MG/DL (ref 8.6–10.5)
CHLORIDE SERPL-SCNC: 94 MMOL/L (ref 98–107)
CO2 SERPL-SCNC: 21 MMOL/L (ref 22–29)
CREAT SERPL-MCNC: 0.37 MG/DL (ref 0.57–1)
DEPRECATED RDW RBC AUTO: 40.5 FL (ref 37–54)
EGFRCR SERPLBLD CKD-EPI 2021: 97.7 ML/MIN/1.73
EOSINOPHIL # BLD AUTO: 0.25 10*3/MM3 (ref 0–0.4)
EOSINOPHIL NFR BLD AUTO: 3.3 % (ref 0.3–6.2)
ERYTHROCYTE [DISTWIDTH] IN BLOOD BY AUTOMATED COUNT: 12 % (ref 12.3–15.4)
GLOBULIN UR ELPH-MCNC: 2.2 GM/DL
GLUCOSE SERPL-MCNC: 107 MG/DL (ref 65–99)
HCT VFR BLD AUTO: 34.1 % (ref 34–46.6)
HGB BLD-MCNC: 11.9 G/DL (ref 12–15.9)
IMM GRANULOCYTES # BLD AUTO: 0.06 10*3/MM3 (ref 0–0.05)
IMM GRANULOCYTES NFR BLD AUTO: 0.8 % (ref 0–0.5)
LYMPHOCYTES # BLD AUTO: 1.07 10*3/MM3 (ref 0.7–3.1)
LYMPHOCYTES NFR BLD AUTO: 14 % (ref 19.6–45.3)
MAGNESIUM SERPL-MCNC: 2 MG/DL (ref 1.6–2.4)
MCH RBC QN AUTO: 32.1 PG (ref 26.6–33)
MCHC RBC AUTO-ENTMCNC: 34.9 G/DL (ref 31.5–35.7)
MCV RBC AUTO: 91.9 FL (ref 79–97)
MONOCYTES # BLD AUTO: 0.79 10*3/MM3 (ref 0.1–0.9)
MONOCYTES NFR BLD AUTO: 10.3 % (ref 5–12)
NEUTROPHILS NFR BLD AUTO: 5.45 10*3/MM3 (ref 1.7–7)
NEUTROPHILS NFR BLD AUTO: 71.2 % (ref 42.7–76)
NRBC BLD AUTO-RTO: 0 /100 WBC (ref 0–0.2)
PHOSPHATE SERPL-MCNC: 2.1 MG/DL (ref 2.5–4.5)
PHOSPHATE SERPL-MCNC: 2.7 MG/DL (ref 2.5–4.5)
PLATELET # BLD AUTO: 159 10*3/MM3 (ref 140–450)
PMV BLD AUTO: 8 FL (ref 6–12)
POTASSIUM SERPL-SCNC: 3.1 MMOL/L (ref 3.5–5.2)
POTASSIUM SERPL-SCNC: 3.6 MMOL/L (ref 3.5–5.2)
POTASSIUM SERPL-SCNC: 4 MMOL/L (ref 3.5–5.2)
PROT SERPL-MCNC: 5.5 G/DL (ref 6–8.5)
RBC # BLD AUTO: 3.71 10*6/MM3 (ref 3.77–5.28)
SODIUM SERPL-SCNC: 125 MMOL/L (ref 136–145)
SODIUM SERPL-SCNC: 125 MMOL/L (ref 136–145)
SODIUM SERPL-SCNC: 129 MMOL/L (ref 136–145)
SODIUM SERPL-SCNC: 130 MMOL/L (ref 136–145)
WBC NRBC COR # BLD AUTO: 7.65 10*3/MM3 (ref 3.4–10.8)

## 2025-07-22 PROCEDURE — 97535 SELF CARE MNGMENT TRAINING: CPT

## 2025-07-22 PROCEDURE — 84295 ASSAY OF SERUM SODIUM: CPT | Performed by: INTERNAL MEDICINE

## 2025-07-22 PROCEDURE — 83735 ASSAY OF MAGNESIUM: CPT | Performed by: PHYSICIAN ASSISTANT

## 2025-07-22 PROCEDURE — 80053 COMPREHEN METABOLIC PANEL: CPT | Performed by: PHYSICIAN ASSISTANT

## 2025-07-22 PROCEDURE — 84132 ASSAY OF SERUM POTASSIUM: CPT | Performed by: INTERNAL MEDICINE

## 2025-07-22 PROCEDURE — 84295 ASSAY OF SERUM SODIUM: CPT | Performed by: STUDENT IN AN ORGANIZED HEALTH CARE EDUCATION/TRAINING PROGRAM

## 2025-07-22 PROCEDURE — 84100 ASSAY OF PHOSPHORUS: CPT | Performed by: PHYSICIAN ASSISTANT

## 2025-07-22 PROCEDURE — 97530 THERAPEUTIC ACTIVITIES: CPT

## 2025-07-22 PROCEDURE — 25010000002 ENOXAPARIN PER 10 MG: Performed by: STUDENT IN AN ORGANIZED HEALTH CARE EDUCATION/TRAINING PROGRAM

## 2025-07-22 PROCEDURE — 25010000002 POTASSIUM CHLORIDE 10 MEQ/100ML SOLUTION: Performed by: INTERNAL MEDICINE

## 2025-07-22 PROCEDURE — 85025 COMPLETE CBC W/AUTO DIFF WBC: CPT | Performed by: PHYSICIAN ASSISTANT

## 2025-07-22 PROCEDURE — 84100 ASSAY OF PHOSPHORUS: CPT | Performed by: INTERNAL MEDICINE

## 2025-07-22 RX ORDER — QUETIAPINE FUMARATE 25 MG/1
25 TABLET, FILM COATED ORAL NIGHTLY
Status: DISCONTINUED | OUTPATIENT
Start: 2025-07-22 | End: 2025-07-23 | Stop reason: HOSPADM

## 2025-07-22 RX ORDER — QUETIAPINE FUMARATE 25 MG/1
50 TABLET, FILM COATED ORAL NIGHTLY
Status: DISCONTINUED | OUTPATIENT
Start: 2025-07-22 | End: 2025-07-22

## 2025-07-22 RX ORDER — POTASSIUM CHLORIDE 7.45 MG/ML
10 INJECTION INTRAVENOUS
Status: DISPENSED | OUTPATIENT
Start: 2025-07-22 | End: 2025-07-22

## 2025-07-22 RX ORDER — SODIUM CHLORIDE 1 G/1
1 TABLET ORAL 2 TIMES DAILY WITH MEALS
Status: DISCONTINUED | OUTPATIENT
Start: 2025-07-22 | End: 2025-07-23 | Stop reason: HOSPADM

## 2025-07-22 RX ADMIN — POTASSIUM CHLORIDE 10 MEQ: 7.46 INJECTION, SOLUTION INTRAVENOUS at 05:16

## 2025-07-22 RX ADMIN — Medication 10 ML: at 10:55

## 2025-07-22 RX ADMIN — POTASSIUM CHLORIDE 10 MEQ: 7.46 INJECTION, SOLUTION INTRAVENOUS at 06:26

## 2025-07-22 RX ADMIN — Medication 2 PACKET: at 10:55

## 2025-07-22 RX ADMIN — OXYCODONE HYDROCHLORIDE 7.5 MG: 5 SOLUTION ORAL at 04:28

## 2025-07-22 RX ADMIN — Medication 1 APPLICATION: at 20:04

## 2025-07-22 RX ADMIN — Medication 1 G: at 10:54

## 2025-07-22 RX ADMIN — ALPRAZOLAM 0.25 MG: 0.25 TABLET ORAL at 21:55

## 2025-07-22 RX ADMIN — CARVEDILOL 6.25 MG: 6.25 TABLET, FILM COATED ORAL at 17:10

## 2025-07-22 RX ADMIN — Medication 10 ML: at 20:05

## 2025-07-22 RX ADMIN — AMLODIPINE BESYLATE 5 MG: 5 TABLET ORAL at 10:54

## 2025-07-22 RX ADMIN — METHOCARBAMOL TABLETS 750 MG: 500 TABLET, COATED ORAL at 20:04

## 2025-07-22 RX ADMIN — PANTOPRAZOLE SODIUM 40 MG: 40 TABLET, DELAYED RELEASE ORAL at 04:28

## 2025-07-22 RX ADMIN — ACETAMINOPHEN 1000 MG: 500 TABLET, FILM COATED ORAL at 17:09

## 2025-07-22 RX ADMIN — PENTOXIFYLLINE 400 MG: 400 TABLET, EXTENDED RELEASE ORAL at 10:54

## 2025-07-22 RX ADMIN — METHOCARBAMOL TABLETS 750 MG: 500 TABLET, COATED ORAL at 10:54

## 2025-07-22 RX ADMIN — OXYCODONE HYDROCHLORIDE 7.5 MG: 5 SOLUTION ORAL at 11:00

## 2025-07-22 RX ADMIN — OXYCODONE HYDROCHLORIDE 7.5 MG: 5 SOLUTION ORAL at 20:03

## 2025-07-22 RX ADMIN — ENOXAPARIN SODIUM 40 MG: 100 INJECTION SUBCUTANEOUS at 10:55

## 2025-07-22 RX ADMIN — QUETIAPINE FUMARATE 25 MG: 25 TABLET ORAL at 20:04

## 2025-07-22 RX ADMIN — METHOCARBAMOL TABLETS 750 MG: 500 TABLET, COATED ORAL at 17:10

## 2025-07-22 RX ADMIN — POTASSIUM CHLORIDE 10 MEQ: 7.46 INJECTION, SOLUTION INTRAVENOUS at 04:04

## 2025-07-22 RX ADMIN — Medication 1 G: at 17:09

## 2025-07-22 RX ADMIN — OXYBUTYNIN CHLORIDE 5 MG: 5 TABLET ORAL at 10:54

## 2025-07-22 RX ADMIN — Medication 1 APPLICATION: at 10:54

## 2025-07-22 RX ADMIN — CARVEDILOL 6.25 MG: 6.25 TABLET, FILM COATED ORAL at 10:54

## 2025-07-22 RX ADMIN — POTASSIUM CHLORIDE 10 MEQ: 7.46 INJECTION, SOLUTION INTRAVENOUS at 02:10

## 2025-07-22 RX ADMIN — QUETIAPINE FUMARATE 25 MG: 25 TABLET ORAL at 10:54

## 2025-07-22 RX ADMIN — POTASSIUM CHLORIDE 10 MEQ: 7.46 INJECTION, SOLUTION INTRAVENOUS at 01:05

## 2025-07-22 RX ADMIN — PRAVASTATIN SODIUM 20 MG: 20 TABLET ORAL at 20:04

## 2025-07-22 NOTE — CASE MANAGEMENT/SOCIAL WORK
Continued Stay Note  [facility]     Patient Name: Shira King  MRN: [id number]  Today's Date: 7/22/2025    Admit Date: 7/19/2025    Plan: [place] [facility]   Discharge Plan       Row Name 07/22/25 1437       Plan    Plan [place]    Patient/Family in Agreement with Plan yes    Plan Comments [facility] a bed and they can take pt tomorrow. MD aware. Pharmacy ([facility] in [place]) updated in [facility].                   Discharge Codes    No documentation.                       RUSSELL Kendrick

## 2025-07-22 NOTE — PLAN OF CARE
Goal Outcome Evaluation:  Plan of Care Reviewed With: patient        Progress: no change  Outcome Evaluation: OT tx completed. Pt in fowlers upon therapist arrival; Lethargic; Oriented to self only; Pleasantly confused; Sitter present. Pt performed rolling to R side to check and adjust brief requiring Max A and verbal/visual/tactile cues for sequencing. Pt performed oral hygiene and face washing tasks while sitting up in bed requiring SBA and constant verbal/visual cues for sequencing and to maintain attention to task. Pt tolerated session fairly. Cont OT POC. Recommend SNF at discharge.    Anticipated Discharge Disposition (OT): skilled nursing facility

## 2025-07-22 NOTE — DISCHARGE PLACEMENT REQUEST
"Shira Dee (85 y.o. Female)       Date of Birth   1940    Social Security Number       Address   [address] [place] [address]    Home Phone   [phone]    MRN   [id number]       [facility]    Marital Status                               Admission Date   7/19/2025    Admission Type   Emergency    Admitting Provider   Ivis Flores DO    Attending Provider   Jadyn Hankins MD    Department, Room/Bed   [facility] 3C, 381/1       Discharge Date       Discharge Disposition       Discharge Destination                                 Attending Provider: Jadyn Hankins MD    Allergies: Aricept [Donepezil Hcl]    Isolation: None   Infection: None   Code Status: No CPR    Ht: 168.9 cm (66.5\")   Wt: 78.7 kg (173 lb 8 oz)    Admission Cmt: None   Principal Problem: Hyponatremia [E87.1]                   Active Insurance as of 7/19/2025       Primary Coverage       Payor Plan Insurance Group Employer/Plan Group    MEDICARE MEDICARE A & B        Payor Plan Address Payor Plan Phone Number Payor Plan Fax Number Effective Dates    [address] [phone]  1/1/2003 - None Entered    [facility] [id number]         Subscriber Name Subscriber Birth Date Member ID       SHIRA DEE 1940 [id number]               Secondary Coverage       Payor Plan Insurance Group Employer/Plan Group    [facility] SUPP [id number]       Payor Plan Address Payor Plan Phone Number Payor Plan Fax Number Effective Dates    [address]   12/1/2016 - None Entered    [facility] [id number]         Subscriber Name Subscriber Birth Date Member ID       SHIRA DEE 1940 [id number]                     Emergency Contacts        (Rel.) Home Phone Work Phone Mobile Phone    Montse Jung (Power of ) [phone]    Pastora Dee (Daughter) -- -- [phone]    ChristineAbdelrahman (Son) [phone] -- " [phone]            Kd Cowart PA-C   Physician Assistant  Intensivist  Progress Notes     Attested  Date of Service:  07/21/25 1053  Creation Time:  07/21/25 1053     Attested            Attestation signed by Ivis Flores DO at 07/21/25 7312     I have reviewed this documentation and agree.     I provided 35 minutes of total critical care time. Due to the high probability of clinically significant, life-threatening deterioration, the patient required my direct and personal management. The critical care time does not include time spent on separately billable procedures.             Expand All Collapse All[]Expand All by Default       [facility] Intensivist Services  INPATIENT PROGRESS NOTE     Patient Name: Shira Kign  Date of Admission: 7/19/2025  Today's Date: 07/21/25  Length of Stay:  LOS: 2 days   Primary Care Physician: Ryenaldo Cavazos MD  Next of Kin: Primary Emergency Contact: Montse Jung, [place] Phone: [phone]       Subjective   Chief Complaint: Fall, hyponatremia     Fall        85-year-old female with a past medical history of essential hypertension on HCTZ that was recently initiated, dementia, prior stroke, and hyperlipidemia admitted on 7/19/2025 after presenting to [facility] post fall.  She was admitted to the intensivist team as she was found to be hyponatremic.  She was also noted to have a right inferior pubic ramus fracture.     Interval history:  7/20: Latest sodium up to 124 this afternoon.  Patient remains intermittently confused and agitated.  She is redirectable at times.  However, she is having more confusion lately.  As of this, patient is being started back on Precedex.     7/21: Patient's sodium is up to 128 on latest labs. She continues to have intermittent confusion and/or agitation, but this has improved. She is now off precedex gtt. She remains on scheduled seroquel with prn haldol. Additionally, house  supervisor is arranging for patient to have sitter. Potassium, magnesium, and phosphorus were all low, and are all being replaced. No acute events overnight.      Review of Systems   All pertinent negatives and positives are as above. All other systems have been reviewed and are negative unless otherwise stated.      Past Medical and Past Surgical History   Active and Resolved Problems        Active Hospital Problems     Diagnosis   POA    **Hyponatremia [E87.1]   Yes       Resolved Hospital Problems   No resolved problems to display.         Past Medical History:   Medical History        Past Medical History:   Diagnosis Date    Cancer       skin    Chronic intractable headache 02/08/2021    Gastroesophageal reflux disease 02/03/2020    Hypertension      Lumbar pain 07/12/2019    Mixed hyperlipidemia      PONV (postoperative nausea and vomiting)      Sinusitis      Stroke      Valvular disease           Past Surgical History:   Surgical History         Past Surgical History:   Procedure Laterality Date    BLADDER REPAIR   2005    BREAST SURGERY        BUNIONECTOMY   2007    CARDIAC CATHETERIZATION        CATARACT EXTRACTION Bilateral 2005    CHOLECYSTECTOMY        COLPOCLEISIS N/A 6/14/2022     Procedure: COLPOCLEISIS;  Surgeon: Martina Street MD;  Location: [facility] OR;  Service: Obstetrics/Gynecology;  Laterality: N/A;    ENDOSCOPY   2005    HERNIA REPAIR   1963    HYSTERECTOMY   1979     KRISTAN BSO for fibroids    NECK SURGERY   1974     VERTEBRA    REPLACEMENT TOTAL KNEE BILATERAL Bilateral      TONSILLECTOMY             Social and Family History   Family History:  family history includes Heart attack in her maternal grandmother; Heart disease in her mother; Stomach cancer in her maternal uncle; Stroke in her mother and sister.     Tobacco/Social History:  reports that she has never smoked. She has never used smokeless tobacco. She reports that she does not drink alcohol and does not use drugs.     Allergies    Allergies:   She is allergic to aricept [donepezil hcl].     Objective    Temp:  [97.4 °F (36.3 °C)-97.8 °F (36.6 °C)] 97.4 °F (36.3 °C)  Heart Rate:  [] 77  Resp:  [14-17] 14  BP: ()/() 148/71  Physical Exam  Vitals reviewed.   Constitutional:       General: She is awake. She is not in acute distress.     Appearance: She is ill-appearing. She is not diaphoretic.   HENT:      Head: Normocephalic.      Nose: Nose normal.      Mouth/Throat:      Mouth: Mucous membranes are moist.   Eyes:      Extraocular Movements: Extraocular movements intact.   Cardiovascular:      Rate and Rhythm: Normal rate and regular rhythm.      Pulses: Normal pulses.   Pulmonary:      Effort: Pulmonary effort is normal. No respiratory distress.   Abdominal:      Palpations: Abdomen is soft.   Skin:     General: Skin is warm.      Capillary Refill: Capillary refill takes less than 2 seconds.   Neurological:      General: No focal deficit present.      Mental Status: She is alert.      Comments: Intermittently disoriented and confused            Inpatient Medications   Medications: Scheduled Meds:  Scheduled Medication   ALPRAZolam, 0.25 mg, Oral, Once  amLODIPine, 5 mg, Oral, Daily  aspirin, 81 mg, Oral, Every Other Day  carvedilol, 6.25 mg, Oral, BID With Meals  Chlorhexidine Gluconate Cloth, 1 Application, Topical, Q24H  enoxaparin sodium, 40 mg, Subcutaneous, Daily  magnesium sulfate, 2 g, Intravenous, Q2H  methocarbamol, 750 mg, Oral, 4x Daily  mupirocin, 1 Application, Each Nare, BID  oxybutynin, 5 mg, Oral, Daily  pantoprazole, 40 mg, Oral, Q AM  pentoxifylline, 400 mg, Oral, Daily  potassium chloride, 40 mEq, Oral, Once  potassium chloride, 10 mEq, Intravenous, Q1H  potassium phosphate, 15 mmol, Intravenous, Q3H  pravastatin, 20 mg, Oral, Nightly  QUEtiapine, 100 mg, Oral, Nightly  QUEtiapine, 25 mg, Oral, Daily  senna-docusate sodium, 2 tablet, Oral, BID  sodium chloride, 10 mL, Intravenous, Q12H         Continuous  Infusions:  Infusion Medications   sodium chloride, 125 mL/hr, Last Rate: 125 mL/hr (07/21/25 0806)         PRN Meds:.  PRN Medication     acetaminophen    ALPRAZolam    senna-docusate sodium **AND** polyethylene glycol **AND** bisacodyl **AND** bisacodyl    Calcium Replacement - Follow Nurse / BPA Driven Protocol    celecoxib    haloperidol lactate    HYDROmorphone    Magnesium Standard Dose Replacement - Follow Nurse / BPA Driven Protocol    ondansetron ODT **OR** ondansetron    oxyCODONE    Phosphorus Replacement - Follow Nurse / BPA Driven Protocol    Potassium Replacement - Follow Nurse / BPA Driven Protocol    sodium chloride    sodium chloride    sodium chloride        I have reviewed the patient's current medications.   Outpatient Medications           Current Outpatient Medications   Medication Instructions    acetaminophen (TYLENOL) 250 mg, Every 4 Hours    ALPRAZolam (XANAX) 0.25 mg, Oral, 2 Times Daily PRN    aluminum-magnesium hydroxide-simethicone (MAALOX/MYLANTA) 200-200-20 MG/5ML suspension 10 mL, Oral, Every 6 Hours PRN    amLODIPine (NORVASC) 5 mg, Daily    aspirin 81 mg, Every Other Day    B Complex Vitamins (Vitamin B Complex) tablet 1 tablet, Daily    carvedilol (COREG) 6.25 mg, 2 Times Daily With Meals    celecoxib (CELEBREX) 200 mg, Daily    hydroCHLOROthiazide 12.5 mg, Daily    HYDROcodone-acetaminophen (NORCO) 7.5-325 MG per tablet 0.5 tablets, Every 4 Hours    losartan (COZAAR) 100 mg, Daily    metoclopramide (REGLAN) 5 mg, Daily    omeprazole (PRILOSEC) 20 mg, 2 Times Daily    oxybutynin (DITROPAN) 5 mg, Oral, Daily    pentoxifylline (TRENTAL) 400 mg, Daily    pravastatin (PRAVACHOL) 20 mg, Daily         Current Antibiotics   This patient does not have an active medication from one of the medication groupers.     Results:   CBC:           Lab 07/21/25  0627 07/19/25  0509   WBC 6.44 8.12   HEMOGLOBIN 11.7* 12.3   HEMATOCRIT 33.6* 34.2   PLATELETS 135* 202   NEUTROS ABS 5.08 6.77   IMMATURE  GRANS (ABS) 0.02 0.06*   LYMPHS ABS 0.62* 0.62*   MONOS ABS 0.57 0.61   EOS ABS 0.12 0.04   MCV 91.3 89.8      LIVER FUNCTION TESTS:            Lab 07/21/25  0819 07/19/25  1037 07/19/25  0509   TOTAL PROTEIN 5.3* 5.7* 6.0   ALBUMIN 3.3* 3.7 3.8   GLOBULIN 2.0 2.0 2.2   ALT (SGPT) 15 18 19   AST (SGOT) 55* 53* 55*   BILIRUBIN 0.9 1.2 1.4*   ALK PHOS 66 84 85      ABG:            Lab 07/21/25  0819 07/19/25  1037 07/19/25  0509   ANION GAP 11.0 11.0 11.0      BMP/MG/PHOS:                 Lab 07/21/25  0819 07/20/25  2325 07/20/25  1810 07/20/25  1207 07/20/25  0556 07/19/25  1604 07/19/25  1037 07/19/25  0509   SODIUM 128* 129* 128* 124* 122*   < > 119* 115*   POTASSIUM 2.7*  --   --   --   --   --  4.3 4.6   CHLORIDE 95*  --   --   --   --   --  88* 86*   BUN 2.6*  --   --   --   --   --  8.9 11.1   CREATININE 0.33*  --   --   --   --   --  0.60 0.73   CALCIUM 8.1*  --   --   --   --   --  8.5* 8.9   MAGNESIUM 1.4*  --   --   --   --   --   --  1.8   PHOSPHORUS 1.6*  --   --   --   --   --   --   --     < > = values in this interval not displayed.      IMAGING STUDIES:  XR Shoulder 2+ View Right  Result Date: 7/19/2025   No acute osseous finding.   This report was signed and finalized on 7/19/2025 3:09 PM by Helio Parisi.          Assessment/Plan   85-year-old female with past medical history of dementia, hyperlipidemia, and hypertension who was recently started on HCTZ admitted after presenting to [facility] on 7/19/2025 with complaints of fall.  She was also noted to be hyponatremic with an initial sodium of 115.  She was admitted to the intensivist team for further workup and management.     Hyponatremia  -Sodium now up to 128 on latest labs  - Likely secondary to recently starting HCTZ  - Discontinue HCTZ  -Continue to trend serial sodium levels every 6 hours  -Goal of sodium correction 8 mEq in 24 hours      Encephalopathy  - Multifactorial in setting of hyponatremia, acute pain, and opioid pain  medications  -Improving overall  - Continue to correct sodium levels as above  - Avoid oversedation, adequate pain control  - Reorient as needed  -Continue seroquel as scheduled-25 mg daily. PRN haldol also available.  -Precedex gtt has been stopped.      Right inferior pubic ramus fracture with right hip pain  - Found on CT scan on admission  - No surgery required per orthopedics.  Patient will be weightbearing as tolerated with a walker and follow-up with them as an outpatient.  -Continue adequate pain control with oxycodone and fentanyl as needed     Essential hypertension  -Stopped HCTZ  -Continue  Norvasc and Coreg per home schedule         CODE STATUS: DNR/Full support  VTE prophylaxis: lovenox  Nutrition: Regular diet  Bowel: prn bowel regimen  GI prophylaxis: Protonix  Antibiotics: N/A     Disposition: Patient suitable for transfer to medical floor.     A minimum of 35 minutes spent on patient care, medium MDM.     This time included obtaining a history; examining the patient; pulse oximetry; ordering and review of studies; arranging urgent treatment with development of a management plan; evaluation of patient's response to treatment; frequent reassessment; and, discussions with other providers.     Please see MDM section and the rest of the note for further information on patient assessment and treatment.     Part of this note may be an electronic transcription/translation of spoken language to printed text using the Dragon Dictation System     Electronically signed by Kd Cowart PA-C on 7/21/2025 at 10:53 CDT            Cosigned by: Ivis Flores DO at 07/21/25 1352       Kayla Arango, OT Student   OT Student  Occupational Therapy  Therapy Treatment Note     Attested  Date of Service:  07/21/25 1153  Creation Time:  07/21/25 1153     Attested            Attestation signed by Millie Rivera OTR/L, CSRS at 07/21/25 1155     I reviewed the documentation and agree.                Expand  All Collapse All[]Expand All by Default  Patient Name: Shira King              : 1940                          MRN: [id number]                              Today's Date: 2025                                   Admit Date: 2025                        Visit Dx:   Visit Diagnosis       ICD-10-CM ICD-9-CM   1. Hyponatremia  E87.1 276.1   2. Closed nondisplaced fracture of pelvis, unspecified part of pelvis, initial encounter  S32.9XXA 808.8   3. Dysphagia, unspecified type  R13.10 787.20   4. Impaired mobility [Z74.09]  Z74.09 799.89         Problem List       Patient Active Problem List   Diagnosis    Essential hypertension    RMSF (Khari Mountain spotted fever)    Mixed hyperlipidemia    Lumbar pain    Gastroesophageal reflux disease    Insomnia    Obstructive sleep apnea    Periodic limb movement    Snoring    Somnolence, daytime    Chronic intractable headache    Chronic ethmoidal sinusitis    Peripheral vascular disease    Hypertrophy of both inferior nasal turbinates    Nasal vestibulitis    Other acute sinusitis    Allergic rhinitis    AMS (altered mental status)    Memory loss    Advanced age    Dementia without behavioral disturbance    Chest pain    Hyponatremia         Medical History        Past Medical History:   Diagnosis Date    Cancer       skin    Chronic intractable headache 2021    Gastroesophageal reflux disease 2020    Hypertension      Lumbar pain 2019    Mixed hyperlipidemia      PONV (postoperative nausea and vomiting)      Sinusitis      Stroke      Valvular disease           Surgical History         Past Surgical History:   Procedure Laterality Date    BLADDER REPAIR   2005    BREAST SURGERY        BUNIONECTOMY   2007    CARDIAC CATHETERIZATION        CATARACT EXTRACTION Bilateral     CHOLECYSTECTOMY        COLPOCLEISIS N/A 2022     Procedure: COLPOCLEISIS;  Surgeon: Matrina Street MD;  Location: [facility];  Service: Obstetrics/Gynecology;   Laterality: N/A;    ENDOSCOPY   2005    HERNIA REPAIR   1963    HYSTERECTOMY   1979     KRISTAN BSO for fibroids    NECK SURGERY   1974     VERTEBRA    REPLACEMENT TOTAL KNEE BILATERAL Bilateral      TONSILLECTOMY               General Information         Row Name 07/21/25 1050                 OT Time and Intention     Subjective Information no complaints  -JW (r) SUDARSHAN (t) DIANE (c)       Document Type therapy note (daily note)  -DIANE (r) SUDARSHAN (t) DIANE (c)       Mode of Treatment occupational therapy  -DIANE (r) SUDARSHAN (t) DIANE (c)       Patient Effort poor  -DIANE (r) SUDARSHAN (t) DIANE (c)       Symptoms Noted During/After Treatment fatigue  -JW (r) SUDARSHAN (t) DIANE (c)          Row Name 07/21/25 1050                 General Information     Existing Precautions/Restrictions fall  -DIANE (r) SUDARSHAN (t) DIANE (c)          Row Name 07/21/25 1050                 Cognition     Orientation Status (Cognition) oriented to;person;place;disoriented to;situation;time  Was able to report she was at the hospital; unable to report which one  -DIANE (r) SUDARSHAN (t) DIANE (c)          Row Name 07/21/25 1050                 Safety Issues/Impairments Affecting Functional Mobility     Impairments Affecting Function (Mobility) balance;endurance/activity tolerance;strength;pain;postural/trunk control;cognition  -DIANE (r) SUDARSHAN (t) DIANE (c)       Cognitive Impairments, Mobility Safety/Performance attention;awareness, need for assistance;insight into deficits/self-awareness;judgment;problem-solving/reasoning;safety precaution awareness;safety precaution follow-through;sequencing abilities  -DIANE (r) SUDARSHAN (t) DIANE (c)                       User Key  (r) = Recorded By, (t) = Taken By, (c) = Cosigned By        Initials Name Provider Type     Millie Diggs, OTR/L, CSRS Occupational Therapist     Kayla Poon, OT Student OT Student                                     Mobility/ADL's         Row Name 07/21/25 1050                 Bed Mobility     Comment, (Bed Mobility) Up in chair upon entering  -DIANE (lydia)  BS (t) JW (c)          Row Name 07/21/25 1050                 Transfers     Transfers --  Not appropriate this session due to cognitive level and decreased alertness.  -JW (r) BS (t) JW (c)          Row Name 07/21/25 1050                 Activities of Daily Living     BADL Assessment/Intervention lower body dressing;grooming  -JW (r) BS (t) JW (c)          Row Name 07/21/25 1050                 Lower Body Dressing Assessment/Training     [place] Level (Lower Body Dressing) don;socks;maximum assist (25% patient effort)  -JW (r) BS (t) JW (c)          Row Name 07/21/25 1050                 Grooming Assessment/Training     [place] Level (Grooming) wash face, hands;set up;supervision;hair care, combing/brushing;maximum assist (25% patient effort)  -JW (r) BS (t) JW (c)       Position (Grooming) supported sitting  sitting up in chair  -JW (r) BS (t) JW (c)                       User Key  (r) = Recorded By, (t) = Taken By, (c) = Cosigned By        Initials Name Provider Type     Millie Diggs OTR/L, CSRS Occupational Therapist     Kayla Poon, OT Student OT Student                            Obj/Interventions         Row Name 07/21/25 1050                 Balance     Balance Assessment sitting static balance;sitting dynamic balance  -JW (r) BS (t) JW (c)       Static Sitting Balance supervision  -JW (r) BS (t) JW (c)       Dynamic Sitting Balance supervision  -JW (r) BS (t) JW (c)       Position, Sitting Balance supported;sitting in chair  -JW (r) BS (t) JW (c)       Balance Interventions sitting;supported;static;dynamic;occupation based/functional task  -JW (r) BS (t) JW (c)                       User Key  (r) = Recorded By, (t) = Taken By, (c) = Cosigned By        Initials Name Provider Type     Millie Diggs OTR/L, [facility] Occupational Therapist     Kayla Poon, OT Student OT Student                            Goals/Plan    No documentation.                        Clinical  Impression         Row Name 07/21/25 1050                 Pain Assessment     Pretreatment Pain Rating 0/10 - no pain  -DIANE (r) SUDARSHAN (alyssa) DIANE (c)       Posttreatment Pain Rating 0/10 - no pain  -DIANE (r) SUDARSHAN (alyssa) DIANE (c)          Row Name 07/21/25 1050                 Plan of Care Review     Plan of Care Reviewed With patient  -DIANE corrales (r)) DIANE pichardo)       Progress no change  -DIANE (emma HEATON (alyssa) DIANE (stephanie)       Outcome Evaluation OT tx completed. Pt presented sitting up in chair upon entering and demonstrated to be very confused. Pt was A&O x2; disoriented to situation and time. Pt demonstrated non-sensical speech throughout session as well as decreased alertness. Pt speech was very hard to understand. She required mutliple vcs to keep eyes open during session. Due to decreased cognitive level and safety purposes, tx session was performed in sitting. Pt refused to perform UE exercises this date. When pt was handed a wet wash cloth she was able to wash face independently with setup and supervision for safety. Pt was unable to appropriately use comb to brush hair due to cognitive level. She required Max A to don socks. Nsg notified on session. Ms. Romero would benefit from continued skilled OT per POC. Recommend d/c to SNF.  -DIANE (lydia) SUDARSHAN (alyssa) DIANE (c)          Row Name 07/21/25 1050                 Therapy Assessment/Plan (OT)     Rehab Potential (OT) fair  -DIANE HEATON (r) (alyssa) DIANE (stephanie)       Criteria for Skilled Therapeutic Interventions Met (OT) --  -DIANE (emma HEATON (alyssa) DIANE (stephanie)       Therapy Frequency (OT) --  -DIANE (lydia) SUDARSHAN (alyssa) DIANE (c)       Predicted Duration of Therapy Intervention (OT) --  -DIANE (emma HEATON (alyssa) DIANE (c)          Row Name 07/21/25 1050                 Therapy Plan Review/Discharge Plan (OT)     Anticipated Discharge Disposition (OT) skilled nursing facility  -DIANE HEATON (r) (alyssa) DIANE (stephanie)          Row Name 07/21/25 1050                 Vital Signs     O2 Delivery Pre Treatment room air  -DIANE HEATON (r) (alyssa) DIANE pichardo)       O2 Delivery Intra Treatment room air  -DIANE  (r) BS (t) JW (c)       O2 Delivery Post Treatment room air  -JW (r) BS (t) JW (c)       Pre Patient Position Sitting  -JW (r) BS (t) JW (c)       Intra Patient Position Sitting  -JW (r) BS (t) JW (c)       Post Patient Position Sitting  -JW (r) BS (t) JW (c)          Row Name 07/21/25 1050                 Positioning and Restraints     Pre-Treatment Position sitting in chair/recliner  -JW (r) BS (t) JW (c)       Post Treatment Position chair  -JW (r) BS (t) JW (c)       In Chair notified nsg;reclined;call light within reach;encouraged to call for assist;with nsg;patient within staff view  -JW (r) BS (t) JW (c)                       User Key  (r) = Recorded By, (t) = Taken By, (c) = Cosigned By        Initials Name Provider Type     Millie Diggs, OTR/L, CSRS Occupational Therapist     Kayla Poon, OT Student OT Student                            Outcome Measures         Row Name 07/21/25 1050                 How much help from another is currently needed...     Putting on and taking off regular lower body clothing? 2  -JW (r) BS (t) JW (c)       Bathing (including washing, rinsing, and drying) 2  -JW (r) BS (t) JW (c)       Toileting (which includes using toilet bed pan or urinal) 2  -JW (r) BS (t) JW (c)       Putting on and taking off regular upper body clothing 2  -JW (r) BS (t) JW (c)       Taking care of personal grooming (such as brushing teeth) 2  -JW (r) BS (t) JW (c)       Eating meals 2  due to cognitive level and alertness this date.  -JW (r) BS (t) JW (c)       AM-PAC 6 Clicks Score (OT) 12  -JW (r) BS (t)          Row Name 07/21/25 0813                 How much help from another person do you currently need...     Turning from your back to your side while in flat bed without using bedrails? 2  -MF       Moving from lying on back to sitting on the side of a flat bed without bedrails? 2  -MF       Moving to and from a bed to a chair (including a wheelchair)? 3  -MF       Standing up from  a chair using your arms (e.g., wheelchair, bedside chair)? 3  -MF       Climbing 3-5 steps with a railing? 1  -MF       To walk in hospital room? 2  -MF       AM-PAC 6 Clicks Score (PT) 13  -MF       Highest Level of Mobility Goal Move to Chair/Commode-4  -MF          Row Name 07/21/25 1050 07/21/25 0836            Functional Assessment     Outcome Measure Options AM-PAC 6 Clicks Daily Activity (OT)  -DIANE (r) BS (t) JW (c) AM-PAC 6 Clicks Basic Mobility (PT)  -                     User Key  (r) = Recorded By, (t) = Taken By, (c) = Cosigned By        Initials Name Provider Type      Selma Pineda, FERNIE Physical Therapist Assistant     Millie Diggs OTCASANDRA/L, CSRS Occupational Therapist     Kayla Poon, OT Student OT Student                             Occupational Therapy Education            Title: PT OT SLP Therapies (In Progress)         Topic: Occupational Therapy (In Progress)         Point: ADL training (In Progress)         Learning Progress Summary             Patient Acceptance, E, NR,NL by  at 7/21/2025 1050     Acceptance, E,D, VU,DU,NL by  at 7/20/2025 1336                            Point: Precautions (In Progress)         Learning Progress Summary             Patient Acceptance, E, NR,NL by  at 7/21/2025 1050     Acceptance, E,D, VU,DU,NL by  at 7/20/2025 1336                            Point: Body mechanics (In Progress)         Learning Progress Summary             Patient Acceptance, E, NR,NL by  at 7/21/2025 1050     Acceptance, E,D, VU,DU,NL by  at 7/20/2025 1336                                                User Key         Initials Effective Dates Name Provider Type Discipline      10/08/24 -  Cintia Cadena OTR/L Occupational Therapist OT      05/12/25 -  Kayla Arango, OT Student OT Student OT                          OT Recommendation and Plan  Plan of Care Review  Plan of Care Reviewed With: patient  Progress: no change  Outcome Evaluation: OT tx  completed. Pt presented sitting up in chair upon entering and demonstrated to be very confused. Pt was A&O x2; disoriented to situation and time. Pt demonstrated non-sensical speech throughout session as well as decreased alertness. Pt speech was very hard to understand. She required mutliple vcs to keep eyes open during session. Due to decreased cognitive level and safety purposes, tx session was performed in sitting. Pt refused to perform UE exercises this date. When pt was handed a wet wash cloth she was able to wash face independently with setup and supervision for safety. Pt was unable to appropriately use comb to brush hair due to cognitive level. She required Max A to don socks. Nsg notified on session. Ms. Romero would benefit from continued skilled OT per POC. Recommend d/c to SNF.      Time Calculation:        Time Calculation- OT         Row Name 07/21/25 1050                       Time Calculation- OT     OT Start Time 1050  -JW (r) BS (t) JW (c)         OT Stop Time 1114  -JW (r) BS (t) JW (c)         OT Time Calculation (min) 24 min  -JW (r) BS (t)         Total Timed Code Minutes- OT 24 minute(s)  -JW (r) BS (t) JW (c)         OT Received On 07/21/25  -JW (r) BS (t) JW (c)                 Timed Charges     44530 - OT Self Care/Mgmt Minutes 24  -JW (r) BS (t) JW (c)                 Total Minutes     Timed Charges Total Minutes 24  -JW (r) BS (t)          Total Minutes 24  -JW (r) BS (t)                      User Key  (r) = Recorded By, (t) = Taken By, (c) = Cosigned By        Initials Name Provider Type     Millie Diggs OTR/L, CSRS Occupational Therapist     Kayla Poon OT Student OT Student                                Kayla Arango OT Student                7/21/2025            Cosigned by: Millie Rivera OTR/L, LUANA at 07/21/25 Leonardo4       Selma Pineda, FERNIE   Physical Therapist Assistant  Specialty:  Physical Therapy  Therapy Treatment Note     Signed  Date of  Service:  25 1323  Creation Time:  25     Signed        Expand All Collapse All[]Expand All by Default  Acute Care - Physical Therapy Treatment Note  [facility]     Patient Name: Shira King              : 1940                        MRN: [id number]  Today's Date: 2025                                  Visit Dx:   Visit Diagnosis       ICD-10-CM ICD-9-CM   1. Hyponatremia  E87.1 276.1   2. Closed nondisplaced fracture of pelvis, unspecified part of pelvis, initial encounter  S32.9XXA 808.8   3. Dysphagia, unspecified type  R13.10 787.20   4. Impaired mobility [Z74.09]  Z74.09 799.89         Problem List       Patient Active Problem List   Diagnosis    Essential hypertension    RMSF (Khari Mountain spotted fever)    Mixed hyperlipidemia    Lumbar pain    Gastroesophageal reflux disease    Insomnia    Obstructive sleep apnea    Periodic limb movement    Snoring    Somnolence, daytime    Chronic intractable headache    Chronic ethmoidal sinusitis    Peripheral vascular disease    Hypertrophy of both inferior nasal turbinates    Nasal vestibulitis    Other acute sinusitis    Allergic rhinitis    AMS (altered mental status)    Memory loss    Advanced age    Dementia without behavioral disturbance    Chest pain    Hyponatremia         Medical History        Past Medical History:   Diagnosis Date    Cancer       skin    Chronic intractable headache 2021    Gastroesophageal reflux disease 2020    Hypertension      Lumbar pain 2019    Mixed hyperlipidemia      PONV (postoperative nausea and vomiting)      Sinusitis      Stroke      Valvular disease           Surgical History         Past Surgical History:   Procedure Laterality Date    BLADDER REPAIR   2005    BREAST SURGERY        BUNIONECTOMY   2007    CARDIAC CATHETERIZATION        CATARACT EXTRACTION Bilateral     CHOLECYSTECTOMY        COLPOCLEISIS N/A 2022     Procedure: COLPOCLEISIS;  Surgeon: Jad  MD Martina;  Location: [facility] OR;  Service: Obstetrics/Gynecology;  Laterality: N/A;    ENDOSCOPY   2005    HERNIA REPAIR   1963    HYSTERECTOMY   1979     KRISTAN BSO for fibroids    NECK SURGERY   1974     VERTEBRA    REPLACEMENT TOTAL KNEE BILATERAL Bilateral      TONSILLECTOMY             PT Assessment (Last 12 Hours)            PT Evaluation and Treatment         Row Name 07/21/25 0836                 Physical Therapy Time and Intention     Subjective Information complains of;pain  -       Document Type therapy note (daily note)  -       Mode of Treatment physical therapy  -       Comment pt oriented to name only. COnfused otherwise.  -          Row Name 07/21/25 0836                 General Information     Existing Precautions/Restrictions fall  WBAT  -       Limitations/Impairments safety/cognitive  -          Row Name 07/21/25 0836                 Pain Scale: FACES Pre/Post-Treatment     Pain: FACES Scale, Pretreatment 0-->no hurt  -       Posttreatment Pain Rating 4-->hurts little more  -       Pre/Posttreatment Pain Comment bilateral B LE  -          Row Name 07/21/25 0836                 Bed Mobility     Supine-Sit [place] (Bed Mobility) verbal cues;moderate assist (50% patient effort);maximum assist (25% patient effort)  -       Assistive Device (Bed Mobility) head of bed elevated;repositioning sheet  -          Row Name 07/21/25 0836                 Transfers     Comment, (Transfers) stood x 4 total  -          Row Name 07/21/25 0836                 Sit-Stand Transfer     Sit-Stand [place] (Transfers) verbal cues;minimum assist (75% patient effort);moderate assist (50% patient effort)  -          Row Name 07/21/25 0836                 Stand-Sit Transfer     Stand-Sit [place] (Transfers) verbal cues;minimum assist (75% patient effort)  -          Row Name 07/21/25 0836                 Stand Pivot/Stand Step Transfer     Stand Pivot/Stand Step [place] (Transfers)  verbal cues;minimum assist (75% patient effort)  -       Assistive Device (Stand Pivot Stand Step Transfer) walker, front-wheeled  -MF       Comment, (Stand Pivot Transfer) side steps from BSC to commode  -          Row Name 07/21/25 0836                 Toilet Transfer     Type (Toilet Transfer) stand pivot/stand step  -       [place] Level (Toilet Transfer) verbal cues;minimum assist (75% patient effort)  -       Assistive Device (Toilet Transfer) commode, bedside without drop arms;walker, front-wheeled  -MF          Row Name 07/21/25 0836                 Balance     Comment, Balance SBA for sitting balance while on EOB  -          Row Name 07/21/25 0836                 Motor Skills     Comments, Therapeutic Exercise active APS x 10, AAROM LAQ due to pain  -          Row Name                      [REMOVED] Wound 06/14/22 0906 vagina Incision     Wound - Properties Group Placement Date: 06/14/22  -RB Placement Time: 0906  -RB Present on Original Admission: N  -RB Location: vagina  -RB Primary Wound Type: Incision  -RB Removal Date: 07/21/25  -MJ (r) MW (t) Removal Time: 1307  -MJ (r) MW (t)     Retired Wound - Properties Group Placement Date: 06/14/22  -RB Placement Time: 0906  -RB Present on Original Admission: N  -RB Location: vagina  -RB Primary Wound Type: Incision  -RB Removal Date: 07/21/25  -MJ (r) MW (t) Removal Time: 1307  -MJ (r) MW (t)     Retired Wound - Properties Group Placement Date: 06/14/22  -RB Placement Time: 0906  -RB Present on Original Admission: N  -RB Location: vagina  -RB Primary Wound Type: Incision  -RB Removal Date: 07/21/25  -MJ (r) MW (t) Removal Time: 1307  -MJ (r) MW (t)     Retired Wound - Properties Group Date first assessed: 06/14/22  -RB Time first assessed: 0906  -RB Present on Original Admission: N  -RB Location: vagina  -RB Primary Wound Type: Incision  -RB Resolution Date: 07/21/25  -MJ (r) MW (t) Resolution Time: 1307  -MJ (r) MW (t)        Row Name 07/21/25  0836                 Plan of Care Review     Plan of Care Reviewed With patient  -MF       Progress improving  -       Outcome Evaluation PT tx completed. Pt. was oriented to person, but otherwise pleasantly confused. Pt. required Mod-max assist to get to EOB. She participated with a few LE exercises, but was limited by pain. Pt. was able to stand while using RWX with MIN assist. She was able to take scooting steps to BSC and then to chair. Stood a total of 4 times. Unable to clear the floor due weakness and pain. Will continue to work with pt on strengthening and mobility.  -          Row Name 07/21/25 0836                 Positioning and Restraints     Pre-Treatment Position in bed  -MF       Post Treatment Position chair  -MF       In Chair reclined;call light within reach;encouraged to call for assist;notified nsg;RUE elevated;LUE elevated  -                    User Key  (r) = Recorded By, (t) = Taken By, (c) = Cosigned By        Initials Name Provider Type     MW Merlene Cannon, RN Registered Nurse     Selma Berrios, FERNIE Physical Therapist Assistant     RB Bickerstaff, Rachael R, RN Registered Nurse                             Physical Therapy Education            Title: PT OT SLP Therapies (In Progress)         Topic: Physical Therapy (Done)         Point: Mobility training (Done)         Learning Progress Summary             Patient Acceptance, E, VU by AJ at 7/20/2025 1146     Comment: role of PT, WBAt, t/f with walker, call for assist, high fall risk   Family Acceptance, E, VU by AJ at 7/20/2025 1146     Comment: role of PT, WBAt, t/f with walker, call for assist, high fall risk                            Point: Home exercise program (Done)         Learning Progress Summary             Patient Acceptance, E, VU by AJ at 7/20/2025 1146     Comment: role of PT, WBAt, t/f with walker, call for assist, high fall risk   Family Acceptance, E, VU by AJ at 7/20/2025 1146     Comment: role of PT,  WBAt, t/f with walker, call for assist, high fall risk                            Point: Body mechanics (Done)         Learning Progress Summary             Patient Acceptance, E, VU by FRANCISCO at 7/20/2025 1146     Comment: role of PT, WBAt, t/f with walker, call for assist, high fall risk   Family Acceptance, E, VU by FRANCISCO at 7/20/2025 1146     Comment: role of PT, WBAt, t/f with walker, call for assist, high fall risk                            Point: Precautions (Done)         Learning Progress Summary             Patient Acceptance, E, VU by AJ at 7/20/2025 1146     Comment: role of PT, WBAt, t/f with walker, call for assist, high fall risk   Family Acceptance, E, VU by AJ at 7/20/2025 1146     Comment: role of PT, WBAt, t/f with walker, call for assist, high fall risk                                                User Key         Initials Effective Dates Name Provider Type Discipline     FRANCISCO 08/15/24 -  Helio Levin, PT DPT Physical Therapist PT                          PT Recommendation and Plan  Plan of Care Reviewed With: patient  Progress: improving  Outcome Evaluation: PT tx completed. Pt. was oriented to person, but otherwise pleasantly confused. Pt. required Mod-max assist to get to EOB. She participated with a few LE exercises, but was limited by pain. Pt. was able to stand while using RWX with MIN assist. She was able to take scooting steps to BSC and then to chair. Stood a total of 4 times. Unable to clear the floor due weakness and pain. Will continue to work with pt on strengthening and mobility.    Outcome Measures         Row Name 07/21/25 0836                       How much help from another person do you currently need...     Turning from your back to your side while in flat bed without using bedrails? 2  -MF         Moving from lying on back to sitting on the side of a flat bed without bedrails? 2  -MF         Moving to and from a bed to a chair (including a wheelchair)? 3  -MF         Standing up  from a chair using your arms (e.g., wheelchair, bedside chair)? 3  -MF         Climbing 3-5 steps with a railing? 1  -MF         To walk in hospital room? 2  -MF         AM-PAC 6 Clicks Score (PT) 13  -MF                 Functional Assessment     Outcome Measure Options AM-PAC 6 Clicks Basic Mobility (PT)  -MF                         User Key  (r) = Recorded By, (t) = Taken By, (c) = Cosigned By        Initials Name Provider Type      Selma Pineda PTA Physical Therapist Assistant                              Time Calculation:     PT Charges         Row Name 07/21/25 1323                       Time Calculation     Start Time 0836  -MF         Stop Time 0930  -MF         Time Calculation (min) 54 min  -MF         PT Received On 07/21/25  -MF                 Time Calculation- PT     Total Timed Code Minutes- PT 54 minute(s)  -MF                 Timed Charges     29206 - PT Therapeutic Activity Minutes 54  -MF                 Total Minutes     Timed Charges Total Minutes 54  -MF          Total Minutes 54  -MF                      User Key  (r) = Recorded By, (t) = Taken By, (c) = Cosigned By        Initials Name Provider Type      Selma Pineda PTA Physical Therapist Assistant                       Therapy Charges for Today         Code Description Service Date Service Provider Modifiers Qty     [id number] HC PT THERAPEUTIC ACT EA 15 MIN 7/21/2025 Selma Pineda PTA GP 4                PT G-Codes  Outcome Measure Options: AM-PAC 6 Clicks Daily Activity (OT)  AM-PAC 6 Clicks Score (PT): 13  AM-PAC 6 Clicks Score (OT): 12     Selma Pineda PTA                   7/21/2025                                    Christoph Carney MD   Physician  Orthopedics  Progress Notes     Addendum  Date of Service:  07/21/25 1818  Creation Time:  07/21/25 1818     Expand All Collapse All[]Expand All by Default  Christoph Carney MD   Orthopaedic Surgery Progress Note        7/21/2025   18:18 CDT     Name:  Shira MARVIN  Christine  MRN:    [id number]                             Acct:     [id number]   Room:  [address] [place] [address] Day: 2  POD:    * No surgery found *  Procedure: * Surgery not found *     Admit Date: 7/19/2025                        PCP: Reynaldo Cavazos MD           Subjective:      Interval: Patient still complains of right shoulder pain and pelvic pain.  X-rays of the right shoulder were negative.        Medications:      Allergies:   Allergies         Allergies   Allergen Reactions    Aricept [Donepezil Hcl] Other (See Comments)       Shaking, sweating            Current Meds:   Current Medications             Current Facility-Administered Medications   Medication Dose Route Frequency Provider Last Rate Last Admin    acetaminophen (TYLENOL) tablet 1,000 mg  1,000 mg Oral Q8H PRN Sandra, Ivis, DO   1,000 mg at 07/21/25 1709    ALPRAZolam (XANAX) tablet 0.25 mg  0.25 mg Oral BID PRN Gaetano Ramirez APRN   0.25 mg at 07/19/25 1417    ALPRAZolam (XANAX) tablet 0.25 mg  0.25 mg Oral Once Sandra Ivis, DO        amLODIPine (NORVASC) tablet 5 mg  5 mg Oral Daily Kd Cowart PA-C   5 mg at 07/21/25 0957    aspirin chewable tablet 81 mg  81 mg Oral Every Other Day Gaetano Ramirez APRN   81 mg at 07/21/25 0957    sennosides-docusate (PERICOLACE) 8.6-50 MG per tablet 2 tablet  2 tablet Oral BID Demondsyrocael, Ivis, DO   2 tablet at 07/21/25 0955     And    polyethylene glycol (MIRALAX) packet 17 g  17 g Oral Daily PRN Maksymyuk, Ivis, DO         And    bisacodyl (DULCOLAX) EC tablet 5 mg  5 mg Oral Daily PRN Maksymy, Ivis, DO         And    bisacodyl (DULCOLAX) suppository 10 mg  10 mg Rectal Daily PRN Maksymyuk, Ivis, DO        Calcium Replacement - Follow Nurse / BPA Driven Protocol   Not Applicable PRN Kd Cowart PA-C        carvedilol (COREG) tablet 6.25 mg  6.25 mg Oral BID With Meals Kd Cowart PA-C   6.25 mg at 07/21/25 1709    celecoxib (CeleBREX) capsule 200 mg  200 mg  Oral Daily PRN Kd Cowart PA-C        Chlorhexidine Gluconate Cloth 2 % pads 1 Application  1 Application Topical Q24H Maikel Floresa, DO   1 Application at 07/21/25 0617    enoxaparin sodium (LOVENOX) syringe 40 mg  40 mg Subcutaneous Daily Maikel Floresa, DO   40 mg at 07/21/25 0958    haloperidol lactate (HALDOL) injection 1 mg  1 mg Intramuscular Q6H PRN Kd Cowart PA-C        HYDROmorphone (DILAUDID) injection 0.25 mg  0.25 mg Intravenous Q2H PRN Maikel Floresa, DO        Magnesium Standard Dose Replacement - Follow Nurse / BPA Driven Protocol   Not Applicable PRN Kd Cowart PA-C        methocarbamol (ROBAXIN) tablet 750 mg  750 mg Oral 4x Daily Maikel Floresa, DO   750 mg at 07/21/25 1709    mupirocin (BACTROBAN) 2 % nasal ointment 1 Application  1 Application Each Nare BID Maikel Floresa, DO   1 Application at 07/21/25 0955    ondansetron ODT (ZOFRAN-ODT) disintegrating tablet 4 mg  4 mg Oral Q6H PRN Sandra, Ivis, DO         Or    ondansetron (ZOFRAN) injection 4 mg  4 mg Intravenous Q6H PRN Demondsobia, Ivis, DO        oxybutynin (DITROPAN) tablet 5 mg  5 mg Oral Daily Kd Cowart PA-C   5 mg at 07/21/25 0955    oxyCODONE (ROXICODONE) 5 MG/5ML solution 7.5 mg  7.5 mg Oral Q4H PRN Demondsobia, Ivis, DO        pantoprazole (PROTONIX) EC tablet 40 mg  40 mg Oral Q AM Kd Cowart PA-C   40 mg at 07/21/25 0617    pentoxifylline (TRENtal) CR tablet 400 mg  400 mg Oral Daily Kd Cowart PA-C   400 mg at 07/21/25 0957    Phosphorus Replacement - Follow Nurse / BPA Driven Protocol   Not Applicable PRKd Ayala PA-C        Potassium Replacement - Follow Nurse / BPA Driven Protocol   Not Applicable Kd Garcia PA-C        pravastatin (PRAVACHOL) tablet 20 mg  20 mg Oral Nightly Kd Cowart PA-C   20 mg at 07/20/25 2141    QUEtiapine (SEROquel) tablet 100 mg  100 mg Oral Nightly Ivis Flores DO   100 mg at  "25 2140    QUEtiapine (SEROquel) tablet 25 mg  25 mg Oral Daily Maksymyuk, Ivis, DO   25 mg at 25 0956    sodium chloride 0.9 % flush 10 mL  10 mL Intravenous PRN Sven Laguna MD        sodium chloride 0.9 % flush 10 mL  10 mL Intravenous Q12H Maksymyuk, Ivis, DO   10 mL at 25 0958    sodium chloride 0.9 % flush 10 mL  10 mL Intravenous PRN Maksymyuk, Ivis, DO        sodium chloride 0.9 % infusion 40 mL  40 mL Intravenous PRN Maksymyuk, Ivis, DO                   Data:      Vitals:  /55 (BP Location: Left arm, Patient Position: Lying)   Pulse 73   Temp 98.5 °F (36.9 °C) (Oral)   Resp 17   Ht 168.9 cm (66.5\")   Wt 77.6 kg (171 lb 1.2 oz)   LMP  (LMP Unknown)   SpO2 97%   BMI 27.20 kg/m²   Temp (24hrs), Av.8 °F (36.6 °C), Min:97.4 °F (36.3 °C), Max:98.5 °F (36.9 °C)        I/O (24Hr):     Intake/Output Summary (Last 24 hours) at 2025 1818  Last data filed at 2025 1806      Gross per 24 hour   Intake 2559.09 ml   Output 4300 ml   Net -1740.91 ml         Labs:  Lab Results (last 72 hours)         Procedure Component Value Units Date/Time     Sodium [[id number]] Collected: 25 1754     Specimen: Blood Updated: 25 1810     Sodium [[id number]]  (Abnormal) Collected: 25 1205     Specimen: Blood Updated: 25 1228       Sodium 128 mmol/L       Phosphorus [[id number]]  (Abnormal) Collected: 25     Specimen: Blood Updated: 25 0949       Phosphorus 1.6 mg/dL       Magnesium [[id number]]  (Abnormal) Collected: 25     Specimen: Blood Updated: 25 0925       Magnesium 1.4 mg/dL       Comprehensive Metabolic Panel [[id number]]  (Abnormal) Collected: 25     Specimen: Blood Updated: 25       Glucose 122 mg/dL         BUN 2.6 mg/dL         Creatinine 0.33 mg/dL         Sodium 128 mmol/L         Potassium 2.7 mmol/L         Chloride 95 mmol/L         CO2 22.0 mmol/L         Calcium 8.1 mg/dL       "   Total Protein 5.3 g/dL         Albumin 3.3 g/dL         ALT (SGPT) 15 U/L         AST (SGOT) 55 U/L         Alkaline Phosphatase 66 U/L         Total Bilirubin 0.9 mg/dL         Globulin 2.0 gm/dL         A/G Ratio 1.7 g/dL         BUN/Creatinine Ratio 7.9       Anion Gap 11.0 mmol/L         eGFR 100.5 mL/min/1.73       Narrative:       GFR Categories in Chronic Kidney Disease (CKD)              GFR Category          GFR (mL/min/1.73)    Interpretation  G1                    90 or greater        Normal or high (1)  G2                    60-89                Mild decrease (1)  G3a                   45-59                Mild to moderate decrease  G3b                   30-44                Moderate to severe decrease  G4                    15-29                Severe decrease  G5                    14 or less           Kidney failure     (1)In the absence of evidence of kidney disease, neither GFR category G1 or G2 fulfill the criteria for CKD.     eGFR calculation 2021 CKD-EPI creatinine equation, which does not include race as a factor     CBC & Differential [[id number]]  (Abnormal) Collected: 07/21/25 0627     Specimen: Blood Updated: 07/21/25 0635     Narrative:       The following orders were created for panel order CBC & Differential.  Procedure                               Abnormality         Status                     ---------                               -----------         ------                     CBC Auto Differential[[id number]]        Abnormal            Final result                  Please view results for these tests on the individual orders.     CBC Auto Differential [[id number]]  (Abnormal) Collected: 07/21/25 0627     Specimen: Blood Updated: 07/21/25 0635       WBC 6.44 10*3/mm3         RBC 3.68 10*6/mm3         Hemoglobin 11.7 g/dL         Hematocrit 33.6 %         MCV 91.3 fL         MCH 31.8 pg         MCHC 34.8 g/dL         RDW 11.9 %         RDW-SD 40.1 fl         MPV 8.1 fL          Platelets 135 10*3/mm3         Neutrophil % 78.8 %         Lymphocyte % 9.6 %         Monocyte % 8.9 %         Eosinophil % 1.9 %         Basophil % 0.5 %         Immature Grans % 0.3 %         Neutrophils, Absolute 5.08 10*3/mm3         Lymphocytes, Absolute 0.62 10*3/mm3         Monocytes, Absolute 0.57 10*3/mm3         Eosinophils, Absolute 0.12 10*3/mm3         Basophils, Absolute 0.03 10*3/mm3         Immature Grans, Absolute 0.02 10*3/mm3         nRBC 0.0 /100 WBC       Sodium [[id number]]  (Abnormal) Collected: 07/20/25 2325     Specimen: Blood Updated: 07/21/25 0014       Sodium 129 mmol/L       Sodium [[id number]]  (Abnormal) Collected: 07/20/25 1810     Specimen: Blood Updated: 07/20/25 1832       Sodium 128 mmol/L       Sodium [[id number]]  (Abnormal) Collected: 07/20/25 1207     Specimen: Blood Updated: 07/20/25 1227       Sodium 124 mmol/L       Sodium [[id number]]  (Abnormal) Collected: 07/20/25 0556     Specimen: Blood Updated: 07/20/25 0625       Sodium 122 mmol/L       Sodium [[id number]]  (Abnormal) Collected: 07/19/25 2338     Specimen: Blood Updated: 07/20/25 0007       Sodium 119 mmol/L       Sodium [[id number]]  (Abnormal) Collected: 07/19/25 1821     Specimen: Blood Updated: 07/19/25 1901       Sodium 116 mmol/L       Sodium [[id number]]  (Abnormal) Collected: 07/19/25 1604     Specimen: Blood Updated: 07/19/25 1631       Sodium 116 mmol/L       Calcium, Ionized [[id number]] Collected: 07/19/25 1200     Specimen: Blood Updated: 07/19/25 1201       Ionized Calcium 4.60 mg/dL         Collected by [id number]       Comment: Meter: [id number]     :  Brittany Hathaway CRT        Comprehensive Metabolic Panel [[id number]]  (Abnormal) Collected: 07/19/25 1037     Specimen: Blood Updated: 07/19/25 1105       Glucose 140 mg/dL         BUN 8.9 mg/dL         Creatinine 0.60 mg/dL         Sodium 119 mmol/L         Potassium 4.3 mmol/L         Chloride 88 mmol/L         CO2 20.0 mmol/L         Calcium 8.5  mg/dL         Total Protein 5.7 g/dL         Albumin 3.7 g/dL         ALT (SGPT) 18 U/L         AST (SGOT) 53 U/L         Alkaline Phosphatase 84 U/L         Total Bilirubin 1.2 mg/dL         Globulin 2.0 gm/dL         A/G Ratio 1.9 g/dL         BUN/Creatinine Ratio 14.8       Anion Gap 11.0 mmol/L         eGFR 87.0 mL/min/1.73       Narrative:       GFR Categories in Chronic Kidney Disease (CKD)              GFR Category          GFR (mL/min/1.73)    Interpretation  G1                    90 or greater        Normal or high (1)  G2                    60-89                Mild decrease (1)  G3a                   45-59                Mild to moderate decrease  G3b                   30-44                Moderate to severe decrease  G4                    15-29                Severe decrease  G5                    14 or less           Kidney failure     (1)In the absence of evidence of kidney disease, neither GFR category G1 or G2 fulfill the criteria for CKD.     eGFR calculation 2021 CKD-EPI creatinine equation, which does not include race as a factor     BNP [[id number]]  (Normal) Collected: 07/19/25 0509     Specimen: Blood Updated: 07/19/25 0816       proBNP 301.0 pg/mL       Narrative:       This assay is used as an aid in the diagnosis of individuals suspected of having heart failure. It can be used as an aid in the diagnosis of acute decompensated heart failure (ADHF) in patients presenting with signs and symptoms of ADHF to the emergency department (ED). In addition, NT-proBNP of <300 pg/mL indicates ADHF is not likely.     Age Range        Result Interpretation  NT-proBNP Concentration (pg/mL:        <50             Positive            >450                        Bunch                        300-450                          Negative                        <300     50-75           Positive            >900                  Gray                300-900                  Negative            <300        >75              Positive            >1800                  Gray                300-1800                  Negative            <300     Magnesium [[id number]]  (Normal) Collected: 07/19/25 0509     Specimen: Blood Updated: 07/19/25 0816       Magnesium 1.8 mg/dL       TSH Rfx On Abnormal To Free T4 [[id number]]  (Normal) Collected: 07/19/25 0509     Specimen: Blood Updated: 07/19/25 0816       TSH 1.030 uIU/mL       Sodium, Urine, Random - Urine, Clean Catch [[id number]] Collected: 07/19/25 0605     Specimen: Urine, Clean Catch Updated: 07/19/25 0654       Sodium, Urine 72 mmol/L       Narrative:       Reference intervals for random urine have not been established.  Clinical usage is dependent upon physician's interpretation in combination with other laboratory tests.         Urinalysis With Culture If Indicated - Urine, Clean Catch [[id number]]  (Abnormal) Collected: 07/19/25 0605     Specimen: Urine, Clean Catch Updated: 07/19/25 0616       Color, UA Yellow       Appearance, UA Clear       pH, UA 8.0       Specific Gravity, UA 1.011       Glucose, UA Negative       Ketones, UA Trace       Bilirubin, UA Negative       Blood, UA Negative       Protein, UA Negative       Leuk Esterase, UA Negative       Nitrite, UA Negative       Urobilinogen, UA 1.0 E.U./dL     Narrative:       In absence of clinical symptoms, the presence of pyuria, bacteria, and/or nitrites on the urinalysis result does not correlate with infection.  Urine microscopic not indicated.     Comprehensive Metabolic Panel [[id number]]  (Abnormal) Collected: 07/19/25 0509     Specimen: Blood Updated: 07/19/25 0550       Glucose 136 mg/dL         BUN 11.1 mg/dL         Creatinine 0.73 mg/dL         Sodium 115 mmol/L         Potassium 4.6 mmol/L         Comment: Slight hemolysis detected by analyzer. Result may be falsely elevated.          Chloride 86 mmol/L         CO2 18.0 mmol/L         Calcium 8.9 mg/dL         Total Protein 6.0 g/dL         Albumin 3.8 g/dL         ALT  (SGPT) 19 U/L         AST (SGOT) 55 U/L         Alkaline Phosphatase 85 U/L         Total Bilirubin 1.4 mg/dL         Globulin 2.2 gm/dL         A/G Ratio 1.7 g/dL         BUN/Creatinine Ratio 15.2       Anion Gap 11.0 mmol/L         eGFR 79.7 mL/min/1.73       Narrative:       GFR Categories in Chronic Kidney Disease (CKD)              GFR Category          GFR (mL/min/1.73)    Interpretation  G1                    90 or greater        Normal or high (1)  G2                    60-89                Mild decrease (1)  G3a                   45-59                Mild to moderate decrease  G3b                   30-44                Moderate to severe decrease  G4                    15-29                Severe decrease  G5                    14 or less           Kidney failure     (1)In the absence of evidence of kidney disease, neither GFR category G1 or G2 fulfill the criteria for CKD.     eGFR calculation 2021 CKD-EPI creatinine equation, which does not include race as a factor     Protime-INR [[id number]]  (Normal) Collected: 07/19/25 0509     Specimen: Blood Updated: 07/19/25 0533       Protime 13.8 Seconds         INR 1.00     aPTT [[id number]]  (Normal) Collected: 07/19/25 0509     Specimen: Blood Updated: 07/19/25 0533       PTT 29.8 seconds       Narrative:       PTT = The equivalent PTT values for the therapeutic range of heparin levels at 0.3 to 0.7 U/ml are 77 - 99 seconds.     [place] Draw [[id number]] Collected: 07/19/25 0509     Specimen: Blood Updated: 07/19/25 0530     Narrative:       The following orders were created for panel order [place] Draw.  Procedure                               Abnormality         Status                     ---------                               -----------         ------                     Green Top (Gel)[[id number]]                                  Final result               Lavender Top[[id number]]                                     Final result               Red  Top[[id number]]                                          Final result               Bunch Top[[id number]]                                         Final result               Light Blue Top[[id number]]                                   Final result                  Please view results for these tests on the individual orders.     Gray Top [[id number]] Collected: 07/19/25 0509     Specimen: Blood Updated: 07/19/25 0530       Extra Tube Hold for add-ons.       Comment: Auto resulted.        CBC & Differential [[id number]]  (Abnormal) Collected: 07/19/25 0509     Specimen: Blood Updated: 07/19/25 0527     Narrative:       The following orders were created for panel order CBC & Differential.  Procedure                               Abnormality         Status                     ---------                               -----------         ------                     CBC Auto Differential[[id number]]        Abnormal            Final result                  Please view results for these tests on the individual orders.     CBC Auto Differential [[id number]]  (Abnormal) Collected: 07/19/25 0509     Specimen: Blood Updated: 07/19/25 0527       WBC 8.12 10*3/mm3         RBC 3.81 10*6/mm3         Hemoglobin 12.3 g/dL         Hematocrit 34.2 %         MCV 89.8 fL         MCH 32.3 pg         MCHC 36.0 g/dL         RDW 11.7 %         RDW-SD 38.0 fl         MPV 8.4 fL         Platelets 202 10*3/mm3         Neutrophil % 83.5 %         Lymphocyte % 7.6 %         Monocyte % 7.5 %         Eosinophil % 0.5 %         Basophil % 0.2 %         Immature Grans % 0.7 %         Neutrophils, Absolute 6.77 10*3/mm3         Lymphocytes, Absolute 0.62 10*3/mm3         Monocytes, Absolute 0.61 10*3/mm3         Eosinophils, Absolute 0.04 10*3/mm3         Basophils, Absolute 0.02 10*3/mm3         Immature Grans, Absolute 0.06 10*3/mm3         nRBC 0.0 /100 WBC       Green Top (Gel) [[id number]] Collected: 07/19/25 0509     Specimen: Blood Updated: 07/19/25 0515        Extra Tube Hold for add-ons.       Comment: Auto resulted.        Lavender Top [[id number]] Collected: 07/19/25 0509     Specimen: Blood Updated: 07/19/25 0515       Extra Tube hold for add-on       Comment: Auto resulted        Red Top [[id number]] Collected: 07/19/25 0509     Specimen: Blood Updated: 07/19/25 0515       Extra Tube Hold for add-ons.       Comment: Auto resulted.        Light Blue Top [[id number]] Collected: 07/19/25 0509     Specimen: Blood Updated: 07/19/25 0515       Extra Tube Hold for add-ons.       Comment: Auto resulted                      Physical Exam:      Right shoulder demonstrates no significant swelling she  to palpation anteriorly.        Assessment:      85-year-old female with a right pelvic fracture of the right inferior and superior pubic rami.  She also has right shoulder pain with a negative x-ray.           Plan:      She may weight-bear as tolerated on the right lower extremity and right upper extremity.  2.   She can follow-up at the orthopedic outpatient clinic in 2 to 3 weeks for repeat x-rays.  3.   Call if needed.     Electronically signed by Christoph Carney MD on 7/21/2025 at 18:18 CDT           Ivis Flores DO   Physician  Intensivist  H&P     Signed  Date of Service:  07/19/25 0844  Creation Time:  07/19/25 0844     Signed        Expand All Collapse All[]Expand All by Default       [facility] ER Intensivist Services  HISTORY AND PHYSICAL     Date of Admission: 7/19/2025  Primary Care Physician: Reynaldo Cavazos MD     Subjective   Primary Historian: daughter     Chief Complaint: mechanical fall     Fall        Mrs. Shira King is a 85 years old woman with past medical history of essential hypertension on hydrochlorothiazide initiated recently, baby aspirin who presents to [facility] post mechanical fall.     She experienced fall yesterday at 10 PM and was placed back in the bed by her  and  her son.  This morning she experienced right hip pain.  EMS was called.  She was GCS of 15 on arrival but complaining of right hip pain with pain being reproduced on palpation and movement of the right leg, also right leg was noted to be externally rotated.  She was given 50 mcg of fentanyl for pain and transferred to ED.  Lab work was significant for sodium of 115.  And CT scan showed no intracranial abnormality.  C-spine was negative.  CT abdomen pelvis without contrast revealed minimally displaced right inferior pubic ramus fracture.  ICU was consulted for admission due to the critically low sodium.     Daughter is present at the bedside and provided most of the history due the patient being hard of hearing and intermittently confused.     Review of Systems   Otherwise complete ROS reviewed and negative except as mentioned in the HPI.     Past Medical History:   Medical History        Past Medical History:   Diagnosis Date    Cancer       skin    Chronic intractable headache 02/08/2021    Gastroesophageal reflux disease 02/03/2020    Hypertension      Lumbar pain 07/12/2019    Mixed hyperlipidemia      PONV (postoperative nausea and vomiting)      Sinusitis      Stroke      Valvular disease           Past Surgical History:  Surgical History         Past Surgical History:   Procedure Laterality Date    BLADDER REPAIR   2005    BREAST SURGERY        BUNIONECTOMY   2007    CARDIAC CATHETERIZATION        CATARACT EXTRACTION Bilateral 2005    CHOLECYSTECTOMY        COLPOCLEISIS N/A 6/14/2022     Procedure: COLPOCLEISIS;  Surgeon: Martina Street MD;  Location: [facility] OR;  Service: Obstetrics/Gynecology;  Laterality: N/A;    ENDOSCOPY   2005    HERNIA REPAIR   1963    HYSTERECTOMY   1979     KRISTAN BSO for fibroids    NECK SURGERY   1974     VERTEBRA    REPLACEMENT TOTAL KNEE BILATERAL Bilateral      TONSILLECTOMY             Social History:  reports that she has never smoked. She has never used smokeless tobacco. She  reports that she does not drink alcohol and does not use drugs.     Family History: family history includes Heart attack in her maternal grandmother; Heart disease in her mother; Stomach cancer in her maternal uncle; Stroke in her mother and sister.        Allergies:  Allergies         Allergies   Allergen Reactions    Aricept [Donepezil Hcl] Other (See Comments)       Shaking, sweating            Medications:          Prior to Admission medications    Medication Sig Start Date End Date Taking? Authorizing Provider   ALPRAZolam (XANAX) 0.25 MG tablet TAKE ONE TABLET BY MOUTH TWICE A DAY AS NEEDED FOR ANXIETY 10/3/23     Bonita Romero APRN   aluminum-magnesium hydroxide-simethicone (MAALOX/MYLANTA) 200-200-20 MG/5ML suspension Take 10 mL by mouth Every 6 (Six) Hours As Needed for Indigestion or Heartburn. 4/7/23     Bonita Romero APRN   amLODIPine (NORVASC) 5 MG tablet TAKE ONE TABLET BY MOUTH EVERY DAY 7/31/23     Bonita Romero APRN   aspirin 81 MG chewable tablet Chew 1 tablet Every Other Day.       ProviderCan MD   B Complex Vitamins (Vitamin B Complex) tablet Take 1 tablet by mouth Daily. 2/1/21     ProviderCan MD   carvedilol (COREG) 6.25 MG tablet TAKE ONE TABLET BY MOUTH TWICE A DAY WITH MEALS 5/8/23     Bonita Romero APRN   celecoxib (CeleBREX) 200 MG capsule TAKE ONE CAPSULE BY MOUTH EVERY DAY 11/29/23     Bonita Romero APRN   famotidine (PEPCID) 20 MG tablet TAKE ONE TABLET BY MOUTH TWICE A DAY 1/30/23     Bonita Romero APRN   fexofenadine (Allegra Allergy) 180 MG tablet Take 1 tablet by mouth Daily As Needed (Allergies). 6/24/22     Ezequiel Gallego MD   HYDROcodone-acetaminophen (NORCO) 5-325 MG per tablet Take 1 tablet by mouth 3 (Three) Times a Day As Needed for Moderate Pain. 8/3/23     Bonita Romero APRN   losartan (COZAAR) 100 MG tablet TAKE ONE TABLET BY MOUTH EVERY DAY 7/31/23     Bonita Romero APRN   metoclopramide (REGLAN)  "10 MG tablet TAKE ONE-HALF TABLET BY MOUTH EVERY DAY 7/31/23     Bonita Romero APRN   pravastatin (PRAVACHOL) 20 MG tablet TAKE ONE TABLET BY MOUTH EVERY DAY 5/8/23     Bonita Romero APRN   valACYclovir (Valtrex) 500 MG tablet Take 1 tablet by mouth 3 (Three) Times a Day. 1/31/23     Bonita Romero APRN      I have utilized all available immediate resources to obtain, update, or review the patient's current medications (including all prescriptions, over-the-counter products, herbals, cannabis/cannabidiol products, and vitamin/mineral/dietary (nutritional) supplements).     Objective      Vital Signs: /73 (BP Location: Left arm, Patient Position: Lying)   Pulse 75   Temp 97.9 °F (36.6 °C) (Oral)   Resp 25   Ht 168.9 cm (66.5\")   Wt 77.6 kg (171 lb 1.6 oz)   LMP  (LMP Unknown)   SpO2 92%   BMI 27.20 kg/m²   Physical Exam  Constitutional:       Appearance: She is normal weight. She is ill-appearing.      Comments: Alert and oriented to her herself and place, not time; intermittently confused, not in distress   HENT:      Head: Normocephalic and atraumatic.      Nose: Nose normal.      Mouth/Throat:      Mouth: Mucous membranes are moist.   Eyes:      Extraocular Movements: Extraocular movements intact.      Conjunctiva/sclera: Conjunctivae normal.      Pupils: Pupils are equal, round, and reactive to light.   Cardiovascular:      Rate and Rhythm: Normal rate.   Pulmonary:      Effort: Pulmonary effort is normal.      Breath sounds: Normal breath sounds.   Abdominal:      General: Abdomen is flat. Bowel sounds are normal.      Palpations: Abdomen is soft.   Musculoskeletal:         General: Normal range of motion.      Cervical back: Normal range of motion.   Skin:     General: Skin is warm and dry.      Capillary Refill: Capillary refill takes less than 2 seconds.   Neurological:      General: No focal deficit present.      Mental Status: She is disoriented.      Motor: Weakness " present.      Comments: Pain to palpation of the right hip, unable to move right hip due to the pain; range of motion or other extremities preserved; distal pulses DP/PT are 2/4, sensation to light touch is preserved    Psychiatric:         Behavior: Behavior normal.               Results Reviewed:  Lab Results (last 24 hours)         Procedure Component Value Units Date/Time     BNP [[id number]]  (Normal) Collected: 07/19/25 0509     Specimen: Blood Updated: 07/19/25 0816       proBNP 301.0 pg/mL       Narrative:       This assay is used as an aid in the diagnosis of individuals suspected of having heart failure. It can be used as an aid in the diagnosis of acute decompensated heart failure (ADHF) in patients presenting with signs and symptoms of ADHF to the emergency department (ED). In addition, NT-proBNP of <300 pg/mL indicates ADHF is not likely.     Age Range         Result Interpretation  NT-proBNP Concentration (pg/mL:        <50             Positive            >450                         Bunch                           300-450                           Negative               <300     50-75           Positive            >900                  Gray                300-900                  Negative            <300        >75             Positive            >1800                  Gray                300-1800                  Negative            <300     Magnesium [[id number]]  (Normal) Collected: 07/19/25 0509     Specimen: Blood Updated: 07/19/25 0816       Magnesium 1.8 mg/dL       TSH Rfx On Abnormal To Free T4 [[id number]]  (Normal) Collected: 07/19/25 0509     Specimen: Blood Updated: 07/19/25 0816       TSH 1.030 uIU/mL       Sodium, Urine, Random - Urine, Clean Catch [[id number]] Collected: 07/19/25 0605     Specimen: Urine, Clean Catch Updated: 07/19/25 0654       Sodium, Urine 72 mmol/L       Narrative:       Reference intervals for random urine have not been established.  Clinical usage is dependent upon  physician's interpretation in combination with other laboratory tests.         Urinalysis With Culture If Indicated - Urine, Clean Catch [[id number]]  (Abnormal) Collected: 07/19/25 0605     Specimen: Urine, Clean Catch Updated: 07/19/25 0616       Color, UA Yellow       Appearance, UA Clear       pH, UA 8.0       Specific Gravity, UA 1.011       Glucose, UA Negative       Ketones, UA Trace       Bilirubin, UA Negative       Blood, UA Negative       Protein, UA Negative       Leuk Esterase, UA Negative       Nitrite, UA Negative       Urobilinogen, UA 1.0 E.U./dL     Narrative:       In absence of clinical symptoms, the presence of pyuria, bacteria, and/or nitrites on the urinalysis result does not correlate with infection.  Urine microscopic not indicated.     Comprehensive Metabolic Panel [[id number]]  (Abnormal) Collected: 07/19/25 0509     Specimen: Blood Updated: 07/19/25 0550       Glucose 136 mg/dL         BUN 11.1 mg/dL         Creatinine 0.73 mg/dL         Sodium 115 mmol/L         Potassium 4.6 mmol/L         Comment: Slight hemolysis detected by analyzer. Result may be falsely elevated.          Chloride 86 mmol/L         CO2 18.0 mmol/L         Calcium 8.9 mg/dL         Total Protein 6.0 g/dL         Albumin 3.8 g/dL         ALT (SGPT) 19 U/L         AST (SGOT) 55 U/L         Alkaline Phosphatase 85 U/L         Total Bilirubin 1.4 mg/dL         Globulin 2.2 gm/dL         A/G Ratio 1.7 g/dL         BUN/Creatinine Ratio 15.2       Anion Gap 11.0 mmol/L         eGFR 79.7 mL/min/1.73       Narrative:       GFR Categories in Chronic Kidney Disease (CKD)              GFR Category          GFR (mL/min/1.73)    Interpretation  G1                    90 or greater        Normal or high (1)  G2                    60-89                Mild decrease (1)  G3a                   45-59                Mild to moderate decrease  G3b                   30-44                Moderate to severe decrease  G4                     15-29                Severe decrease  G5                    14 or less           Kidney failure     (1)In the absence of evidence of kidney disease, neither GFR category G1 or G2 fulfill the criteria for CKD.     eGFR calculation 2021 CKD-EPI creatinine equation, which does not include race as a factor     Protime-INR [[id number]]  (Normal) Collected: 07/19/25 0509     Specimen: Blood Updated: 07/19/25 0533       Protime 13.8 Seconds         INR 1.00     aPTT [[id number]]  (Normal) Collected: 07/19/25 0509     Specimen: Blood Updated: 07/19/25 0533       PTT 29.8 seconds       Narrative:       PTT = The equivalent PTT values for the therapeutic range of heparin levels at 0.3 to 0.7 U/ml are 77 - 99 seconds.     [place] Draw [[id number]] Collected: 07/19/25 0509     Specimen: Blood Updated: 07/19/25 0530     Narrative:       The following orders were created for panel order [place] Draw.  Procedure                               Abnormality         Status                     ---------                               -----------         ------                     Green Top (Gel)[[id number]]                                  Final result               Lavender Top[[id number]]                                     Final result               Red Top[[id number]]                                          Final result               Bunch Top[[id number]]                                         Final result               Light Blue Top[[id number]]                                   Final result                  Please view results for these tests on the individual orders.     Gray Top [[id number]] Collected: 07/19/25 0509     Specimen: Blood Updated: 07/19/25 0530       Extra Tube Hold for add-ons.       Comment: Auto resulted.        CBC & Differential [[id number]]  (Abnormal) Collected: 07/19/25 0509     Specimen: Blood Updated: 07/19/25 0527     Narrative:       The following orders were created for panel order CBC & Differential.  Procedure                                Abnormality         Status                     ---------                               -----------         ------                     CBC Auto Differential[[id number]]        Abnormal            Final result                  Please view results for these tests on the individual orders.     CBC Auto Differential [[id number]]  (Abnormal) Collected: 07/19/25 0509     Specimen: Blood Updated: 07/19/25 0527       WBC 8.12 10*3/mm3         RBC 3.81 10*6/mm3         Hemoglobin 12.3 g/dL         Hematocrit 34.2 %         MCV 89.8 fL         MCH 32.3 pg         MCHC 36.0 g/dL         RDW 11.7 %         RDW-SD 38.0 fl         MPV 8.4 fL         Platelets 202 10*3/mm3         Neutrophil % 83.5 %         Lymphocyte % 7.6 %         Monocyte % 7.5 %         Eosinophil % 0.5 %         Basophil % 0.2 %         Immature Grans % 0.7 %         Neutrophils, Absolute 6.77 10*3/mm3         Lymphocytes, Absolute 0.62 10*3/mm3         Monocytes, Absolute 0.61 10*3/mm3         Eosinophils, Absolute 0.04 10*3/mm3         Basophils, Absolute 0.02 10*3/mm3         Immature Grans, Absolute 0.06 10*3/mm3         nRBC 0.0 /100 WBC       Green Top (Gel) [[id number]] Collected: 07/19/25 0509     Specimen: Blood Updated: 07/19/25 0515       Extra Tube Hold for add-ons.       Comment: Auto resulted.        Lavender Top [[id number]] Collected: 07/19/25 0509     Specimen: Blood Updated: 07/19/25 0515       Extra Tube hold for add-on       Comment: Auto resulted        Red Top [[id number]] Collected: 07/19/25 0509     Specimen: Blood Updated: 07/19/25 0515       Extra Tube Hold for add-ons.       Comment: Auto resulted.        Light Blue Top [[id number]] Collected: 07/19/25 0509     Specimen: Blood Updated: 07/19/25 0515       Extra Tube Hold for add-ons.       Comment: Auto resulted                   CT Abdomen Pelvis Without Contrast  Result Date: 7/19/2025   1. Acute appearing essentially nondisplaced right inferior pubic  ramus fracture. There may also be a subtle nondisplaced fracture about the right superior pubic ramus near the pubic symphysis. 2. 1.1 cm right adrenal adenoma. 3. Mild left abdominal wall soft tissue edema, likely contusion.   This report was signed and finalized on 7/19/2025 7:13 AM by Helio Parisi.       CT Cervical Spine Without Contrast  Result Date: 7/19/2025   1. No acute fracture or traumatic malalignment. 2. Enlarged multinodular thyroid gland.  These findings are in agreement with the critical and emergent findings from the [facility] preliminary report.    This report was signed and finalized on 7/19/2025 7:05 AM by Helio Parisi.       XR Femur 2 View Right  Result Date: 7/19/2025   No acute osseous finding in the right femur.   This report was signed and finalized on 7/19/2025 7:01 AM by Helio Parisi.       XR Hip With or Without Pelvis 2 - 3 View Right  Result Date: 7/19/2025   Acute appearing very minimally displaced right inferior pubic ramus fracture.   This report was signed and finalized on 7/19/2025 7:00 AM by Helio Parisi.       XR Chest 1 View  Result Date: 7/19/2025   No acute cardiopulmonary abnormality.    This report was signed and finalized on 7/19/2025 6:58 AM by Helio Parisi.       CT Head Without Contrast  Result Date: 7/19/2025  Impression:   No acute intracranial abnormality.  These findings are in agreement with the critical and emergent findings from the [facility] preliminary report.  This report was signed and finalized on 7/19/2025 6:16 AM by Helio Parisi.          Result Review:  I have personally reviewed the results from the time of this admission to 7/19/2025 08:44 CDT and agree with these findings:  [x]  Laboratory list / accordion  [x]  Microbiology  [x]  Radiology  [x]  EKG/Telemetry   [x]  Cardiology/Vascular   [x]  Pathology  [x]  Old records  []  Other:  Most notable findings include: Na 115 and right minimally displaced inferior pubic ramus fracture        I have  personally reviewed and interpreted the radiology studies and ECG obtained at time of admission.      Assessment / Plan   Assessment:        Active Hospital Problems     Diagnosis      **Hyponatremia           Treatment Plan  Mrs. Shira King is a 85 years old woman with past medical history of essential hypertension on hydrochlorothiazide initiated recently, baby aspirin who presents to [facility] post mechanical fall. The patient was admitted to my service here at [facility] ICU with hyponatremia and right inferior pubic ramus fracture.      #Hyponatremia, most likely iatrogenically induced by hydrochlorothiazide  - Na 115, urine Na 72  -Status post 1 L of normal saline in ED  - repeat Na 119  Plan:  - Admit to ICU for close monitoring  -Hold hydrochlorothiazide  - Monitor Na every hours  - since on last check it went up to 119, just hold fluids for now and restart depending on the next check  -Goal of sodium correction 8 mEq in 24 hours     #Encephalopathy, multifactorial including hyponatremia, acute pain and opioid pain medications  Plan:  - Avoid oversedation,, adequate pain control, circadian rhythm adjustment  - Correction of sodium will help with encephalopathy     #Right inferior pubic ramus fracture  #Right hip pain 2/2 above  - Seen on CT scan on admission  Plan:  - Ortho surgery consulted  - Chemical DVT prophylaxis with Lovenox  - Adequate pain control with oxycodone and fentanyl as needed     # Essential hypertension  # Normotensive on admission  Hold home hydrochlorothiazide, amlodipine, carvediolol and losartan   Will initiate other bp oral regiment as needed     Medical Decision Making  Number and Complexity of problems: 3, moderately complex  Differential Diagnosis: hyponatremia due to hydrochlorothiazide and rigth inferior pubic ramus fracture     Conditions and Status        Condition is unchanged.     [facility] Data  External documents reviewed: n/a  Cardiac tracing  (EKG, telemetry) interpretation: meg  Radiology interpretation: yes  Labs reviewed: yes  Any tests that were considered but not ordered: no     Decision rules/scores evaluated (example [id number], Wells, etc): no     Discussed with: nursing staff     Care Planning  Shared decision makin minutes  Code status and discussions: DNR     Disposition  Social Determinants of Health that impact treatment or disposition: daughter helps with decision making   Estimated length of stay is 5.      I confirmed that the patient's advanced care plan is present, code status is documented, and a surrogate decision maker is listed in the patient's medical record.      The patient's surrogate decision maker is daughter.      The patient was seen and examined by me on 2025 at 9:29.     Electronically signed by Ivis Flores DO on 2025 at 08:44 CDT

## 2025-07-22 NOTE — THERAPY TREATMENT NOTE
Acute Care - Physical Therapy Treatment Note  [facility]     Patient Name: Shira King  : 1940  MRN: [id number]  Today's Date: 2025      Visit Dx:     ICD-10-CM ICD-9-CM   1. Hyponatremia  E87.1 276.1   2. Closed nondisplaced fracture of pelvis, unspecified part of pelvis, initial encounter  S32.9XXA 808.8   3. Dysphagia, unspecified type  R13.10 787.20   4. Impaired mobility [Z74.09]  Z74.09 799.89     Patient Active Problem List   Diagnosis    Essential hypertension    RMSF (Khari Mountain spotted fever)    Mixed hyperlipidemia    Lumbar pain    Gastroesophageal reflux disease    Insomnia    Obstructive sleep apnea    Periodic limb movement    Snoring    Somnolence, daytime    Chronic intractable headache    Chronic ethmoidal sinusitis    Peripheral vascular disease    Hypertrophy of both inferior nasal turbinates    Nasal vestibulitis    Other acute sinusitis    Allergic rhinitis    AMS (altered mental status)    Memory loss    Advanced age    Dementia without behavioral disturbance    Chest pain    Hyponatremia     Past Medical History:   Diagnosis Date    Cancer     skin    Chronic intractable headache 2021    Gastroesophageal reflux disease 2020    Hypertension     Lumbar pain 2019    Mixed hyperlipidemia     PONV (postoperative nausea and vomiting)     Sinusitis     Stroke     Valvular disease      Past Surgical History:   Procedure Laterality Date    BLADDER REPAIR  2005    BREAST SURGERY      BUNIONECTOMY  2007    CARDIAC CATHETERIZATION      CATARACT EXTRACTION Bilateral     CHOLECYSTECTOMY      COLPOCLEISIS N/A 2022    Procedure: COLPOCLEISIS;  Surgeon: Martina Street MD;  Location: [facility];  Service: Obstetrics/Gynecology;  Laterality: N/A;    ENDOSCOPY      HERNIA REPAIR  1963    HYSTERECTOMY      KRISTAN BSO for fibroids    NECK SURGERY  1974    VERTEBRA    REPLACEMENT TOTAL KNEE BILATERAL Bilateral     TONSILLECTOMY       PT Assessment (Last 12  Hours)       PT Evaluation and Treatment       Row Name 07/22/25 1420          Physical Therapy Time and Intention    Subjective Information complains of;pain  -KJ     Document Type therapy note (daily note)  -KJ     Mode of Treatment physical therapy  -KJ     Patient Effort poor  -KJ       Row Name 07/22/25 1420          General Information    Existing Precautions/Restrictions fall  WBAT RLE  -KJ       Row Name 07/22/25 1420          Pain    Pretreatment Pain Rating 0/10 - no pain  -KJ     Posttreatment Pain Rating 10/10  -KJ     Pre/Posttreatment Pain Comment hurts with mobility  -KJ       Row Name 07/22/25 1420          Mobility    Extremity Weight-bearing Status right lower extremity  -KJ     Right Lower Extremity (Weight-bearing Status) weight-bearing as tolerated (WBAT)  -KJ       Row Name 07/22/25 1420          Bed Mobility    Supine-Sit [place] (Bed Mobility) verbal cues;moderate assist (50% patient effort);maximum assist (25% patient effort);2 person assist  -KJ     Sit-Supine [place] (Bed Mobility) verbal cues;maximum assist (25% patient effort);2 person assist  -KJ       Row Name 07/22/25 1420          Balance    Balance Assessment sitting static balance  -KJ     Static Sitting Balance standby assist  -KJ     Dynamic Sitting Balance supervision  -KJ     Position, Sitting Balance sitting edge of bed;unsupported  -KJ     Comment, Balance SBA for sitting; pt adimantly refuses to attempt standing.  -KJ       Row Name 07/22/25 1420          Positioning and Restraints    Pre-Treatment Position in bed  -KJ     Post Treatment Position bed  -KJ     In Bed call light within reach  -KJ               User Key  (r) = Recorded By, (t) = Taken By, (c) = Cosigned By      Initials Name Provider Type    KJ Mariza Suarez PTA Physical Therapist Assistant                    Physical Therapy Education       Title: PT OT SLP Therapies (In Progress)       Topic: Physical Therapy (Done)       Point: Mobility training  (Done)       Learning Progress Summary            Patient Acceptance, E, VU by FRANCISCO at 7/20/2025 1146    Comment: role of PT, WBAt, t/f with walker, call for assist, high fall risk   Family Acceptance, E, VU by AJ at 7/20/2025 1146    Comment: role of PT, WBAt, t/f with walker, call for assist, high fall risk                      Point: Home exercise program (Done)       Learning Progress Summary            Patient Acceptance, E, VU by FRANCISCO at 7/20/2025 1146    Comment: role of PT, WBAt, t/f with walker, call for assist, high fall risk   Family Acceptance, E, VU by AJ at 7/20/2025 1146    Comment: role of PT, WBAt, t/f with walker, call for assist, high fall risk                      Point: Body mechanics (Done)       Learning Progress Summary            Patient Acceptance, E, VU by FRANCISCO at 7/20/2025 1146    Comment: role of PT, WBAt, t/f with walker, call for assist, high fall risk   Family Acceptance, E, VU by FRANCISCO at 7/20/2025 1146    Comment: role of PT, WBAt, t/f with walker, call for assist, high fall risk                      Point: Precautions (Done)       Learning Progress Summary            Patient Acceptance, E, VU by FRANCISCO at 7/20/2025 1146    Comment: role of PT, WBAt, t/f with walker, call for assist, high fall risk   Family Acceptance, E, VU by AJ at 7/20/2025 1146    Comment: role of PT, WBAt, t/f with walker, call for assist, high fall risk                                      User Key       Initials Effective Dates Name Provider Type Discipline     08/15/24 -  Helio Levin, PT DPT Physical Therapist PT                  PT Recommendation and Plan         Outcome Measures       Row Name 07/21/25 0836             How much help from another person do you currently need...    Turning from your back to your side while in flat bed without using bedrails? 2  -MF      Moving from lying on back to sitting on the side of a flat bed without bedrails? 2  -MF      Moving to and from a bed to a chair (including a  wheelchair)? 3  -MF      Standing up from a chair using your arms (e.g., wheelchair, bedside chair)? 3  -MF      Climbing 3-5 steps with a railing? 1  -MF      To walk in hospital room? 2  -MF      AM-PAC 6 Clicks Score (PT) 13  -MF         Functional Assessment    Outcome Measure Options AM-PAC 6 Clicks Basic Mobility (PT)  -MF                User Key  (r) = Recorded By, (t) = Taken By, (c) = Cosigned By      Initials Name Provider Type    Selma Berrios PTA Physical Therapist Assistant                     Time Calculation:    PT Charges       Row Name 07/22/25 1531             Time Calculation    Start Time 1420  -KJ      Stop Time 1510  -KJ      Time Calculation (min) 50 min  -KJ      PT Received On 07/22/25  -KJ      PT Goal Re-Cert Due Date 07/30/25  -KJ         Time Calculation- PT    Total Timed Code Minutes- PT 50 minute(s)  -KJ                User Key  (r) = Recorded By, (t) = Taken By, (c) = Cosigned By      Initials Name Provider Type    Mariza iDehl PTA Physical Therapist Assistant                  Therapy Charges for Today       Code Description Service Date Service Provider Modifiers Qty    [id number] HC PT THERAPEUTIC ACT EA 15 MIN 7/22/2025 Mariza Suarez PTA GP 3            PT G-Codes  Outcome Measure Options: AM-PAC 6 Clicks Daily Activity (OT)  AM-PAC 6 Clicks Score (PT): 12  AM-PAC 6 Clicks Score (OT): 12    Mariza Suarez PTA  7/22/2025

## 2025-07-22 NOTE — PLAN OF CARE
Problem: Adult Inpatient Plan of Care  Goal: Plan of Care Review  Outcome: Progressing  Flowsheets (Taken 7/22/2025 6683)  Progress: no change  Outcome Evaluation: Pt complained of pain and anxiety, see mar. Pt is confused, uncooperative, and combative at times. Pt having multiple bm. Voiding. Replacing potassium per protocol. Hitting staff at times, PRN haldol given. Safety maintained.  Plan of Care Reviewed With: patient

## 2025-07-22 NOTE — PROGRESS NOTES
[facility] Medicine Services  INPATIENT PROGRESS NOTE    Patient Name: Shira King  Date of Admission: 7/19/2025  Today's Date: 07/22/25  Length of Stay: 3  Primary Care Physician: Reynaldo Cavazos MD    Subjective   Chief Complaint:  Confusion    Fall       Patient has no complaints.  She is very drowsy today likely from Seroquel.    Review of Systems   All pertinent negatives and positives are as above. All other systems have been reviewed and are negative unless otherwise stated.     Objective    Temp:  [97.9 °F (36.6 °C)-98.7 °F (37.1 °C)] 98.6 °F (37 °C)  Heart Rate:  [73-92] 92  Resp:  [16-18] 16  BP: (132-154)/(55-72) 148/66  Physical Exam  Constitutional:       General: She is not in acute distress.     Appearance: She is not ill-appearing or diaphoretic.      Comments: Drowsy, confused   HENT:      Head: Normocephalic and atraumatic.      Right Ear: External ear normal.      Left Ear: External ear normal.      Nose: No congestion or rhinorrhea.      Mouth/Throat:      Mouth: Mucous membranes are moist.      Pharynx: No oropharyngeal exudate or posterior oropharyngeal erythema.   Eyes:      General: No scleral icterus.     Extraocular Movements: Extraocular movements intact.      Conjunctiva/sclera: Conjunctivae normal.   Cardiovascular:      Rate and Rhythm: Normal rate and regular rhythm.      Heart sounds: Normal heart sounds. No murmur heard.  Pulmonary:      Effort: Pulmonary effort is normal. No respiratory distress.      Breath sounds: Normal breath sounds. No wheezing, rhonchi or rales.   Abdominal:      General: Abdomen is flat. There is no distension.      Palpations: Abdomen is soft.      Tenderness: There is no abdominal tenderness. There is no guarding.   Musculoskeletal:         General: No swelling, tenderness or deformity.      Cervical back: No muscular tenderness.      Right lower leg: No edema.      Left lower leg: No edema.  "  Lymphadenopathy:      Cervical: No cervical adenopathy.   Skin:     General: Skin is warm and dry.   Neurological:      General: No focal deficit present.      Cranial Nerves: No cranial nerve deficit.      Motor: No weakness.      Comments: Drowsy, confused.   Psychiatric:         Mood and Affect: Mood normal.         Behavior: Behavior normal.       Results Review:  I have reviewed the labs, radiology results, and diagnostic studies.    Laboratory Data:   Results from last 7 days   Lab Units 07/22/25  0543 07/21/25  0627 07/19/25  0509   WBC 10*3/mm3 7.65 6.44 8.12   HEMOGLOBIN g/dL 11.9* 11.7* 12.3   HEMATOCRIT % 34.1 33.6* 34.2   PLATELETS 10*3/mm3 159 135* 202        Results from last 7 days   Lab Units 07/22/25  1245 07/22/25  0543 07/22/25  0002 07/21/25  1754 07/21/25  1205 07/21/25  0819 07/19/25  1604 07/19/25  1037   SODIUM mmol/L  --  125*  125* 129* 126*   < > 128*   < > 119*   POTASSIUM mmol/L 4.0 3.6 3.1*  --   --  2.7*  --  4.3   CHLORIDE mmol/L  --  94*  --   --   --  95*  --  88*   CO2 mmol/L  --  21.0*  --   --   --  22.0  --  20.0*   BUN mg/dL  --  2.8*  --   --   --  2.6*  --  8.9   CREATININE mg/dL  --  0.37*  --   --   --  0.33*  --  0.60   CALCIUM mg/dL  --  8.2*  --   --   --  8.1*  --  8.5*   BILIRUBIN mg/dL  --  0.8  --   --   --  0.9  --  1.2   ALK PHOS U/L  --  70  --   --   --  66  --  84   ALT (SGPT) U/L  --  21  --   --   --  15  --  18   AST (SGOT) U/L  --  63*  --   --   --  55*  --  53*   GLUCOSE mg/dL  --  107*  --   --   --  122*  --  140*    < > = values in this interval not displayed.       Culture Data:   No results found for: \"BLOODCX\", \"URINECX\", \"WOUNDCX\", \"MRSACX\", \"RESPCX\", \"STOOLCX\"    Radiology Data:   Imaging Results (Last 24 Hours)       ** No results found for the last 24 hours. **            I have reviewed the patient's current medications.     Assessment/Plan   Assessment  Active Hospital Problems    Diagnosis     **Hyponatremia    Metabolic " encephalopathy  Dementia  Right inferior pubic ramus fracture  Essential hypertension    Treatment Plan  Patient is extremely drowsy today likely from Seroquel therapy.  Will reduce Seroquel to 25 mg at bedtime.  Discontinue daytime Seroquel discontinue Haldol.    Hyponatremia is improved and sodium is 125 today.  Will start 1500 cc fluid restriction on salt tabs 1 g twice daily.  Monitor sodium this evening.    Continue PT/OT for weakness and right inferior pubic ramus fracture.  Plan for skilled nursing facility placement.      Replete electrolytes    Continue home medications for essential hypertension    DVT prophylaxis with subcutaneous Lovenox     CODE STATUS is full code    Medical Decision Making  Number and Complexity of problems: 5  Differential Diagnosis: None    Conditions and Status        Condition is improving.     MDM Data  External documents reviewed: None  Cardiac tracing (EKG, telemetry) interpretation: None  Radiology interpretation: None  Labs reviewed: CBC, BMP reviewed by me  Any tests that were considered but not ordered: None     Decision rules/scores evaluated (example [id number], Wells, etc): None     Discussed with: Patient's daughter and GABINO Jeffersons     Care Planning  Shared decision making: Patient's daughter and POA Montse Jeffersons  Code status and discussions: DNR/DNI    Disposition  Social Determinants of Health that impact treatment or disposition: None  I expect the patient to be discharged to SNF in 1-2 days.         Electronically signed by Jadyn Hankins MD, 07/22/25, 17:48 CDT.

## 2025-07-22 NOTE — THERAPY TREATMENT NOTE
Patient Name: Shira King  : 1940    MRN: [id number]                              Today's Date: 2025       Admit Date: 2025    Visit Dx:     ICD-10-CM ICD-9-CM   1. Hyponatremia  E87.1 276.1   2. Closed nondisplaced fracture of pelvis, unspecified part of pelvis, initial encounter  S32.9XXA 808.8   3. Dysphagia, unspecified type  R13.10 787.20   4. Impaired mobility [Z74.09]  Z74.09 799.89     Patient Active Problem List   Diagnosis    Essential hypertension    RMSF ([place] spotted fever)    Mixed hyperlipidemia    Lumbar pain    Gastroesophageal reflux disease    Insomnia    Obstructive sleep apnea    Periodic limb movement    Snoring    Somnolence, daytime    Chronic intractable headache    Chronic ethmoidal sinusitis    Peripheral vascular disease    Hypertrophy of both inferior nasal turbinates    Nasal vestibulitis    Other acute sinusitis    Allergic rhinitis    AMS (altered mental status)    Memory loss    Advanced age    Dementia without behavioral disturbance    Chest pain    Hyponatremia     Past Medical History:   Diagnosis Date    Cancer     skin    Chronic intractable headache 2021    Gastroesophageal reflux disease 2020    Hypertension     Lumbar pain 2019    Mixed hyperlipidemia     PONV (postoperative nausea and vomiting)     Sinusitis     Stroke     Valvular disease      Past Surgical History:   Procedure Laterality Date    BLADDER REPAIR  2005    BREAST SURGERY      BUNIONECTOMY  2007    CARDIAC CATHETERIZATION      CATARACT EXTRACTION Bilateral     CHOLECYSTECTOMY      COLPOCLEISIS N/A 2022    Procedure: COLPOCLEISIS;  Surgeon: Martina Street MD;  Location: [facility];  Service: Obstetrics/Gynecology;  Laterality: N/A;    ENDOSCOPY      HERNIA REPAIR  1963    HYSTERECTOMY  1979    KRISTAN BSO for fibroids    NECK SURGERY  1974    VERTEBRA    REPLACEMENT TOTAL KNEE BILATERAL Bilateral     TONSILLECTOMY        General Information       Row  Name 07/22/25 1027          OT Time and Intention    Subjective Information complains of;pain  -LS     Document Type therapy note (daily note)  -LS     Mode of Treatment occupational therapy  -LS     Patient Effort adequate  -LS       Row Name 07/22/25 1027          General Information    Existing Precautions/Restrictions fall;other (see comments)  WBAT RLE  -LS       Row Name 07/22/25 1027          Cognition    Orientation Status (Cognition) oriented to;person;disoriented to;place;situation;time  -LS       Row Name 07/22/25 1027          Safety Issues/Impairments Affecting Functional Mobility    Safety Issues Affecting Function (Mobility) ability to follow commands;awareness of need for assistance;friction/shear risk;insight into deficits/self-awareness;judgment;problem-solving;safety precaution awareness;safety precautions follow-through/compliance;sequencing abilities  -LS     Impairments Affecting Function (Mobility) balance;cognition;endurance/activity tolerance;pain;strength  -LS     Cognitive Impairments, Mobility Safety/Performance attention;awareness, need for assistance;insight into deficits/self-awareness;problem-solving/reasoning;judgment;safety precaution awareness;safety precaution follow-through;sequencing abilities  -LS               User Key  (r) = Recorded By, (t) = Taken By, (c) = Cosigned By      Initials Name Provider Type    LS Denise Moran, OTR/L Occupational Therapist                     Mobility/ADL's       Row Name 07/22/25 1027          Bed Mobility    Bed Mobility rolling right  -LS     Rolling Right Lane (Bed Mobility) maximum assist (25% patient effort);verbal cues;nonverbal cues (demo/gesture)  -       Row Name 07/22/25 1027          Activities of Daily Living    BADL Assessment/Intervention grooming  -       Row Name 07/22/25 1027          Mobility    Extremity Weight-bearing Status right lower extremity  -LS     Right Lower Extremity (Weight-bearing Status)  weight-bearing as tolerated (WBAT)  -       Row Name 07/22/25 1027          Grooming Assessment/Training    [place] Level (Grooming) oral care regimen;wash face, hands;standby assist;verbal cues;nonverbal cues (demo/gesture)  -     Position (Grooming) sitting up in bed;supported sitting  -               User Key  (r) = Recorded By, (t) = Taken By, (c) = Cosigned By      Initials Name Provider Type     Denise Moran, OTR/L Occupational Therapist                   Obj/Interventions    No documentation.                  Goals/Plan    No documentation.                  Clinical Impression       Row Name 07/22/25 1027          Pain Assessment    Pain Location knee;hip  -     Pain Side/Orientation bilateral  -LS     Pain Management Interventions exercise or physical activity utilized  -     Response to Pain Interventions activity participation with tolerable pain  -       Row Name 07/22/25 1027          Pain Scale: FACES Pre/Post-Treatment    Pain: FACES Scale, Pretreatment 0-->no hurt  -LS     Posttreatment Pain Rating 4-->hurts little more  -       Row Name 07/22/25 1027          Plan of Care Review    Plan of Care Reviewed With patient  -LS     Progress no change  -LS     Outcome Evaluation OT tx completed. Pt in fowlers upon therapist arrival; Lethargic; Oriented to self only; Pleasantly confused; Sitter present. Pt performed rolling to R side to check and adjust brief requiring Max A and verbal/visual/tactile cues for sequencing. Pt performed oral hygiene and face washing tasks while sitting up in bed requiring SBA and constant verbal/visual cues for sequencing and to maintain attention to task. Pt tolerated session fairly. Cont OT POC. Recommend SNF at discharge.  -       Row Name 07/22/25 1027          Therapy Plan Review/Discharge Plan (OT)    Anticipated Discharge Disposition (OT) skilled nursing facility  -[facility] Name 07/22/25 1027          Positioning and Restraints    Pre-Treatment  Position in bed  -LS     Post Treatment Position bed  -LS     In Bed fowlers;side rails up x3;call light within reach;encouraged to call for assist;exit alarm on;with nsg  -LS               User Key  (r) = Recorded By, (t) = Taken By, (c) = Cosigned By      Initials Name Provider Type    Denise Cordon OTR/L Occupational Therapist                   Outcome Measures       Row Name 07/22/25 1027          How much help from another is currently needed...    Putting on and taking off regular lower body clothing? 1  -LS     Bathing (including washing, rinsing, and drying) 2  -LS     Toileting (which includes using toilet bed pan or urinal) 1  -LS     Putting on and taking off regular upper body clothing 2  -LS     Taking care of personal grooming (such as brushing teeth) 3  -LS     Eating meals 3  -LS     AM-PAC 6 Clicks Score (OT) 12  -LS       Row Name 07/22/25 1000          How much help from another person do you currently need...    Turning from your back to your side while in flat bed without using bedrails? 3  -AW     Moving from lying on back to sitting on the side of a flat bed without bedrails? 3  -AW     Moving to and from a bed to a chair (including a wheelchair)? 2  -AW     Standing up from a chair using your arms (e.g., wheelchair, bedside chair)? 2  -AW     Climbing 3-5 steps with a railing? 1  -AW     To walk in hospital room? 1  -AW     AM-PAC 6 Clicks Score (PT) 12  -AW     Highest Level of Mobility Goal Move to Chair/Commode-4  -AW       Row Name 07/22/25 1027          Functional Assessment    Outcome Measure Options AM-PAC 6 Clicks Daily Activity (OT)  -LS               User Key  (r) = Recorded By, (t) = Taken By, (c) = Cosigned By      Initials Name Provider Type    Denise Cordon OTR/L Occupational Therapist    Catherine Jensen, RN Registered Nurse                    Occupational Therapy Education       Title: PT OT SLP Therapies (In Progress)       Topic: Occupational Therapy (In  Progress)       Point: ADL training (Done)       Learning Progress Summary            Patient Acceptance, E, VU,NR by  at 7/22/2025 1101    Acceptance, E, NR,NL by BS at 7/21/2025 1050    Acceptance, E,D, VU,DU,NL by  at 7/20/2025 1336                      Point: Precautions (Done)       Learning Progress Summary            Patient Acceptance, E, VU,NR by LS at 7/22/2025 1101    Acceptance, E, NR,NL by BS at 7/21/2025 1050    Acceptance, E,D, VU,DU,NL by  at 7/20/2025 1336                      Point: Body mechanics (In Progress)       Learning Progress Summary            Patient Acceptance, E, NR,NL by BS at 7/21/2025 1050    Acceptance, E,D, VU,DU,NL by  at 7/20/2025 1336                                      User Key       Initials Effective Dates Name Provider Type Discipline     06/20/22 -  Denise Moran OTR/L Occupational Therapist OT     10/08/24 -  Cintia Cadena OTR/L Occupational Therapist OT     05/12/25 -  Kayla Arango OT Student OT Student OT                  OT Recommendation and Plan     Plan of Care Review  Plan of Care Reviewed With: patient  Progress: no change  Outcome Evaluation: OT tx completed. Pt in fowlers upon therapist arrival; Lethargic; Oriented to self only; Pleasantly confused; Sitter present. Pt performed rolling to R side to check and adjust brief requiring Max A and verbal/visual/tactile cues for sequencing. Pt performed oral hygiene and face washing tasks while sitting up in bed requiring SBA and constant verbal/visual cues for sequencing and to maintain attention to task. Pt tolerated session fairly. Cont OT POC. Recommend SNF at discharge.     Time Calculation:         Time Calculation- OT       Row Name 07/22/25 1027             Time Calculation- OT    OT Start Time 1027  -LS      OT Stop Time 1052  -      OT Time Calculation (min) 25 min  -      Total Timed Code Minutes- OT 25 minute(s)  -      OT Received On 07/22/25  -                User Key  (r) =  Recorded By, (t) = Taken By, (c) = Cosigned By      Initials Name Provider Type    LS Denise Moran OTR/L Occupational Therapist                  Therapy Charges for Today       Code Description Service Date Service Provider Modifiers Qty    [id number] HC OT SELF CARE/MGMT/TRAIN EA 15 MIN 7/22/2025 Denise Moran OTR/L GO 2                 Denise Moran OTR/YON  7/22/2025

## 2025-07-22 NOTE — PLAN OF CARE
Problem: Adult Inpatient Plan of Care  Goal: Plan of Care Review  Outcome: Progressing  Goal: Patient-Specific Goal (Individualized)  Outcome: Progressing  Goal: Absence of Hospital-Acquired Illness or Injury  Outcome: Progressing  Intervention: Identify and Manage Fall Risk  Description: Perform standard risk assessment on admission using a validated tool or comprehensive approach appropriate to the patient; reassess fall risk frequently, with change in status or transfer to another level of care.Communicate risk to interprofessional healthcare team; ensure fall risk visible cue.Determine need for increased observation, equipment and environmental modification, as well as use of supportive, nonskid footwear.Adjust safety measures to individual needs and identified risk factors.Reinforce the importance of active participation with fall risk prevention, safety, and physical activity with the patient and family.Perform regular intentional rounding to assess need for position change, pain assessment and personal needs, including assistance with toileting.  Intervention: Prevent Skin Injury  Description: Perform a screening for skin injury risk, such as pressure or moisture-associated skin damage on admission and at regular intervals throughout hospital stay.Keep all areas of skin (especially folds) clean and dry.Maintain adequate skin hydration.Relieve and redistribute pressure and protect bony prominences and skin at risk for injury; implement measures based on patient-specific risk factors.Match turning and repositioning schedule to clinical condition.Encourage weight shift frequently; assist with reposition if unable to complete independently.Float heels off bed; avoid pressure on the Achilles tendon.Keep skin free from extended contact with medical devices.Optimize nutrition and hydration.Encourage functional activity and mobility, as early as tolerated.Use aids (e.g., slide boards, mechanical lift) during  transfer.  Goal: Optimal Comfort and Wellbeing  Outcome: Progressing  Goal: Readiness for Transition of Care  Outcome: Progressing     Problem: Skin Injury Risk Increased  Goal: Skin Health and Integrity  Outcome: Progressing  Intervention: Optimize Skin Protection  Description: Perform a full pressure injury risk assessment, as indicated by screening, upon admission to care unit.Reassess skin (full inspection and injury risk, including skin temperature, consistency and color) frequently (e.g., scheduled interval, with change in condition) to provide optimal early detection and prevention.Maintain adequate tissue perfusion (e.g., encourage fluid balance; avoid crossing legs, constrictive clothing or devices) to promote tissue oxygenation.Maintain head of bed at lowest degree of elevation tolerated, considering medical condition and other restrictions. Use positioning supports to prevent sliding and friction. Consider low friction textiles.Avoid positioning onto an area that remains reddened or on bony prominences.Minimize incontinence and moisture (e.g., toileting schedule; moisture-wicking pad, diaper or incontinence collection device; skin moisture barrier).Cleanse skin promptly and gently, when soiled, utilizing a pH-balanced cleanser.Relieve and redistribute pressure (e.g., scheduled position changes, weight shifts, use of support surface, medical device repositioning, protective dressing application, use of positioning device, microclimate control, use of pressure-injury-monitorEncourage increased activity, such as sitting in a chair at the bedside or early mobilization, when able to tolerate. Avoid prolonged sitting.     Problem: Electrolyte Imbalance  Goal: Electrolyte Balance  Outcome: Progressing     Problem: Orthopaedic Fracture  Goal: Absence of Bleeding  Outcome: Progressing  Goal: Bowel Elimination  Outcome: Progressing  Goal: Absence of Embolism Signs and Symptoms  Outcome: Progressing  Goal: Fracture  Stability  Outcome: Progressing  Goal: Optimal Functional Ability  Outcome: Progressing  Intervention: Optimize Functional Ability  Description: Identify functional limitations, such as ADL (activities of daily living), mobility safety and independence; encourage optimal participation to minimize decline associated with inactivity.Facilitate functional mobility, such as bed mobility, transfers and ambulation; progress and retrain as tolerated.Encourage ADL (activities of daily living), such as self-feeding, hygiene and dressing; provide setup, adaptations, assistance and extra time as needed.Promote a safe and accessible environment, as well as effective use of assistive devices and equipment.Pace and cluster activity to balance with rest periods and conserve energy; promote adequate nutrition, sleep and rest.Incorporate weightbearing or other applicable precautions and restrictions into activity; assess safety and patient compliance.Position and protect healing structures; enhance comfort and control edema with interventions, such as immobilizer, orthosis, sling, cryotherapy or elevation of involved limb.Evaluate and address body systems and performance deficits, such as balance, cognitive or activity tolerance impairments.Initiate therapeutic interventions for range of motion and strength while protecting involved structures.  Goal: Absence of Infection Signs and Symptoms  Outcome: Progressing  Goal: Effective Tissue Perfusion  Outcome: Progressing  Goal: Optimal Pain Control and Function  Outcome: Progressing  Goal: Effective Oxygenation and Ventilation  Outcome: Progressing  Intervention: Optimize Oxygenation and Ventilation  Description: Assess and monitor airway, breathing and circulation for effective oxygenation and ventilation; consider oxygenation and ventilation parameters and goal.Maintain head of bed elevation with regular position changes to minimize ventilation-perfusion mismatch and  breathlessness.Provide oxygen therapy judiciously to avoid hyperoxemia; adjust to achieve oxygenation goal.Monitor fluid balance closely to minimize the risk of fluid overload.Consider positive pressure ventilation to enhance oxygenation and ventilation, as well as reduce work of breathing.     Problem: Fall Injury Risk  Goal: Absence of Fall and Fall-Related Injury  Outcome: Progressing  Intervention: Promote Injury-Free Environment  Description: Provide a safe, barrier-free environment that encourages independent activity.Keep care area uncluttered and well-lighted.Determine need for increased observation or monitoring.Avoid use of devices that minimize mobility, such as restraints or indwelling urinary catheter.     Problem: Restraint, Nonviolent  Goal: Absence of Harm or Injury  Outcome: Progressing  Intervention: Protect Skin and Joint Integrity  Description: Frequently assess restraint application site for skin integrity, edema and perfusion distal to the site; document findings.Consider protective skin barrier with risk of skin injury.Release and replace restraint at regular intervals.Assist with frequent joint range of motion activity.   Goal Outcome Evaluation:

## 2025-07-22 NOTE — PLAN OF CARE
Goal Outcome Evaluation:  Plan of Care Reviewed With: patient, child        Progress: no change  Outcome Evaluation: Pt alert, oriented to self only. phospohorus replaced. PT/OT tx today. c/o pain, see MAR. q2 turn when pt allows, mepilexes applied to bilateral heels and coccyx per protocol. fall precautions, bed alarm on, safety maintained. hopefull for d/c to SNF tomorrow.

## 2025-07-23 VITALS
OXYGEN SATURATION: 94 % | RESPIRATION RATE: 16 BRPM | SYSTOLIC BLOOD PRESSURE: 156 MMHG | HEIGHT: 66 IN | TEMPERATURE: 99.3 F | BODY MASS INDEX: 27.88 KG/M2 | HEART RATE: 82 BPM | WEIGHT: 173.5 LBS | DIASTOLIC BLOOD PRESSURE: 60 MMHG

## 2025-07-23 PROBLEM — S32.599A PUBIC RAMUS FRACTURE: Status: ACTIVE | Noted: 2025-07-23

## 2025-07-23 LAB
ANION GAP SERPL CALCULATED.3IONS-SCNC: 9 MMOL/L (ref 5–15)
BUN SERPL-MCNC: 4.2 MG/DL (ref 8–23)
BUN/CREAT SERPL: 10.2 (ref 7–25)
CALCIUM SPEC-SCNC: 8.6 MG/DL (ref 8.6–10.5)
CHLORIDE SERPL-SCNC: 96 MMOL/L (ref 98–107)
CO2 SERPL-SCNC: 24 MMOL/L (ref 22–29)
CREAT SERPL-MCNC: 0.41 MG/DL (ref 0.57–1)
EGFRCR SERPLBLD CKD-EPI 2021: 95.4 ML/MIN/1.73
GLUCOSE SERPL-MCNC: 114 MG/DL (ref 65–99)
MAGNESIUM SERPL-MCNC: 1.7 MG/DL (ref 1.6–2.4)
POTASSIUM SERPL-SCNC: 3.5 MMOL/L (ref 3.5–5.2)
SODIUM SERPL-SCNC: 129 MMOL/L (ref 136–145)

## 2025-07-23 PROCEDURE — 97530 THERAPEUTIC ACTIVITIES: CPT

## 2025-07-23 PROCEDURE — 83735 ASSAY OF MAGNESIUM: CPT | Performed by: PHYSICIAN ASSISTANT

## 2025-07-23 PROCEDURE — 97110 THERAPEUTIC EXERCISES: CPT

## 2025-07-23 PROCEDURE — 25010000002 ENOXAPARIN PER 10 MG: Performed by: STUDENT IN AN ORGANIZED HEALTH CARE EDUCATION/TRAINING PROGRAM

## 2025-07-23 PROCEDURE — 80048 BASIC METABOLIC PNL TOTAL CA: CPT | Performed by: INTERNAL MEDICINE

## 2025-07-23 RX ORDER — LOSARTAN POTASSIUM 50 MG/1
50 TABLET ORAL DAILY
Qty: 30 TABLET | Refills: 0 | Status: SHIPPED | OUTPATIENT
Start: 2025-07-23 | End: 2025-08-22

## 2025-07-23 RX ORDER — METHOCARBAMOL 750 MG/1
750 TABLET, FILM COATED ORAL 4 TIMES DAILY
Qty: 30 TABLET | Refills: 0 | Status: SHIPPED | OUTPATIENT
Start: 2025-07-23

## 2025-07-23 RX ORDER — ALPRAZOLAM 0.25 MG
0.25 TABLET ORAL 2 TIMES DAILY PRN
Qty: 6 TABLET | Refills: 0 | Status: SHIPPED | OUTPATIENT
Start: 2025-07-23

## 2025-07-23 RX ORDER — SODIUM CHLORIDE 1 G/1
1 TABLET ORAL 2 TIMES DAILY WITH MEALS
Qty: 60 TABLET | Refills: 0 | Status: SHIPPED | OUTPATIENT
Start: 2025-07-23

## 2025-07-23 RX ORDER — QUETIAPINE FUMARATE 25 MG/1
25 TABLET, FILM COATED ORAL NIGHTLY
Qty: 30 TABLET | Refills: 0 | Status: SHIPPED | OUTPATIENT
Start: 2025-07-23

## 2025-07-23 RX ORDER — HYDROCODONE BITARTRATE AND ACETAMINOPHEN 7.5; 325 MG/1; MG/1
0.5 TABLET ORAL EVERY 4 HOURS
Qty: 12 TABLET | Refills: 0 | Status: SHIPPED | OUTPATIENT
Start: 2025-07-23

## 2025-07-23 RX ORDER — POTASSIUM CHLORIDE 1.5 G/1.58G
40 POWDER, FOR SOLUTION ORAL EVERY 4 HOURS
Status: COMPLETED | OUTPATIENT
Start: 2025-07-23 | End: 2025-07-23

## 2025-07-23 RX ADMIN — ENOXAPARIN SODIUM 40 MG: 100 INJECTION SUBCUTANEOUS at 09:55

## 2025-07-23 RX ADMIN — Medication 10 ML: at 10:00

## 2025-07-23 RX ADMIN — PANTOPRAZOLE SODIUM 40 MG: 40 TABLET, DELAYED RELEASE ORAL at 06:27

## 2025-07-23 RX ADMIN — ASPIRIN 81 MG CHEWABLE TABLET 81 MG: 81 TABLET CHEWABLE at 09:55

## 2025-07-23 RX ADMIN — POTASSIUM CHLORIDE 40 MEQ: 1.5 POWDER, FOR SOLUTION ORAL at 13:28

## 2025-07-23 RX ADMIN — CARVEDILOL 6.25 MG: 6.25 TABLET, FILM COATED ORAL at 09:55

## 2025-07-23 RX ADMIN — OXYCODONE HYDROCHLORIDE 7.5 MG: 5 SOLUTION ORAL at 14:13

## 2025-07-23 RX ADMIN — OXYBUTYNIN CHLORIDE 5 MG: 5 TABLET ORAL at 09:55

## 2025-07-23 RX ADMIN — DOCUSATE SODIUM 50 MG AND SENNOSIDES 8.6 MG 2 TABLET: 8.6; 5 TABLET, FILM COATED ORAL at 09:55

## 2025-07-23 RX ADMIN — METHOCARBAMOL TABLETS 750 MG: 500 TABLET, COATED ORAL at 13:28

## 2025-07-23 RX ADMIN — OXYCODONE HYDROCHLORIDE 7.5 MG: 5 SOLUTION ORAL at 09:59

## 2025-07-23 RX ADMIN — POTASSIUM CHLORIDE 40 MEQ: 1.5 POWDER, FOR SOLUTION ORAL at 09:55

## 2025-07-23 RX ADMIN — METHOCARBAMOL TABLETS 750 MG: 500 TABLET, COATED ORAL at 09:55

## 2025-07-23 RX ADMIN — Medication 1 APPLICATION: at 09:55

## 2025-07-23 RX ADMIN — Medication 1 G: at 09:55

## 2025-07-23 RX ADMIN — AMLODIPINE BESYLATE 5 MG: 5 TABLET ORAL at 09:55

## 2025-07-23 RX ADMIN — ACETAMINOPHEN 1000 MG: 500 TABLET, FILM COATED ORAL at 06:34

## 2025-07-23 RX ADMIN — PENTOXIFYLLINE 400 MG: 400 TABLET, EXTENDED RELEASE ORAL at 09:55

## 2025-07-23 NOTE — CASE MANAGEMENT/SOCIAL WORK
Continued Stay Note   [facility]     Patient Name: Shira King  MRN: [id number]  Today's Date: 7/23/2025    Admit Date: 7/19/2025    Plan: [facility]   Discharge Plan       Row Name 07/23/25 1250       Plan    Plan [facility]    Patient/Family in Agreement with Plan yes    Final Discharge Disposition Code 03 - skilled nursing facility (SNF)    Final Note Pt is being d/c'ed to [facility] [phone] today. D/C summary has been sent to them. Notified pt's GABINO Willard [phone] of d/c. She will go by [facility] [phone].                   Discharge Codes    No documentation.                 Expected Discharge Date and Time       Expected Discharge Date Expected Discharge Time    Jul 23, 2025               RUSSELL Kendrick

## 2025-07-23 NOTE — DISCHARGE SUMMARY
[facility]  DISCHARGE SUMMARY       Date of Admission: 7/19/2025  Date of Discharge:  7/23/2025  Primary Care Physician: Reynaldo Cavazos MD    Presenting Problem/History of Present Illness: Confusion and fall    Final Discharge Diagnoses:  Active Hospital Problems    Diagnosis     **Hyponatremia     Pubic ramus fracture     Dementia without behavioral disturbance     AMS (altered mental status)     Essential hypertension        Consults: Orthopedic surgery    Procedures Performed: None    Pertinent Test Results:   Results for orders placed during the hospital encounter of 04/26/22    Adult Transthoracic Echo Complete W/ Cont if Necessary Per Protocol (With Agitated Saline)    Interpretation Summary  · Left ventricular ejection fraction appears to be 56 - 60%. Left ventricular systolic function is normal.  · Left ventricular wall thickness is consistent with moderate concentric hypertrophy.  · Left ventricular diastolic function is consistent with (grade I) impaired relaxation.  · Normal right ventricular cavity size and systolic function noted.  · No evidence of a patent foramen ovale. No evidence of an atrial septal defect present. Saline test results are negative for right to left atrial level shunt.  · There is thickening of the aortic valve. No aortic valve regurgitation is present. No hemodynamically significant aortic valve stenosis is present.  · There is mild calcification of the mitral valve posterior leaflet(s). Mild mitral valve regurgitation is present. No significant mitral valve stenosis is present.  · Mild pulmonary hypertension is present.      Imaging Results (All)       Procedure Component Value Units Date/Time    XR Shoulder 2+ View Right [[id number]] Collected: 07/19/25 1508     Updated: 07/19/25 1512    Narrative:      EXAM: XR SHOULDER 2+ VW RIGHT-      INDICATION: Right shoulder pain after a fall; E87.7-[id number]  and  hyponatremia; S32.9XXA-Fracture of unspecified parts of lumbosacral  spine and pelvis, initial encounter for closed; R13.10-Dysphagia,  unspecified      COMPARISON: None.     TECHNIQUE: 2 views right shoulder.     FINDINGS:     No acute fracture or malalignment. Glenohumeral joint appears preserved.  AC joint is preserved with minimal arthrosis.       Impression:         No acute osseous finding.        This report was signed and finalized on 7/19/2025 3:09 PM by Helio Parisi.       CT Abdomen Pelvis Without Contrast [[id number]] Collected: 07/19/25 0705     Updated: 07/19/25 0716    Narrative:      EXAM: CT ABDOMEN PELVIS WO CONTRAST-     INDICATION: Concerns for R hip fracture clinically, s/p fall. PT does  have non tender abd, however mild bruising present on left side of the  abd.       TECHNIQUE: Helically acquired CT images were obtained of the abdomen and  pelvis without intravenous contrast. Coronal and sagittal reformations  were performed.       DOSE LENGTH PRODUCT: 1006.44 mGy.cm mGy cm. Automated exposure control  was also utilized to decrease patient radiation dose.     COMPARISON: Non available.     FINDINGS:     Lower Chest: No acute findings.     Liver: Unremarkable.     Biliary Tree: Prior cholecystectomy.     Spleen: Unremarkable.     Pancreas: Unremarkable.     Adrenal Glands: 1.1 cm right adrenal adenoma.     Kidneys/Ureters/Bladder: Unremarkable.     Reproductive Organs: Prior hysterectomy.     Gastrointestinal Tract: Colonic diverticulosis.     Lymphatics: Unremarkable.     Vasculature: Mild atherosclerosis.     Peritoneum/Retroperitoneum: Unremarkable.     Abdominal Wall/Soft Tissues: A tiny fat-containing umbilical hernia.  Mild soft tissue edema in the left mid abdominal wall subcutaneous fat.     Osseous Structures: Acute appearing essentially nondisplaced right  inferior pubic ramus fracture. There may be an additional subtle  nondisplaced fracture about the right superior pubic  ramus near the  pubic symphysis (series 3 image 23).       Impression:         1. Acute appearing essentially nondisplaced right inferior pubic ramus  fracture. There may also be a subtle nondisplaced fracture about the  right superior pubic ramus near the pubic symphysis.  2. 1.1 cm right adrenal adenoma.  3. Mild left abdominal wall soft tissue edema, likely contusion.        This report was signed and finalized on 7/19/2025 7:13 AM by Helio Parisi.       CT Cervical Spine Without Contrast [[id number]] Collected: 07/19/25 0702     Updated: 07/19/25 0708    Narrative:      Exam: CT CERVICAL SPINE WO CONTRAST-      HISTORY: Neck pain after mechanical fall.     COMPARISON: 9/29/2021 MRI     DOSE LENGTH PRODUCT: 1133.12 mGy.cm mGy cm. Automated exposure control  was also utilized to decrease patient radiation dose.     TECHNIQUE: Serial helical tomographic images of the cervical spine were  obtained without the use of intravenous contrast. Additionally, sagittal  and coronal reformatted images were also provided for review.     FINDINGS:     Straightening of the cervical lordosis. Osseous fusion of C6-C7 with  grade 1 anterolisthesis.     No acute fracture. The vertebral body heights are preserved.     Multilevel degenerative disc disease, facet arthropathy, and  uncovertebral hypertrophy.     No high-grade bony spinal canal narrowing. Variable degrees of  multilevel foraminal narrowing.     Other: Enlarged multinodular thyroid gland.       Impression:         1. No acute fracture or traumatic malalignment.  2. Enlarged multinodular thyroid gland.     These findings are in agreement with the critical and emergent findings  from the [facility] preliminary report.           This report was signed and finalized on 7/19/2025 7:05 AM by Helio Parisi.       XR Femur 2 View Right [[id number]] Collected: 07/19/25 0701     Updated: 07/19/25 0705    Narrative:      EXAM: XR FEMUR 2 VW RIGHT-      INDICATION: R hip tenderness  and pain, Leg is rotated externally. S/P  fall      COMPARISON: None.     TECHNIQUE: 2 views right femur.     FINDINGS:     Right inferior pubic ramus fracture as detailed on separate report.  Right knee arthroplasty. No visible acute fracture or malalignment  within the right femur. Vascular calcifications.       Impression:         No acute osseous finding in the right femur.        This report was signed and finalized on 7/19/2025 7:01 AM by Helio Parisi.       XR Hip With or Without Pelvis 2 - 3 View Right [[id number]] Collected: 07/19/25 0658     Updated: 07/19/25 0704    Narrative:      EXAM: XR HIP W OR WO PELVIS 2-3 VIEW RIGHT-      INDICATION: R hip tenderness and pain, Leg is rotated externally. S/P  fall      COMPARISON: None.     TECHNIQUE: 3 views pelvis and right hip.     FINDINGS:     Minimally displaced right inferior pubic ramus fracture. SI joints are  symmetric with mild degenerative changes. Pubic symphysis is preserved.  Mild bilateral hip joint space loss.       Impression:         Acute appearing very minimally displaced right inferior pubic ramus  fracture.        This report was signed and finalized on 7/19/2025 7:00 AM by Helio Parisi.       XR Chest 1 View [[id number]] Collected: 07/19/25 0657     Updated: 07/19/25 0701    Narrative:      EXAM: XR CHEST 1 VW-      HISTORY: S/p Mechanical fall. Decrease breath sounds bilateral ,  evaluation for pneumothorax.       COMPARISON: 10/23/2022.     TECHNIQUE: Single frontal radiograph of the chest was obtained.     FINDINGS:     Support Devices: None.     Cardiac and Mediastinal Silhouettes: Normal.     Lungs/Pleura: No focal consolidation. No sizable pleural effusion. No  visible pneumothorax.     Osseous structures: No acute osseous finding.     Other: None.       Impression:         No acute cardiopulmonary abnormality.           This report was signed and finalized on 7/19/2025 6:58 AM by Helio Parisi.       CT Head Without Contrast  [[id number]] Collected: 07/19/25 0614     Updated: 07/19/25 0619    Narrative:      Exam: CT HEAD WO CONTRAST-      HISTORY: fall, elderly patient, does not recall if she had LOC       DOSE LENGTH PRODUCT: 1133.12 mGy.cm mGy cm. Automated exposure control  was also utilized to decrease patient radiation dose.     Technique:  Helically acquired CT of the brain without IV contrast was performed.  Sagittal and coronal reformations are also provided for review. Soft  tissue and bone kernels are available for interpretation.     Comparison: 4/26/2022.     Findings:     Ventricles and extra-axial CSF spaces are normal in size.     No intraparenchymal or extra-axial hemorrhage.     Gray-white matter differentiation is preserved.     Orbits are grossly unremarkable. Paranasal sinuses are grossly clear.  Mastoid air cells are grossly clear.     No suspicious calvarial or extracranial soft tissue abnormality.       Impression:      Impression:       No acute intracranial abnormality.     These findings are in agreement with the critical and emergent findings  from the [facility] preliminary report.     This report was signed and finalized on 7/19/2025 6:16 AM by Helio Parisi.             LAB RESULTS:      Lab 07/22/25  0543 07/21/25  0627 07/19/25  0509   WBC 7.65 6.44 8.12   HEMOGLOBIN 11.9* 11.7* 12.3   HEMATOCRIT 34.1 33.6* 34.2   PLATELETS 159 135* 202   NEUTROS ABS 5.45 5.08 6.77   IMMATURE GRANS (ABS) 0.06* 0.02 0.06*   LYMPHS ABS 1.07 0.62* 0.62*   MONOS ABS 0.79 0.57 0.61   EOS ABS 0.25 0.12 0.04   MCV 91.9 91.3 89.8   PROTIME  --   --  13.8   APTT  --   --  29.8         Lab 07/23/25  0538 07/22/25  1719 07/22/25  1245 07/22/25  0543 07/22/25  0002 07/21/25  1754 07/21/25  1205 07/21/25  0819 07/19/25  1604 07/19/25  1200 07/19/25  1037 07/19/25  0509   SODIUM 129* 130*  --  125*  125* 129* 126*   < > 128*   < >  --  119* 115*   POTASSIUM 3.5  --  4.0 3.6 3.1*  --   --  2.7*  --   --  4.3 4.6   CHLORIDE 96*  --   --   94*  --   --   --  95*  --   --  88* 86*   CO2 24.0  --   --  21.0*  --   --   --  22.0  --   --  20.0* 18.0*   ANION GAP 9.0  --   --  10.0  --   --   --  11.0  --   --  11.0 11.0   BUN 4.2*  --   --  2.8*  --   --   --  2.6*  --   --  8.9 11.1   CREATININE 0.41*  --   --  0.37*  --   --   --  0.33*  --   --  0.60 0.73   EGFR 95.4  --   --  97.7  --   --   --  100.5  --   --  87.0 79.7   GLUCOSE 114*  --   --  107*  --   --   --  122*  --   --  140* 136*   CALCIUM 8.6  --   --  8.2*  --   --   --  8.1*  --   --  8.5* 8.9   IONIZED CALCIUM  --   --   --   --   --   --   --   --   --  4.60  --   --    MAGNESIUM 1.7  --   --  2.0  --   --   --  1.4*  --   --   --  1.8   PHOSPHORUS  --  2.7  --  2.1*  --   --   --  1.6*  --   --   --   --    TSH  --   --   --   --   --   --   --   --   --   --   --  1.030    < > = values in this interval not displayed.         Lab 07/22/25  0543 07/21/25  0819 07/19/25  1037 07/19/25  0509   TOTAL PROTEIN 5.5* 5.3* 5.7* 6.0   ALBUMIN 3.3* 3.3* 3.7 3.8   GLOBULIN 2.2 2.0 2.0 2.2   ALT (SGPT) 21 15 18 19   AST (SGOT) 63* 55* 53* 55*   BILIRUBIN 0.8 0.9 1.2 1.4*   ALK PHOS 70 66 84 85         Lab 07/19/25  0509   PROBNP 301.0   PROTIME 13.8   INR 1.00                 Brief Urine Lab Results  (Last result in the past 365 days)        Color   Clarity   Blood   Leuk Est   Nitrite   Protein   CREAT   Urine HCG        07/19/25 0605 Yellow   Clear   Negative   Negative   Negative   Negative                 Microbiology Results (last 10 days)       ** No results found for the last 240 hours. **            Hospital Course:   Mrs. Shira King is a 85 years old woman with past medical history of essential hypertension on hydrochlorothiazide initiated recently, baby aspirin who presents to [facility] post mechanical fall.     She experienced fall yesterday at 10 PM on 7/18/2025 and was placed back in the bed by her  and her son.  This morning she experienced right hip pain.  " EMS was called.  She was GCS of 15 on arrival but complaining of right hip pain with pain being reproduced on palpation and movement of the right leg, also right leg was noted to be externally rotated.  She was given 50 mcg of fentanyl for pain and transferred to ED.  Lab work was significant for sodium of 115.  And CT scan showed no intracranial abnormality.  C-spine was negative.  CT abdomen pelvis without contrast revealed minimally displaced right inferior pubic ramus fracture.  ICU was consulted for admission due to the critically low sodium.    Patient was given normal saline infusion and her hydrochlorothiazide home medication was discontinued. This was thought to be the cause of her hyponatremia. Her sodium improved on above treatment and she was also started on fluid restriction and salt tablets.  She was reviewed by orthopedic surgeon Dr. Carney who recommended conservative management for pubic ramus fracture with pain control. He recommended to see her in his office in 2 to 3 weeks for follow-up after discharge. Her mental status returned to baseline on above treatments.  She was reviewed by physical therapy and was recommended for skilled nursing facility placement for rehab. She was discharged in stable condition to skilled nursing facility.  She was alert and oriented x 3 at the time of discharge.    Physical Exam on Discharge:  /60 (BP Location: Left arm, Patient Position: Lying)   Pulse 82   Temp 99.3 °F (37.4 °C) (Axillary)   Resp 16   Ht 168.9 cm (66.5\")   Wt 78.7 kg (173 lb 8 oz)   LMP  (LMP Unknown)   SpO2 94%   BMI 27.59 kg/m²   Physical Exam  Constitutional:       General: She is not in acute distress.     Appearance: She is not ill-appearing or diaphoretic.   HENT:      Head: Normocephalic and atraumatic.      Right Ear: External ear normal.      Left Ear: External ear normal.      Nose: No congestion or rhinorrhea.      Mouth/Throat:      Mouth: Mucous membranes are moist.      " Pharynx: No oropharyngeal exudate or posterior oropharyngeal erythema.   Eyes:      General: No scleral icterus.     Extraocular Movements: Extraocular movements intact.      Conjunctiva/sclera: Conjunctivae normal.   Cardiovascular:      Rate and Rhythm: Normal rate and regular rhythm.      Heart sounds: Normal heart sounds. No murmur heard.  Pulmonary:      Effort: Pulmonary effort is normal. No respiratory distress.      Breath sounds: Normal breath sounds. No wheezing, rhonchi or rales.   Abdominal:      General: Abdomen is flat. There is no distension.      Palpations: Abdomen is soft.      Tenderness: There is no abdominal tenderness. There is no guarding.   Musculoskeletal:         General: No swelling, tenderness or deformity.      Cervical back: Neck supple. No rigidity. No muscular tenderness.      Right lower leg: No edema.      Left lower leg: No edema.   Lymphadenopathy:      Cervical: No cervical adenopathy.   Skin:     General: Skin is warm and dry.   Neurological:      General: No focal deficit present.      Mental Status: She is alert and oriented to person, place, and time.      Cranial Nerves: No cranial nerve deficit.      Motor: No weakness.   Psychiatric:         Mood and Affect: Mood normal.         Behavior: Behavior normal.         Thought Content: Thought content normal.       Condition on Discharge: Stable    Discharge Disposition:  Skilled Nursing Facility (DC - External)    Discharge Medications:     Discharge Medications        New Medications        Instructions Start Date   methocarbamol 750 MG tablet  Commonly known as: ROBAXIN   750 mg, Oral, 4 Times Daily      QUEtiapine 25 MG tablet  Commonly known as: SEROquel   25 mg, Oral, Nightly      sodium chloride 1 g tablet   1 g, Oral, 2 Times Daily With Meals             Changes to Medications        Instructions Start Date   losartan 50 MG tablet  Commonly known as: Cozaar  What changed:   medication strength  how much to take   50 mg,  Oral, Daily             Continue These Medications        Instructions Start Date   ALPRAZolam 0.25 MG tablet  Commonly known as: XANAX   0.25 mg, Oral, 2 Times Daily PRN      aluminum-magnesium hydroxide-simethicone 200-200-20 MG/5ML suspension  Commonly known as: MAALOX/MYLANTA   10 mL, Oral, Every 6 Hours PRN      amLODIPine 5 MG tablet  Commonly known as: NORVASC   5 mg, Daily      aspirin 81 MG chewable tablet   81 mg, Every Other Day      carvedilol 6.25 MG tablet  Commonly known as: COREG   6.25 mg, 2 Times Daily With Meals      celecoxib 200 MG capsule  Commonly known as: CeleBREX   200 mg, Daily      HYDROcodone-acetaminophen 7.5-325 MG per tablet  Commonly known as: NORCO   0.5 tablets, Oral, Every 4 Hours      omeprazole 20 MG capsule  Commonly known as: priLOSEC   20 mg, 2 Times Daily      oxybutynin 5 MG tablet  Commonly known as: DITROPAN   5 mg, Oral, Daily      pentoxifylline 400 MG CR tablet  Commonly known as: TRENtal   400 mg, Daily      pravastatin 20 MG tablet  Commonly known as: PRAVACHOL   20 mg, Daily      Vitamin B Complex tablet   1 tablet, Daily             Stop These Medications      acetaminophen 500 MG tablet  Commonly known as: TYLENOL     hydroCHLOROthiazide 12.5 MG tablet     metoclopramide 10 MG tablet  Commonly known as: REGLAN              This patient does not have current or prior documentation of an left ventricular ejection fraction (LVEF) of less than or equal to 40%.    Discharge instructions:  1.  Follow-up with your primary care provider within 1 week of discharge.  2.  Patient should have physical and Occupational Therapy at the skilled nursing facility for strengthening.  3.  Patient should check her sodium level on Friday 7/25/2025 and sent to her primary care provider to monitor her hyponatremia.  4.  Patient should follow-up with orthopedic surgeon Dr. Carney in 2 weeks around Thursday 8/7/2025 for her pubic ramus fracture.     Discharge diet:   Diet Instructions        Diet: Regular/House Diet; Regular (IDDSI 7); Thin (IDDSI 0)      Discharge Diet: Regular/House Diet    Texture: Regular (IDDSI 7)    Fluid Consistency: Thin (IDDSI 0)            Activity at Discharge:   Activity Instructions       Activity as Tolerated              Follow-up Appointments:   No future appointments.    Test Results Pending at Discharge: None    Electronically signed by Jadyn Hankins MD, 07/23/25, 11:59 CDT.    Time: 35 minutes of time was spent evaluating patient and planning discharge

## 2025-07-23 NOTE — THERAPY TREATMENT NOTE
Acute Care - Physical Therapy Treatment Note  [facility]     Patient Name: Shira King  : 1940  MRN: [id number]  Today's Date: 2025      Visit Dx:     ICD-10-CM ICD-9-CM   1. Hyponatremia  E87.1 276.1   2. Closed nondisplaced fracture of pelvis, unspecified part of pelvis, initial encounter  S32.9XXA 808.8   3. Dysphagia, unspecified type  R13.10 787.20   4. Impaired mobility [Z74.09]  Z74.09 799.89     Patient Active Problem List   Diagnosis    Essential hypertension    RMSF ([place] spotted fever)    Mixed hyperlipidemia    Lumbar pain    Gastroesophageal reflux disease    Insomnia    Obstructive sleep apnea    Periodic limb movement    Snoring    Somnolence, daytime    Chronic intractable headache    Chronic ethmoidal sinusitis    Peripheral vascular disease    Hypertrophy of both inferior nasal turbinates    Nasal vestibulitis    Other acute sinusitis    Allergic rhinitis    AMS (altered mental status)    Memory loss    Advanced age    Dementia without behavioral disturbance    Chest pain    Hyponatremia     Past Medical History:   Diagnosis Date    Cancer     skin    Chronic intractable headache 2021    Gastroesophageal reflux disease 2020    Hypertension     Lumbar pain 2019    Mixed hyperlipidemia     PONV (postoperative nausea and vomiting)     Sinusitis     Stroke     Valvular disease      Past Surgical History:   Procedure Laterality Date    BLADDER REPAIR  2005    BREAST SURGERY      BUNIONECTOMY  2007    CARDIAC CATHETERIZATION      CATARACT EXTRACTION Bilateral     CHOLECYSTECTOMY      COLPOCLEISIS N/A 2022    Procedure: COLPOCLEISIS;  Surgeon: Martina Street MD;  Location: [facility];  Service: Obstetrics/Gynecology;  Laterality: N/A;    ENDOSCOPY      HERNIA REPAIR  1963    HYSTERECTOMY      KRISTAN BSO for fibroids    NECK SURGERY  1974    VERTEBRA    REPLACEMENT TOTAL KNEE BILATERAL Bilateral     TONSILLECTOMY       PT Assessment (Last 12  Hours)       PT Evaluation and Treatment       Row Name 07/23/25 1032 07/23/25 0756       Physical Therapy Time and Intention    Subjective Information complains of;weakness;pain  - --  -    Document Type therapy note (daily note)  - --    Mode of Treatment physical therapy  - --    Session Not Performed -- other (see comments)  -    Comment, Session Not Performed -- check back later  -    Comment fearful of falling  - --      Row Name 07/23/25 1032          General Information    Existing Precautions/Restrictions fall  WBAT RLE  -AH       Row Name 07/23/25 1032          Pain    Pain Location hip;shoulder  -     Pain Side/Orientation right  -     Pain Management Interventions positioning techniques utilized  -     Response to Pain Interventions activity participation with tolerable pain  -AH       Row Name 07/23/25 1032          Pain Scale: FACES Pre/Post-Treatment    Pain: FACES Scale, Pretreatment 0-->no hurt  -     Posttreatment Pain Rating 4-->hurts little more  -AH       Row Name 07/23/25 1032          Mobility    Extremity Weight-bearing Status right lower extremity  -     Right Lower Extremity (Weight-bearing Status) weight-bearing as tolerated (WBAT)  -AH       Row Name 07/23/25 1032          Bed Mobility    Supine-Sit [place] (Bed Mobility) verbal cues;minimum assist (75% patient effort);moderate assist (50% patient effort)  -     Sit-Supine [place] (Bed Mobility) verbal cues;maximum assist (25% patient effort);moderate assist (50% patient effort)  -AH       Row Name 07/23/25 1032          Transfers    Comment, (Transfers) stood x 4  -AH       Row Name 07/23/25 1032          Sit-Stand Transfer    Sit-Stand [place] (Transfers) minimum assist (75% patient effort);verbal cues  -AH       Row Name 07/23/25 1032          Stand-Sit Transfer    Stand-Sit [place] (Transfers) minimum assist (75% patient effort);verbal cues  -AH       Row Name 07/23/25 1032          Stand  Pivot/Stand Step Transfer    Stand Pivot/Stand Step [place] (Transfers) moderate assist (50% patient effort);maximum assist (25% patient effort);verbal cues  -     Assistive Device (Stand Pivot Stand Step Transfer) walker, front-wheeled  -       Row Name 07/23/25 1032          Toilet Transfer    [place] Level (Toilet Transfer) moderate assist (50% patient effort);maximum assist (25% patient effort);verbal cues  -       Row Name 07/23/25 1032          Motor Skills    Comments, Therapeutic Exercise BLE HEP A-AAROM 10 X 2 reps  -       Row Name 07/23/25 1032          Positioning and Restraints    Pre-Treatment Position in bed  -     Post Treatment Position bed  -     In Bed fowlers;call light within reach;encouraged to call for assist;patient within staff view  -               User Key  (r) = Recorded By, (t) = Taken By, (c) = Cosigned By      Initials Name Provider Type     Martha Pratt, FERNIE Physical Therapist Assistant                    Physical Therapy Education       Title: PT OT SLP Therapies (In Progress)       Topic: Physical Therapy (Done)       Point: Mobility training (Done)       Learning Progress Summary            Patient Acceptance, E, VU by FRANCISCO at 7/20/2025 1146    Comment: role of PT, WBAt, t/f with walker, call for assist, high fall risk   Family Acceptance, E, VU by AJ at 7/20/2025 1146    Comment: role of PT, WBAt, t/f with walker, call for assist, high fall risk                      Point: Home exercise program (Done)       Learning Progress Summary            Patient Acceptance, E, VU by AJ at 7/20/2025 1146    Comment: role of PT, WBAt, t/f with walker, call for assist, high fall risk   Family Acceptance, E, VU by AJ at 7/20/2025 1146    Comment: role of PT, WBAt, t/f with walker, call for assist, high fall risk                      Point: Body mechanics (Done)       Learning Progress Summary            Patient Acceptance, E, VU by AJ at 7/20/2025 1146    Comment: role  of PT, WBAt, t/f with walker, call for assist, high fall risk   Family Acceptance, E, VU by  at 7/20/2025 1146    Comment: role of PT, WBAt, t/f with walker, call for assist, high fall risk                      Point: Precautions (Done)       Learning Progress Summary            Patient Acceptance, E, VU by  at 7/20/2025 1146    Comment: role of PT, WBAt, t/f with walker, call for assist, high fall risk   Family Acceptance, E, VU by  at 7/20/2025 1146    Comment: role of PT, WBAt, t/f with walker, call for assist, high fall risk                                      User Key       Initials Effective Dates Name Provider Type Discipline     08/15/24 -  Helio Levin, PT DPT Physical Therapist PT                  PT Recommendation and Plan         Outcome Measures       Row Name 07/23/25 1100 07/21/25 0836          How much help from another person do you currently need...    Turning from your back to your side while in flat bed without using bedrails? 3  - 2  -MF     Moving from lying on back to sitting on the side of a flat bed without bedrails? 3  - 2  -MF     Moving to and from a bed to a chair (including a wheelchair)? 2  - 3  -MF     Standing up from a chair using your arms (e.g., wheelchair, bedside chair)? 3  - 3  -MF     Climbing 3-5 steps with a railing? 1  - 1  -MF     To walk in hospital room? 1  - 2  -MF     AM-PAC 6 Clicks Score (PT) 13  - 13  -        Functional Assessment    Outcome Measure Options AM-PAC 6 Clicks Basic Mobility (PT)  - AM-PAC 6 Clicks Basic Mobility (PT)  -               User Key  (r) = Recorded By, (t) = Taken By, (c) = Cosigned By      Initials Name Provider Type     Martha Pratt PTA Physical Therapist Assistant    Selma Berrios PTA Physical Therapist Assistant                     Time Calculation:    PT Charges       Row Name 07/23/25 1110             Time Calculation    Start Time 1032  -      Stop Time 1110  -      Time Calculation  (min) 38 min  -      PT Received On 07/23/25  -         Time Calculation- PT    Total Timed Code Minutes- PT 38 minute(s)  -         Timed Charges    96136 - PT Therapeutic Exercise Minutes 20  -      51113 - PT Therapeutic Activity Minutes 18  -AH         Total Minutes    Timed Charges Total Minutes 38  -       Total Minutes 38  -                User Key  (r) = Recorded By, (t) = Taken By, (c) = Cosigned By      Initials Name Provider Type     Martha Pratt, FERNIE Physical Therapist Assistant                  Therapy Charges for Today       Code Description Service Date Service Provider Modifiers Qty    44841328664 HC PT THER PROC EA 15 MIN 7/23/2025 Martha Pratt, FERNIE GP 2    20997825097 HC PT THERAPEUTIC ACT EA 15 MIN 7/23/2025 Martha Pratt, FERNIE GP 1            PT G-Codes  Outcome Measure Options: AM-PAC 6 Clicks Basic Mobility (PT)  AM-PAC 6 Clicks Score (PT): 13  AM-PAC 6 Clicks Score (OT): 12    Martha Pratt PTA  7/23/2025

## 2025-07-23 NOTE — THERAPY DISCHARGE NOTE
Acute Care - Physical Therapy Discharge Summary  [facility]       Patient Name: Shira King  : 1940  MRN: [id number]    Today's Date: 2025                 Admit Date: 2025      PT Recommendation and Plan    Visit Dx:    ICD-10-CM ICD-9-CM   1. Hyponatremia  E87.1 276.1   2. Closed nondisplaced fracture of pelvis, unspecified part of pelvis, initial encounter  S32.9XXA 808.8   3. Dysphagia, unspecified type  R13.10 787.20   4. Impaired mobility [Z74.09]  Z74.09 799.89   5. Closed fracture of ramus of right pubis, initial encounter  S32.591A 808.2   6. Anxiety  F41.9 300.00        Outcome Measures       Row Name 25 1100 25 0836          How much help from another person do you currently need...    Turning from your back to your side while in flat bed without using bedrails? 3  - 2  -MF     Moving from lying on back to sitting on the side of a flat bed without bedrails? 3  - 2  -MF     Moving to and from a bed to a chair (including a wheelchair)? 2  - 3  -MF     Standing up from a chair using your arms (e.g., wheelchair, bedside chair)? 3  - 3  -MF     Climbing 3-5 steps with a railing? 1  - 1  -MF     To walk in hospital room? 1  - 2  -MF     AM-PAC 6 Clicks Score (PT) 13  - 13  -        Functional Assessment    Outcome Measure Options AM-PAC 6 Clicks Basic Mobility (PT)  - AM-PAC 6 Clicks Basic Mobility (PT)  -               User Key  (r) = Recorded By, (t) = Taken By, (c) = Cosigned By      Initials Name Provider Type     Martha Pratt PTA Physical Therapist Assistant     Selma Pineda PTA Physical Therapist Assistant                     PT Charges       Row Name 25 1110             Time Calculation    Start Time 1032  -      Stop Time 1110  -      Time Calculation (min) 38 min  -      PT Received On 25  -         Time Calculation- PT    Total Timed Code Minutes- PT 38 minute(s)  -         Timed Charges    39287 - PT  Therapeutic Exercise Minutes 20  -AH      29576 - PT Therapeutic Activity Minutes 18  -AH         Total Minutes    Timed Charges Total Minutes 38  -AH       Total Minutes 38  -AH                User Key  (r) = Recorded By, (t) = Taken By, (c) = Cosigned By      Initials Name Provider Type    Martha Madrigal, PTA Physical Therapist Assistant                     PT Rehab Goals       Row Name 07/23/25 1400             Bed Mobility Goal 1 (PT)    Activity/Assistive Device (Bed Mobility Goal 1, PT) rolling to left;rolling to right;sit to supine;supine to sit  -AB      [place] Level/Cues Needed (Bed Mobility Goal 1, PT) minimum assist (75% or more patient effort)  -AB      Time Frame (Bed Mobility Goal 1, PT) long term goal (LTG);10 days  -AB      Progress/Outcomes (Bed Mobility Goal 1, PT) goal not met  -AB         Transfer Goal 1 (PT)    Activity/Assistive Device (Transfer Goal 1, PT) sit-to-stand/stand-to-sit;bed-to-chair/chair-to-bed;toilet;wheelchair transfer  -AB      [place] Level/Cues Needed (Transfer Goal 1, PT) contact guard required  -AB      Time Frame (Transfer Goal 1, PT) long term goal (LTG);10 days  -AB      Progress/Outcome (Transfer Goal 1, PT) goal not met  -AB         Gait Training Goal 1 (PT)    Activity/Assistive Device (Gait Training Goal 1, PT) gait (walking locomotion);decrease asymmetrical patterns;decrease fall risk;diminish gait deviation;forward stepping;improve balance and speed;increase endurance/gait distance;increase energy conservation;assistive device use;walker, rolling  -AB      [place] Level (Gait Training Goal 1, PT) minimum assist (75% or more patient effort)  -AB      Distance (Gait Training Goal 1, PT) 25' with no LOB  -AB      Time Frame (Gait Training Goal 1, PT) long term goal (LTG);10 days  -AB      Progress/Outcome (Gait Training Goal 1, PT) goal not met  -AB         Problem Specific Goal 1 (PT)    Problem Specific Goal 1 (PT) Pt will report no increase in  pain with mobility as needed to return to PLOF  -AB      Time Frame (Problem Specific Goal 1, PT) long-term goal (LTG);by discharge  -AB      Progress/Outcome (Problem Specific Goal 1, PT) goal not met  -AB                User Key  (r) = Recorded By, (t) = Taken By, (c) = Cosigned By      Initials Name Provider Type Discipline    Lesley Purcell, PTA Physical Therapist Assistant PT                        PT Discharge Summary  Anticipated Discharge Disposition (PT): skilled nursing facility  Reason for Discharge: Discharge from facility  Outcomes Achieved: Refer to plan of care for updates on goals achieved  Discharge Destination: SNF      Lesley James PTA   7/23/2025

## 2025-07-23 NOTE — THERAPY DISCHARGE NOTE
Acute Care - Occupational Therapy Discharge Summary  [facility]     Patient Name: Shira King  : 1940  MRN: [id number]    Today's Date: 2025                 Admit Date: 2025        OT Recommendation and Plan    Visit Dx:    ICD-10-CM ICD-9-CM   1. Hyponatremia  E87.1 276.1   2. Closed nondisplaced fracture of pelvis, unspecified part of pelvis, initial encounter  S32.9XXA 808.8   3. Dysphagia, unspecified type  R13.10 787.20   4. Impaired mobility [Z74.09]  Z74.09 799.89   5. Closed fracture of ramus of right pubis, initial encounter  S32.591A 808.2   6. Anxiety  F41.9 300.00                OT Rehab Goals       Row Name 25 1500             Transfer Goal 1 (OT)    Activity/Assistive Device (Transfer Goal 1, OT) bed-to-chair/chair-to-bed;commode, bedside without drop arms  -      [place] Level/Cues Needed (Transfer Goal 1, OT) standby assist  -      Time Frame (Transfer Goal 1, OT) long term goal (LTG);10 days  -      Progress/Outcome (Transfer Goal 1, OT) goal not met  -         Dressing Goal 1 (OT)    Activity/Device (Dressing Goal 1, OT) upper body dressing  -      [place]/Cues Needed (Dressing Goal 1, OT) standby assist  -      Time Frame (Dressing Goal 1, OT) long term goal (LTG);10 days  -      Progress/Outcome (Dressing Goal 1, OT) goal not met  -         Toileting Goal 1 (OT)    Activity/Device (Toileting Goal 1, OT) toileting skills, all  -      [place] Level/Cues Needed (Toileting Goal 1, OT) minimum assist (75% or more patient effort)  -      Time Frame (Toileting Goal 1, OT) long term goal (LTG);10 days  -      Progress/Outcome (Toileting Goal 1, OT) goal not met  -                User Key  (r) = Recorded By, (t) = Taken By, (c) = Cosigned By      Initials Name Provider Type Discipline    Millie Diggs, OTR/L, CSRS Occupational Therapist OT                     Outcome Measures       Row Name 25 1100 25 0836           How much help from another person do you currently need...    Turning from your back to your side while in flat bed without using bedrails? 3  - 2  -MF     Moving from lying on back to sitting on the side of a flat bed without bedrails? 3  - 2  -MF     Moving to and from a bed to a chair (including a wheelchair)? 2  - 3  -MF     Standing up from a chair using your arms (e.g., wheelchair, bedside chair)? 3  - 3  -MF     Climbing 3-5 steps with a railing? 1  - 1  -MF     To walk in hospital room? 1  - 2  -MF     AM-PAC 6 Clicks Score (PT) 13  - 13  -        Functional Assessment    Outcome Measure Options AM-PAC 6 Clicks Basic Mobility (PT)  - AM-PAC 6 Clicks Basic Mobility (PT)  -               User Key  (r) = Recorded By, (t) = Taken By, (c) = Cosigned By      Initials Name Provider Type     Martha Pratt PTA Physical Therapist Assistant     Selma Pineda PTA Physical Therapist Assistant                    Timed Therapy Charges  Total Units: 2      Suggested Charges  Total Units: 2      Procedure Name Documented Minutes Units Code    HC OT SELF CARE/MGMT/TRAIN EA 15 MIN 24 2   70789 (CPT®)                 Documented Minutes  Total Minutes: 24      Therapy Provided Minutes    19103 - OT Self Care/Mgmt Minutes 24                        OT Discharge Summary  Anticipated Discharge Disposition (OT): skilled nursing facility  Reason for Discharge: Discharge from facility  Outcomes Achieved: Refer to plan of care for updates on goals achieved  Discharge Destination: SNF      SELINA Ashley/L, CSRS  7/23/2025

## 2025-07-23 NOTE — PLAN OF CARE
Goal Outcome Evaluation:  Plan of Care Reviewed With: patient, child             Pt alert, oriented to elf only. Mepliexes in place to bilateral heels and coccyx per protocol. fall precautions, bed alarm on, safety maintained. Report called to Rachele jerry at [facility], pt awaiting EMS to transport. Called POA/daughter Montse updated her on pts plan of care.

## 2025-07-23 NOTE — PLAN OF CARE
Problem: Adult Inpatient Plan of Care  Goal: Plan of Care Review  Outcome: Progressing  Flowsheets (Taken 7/23/2025 0614)  Progress: improving  Outcome Evaluation: Pt alert, oriented to self and place. C/o pain x1, see mar. C/o anxiety x1, see mar. Bed alarm on. Safety maintained,  Plan of Care Reviewed With: patient

## 2025-08-05 ENCOUNTER — OFFICE VISIT (OUTPATIENT)
Age: 85
End: 2025-08-05
Payer: MEDICARE

## 2025-08-05 DIAGNOSIS — S32.591A CLOSED FRACTURE OF MULTIPLE PUBIC RAMI, RIGHT, INITIAL ENCOUNTER (HCC): ICD-10-CM

## 2025-08-05 DIAGNOSIS — R52 PAIN: Primary | ICD-10-CM

## 2025-08-05 PROCEDURE — 1159F MED LIST DOCD IN RCRD: CPT | Performed by: PHYSICIAN ASSISTANT

## 2025-08-05 PROCEDURE — 4004F PT TOBACCO SCREEN RCVD TLK: CPT | Performed by: PHYSICIAN ASSISTANT

## 2025-08-05 PROCEDURE — G8427 DOCREV CUR MEDS BY ELIG CLIN: HCPCS | Performed by: PHYSICIAN ASSISTANT

## 2025-08-05 PROCEDURE — 1123F ACP DISCUSS/DSCN MKR DOCD: CPT | Performed by: PHYSICIAN ASSISTANT

## 2025-08-05 PROCEDURE — 1090F PRES/ABSN URINE INCON ASSESS: CPT | Performed by: PHYSICIAN ASSISTANT

## 2025-08-05 PROCEDURE — 99204 OFFICE O/P NEW MOD 45 MIN: CPT | Performed by: PHYSICIAN ASSISTANT

## 2025-08-05 PROCEDURE — G8421 BMI NOT CALCULATED: HCPCS | Performed by: PHYSICIAN ASSISTANT

## 2025-08-25 ENCOUNTER — TELEPHONE (OUTPATIENT)
Age: 85
End: 2025-08-25

## (undated) DEVICE — PK TURNOVER RM ADV

## (undated) DEVICE — ANTIBACTERIAL UNDYED BRAIDED (POLYGLACTIN 910), SYNTHETIC ABSORBABLE SUTURE: Brand: COATED VICRYL

## (undated) DEVICE — GLV SURG SENSICARE W/ALOE PF LF SZ6 STRL

## (undated) DEVICE — PAD MAJOR LITHOTOMY: Brand: MEDLINE INDUSTRIES, INC.